# Patient Record
Sex: MALE | Race: WHITE | NOT HISPANIC OR LATINO | Employment: OTHER | URBAN - METROPOLITAN AREA
[De-identification: names, ages, dates, MRNs, and addresses within clinical notes are randomized per-mention and may not be internally consistent; named-entity substitution may affect disease eponyms.]

---

## 2017-01-01 ENCOUNTER — HOSPITAL ENCOUNTER (EMERGENCY)
Facility: CLINIC | Age: 62
Discharge: HOME | End: 2017-01-01
Attending: EMERGENCY MEDICINE | Admitting: EMERGENCY MEDICINE
Payer: COMMERCIAL

## 2017-01-01 ENCOUNTER — APPOINTMENT (OUTPATIENT)
Dept: GENERAL RADIOLOGY | Facility: CLINIC | Age: 62
End: 2017-01-01
Attending: EMERGENCY MEDICINE
Payer: COMMERCIAL

## 2017-01-01 VITALS
OXYGEN SATURATION: 98 % | WEIGHT: 126.1 LBS | TEMPERATURE: 98.2 F | RESPIRATION RATE: 16 BRPM | BODY MASS INDEX: 17.83 KG/M2 | DIASTOLIC BLOOD PRESSURE: 84 MMHG | SYSTOLIC BLOOD PRESSURE: 143 MMHG

## 2017-01-01 DIAGNOSIS — H10.31 ACUTE CONJUNCTIVITIS OF RIGHT EYE, UNSPECIFIED ACUTE CONJUNCTIVITIS TYPE: ICD-10-CM

## 2017-01-01 DIAGNOSIS — R04.2 HEMOPTYSIS: ICD-10-CM

## 2017-01-01 DIAGNOSIS — J44.1 COPD EXACERBATION (H): ICD-10-CM

## 2017-01-01 DIAGNOSIS — J20.9 ACUTE BRONCHITIS, UNSPECIFIED ORGANISM: ICD-10-CM

## 2017-01-01 DIAGNOSIS — J01.90 ACUTE NON-RECURRENT SINUSITIS, UNSPECIFIED LOCATION: ICD-10-CM

## 2017-01-01 LAB
FLUAV+FLUBV AG SPEC QL: NEGATIVE
FLUAV+FLUBV AG SPEC QL: NORMAL
SPECIMEN SOURCE: NORMAL

## 2017-01-01 PROCEDURE — 71020 XR CHEST 2 VW: CPT

## 2017-01-01 PROCEDURE — 99284 EMERGENCY DEPT VISIT MOD MDM: CPT | Mod: 25

## 2017-01-01 PROCEDURE — 87804 INFLUENZA ASSAY W/OPTIC: CPT | Performed by: EMERGENCY MEDICINE

## 2017-01-01 RX ORDER — ERYTHROMYCIN 5 MG/G
1 OINTMENT OPHTHALMIC 4 TIMES DAILY
Qty: 1 TUBE | Refills: 0 | Status: SHIPPED | OUTPATIENT
Start: 2017-01-01 | End: 2017-01-06

## 2017-01-01 RX ORDER — PREDNISONE 20 MG/1
TABLET ORAL
Qty: 10 TABLET | Refills: 0 | Status: SHIPPED | OUTPATIENT
Start: 2017-01-01 | End: 2017-01-06

## 2017-01-01 RX ORDER — AZITHROMYCIN 250 MG/1
TABLET, FILM COATED ORAL
Qty: 6 TABLET | Refills: 0 | Status: SHIPPED | OUTPATIENT
Start: 2017-01-01 | End: 2017-01-06

## 2017-01-01 ASSESSMENT — ENCOUNTER SYMPTOMS
ABDOMINAL PAIN: 0
COUGH: 1
CHILLS: 1
FEVER: 1
SORE THROAT: 0

## 2017-01-01 NOTE — ED PROVIDER NOTES
History     Chief Complaint:  Cough and Hemoptysis    HPI   Ángel Pham is a 61 year old male smoker with COPD who presents to the ED for evaluation of cough and hemoptysis. The patient reports he has had a cold for about a week, which started with a cough and congestion. He states his cold worsened quite a bit a couple days ago and he noticed his mucous turned green and yellow. He mentions he started in with fever and chills yesterday. Today, he comes in because he noticed a small streak of blood in his sputum and his symptoms are not improving. He has been taking Tylenol and doing his nebulizer treatments at home as typical and notes his shortness of breath is just slightly worse than typical. He denies chest pain, leg swelling, abdominal pain or sore throat. No known sick contacts or travel outside the country.    Allergies:  Chantix  Morphine  Wellbutrin     Medications:    Ventolin  Lisinopril  Incruse Ellipta  Advair Disk  Accuneb  Butisol sodium PO     Past Medical History:    HTN  Hyperlipidemia  COPD  Pneumothorax on left    Past Surgical History:    Hemorrhoidectomy  Thoracic surgery  Hernia repair  ENT surgery  Orthopedic surgery    Family History:    Breast cancer- mother  Brain cancer- father    Social History:  The patient was accompanied to the ED by his wife.  Smoking Status: Current every day smoker, 1 pack per day for 30 yrs.  Smokeless Tobacco: Never  Alcohol Use: 6 cans of beer weekly  Marital Status:  Single      Review of Systems   Constitutional: Positive for fever and chills.   HENT: Positive for congestion. Negative for sore throat.    Respiratory: Positive for cough.    Cardiovascular: Negative for chest pain and leg swelling.   Gastrointestinal: Negative for abdominal pain.   All other systems reviewed and are negative.    Physical Exam   First Vitals:  BP: 171/84 mmHg  Heart Rate: 70  Temp: 98.2  F (36.8  C)  Resp: 18  Weight: 57.2 kg (126 lb 1.7 oz)  SpO2: 100 %      Physical  Exam  General: Thin adult male sitting upright  Eyes: PERRL. Mild conjunctival injection on the right. No discharge.  ENT:No facial tenderness to palpation. Moist mucous membranes, oropharynx clear. No lymphadenopathy.  CV: Normal S1S2, no murmur, rub or gallop. Regular rate and rhythm  Resp: Mildly diminished throughout. Normal respiratory effort.  GI: Abdomen is soft, nontender and nondistended. No palpable masses. No rebound or guarding.  MSK: No edema. Nontender. Normal active range of motion. No calf asymmetry or tenderness.  Skin: Warm and dry. No rashes or lesions or ecchymoses on visible skin.  Neuro: Alert and oriented. Responds appropriately to all questions and commands. No focal findings appreciated. Normal muscle tone.  Psych: Normal mood and affect. Pleasant.    Emergency Department Course     Imaging:  Radiographic findings were communicated with the patient who voiced understanding of the findings.    Chest XR, PA & LAT  IMPRESSION: Emphysematous and fibrotic changes again noted. There are  no airspace opacities to suggest pneumonia. There is no pleural  effusion or pneumothorax. Heart size is normal with no evidence for  congestive failure.  Preliminary result, per Radiology.    Laboratory:  Influenza A/B Antigen: Negative    Emergency Department Course:  Nursing notes and vitals reviewed.  I performed an exam of the patient as documented above.     Patient reassessed. Feels comfortable. Denies any new concerns.     Findings and plan explained to the Patient. Patient discharged home with instructions regarding supportive care, medications, and reasons to return. The importance of close follow-up was reviewed. The patient was prescribed Prednisone, Azithromycin and Erythromycin.      Impression & Plan    Medical Decision Making:  Ángel Pham is a 61 year old male with a history of COPD and ongoing smoking who presents to the ED with concerns for a cough, nasal congestion and a headache. He has  also coughed a bit of blood-tinged mucous, but thought it may have been from his nose, which seems likely, as he had a bloody nose earlier that resolved. Here, he did not have any hemoptysis. He appeared stable. Given the slight amount of blood tinged mucous, I did not think there was indication for blood work. He had no evidence to suggest pneumonia, effusion, or other pulmonary abnormalities that were worrisome from this presentation. No clinical concern for significant PE.  He is not hypoxic. He may be having a mild COPD flare, secondary to his acute bronchitis, which is likely viral. He is has associated conjunctivitis, also suggesting viral etiology. I prescribed Erythromycin for this. Given concerns for COPD exacerbation, he'll be covered with Azithromycin. He was also sent home with a burst dose of steroids. There is no indication for antibiotics for acute sinusitis, given the short duration and lack of findings that would support bacterial cause. I feel comfortable discharging him home. He is comfortable with the plan as well. He should return immediately if symptoms worsen. He should follow up with his PCP within 3 days for reassessment.     Diagnosis:  1. (J20.9) Acute bronchitis, unspecified organism    2. (R04.2) Hemoptysis    3. (J01.90) Acute non-recurrent sinusitis, unspecified location    4. (H10.31) Acute conjunctivitis of right eye, unspecified acute conjunctivitis type    5. (J44.1) COPD exacerbation (H)    Disposition:  The patient was discharged home.    Discharge Medications:  Discharge Medication List as of 1/1/2017  9:14 AM      START taking these medications    Details   predniSONE (DELTASONE) 20 MG tablet Take two tablets (= 40mg) each day for 5 (five) days, Disp-10 tablet, R-0, Local Print      azithromycin (ZITHROMAX Z-CINDY) 250 MG tablet Two tablets on the first day, then one tablet daily for the next 4 days, Disp-6 tablet, R-0, Local Print      erythromycin (ROMYCIN) ophthalmic ointment  Place 1 Application into the right eye 4 times daily for 5 daysDisp-1 Tube, R-0Local Print             1/1/2017   [facility] EMERGENCY DEPARTMENT    I, Marlene Greco, am serving as a scribe at 0651 on January 1, 2017 to document services personally performed by  Dr. Ray, based on my observations and the provider's statements to me.      Solange Ray MD  01/02/17 [id number]

## 2017-01-01 NOTE — ED AVS SNAPSHOT
[facility] Emergency Department    [address]    [facility] [address]    Phone:  [phone]    Fax:  [phone]                                       Ángel Pham   MRN: [id number]    Department:  [facility] Emergency Department   Date of Visit:  1/1/2017           Patient Information     Date Of Birth          1955        Your diagnoses for this visit were:     Acute bronchitis, unspecified organism     Hemoptysis     Acute non-recurrent sinusitis, unspecified location     Acute conjunctivitis of right eye, unspecified acute conjunctivitis type     COPD exacerbation (H)        You were seen by Solange Ray MD.      Follow-up Information     Follow up with Chuck Morillo MD.    Specialty:  Family Practice    Why:  within 3 days for reassessment    Contact information:    [facility]  [address]  [facility] [address] [phone]          Follow up with [facility] Emergency Department.    Specialty:  EMERGENCY MEDICINE    Why:  As needed, If symptoms worsen    Contact information:    [address]  [facility] [phone] [phone]        Discharge Instructions       Discharge Instructions  Bronchitis, Pneumonia, Bronchospasm    You were seen today for a chest infection or inflammation. If your doctor decided this was due to a bacterial infection, you may need an antibiotic. Sometimes these are caused by a virus, and then an antibiotic will not help.     Return to the Emergency Department if:    Your breathing gets much worse.    You are very weak, or feel much more ill.    You develop new symptoms, such as chest pain.    You cough up blood.    You are vomiting enough that you can t keep fluids or your medicine down.    What can I do to help myself?    Fill any prescriptions the doctor gave you and take them right away--especially antibiotics. Be sure to finish the whole antibiotic  "prescription.    You may be given a prescription for an inhaler, which can help loosen tight air passages.  Use this as needed, but not more often than directed. Inhalers work much better when used with a spacer.     You may be given a prescription for a steroid to reduce inflammation. Used long-term, these can have many serious side effects, but for short courses these do not happen. You may notice restlessness or increased appetite.        You may use non-prescription cough or cold medicines. Cough medicines may help, but don t make the cough go away completely.     Avoid smoke, because this can make your symptoms worse. If you smoke, this may be a good time to quit! Consider using nicotine lozenges, gum, or patches to reduce cravings.     If you have a fever, Tylenol  (acetaminophen), Motrin  (ibuprofen), or Advil  (ibuprofen) may help bring fever down and may help you feel more comfortable. Be sure to read and follow the package directions, and ask your doctor if you have questions.    Be sure to get your flu shot each year.  The pneumonia shot can help prevent pneumonia.  Probiotics: If you have been given an antibiotic, you may want to also take a probiotic pill or eat yogurt with live cultures. Probiotics have \"good bacteria\" to help your intestines stay healthy. Studies have shown that probiotics help prevent diarrhea and other intestine problems (including C. diff infection) when you take antibiotics. You can buy these without a prescription in the pharmacy section of the store.     If your doctor has told you to follow-up at your clinic, be sure to call right away and go to your appointment.  If there is any problem with keeping your appointment, call your doctor or return to the Emergency Department.    If you were given a prescription for medicine here today, be sure to read all of the information (including the package insert) that comes with your prescription.  This will include important information " about the medicine, its side effects, and any warnings that you need to know about.  The pharmacist who fills the prescription can provide more information and answer questions you may have about the medicine.  If you have questions or concerns that the pharmacist cannot address, please call or return to the Emergency Department.       Remember that you can always come back to the Emergency Department if you are not able to see your regular doctor in the amount of time listed above, if you get any new symptoms, or if there is anything that worries you.    Discharge Instructions  Sinus Infection    You have acute sinusitis, or an infection of the sinuses. The sinuses are the hollow areas within the facial bones that are connected to the nasal opening. The most common cause of acute sinusitis is a virus infection associated with the common cold. Bacterial sinusitis occurs much less commonly, usually as a complication of viral sinusitis. Experts say that most sinusitis is caused by a virus within the first 7-10 days of illness. Antibiotics do nothing to help with virus infections, so most people do not need antibiotics for acute sinusitis.     Return to the Emergency Department if:    Your vision changes.    You are confused or have difficulty thinking clearly.    You have swelling around your eye.    You develop a severe headache or neck stiffness.    You have a fever over 101 degrees.    Your symptoms get worse and you are unable to see your primary doctor.    Follow-up with your doctor:     See your primary doctor in one week if not improving.    Treatment:    Pain relief -- Non-prescription pain medications, such as Tylenol  (acetaminophen) or Motrin  or Advil  (ibuprofen) are recommended for pain.  Do not use a medicine that you are allergic to, or if your doctor has told you not to use it.       Nasal irrigation -- Flushing the nose and sinuses with a saline solution several times per day can help to decrease  "pain caused by congestion.    Nasal decongestants -- Nasal decongestant sprays, including Afrin  (oxymetazoline) and Chuy-Synephrine  (phenylephrine) can be used to temporarily treat congestion. However, these sprays should not be used for more than two to three days due to the risk of rebound congestion (when the nose is congested constantly unless the medication is used repeatedly).    Nasal glucocorticoids -- These are prescription steroids delivered by a nasal spray that can help to reduce swelling inside the nose, usually within two to three days. These drugs have few side effects and dramatically relieve symptoms in most people.  If you use these in conjunction with Afrin  you will need to use at least 15 minutes prior to the nasal decongestant.      Do I need an antibiotic? -- Sometimes, but not always, antibiotics are used along with the above treatments.    Antibiotic Warning:     If you have been placed on antibiotics - watch for signs of allergic reaction.  These include rash, lip swelling, difficulty breathing, wheezing, and dizziness.  If you develop any of these symptoms, stop the antibiotic immediately and go to an Emergency Department or Urgent Care for evaluation.    Probiotics: If you have been given an antibiotic, you may want to also take a probiotic pill or eat yogurt with live cultures. Probiotics have \"good bacteria\" to help your intestines stay healthy. Studies have shown that probiotics help prevent diarrhea and other intestine problems (including C. diff infection) when you take antibiotics. You can buy these without a prescription in the pharmacy section of the store.   If you were given a prescription for medicine here today, be sure to read all of the information (including the package insert) that comes with your prescription.  This will include important information about the medicine, its side effects, and any warnings that you need to know about.  The pharmacist who fills the " "prescription can provide more information and answer questions you may have about the medicine.  If you have questions or concerns that the pharmacist cannot address, please call or return to the Emergency Department.   Discharge Instructions  Conjunctivitis  Conjunctivitis, or \"pinkeye\", is inflammation of the conjunctiva which is the thin membrane that lines the inner surface of the eyelids and the whites of the eyes.   There are four main types of conjunctivitis: viral, bacterial, allergic, and non-specific. Both bacterial and viral conjunctivitis spread easily from one person to another by contact with the eye or another person s hands, by an object the infected person has touched, such as a door handle, or by sharing an object that has touched their eye such as a towel or pillow case. Because of this, children with bacterial conjunctivitis can t go back to school or  until they have been on antibiotic drops for 24 hours.  VIRAL CONJUNCTIVITIS:  This is typically caused by the virus that also causes the common cold and is often seen as part of a general cold.  This type of conjunctivitis is not treated with antibiotics, and usually lasts 3 - 5 days.  An over the counter antihistamine/decongestant eye drop may help to relieve the itching and irritation of viral conjunctivitis.  BACTERIAL CONJUNCTIVITIS:  This is treated with an antibiotic ointment or eye drop.  In both bacterial and viral conjunctivitis, do not wear contact lenses until your eye is no longer red.   Your contact case should be thrown away and the contacts disinfected overnight, or replaced if disposable.  NON SPECIFIC CONJUNCTIVITIS: Sometimes a red eye is caused by other things such as dry eye, chemical exposure, or foreign body in the eye such as dust or eyelash.   All of these problems generally improve on their own within 24 hours.  ALLERGIC CONJUNCTIVITIS: These are eye symptoms caused by allergies. This type of conjunctivitis will be " treated with allergy medications.    Return to the Emergency Department if:    If you have blurry vision.    If you have increasing eye pain or drainage.    If you have redness or swelling in the skin around the eye.    If you have a fever.    Follow-up with your doctor:      Your eye should improve within 2 days, if it does not, return to the Emergency Department or see your regular doctor for a second eye exam.  If you were given a prescription for medicine here today, be sure to read all of the information (including the package insert) that comes with your prescription.  This will include important information about the medicine, its side effects, and any warnings that you need to know about.  The pharmacist who fills the prescription can provide more information and answer questions you may have about the medicine.  If you have questions or concerns that the pharmacist cannot address, please call or return to the Emergency Department.     COPD Flare    You have had a flare-up of your COPD.  COPD, or chronic obstructive pulmonary disease, is a common lung disease. It causes your airways to become irritated and narrower. This makes it harder for you to breathe. Emphysema and chronic bronchitis are both types of COPD. This is a chronic condition, which means you always have it. Sometimes it gets worse. When this happens, it is called a flare-up.  Symptoms of COPD  People with COPD may have symptoms most of the time. In a flare-up, your symptoms get worse. These symptoms may mean you are having a flare-up:    Shortness of breath, shallow or rapid breathing, or wheezing that gets worse    Lung infection    Cough that gets worse    More mucus, thicker mucus or mucus of a different color    Tiredness, decreased energy, or trouble doing your usual activities    Fever    Chest tightness    Your symptoms don t get better even when you use your usual medicines, inhalers, and nebulizer    Trouble talking    You feel  confused  Causes of flare-ups  Unfortunately, a flare-up can happen even though you did everything right, and you followed your doctor s instructions. Some causes of flare-ups are:    Smoking or secondhand smoke    Colds, the flu, or respiratory infections    Air pollution    Sudden change in the weather    Dust, irritating chemicals, or strong fumes    Not taking your medicines as prescribed  Home care  Here are some things you can do at home to treat a flare-up:    Try not to panic. This makes it harder to breathe, and keeps you from doing the right things.    Don t smoke or be around others who are smoking.    Try to drink more fluids than usual during a flare-up, unless your doctor has told you not to because of heart and kidney problems. More fluids can help loosen the mucus.    Use your inhalers and nebulizer, if you have one, as you have been told to.    If you were given antibiotics, take them until they are used up or your doctor tells you to stop. It s important to finish the antibiotics, even though you feel better. This will make sure the infection has cleared.    If you were given prednisone or another steroid, finish it even if you feel better.  Preventing a flare-up  Even though flare-ups happen, the best way to treat one is to prevent it before it starts. Here are some pointers:    Don t smoke or be around others who are smoking.    Take your medicines as you have been told.    Talk with your doctor about getting a flu shot every year. Also find out if you need a pneumonia shot.    If there is a weather advisory warning to stay indoors, try to stay inside when possible.    Try to eat healthy and get plenty of sleep.    Try to avoid things that usually set you off, like dust, chemical fumes, hairsprays, or strong perfumes.  Follow-up care  Follow up with your healthcare provider.  If a culture was done, you will be told if your treatment needs to be changed. You can call as directed for the  results.  If X-rays were done, and a radiologist had not seen them while you were there, they will be reviewed. You will be told if there is a change in the reading, especially if it affects your treatment.  Call 911  Call 911 if any of these occur:    You have trouble breathing    You feel confused or it s difficult to wake you up    You faint or lose consciousness    You have a rapid heart rate    You have new pain in your chest, arm, shoulder, neck or upper back  When to seek medical advice  Call your healthcare provider right away if any of these occur:    Wheezing or shortness of breath gets worse    You need to use your inhalers more often than usual without relief    Fever of 100.4 F (38 C) or higher, or as directed    Coughing up lots of dark-colored or bloody sputum (mucus)    Chest pain with each breath    You do not start to get better within 24 hours    Swelling or your ankles gets worse    Dizziness or weakness       2073-7515 [employer]. [address] [place] [address]. All rights reserved. This information is not intended as a substitute for professional medical care. Always follow your healthcare professional's instructions.          Remember that you can always come back to the Emergency Department if you are not able to see your regular doctor in the amount of time listed above, if you get any new symptoms, or if there is anything that worries you.        24 Hour Appointment Hotline       To make an appointment at any [facility], call [phone] ([phone]). If you don't have a family doctor or clinic, we will help you find one. [facility] are conveniently located to serve the needs of you and your family.             Review of your medicines      START taking        Dose / Directions Last dose taken    azithromycin 250 MG tablet   Commonly known as:  ZITHROMAX Z-CINDY   Quantity:  6 tablet        Two tablets on the first day, then one tablet daily for the  next 4 days   Refills:  0        erythromycin ophthalmic ointment   Commonly known as:  ROMYCIN   Dose:  1 Application   Quantity:  1 Tube        Place 1 Application into the right eye 4 times daily for 5 days   Refills:  0        predniSONE 20 MG tablet   Commonly known as:  DELTASONE   Quantity:  10 tablet        Take two tablets (= 40mg) each day for 5 (five) days   Refills:  0          Our records show that you are taking the medicines listed below. If these are incorrect, please call your family doctor or clinic.        Dose / Directions Last dose taken    ADVAIR DISKUS 250-50 MCG/DOSE diskus inhaler   Dose:  1 puff   Generic drug:  fluticasone-salmeterol        Inhale 1 puff into the lungs every 12 hours   Refills:  0        * albuterol 1.25 MG/3ML nebulizer solution   Commonly known as:  ACCUNEB   Dose:  1 vial        Take 1 vial by nebulization every 6 hours as needed for shortness of breath / dyspnea or wheezing   Refills:  0        * albuterol 108 (90 BASE) MCG/ACT Inhaler   Dose:  2 puff   Generic drug:  albuterol        Inhale 2 puffs into the lungs as needed.   Refills:  0        BUTISOL SODIUM PO        Take  by mouth at onset of headache.   Refills:  0        lisinopril 10 MG tablet   Commonly known as:  PRINIVIL/ZESTRIL   Quantity:  90 tablet        Take 1 tablet by mouth  daily   Refills:  0        umeclidinium-vilanterol 62.5-25 MCG/INH oral inhaler   Commonly known as:  ANORO ELLIPTA   Dose:  1 puff        Inhale 1 puff into the lungs daily   Refills:  0        * Notice:  This list has 2 medication(s) that are the same as other medications prescribed for you. Read the directions carefully, and ask your doctor or other care provider to review them with you.            Prescriptions were sent or printed at these locations (3 Prescriptions)                   Other Prescriptions                Printed at Department/Unit printer (3 of 3)         predniSONE (DELTASONE) 20 MG tablet                azithromycin (ZITHROMAX Z-CINDY) 250 MG tablet               erythromycin (ROMYCIN) ophthalmic ointment                Procedures and tests performed during your visit     Chest XR,  PA & LAT    Influenza A/B antigen      Orders Needing Specimen Collection     None      Pending Results     No orders found from 12/31/2016 to 1/2/2017.       Test Results from your hospital stay           1/1/2017  8:04 AM - Interface, Radiant Ib      Narrative     CHEST TWO VIEWS 1/1/2017 7:30 AM     COMPARISON: Chest CT 11/10/2011.    HISTORY: Cough.        Impression     IMPRESSION: Emphysematous and fibrotic changes again noted. There are  no airspace opacities to suggest pneumonia. There is no pleural  effusion or pneumothorax. Heart size is normal with no evidence for  congestive failure.    AUTUMN SQUIRES MD         1/1/2017  8:29 AM - Interface, Flexilab Results      Component Results     Component Value Ref Range & Units Status    Influenza A/B Agn Specimen Nasopharyngeal  Final    Influenza A Negative NEG Final    Influenza B  NEG Final    Negative   Test results must be correlated with clinical data. If necessary, results   should be confirmed by a molecular assay or viral culture.                  Clinical Quality Measure: Blood Pressure Screening     Your blood pressure was checked while you were in the emergency department today. The last reading we obtained was  BP: 143/84 mmHg . Please read the guidelines below about what these numbers mean and what you should do about them.  If your systolic blood pressure (the top number) is less than 120 and your diastolic blood pressure (the bottom number) is less than 80, then your blood pressure is normal. There is nothing more that you need to do about it.  If your systolic blood pressure (the top number) is 120-139 or your diastolic blood pressure (the bottom number) is 80-89, your blood pressure may be higher than it should be. You should have your blood pressure rechecked within  "a year by a primary care provider.  If your systolic blood pressure (the top number) is 140 or greater or your diastolic blood pressure (the bottom number) is 90 or greater, you may have high blood pressure. High blood pressure is treatable, but if left untreated over time it can put you at risk for heart attack, stroke, or kidney failure. You should have your blood pressure rechecked by a primary care provider within the next 4 weeks.  If your provider in the emergency department today gave you specific instructions to follow-up with your doctor or provider even sooner than that, you should follow that instruction and not wait for up to 4 weeks for your follow-up visit.        Thank you for choosing [facility]       Thank you for choosing [facility] for your care. Our goal is always to provide you with excellent care. Hearing back from our patients is one way we can continue to improve our services. Please take a few minutes to complete the written survey that you may receive in the mail after you visit with us. Thank you!        [employer] Information     [employer] lets you send messages to your doctor, view your test results, renew your prescriptions, schedule appointments and more. To sign up, go to [email or web address] . Click on \"Log in\" on the left side of the screen, which will take you to the Welcome page. Then click on \"Sign up Now\" on the right side of the page.     You will be asked to enter the access code listed below, as well as some personal information. Please follow the directions to create your username and password.     Your access code is: [email or web address]  Expires: 2017  9:07 AM     Your access code will  in 90 days. If you need help or a new code, please call your [facility] clinic or [phone].        After Visit Summary       This is your record. Keep this with you and show to your community pharmacist(s) and doctor(s) at your next visit.                  "

## 2017-01-01 NOTE — ED AVS SNAPSHOT
[facility] Emergency Department    [address]    [facility] [address]    Phone:  [phone]    Fax:  [phone]                                       Ángel Pham   MRN: [id number]    Department:  [facility] Emergency Department   Date of Visit:  1/1/2017           After Visit Summary Signature Page     I have received my discharge instructions, and my questions have been answered. I have discussed any challenges I see with this plan with the nurse or doctor.    ..........................................................................................................................................  Patient/Patient Representative Signature      ..........................................................................................................................................  Patient Representative Print Name and Relationship to Patient    ..................................................               ................................................  Date                                            Time    ..........................................................................................................................................  Reviewed by Signature/Title    ...................................................              ..............................................  Date                                                            Time

## 2017-01-01 NOTE — ED NOTES
Alert and oriented x 3 airway,breathing and circulation intact, sinus headache for 3 days, coughing up blood tingled sputum this am

## 2017-01-01 NOTE — DISCHARGE INSTRUCTIONS
Discharge Instructions  Bronchitis, Pneumonia, Bronchospasm    You were seen today for a chest infection or inflammation. If your doctor decided this was due to a bacterial infection, you may need an antibiotic. Sometimes these are caused by a virus, and then an antibiotic will not help.     Return to the Emergency Department if:    Your breathing gets much worse.    You are very weak, or feel much more ill.    You develop new symptoms, such as chest pain.    You cough up blood.    You are vomiting enough that you can t keep fluids or your medicine down.    What can I do to help myself?    Fill any prescriptions the doctor gave you and take them right away--especially antibiotics. Be sure to finish the whole antibiotic prescription.    You may be given a prescription for an inhaler, which can help loosen tight air passages.  Use this as needed, but not more often than directed. Inhalers work much better when used with a spacer.     You may be given a prescription for a steroid to reduce inflammation. Used long-term, these can have many serious side effects, but for short courses these do not happen. You may notice restlessness or increased appetite.        You may use non-prescription cough or cold medicines. Cough medicines may help, but don t make the cough go away completely.     Avoid smoke, because this can make your symptoms worse. If you smoke, this may be a good time to quit! Consider using nicotine lozenges, gum, or patches to reduce cravings.     If you have a fever, Tylenol  (acetaminophen), Motrin  (ibuprofen), or Advil  (ibuprofen) may help bring fever down and may help you feel more comfortable. Be sure to read and follow the package directions, and ask your doctor if you have questions.    Be sure to get your flu shot each year.  The pneumonia shot can help prevent pneumonia.  Probiotics: If you have been given an antibiotic, you may want to also take a probiotic pill or eat yogurt with live cultures.  "Probiotics have \"good bacteria\" to help your intestines stay healthy. Studies have shown that probiotics help prevent diarrhea and other intestine problems (including C. diff infection) when you take antibiotics. You can buy these without a prescription in the pharmacy section of the store.     If your doctor has told you to follow-up at your clinic, be sure to call right away and go to your appointment.  If there is any problem with keeping your appointment, call your doctor or return to the Emergency Department.    If you were given a prescription for medicine here today, be sure to read all of the information (including the package insert) that comes with your prescription.  This will include important information about the medicine, its side effects, and any warnings that you need to know about.  The pharmacist who fills the prescription can provide more information and answer questions you may have about the medicine.  If you have questions or concerns that the pharmacist cannot address, please call or return to the Emergency Department.       Remember that you can always come back to the Emergency Department if you are not able to see your regular doctor in the amount of time listed above, if you get any new symptoms, or if there is anything that worries you.    Discharge Instructions  Sinus Infection    You have acute sinusitis, or an infection of the sinuses. The sinuses are the hollow areas within the facial bones that are connected to the nasal opening. The most common cause of acute sinusitis is a virus infection associated with the common cold. Bacterial sinusitis occurs much less commonly, usually as a complication of viral sinusitis. Experts say that most sinusitis is caused by a virus within the first 7-10 days of illness. Antibiotics do nothing to help with virus infections, so most people do not need antibiotics for acute sinusitis.     Return to the Emergency Department if:    Your vision " changes.    You are confused or have difficulty thinking clearly.    You have swelling around your eye.    You develop a severe headache or neck stiffness.    You have a fever over 101 degrees.    Your symptoms get worse and you are unable to see your primary doctor.    Follow-up with your doctor:     See your primary doctor in one week if not improving.    Treatment:    Pain relief -- Non-prescription pain medications, such as Tylenol  (acetaminophen) or Motrin  or Advil  (ibuprofen) are recommended for pain.  Do not use a medicine that you are allergic to, or if your doctor has told you not to use it.       Nasal irrigation -- Flushing the nose and sinuses with a saline solution several times per day can help to decrease pain caused by congestion.    Nasal decongestants -- Nasal decongestant sprays, including Afrin  (oxymetazoline) and Chuy-Synephrine  (phenylephrine) can be used to temporarily treat congestion. However, these sprays should not be used for more than two to three days due to the risk of rebound congestion (when the nose is congested constantly unless the medication is used repeatedly).    Nasal glucocorticoids -- These are prescription steroids delivered by a nasal spray that can help to reduce swelling inside the nose, usually within two to three days. These drugs have few side effects and dramatically relieve symptoms in most people.  If you use these in conjunction with Afrin  you will need to use at least 15 minutes prior to the nasal decongestant.      Do I need an antibiotic? -- Sometimes, but not always, antibiotics are used along with the above treatments.    Antibiotic Warning:     If you have been placed on antibiotics - watch for signs of allergic reaction.  These include rash, lip swelling, difficulty breathing, wheezing, and dizziness.  If you develop any of these symptoms, stop the antibiotic immediately and go to an Emergency Department or Urgent Care for evaluation.    Probiotics: If  "you have been given an antibiotic, you may want to also take a probiotic pill or eat yogurt with live cultures. Probiotics have \"good bacteria\" to help your intestines stay healthy. Studies have shown that probiotics help prevent diarrhea and other intestine problems (including C. diff infection) when you take antibiotics. You can buy these without a prescription in the pharmacy section of the store.   If you were given a prescription for medicine here today, be sure to read all of the information (including the package insert) that comes with your prescription.  This will include important information about the medicine, its side effects, and any warnings that you need to know about.  The pharmacist who fills the prescription can provide more information and answer questions you may have about the medicine.  If you have questions or concerns that the pharmacist cannot address, please call or return to the Emergency Department.   Discharge Instructions  Conjunctivitis  Conjunctivitis, or \"pinkeye\", is inflammation of the conjunctiva which is the thin membrane that lines the inner surface of the eyelids and the whites of the eyes.   There are four main types of conjunctivitis: viral, bacterial, allergic, and non-specific. Both bacterial and viral conjunctivitis spread easily from one person to another by contact with the eye or another person s hands, by an object the infected person has touched, such as a door handle, or by sharing an object that has touched their eye such as a towel or pillow case. Because of this, children with bacterial conjunctivitis can t go back to school or  until they have been on antibiotic drops for 24 hours.  VIRAL CONJUNCTIVITIS:  This is typically caused by the virus that also causes the common cold and is often seen as part of a general cold.  This type of conjunctivitis is not treated with antibiotics, and usually lasts 3 - 5 days.  An over the counter " antihistamine/decongestant eye drop may help to relieve the itching and irritation of viral conjunctivitis.  BACTERIAL CONJUNCTIVITIS:  This is treated with an antibiotic ointment or eye drop.  In both bacterial and viral conjunctivitis, do not wear contact lenses until your eye is no longer red.   Your contact case should be thrown away and the contacts disinfected overnight, or replaced if disposable.  NON SPECIFIC CONJUNCTIVITIS: Sometimes a red eye is caused by other things such as dry eye, chemical exposure, or foreign body in the eye such as dust or eyelash.   All of these problems generally improve on their own within 24 hours.  ALLERGIC CONJUNCTIVITIS: These are eye symptoms caused by allergies. This type of conjunctivitis will be treated with allergy medications.    Return to the Emergency Department if:    If you have blurry vision.    If you have increasing eye pain or drainage.    If you have redness or swelling in the skin around the eye.    If you have a fever.    Follow-up with your doctor:      Your eye should improve within 2 days, if it does not, return to the Emergency Department or see your regular doctor for a second eye exam.  If you were given a prescription for medicine here today, be sure to read all of the information (including the package insert) that comes with your prescription.  This will include important information about the medicine, its side effects, and any warnings that you need to know about.  The pharmacist who fills the prescription can provide more information and answer questions you may have about the medicine.  If you have questions or concerns that the pharmacist cannot address, please call or return to the Emergency Department.     COPD Flare    You have had a flare-up of your COPD.  COPD, or chronic obstructive pulmonary disease, is a common lung disease. It causes your airways to become irritated and narrower. This makes it harder for you to breathe. Emphysema and  chronic bronchitis are both types of COPD. This is a chronic condition, which means you always have it. Sometimes it gets worse. When this happens, it is called a flare-up.  Symptoms of COPD  People with COPD may have symptoms most of the time. In a flare-up, your symptoms get worse. These symptoms may mean you are having a flare-up:    Shortness of breath, shallow or rapid breathing, or wheezing that gets worse    Lung infection    Cough that gets worse    More mucus, thicker mucus or mucus of a different color    Tiredness, decreased energy, or trouble doing your usual activities    Fever    Chest tightness    Your symptoms don t get better even when you use your usual medicines, inhalers, and nebulizer    Trouble talking    You feel confused  Causes of flare-ups  Unfortunately, a flare-up can happen even though you did everything right, and you followed your doctor s instructions. Some causes of flare-ups are:    Smoking or secondhand smoke    Colds, the flu, or respiratory infections    Air pollution    Sudden change in the weather    Dust, irritating chemicals, or strong fumes    Not taking your medicines as prescribed  Home care  Here are some things you can do at home to treat a flare-up:    Try not to panic. This makes it harder to breathe, and keeps you from doing the right things.    Don t smoke or be around others who are smoking.    Try to drink more fluids than usual during a flare-up, unless your doctor has told you not to because of heart and kidney problems. More fluids can help loosen the mucus.    Use your inhalers and nebulizer, if you have one, as you have been told to.    If you were given antibiotics, take them until they are used up or your doctor tells you to stop. It s important to finish the antibiotics, even though you feel better. This will make sure the infection has cleared.    If you were given prednisone or another steroid, finish it even if you feel better.  Preventing a  flare-up  Even though flare-ups happen, the best way to treat one is to prevent it before it starts. Here are some pointers:    Don t smoke or be around others who are smoking.    Take your medicines as you have been told.    Talk with your doctor about getting a flu shot every year. Also find out if you need a pneumonia shot.    If there is a weather advisory warning to stay indoors, try to stay inside when possible.    Try to eat healthy and get plenty of sleep.    Try to avoid things that usually set you off, like dust, chemical fumes, hairsprays, or strong perfumes.  Follow-up care  Follow up with your healthcare provider.  If a culture was done, you will be told if your treatment needs to be changed. You can call as directed for the results.  If X-rays were done, and a radiologist had not seen them while you were there, they will be reviewed. You will be told if there is a change in the reading, especially if it affects your treatment.  Call 911  Call 911 if any of these occur:    You have trouble breathing    You feel confused or it s difficult to wake you up    You faint or lose consciousness    You have a rapid heart rate    You have new pain in your chest, arm, shoulder, neck or upper back  When to seek medical advice  Call your healthcare provider right away if any of these occur:    Wheezing or shortness of breath gets worse    You need to use your inhalers more often than usual without relief    Fever of 100.4 F (38 C) or higher, or as directed    Coughing up lots of dark-colored or bloody sputum (mucus)    Chest pain with each breath    You do not start to get better within 24 hours    Swelling or your ankles gets worse    Dizziness or weakness       7247-1183 [employer]. [address] [place] [address]24. All rights reserved. This information is not intended as a substitute for professional medical care. Always follow your healthcare professional's instructions.          Remember  that you can always come back to the Emergency Department if you are not able to see your regular doctor in the amount of time listed above, if you get any new symptoms, or if there is anything that worries you.

## 2017-01-06 ENCOUNTER — OFFICE VISIT (OUTPATIENT)
Dept: FAMILY MEDICINE | Facility: CLINIC | Age: 62
End: 2017-01-06

## 2017-01-06 VITALS
HEART RATE: 75 BPM | BODY MASS INDEX: 17.82 KG/M2 | TEMPERATURE: 98.9 F | DIASTOLIC BLOOD PRESSURE: 80 MMHG | WEIGHT: 126 LBS | SYSTOLIC BLOOD PRESSURE: 132 MMHG | OXYGEN SATURATION: 98 %

## 2017-01-06 DIAGNOSIS — J01.90 ACUTE SINUSITIS WITH SYMPTOMS > 10 DAYS: Primary | ICD-10-CM

## 2017-01-06 PROCEDURE — 99213 OFFICE O/P EST LOW 20 MIN: CPT | Performed by: FAMILY MEDICINE

## 2017-01-06 NOTE — PROGRESS NOTES
Here to recheck on his COPD exacerbation. He was given prednisone and Zpak at the [facility] ED 5 days ago. He is still not feeling well. No fever, but SOB, stuffy nose. Productive cough.  /80 mmHg  Pulse 75  Temp(Src) 98.9  F (37.2  C)  Wt 57.153 kg (126 lb)  SpO2 98%   NAD except fatigue. TM's OK. Turbinates red and swollen. Pharynx injected. No nodes. Lungs are clear with decreased flow, and cor RRR.  (J01.90) Acute sinusitis with symptoms > 10 days  (primary encounter diagnosis)  Comment:   Plan: amoxicillin-clavulanate (AUGMENTIN) 875-125 MG         per tablet

## 2017-01-13 ENCOUNTER — TELEPHONE (OUTPATIENT)
Dept: FAMILY MEDICINE | Facility: CLINIC | Age: 62
End: 2017-01-13

## 2017-01-13 DIAGNOSIS — J44.9 COPD (CHRONIC OBSTRUCTIVE PULMONARY DISEASE) (H): Primary | ICD-10-CM

## 2017-01-13 DIAGNOSIS — J06.9 URI (UPPER RESPIRATORY INFECTION): ICD-10-CM

## 2017-01-13 RX ORDER — METHYLPREDNISOLONE 4 MG
TABLET, DOSE PACK ORAL
Qty: 21 TABLET | Refills: 0 | Status: SHIPPED | OUTPATIENT
Start: 2017-01-13 | End: 2017-01-20

## 2017-01-13 NOTE — TELEPHONE ENCOUNTER
Patient called and states he was seen last Friday and given augmentin 875 mg for sinus infection.  States he does not feel much better.  He also had RENE persaud from hospital on 1/1/17.  He does have COPD.  Per Dr. Saji Rush--- no further antibiotic is needed at this time.  To finish out the augmentin 875 mg-- has 3 days left.  Per Dr. Saji Rush-- will treat with medrol dose cori.  Patient informed and rx to pharmacy.

## 2017-01-20 ENCOUNTER — OFFICE VISIT (OUTPATIENT)
Dept: FAMILY MEDICINE | Facility: CLINIC | Age: 62
End: 2017-01-20

## 2017-01-20 VITALS
WEIGHT: 121 LBS | DIASTOLIC BLOOD PRESSURE: 70 MMHG | TEMPERATURE: 98.4 F | BODY MASS INDEX: 17.11 KG/M2 | HEART RATE: 66 BPM | OXYGEN SATURATION: 98 % | SYSTOLIC BLOOD PRESSURE: 120 MMHG

## 2017-01-20 DIAGNOSIS — I10 BENIGN ESSENTIAL HYPERTENSION: Primary | ICD-10-CM

## 2017-01-20 DIAGNOSIS — J44.9 CHRONIC OBSTRUCTIVE PULMONARY DISEASE, UNSPECIFIED COPD TYPE (H): ICD-10-CM

## 2017-01-20 PROCEDURE — 99213 OFFICE O/P EST LOW 20 MIN: CPT | Performed by: FAMILY MEDICINE

## 2017-01-20 RX ORDER — LOSARTAN POTASSIUM 50 MG/1
50 TABLET ORAL DAILY
Qty: 30 TABLET | Refills: 0 | Status: SHIPPED | OUTPATIENT
Start: 2017-01-20 | End: 2017-01-20

## 2017-01-20 RX ORDER — LOSARTAN POTASSIUM 50 MG/1
50 TABLET ORAL DAILY
Qty: 90 TABLET | Refills: 3 | Status: SHIPPED | OUTPATIENT
Start: 2017-01-20 | End: 2017-11-06

## 2017-01-20 RX ORDER — LOSARTAN POTASSIUM 50 MG/1
50 TABLET ORAL DAILY
Qty: 30 TABLET | Refills: 0 | Status: SHIPPED | OUTPATIENT
Start: 2017-01-20 | End: 2019-04-11

## 2017-01-20 RX ORDER — PREDNISONE 20 MG/1
40 TABLET ORAL DAILY
Qty: 14 TABLET | Refills: 0 | Status: SHIPPED | OUTPATIENT
Start: 2017-01-20 | End: 2017-11-06

## 2017-01-20 NOTE — PROGRESS NOTES
3 weeks of nasal congestion and productive cough. He has failed Zpak and Augmentin with Medrol dosepak. No fever or chills. Breathing OK, unless the nose is congested. Cold air helps open him up. He is chronic smoker and has been exposed to silica in the past.  On lisinopril for HTN.  OBJECTIVE: /70 mmHg  Pulse 66  Temp(Src) 98.4  F (36.9  C)  Wt 54.885 kg (121 lb)  SpO2 98% NAD. Nasal turbinates pale and swollen, occluding airway. Lungs are clear with decreased flow.   (I10) Benign essential hypertension  (primary encounter diagnosis)  Comment: on lisinopril, may effect breathing  Plan: change to losartan 50 mg daily    (J44.9) Chronic obstructive pulmonary disease, unspecified COPD type (H)  Comment: nasal congestion, and refuses nasal sprays  Plan: predniSONE (DELTASONE) 20 MG tablet        For 14 days    '

## 2017-04-04 ENCOUNTER — TRANSFERRED RECORDS (OUTPATIENT)
Dept: FAMILY MEDICINE | Facility: CLINIC | Age: 62
End: 2017-04-04

## 2017-05-11 ENCOUNTER — TRANSFERRED RECORDS (OUTPATIENT)
Dept: FAMILY MEDICINE | Facility: CLINIC | Age: 62
End: 2017-05-11

## 2017-09-28 DIAGNOSIS — Z23 NEED FOR PROPHYLACTIC VACCINATION AND INOCULATION AGAINST INFLUENZA: Primary | ICD-10-CM

## 2017-09-28 PROCEDURE — 90471 IMMUNIZATION ADMIN: CPT | Performed by: FAMILY MEDICINE

## 2017-09-28 PROCEDURE — 90686 IIV4 VACC NO PRSV 0.5 ML IM: CPT | Performed by: FAMILY MEDICINE

## 2017-09-28 NOTE — PROGRESS NOTES
Injectable Influenza Immunization Documentation    1.  Is the person to be vaccinated sick today?   No    2. Does the person to be vaccinated have an allergy to a component   of the vaccine?   No    3. Has the person to be vaccinated ever had a serious reaction   to influenza vaccine in the past?   No    4. Has the person to be vaccinated ever had Guillain-Barré syndrome?   No    Form completed by Parminder Saunders

## 2017-11-06 ENCOUNTER — OFFICE VISIT (OUTPATIENT)
Dept: FAMILY MEDICINE | Facility: CLINIC | Age: 62
End: 2017-11-06

## 2017-11-06 VITALS
DIASTOLIC BLOOD PRESSURE: 72 MMHG | BODY MASS INDEX: 17.12 KG/M2 | TEMPERATURE: 98.5 F | HEART RATE: 64 BPM | WEIGHT: 121 LBS | SYSTOLIC BLOOD PRESSURE: 122 MMHG | RESPIRATION RATE: 16 BRPM | OXYGEN SATURATION: 95 %

## 2017-11-06 DIAGNOSIS — E78.5 HYPERLIPIDEMIA WITH TARGET LDL LESS THAN 100: ICD-10-CM

## 2017-11-06 DIAGNOSIS — J69.0 ASPIRATION PNEUMONITIS (H): Primary | ICD-10-CM

## 2017-11-06 DIAGNOSIS — J44.9 CHRONIC OBSTRUCTIVE PULMONARY DISEASE, UNSPECIFIED COPD TYPE (H): ICD-10-CM

## 2017-11-06 DIAGNOSIS — F17.200 TOBACCO USE DISORDER: ICD-10-CM

## 2017-11-06 DIAGNOSIS — I10 HTN, GOAL BELOW 140/90: ICD-10-CM

## 2017-11-06 DIAGNOSIS — H61.22 IMPACTED CERUMEN OF LEFT EAR: ICD-10-CM

## 2017-11-06 PROCEDURE — 69209 REMOVE IMPACTED EAR WAX UNI: CPT | Mod: LT | Performed by: FAMILY MEDICINE

## 2017-11-06 PROCEDURE — 99213 OFFICE O/P EST LOW 20 MIN: CPT | Mod: 25 | Performed by: FAMILY MEDICINE

## 2017-11-06 RX ORDER — AZITHROMYCIN 250 MG/1
TABLET, FILM COATED ORAL
Qty: 6 TABLET | Refills: 0 | Status: SHIPPED | OUTPATIENT
Start: 2017-11-06 | End: 2019-04-11

## 2017-11-06 NOTE — PROGRESS NOTES
Chest two views on 1/1/2017:  COMPARISON: Chest CT 11/10/2011  HISTORY: Cough  IMPRESSION: Emphysematous and fibrotic changes again noted. There are  no airspace opacities to suggest pneumonia. There is no pleural  effusion or pneumothorax. Heart size is normal with no evidence for  congestive failure.  AUTUMN SQUIRES MD    HCM:   Hep C  Living Will- declined     Cc: cough; current everyday smoker, HO of COPD and pneumothorax on left.   Sees [place] [facility] specialist for COPD.   SUBJECTIVE:   Ángel Pham is a 62 year old male who presents to clinic today for the following health issues:  White male smoker  FYI: Patient states that he had a piece of steak in esophagus x yesterday.Get the steak out. Wasn't wearing teeth. Patient induced vomiting to remove piece of steak. Noticed mucus in vomit. Feels that the piece of steak has been removed. Currently has no issues with fluid intake, has not eaten since episode. No chest pain, no SOB, no difficulty with breathing.        RESPIRATORY SYMPTOMS      Duration: productive cough x  1.5 weeks, no recent fevers     Description  nasal congestion, rhinorrhea, facial pain/pressure, productive cough white, wheezing and headache, no fevers, no chills, no N/V/D    Severity: mild     Accompanying signs and symptoms: None    History (predisposing factors):  COPD, tobacco abuse and pneumothorax on left.     Precipitating or alleviating factors: None    Therapies tried and outcome:  none    <1ppd smoking has patches at home. Discussed smoking cessation.  ETOH 1 case of beer per week  Street drug/MJ no  Caffeine 1 cup coffee per day    Travel no  Pets dog  Exposure no    BP Readings from Last 3 Encounters:   11/06/17 122/72   01/20/17 120/70   01/06/17 132/80   On cozaar    LDL Cholesterol Calculated   Date Value Ref Range Status   07/11/2014 81 0 - 99 mg/dL Final   ]        Problem list and histories reviewed & adjusted, as indicated.  Additional history: as documented    Patient  Active Problem List   Diagnosis     Allergy history unknown     COPD (chronic obstructive pulmonary disease) (H)     Pneumothorax on left     Health Care Home     HTN, goal below 140/90     Hyperlipidemia with target LDL less than 100     Past Surgical History:   Procedure Laterality Date     ENT SURGERY       HC HEMORROIDECTOMY, EXTERNAL, SINGLE COLUMN/GROUP      left     HERNIA REPAIR       ORTHOPEDIC SURGERY       THORACIC SURGERY         Social History   Substance Use Topics     Smoking status: Current Every Day Smoker     Packs/day: 1.00     Years: 30.00     Types: Cigarettes     Smokeless tobacco: Never Used     Alcohol use 3.0 oz/week     6 Cans of beer per week     Family History   Problem Relation Age of Onset     Breast Cancer Mother      CANCER Father 55     brain ca         Current Outpatient Prescriptions   Medication Sig Dispense Refill     azithromycin (ZITHROMAX) 250 MG tablet Two tablets first day, then one tablet daily for four days. 6 tablet 0     losartan (COZAAR) 50 MG tablet Take 1 tablet (50 mg) by mouth daily 30 tablet 0     albuterol (ACCUNEB) 1.25 MG/3ML nebulizer solution Take 1 vial by nebulization every 6 hours as needed for shortness of breath / dyspnea or wheezing       fluticasone-salmeterol (ADVAIR DISKUS) 250-50 MCG/DOSE diskus inhaler Inhale 1 puff into the lungs every 12 hours       albuterol (PROAIR HFA) 108 (90 BASE) MCG/ACT inhaler Inhale 2 puffs into the lungs as needed.       Butabarbital Sodium (BUTISOL SODIUM PO) Take  by mouth at onset of headache.       [DISCONTINUED] losartan (COZAAR) 50 MG tablet Take 1 tablet (50 mg) by mouth daily 90 tablet 3     Allergies   Allergen Reactions     Chantix [Varenicline] Other (See Comments)     Patient reports has tried this in past and had lightheadedness and nose bleeds.     Morphine Hcl Itching     On contact     Wellbutrin [Bupropion Hydrobromide]      syncope     Recent Labs   Lab Test  11/25/15   1033  04/09/15   0645  07/11/14    1416  07/08/14   1046  06/28/13   1425   LDL   --    --   81   --   71   HDL   --    --   103   --   96   TRIG   --    --   51   --   51   ALT  21  29   --   19   --    CR  0.57*  0.61*   --   0.74*   --    GFRESTIMATED   --   >90  Non  GFR Calc     --    --    --    GFRESTBLACK   --   >90   GFR Calc     --    --    --    POTASSIUM  4.9  4.1   --   4.5   --       BP Readings from Last 3 Encounters:   11/06/17 122/72   01/20/17 120/70   01/06/17 132/80    Wt Readings from Last 3 Encounters:   11/06/17 54.9 kg (121 lb)   01/20/17 54.9 kg (121 lb)   01/06/17 57.2 kg (126 lb)                  Labs reviewed in EPIC          Reviewed and updated as needed this visit by clinical staff     Reviewed and updated as needed this visit by Provider         ROS:  Constitutional, HEENT, cardiovascular, pulmonary, GI, , musculoskeletal, neuro, skin, endocrine and psych systems are negative, except as otherwise noted.      OBJECTIVE:   /72  Pulse 64  Temp 98.5  F (36.9  C) (Oral)  Resp 16  Wt 54.9 kg (121 lb)  SpO2 95%  BMI 17.12 kg/m2  Body mass index is 17.12 kg/(m^2).  GENERAL: healthy, alert and no distress  EYES: Eyes grossly normal to inspection, PERRL and conjunctivae and sclerae normal  HENT: ear canals and TM's normal right and cerumen on the left-lavaged and clear on recheck, nose and mouth without ulcers or lesions  NECK: no adenopathy, no asymmetry, masses, or scars and thyroid normal to palpation  RESP: lungs clear to auscultation - no rales, rhonchi or wheezes Deep bronchial cough intermittent. Smelling of cigarettes.  CV: regular rate and rhythm, normal S1 S2, no S3 or S4, no murmur, click or rub, no peripheral edema and peripheral pulses strong  ABDOMEN: soft, nontender, no hepatosplenomegaly, no masses and bowel sounds normal  MS: no gross musculoskeletal defects noted, no edema  SKIN: no suspicious lesions or rashes  NEURO: Normal strength and tone, mentation intact and  "speech normal  PSYCH: mentation appears normal, affect normal/bright  LYMPH: no cervical, supraclavicular, axillary, or inguinal adenopathy    Diagnostic Test Results:  Results for orders placed or performed during the hospital encounter of 01/01/17   Chest XR,  PA & LAT    Narrative    CHEST TWO VIEWS 1/1/2017 7:30 AM     COMPARISON: Chest CT 11/10/2011.    HISTORY: Cough.      Impression    IMPRESSION: Emphysematous and fibrotic changes again noted. There are  no airspace opacities to suggest pneumonia. There is no pleural  effusion or pneumothorax. Heart size is normal with no evidence for  congestive failure.    AUTUMN SQUIRES MD   Influenza A/B antigen   Result Value Ref Range    Influenza A/B Agn Specimen Nasopharyngeal     Influenza A Negative NEG    Influenza B  NEG     Negative   Test results must be correlated with clinical data. If necessary, results   should be confirmed by a molecular assay or viral culture.         ASSESSMENT/PLAN:     ASSESSMENT / PLAN:  (J69.0) Aspiration pneumonitis (H)  (primary encounter diagnosis)  Comment: choked on some steak that he was tasting without his \"teeth\" in. Got the meat out ok, then had increase phlegm. Smoker with COPD history.  Plan: azithromycin (ZITHROMAX) 250 MG tablet        Consider imaging if not getting better    (F17.200) Tobacco use disorder  Comment:   Plan: Discussed smoking cessation. He has some low grade patches at home    (J44.9) Chronic obstructive pulmonary disease, unspecified COPD type (H)  Comment:   Plan: azithromycin (ZITHROMAX) 250 MG tablet        As above    (H61.22) Impacted cerumen of left ear  Comment: clear on inspection after lavage. No currette  Plan: Removal Impacted Cerumen Irrigation Unilateral         (Ear Wash)            (I10) HTN, goal below 140/90  Comment:   BP Readings from Last 3 Encounters:   11/06/17 122/72   01/20/17 120/70   01/06/17 132/80     Last Basic Metabolic Panel:  Lab Results   Component Value Date     " 11/25/2015      Lab Results   Component Value Date    POTASSIUM 4.9 11/25/2015     Lab Results   Component Value Date    CHLORIDE 92 11/25/2015     Lab Results   Component Value Date    MOSHE 9.4 11/25/2015     Lab Results   Component Value Date    CO2 26 04/09/2015     Lab Results   Component Value Date    BUN 8 11/25/2015    BUN 14 11/25/2015     Lab Results   Component Value Date    CR 0.57 11/25/2015     Lab Results   Component Value Date    GLC 92 11/25/2015     Update lab at future appointment lab only   Plan: Continue cares    (E78.5) Hyperlipidemia with target LDL less than 100  Comment:   LDL Cholesterol Calculated   Date Value Ref Range Status   07/11/2014 81 0 - 99 mg/dL Final   ]    Plan: Consider recheck fasting lab at a future appointment lab only  Continue cares        Patient Instructions   Left ear wax wash    Continue smoking cessation work    Zithromax for respiratory symptoms    Don't eat steak without your teeth in    Living will forms given    You declined Hep C screen today    Consider lipid and BMP at future lab appointment.      Naa Rodriges MD  [facility]

## 2017-11-06 NOTE — PATIENT INSTRUCTIONS
Left ear wax wash    Continue smoking cessation work    Zithromax for respiratory symptoms    Don't eat steak without your teeth in    Living will forms given    You declined Hep C screen today    Consider lipid and BMP at future lab appointment.

## 2017-11-06 NOTE — LETTER
[facility]  [address] [place] [address]  [phone]  November 6, 2017    Dear Employer:    This patient was seen in clinic today. Please excuse today due to illness.    Sincerely,    Naa Rodriges MD, MEd

## 2017-11-06 NOTE — MR AVS SNAPSHOT
"              After Visit Summary   11/6/2017    Ángel Pham    MRN: [id number]           Patient Information     Date Of Birth          1955        Visit Information        Provider Department      11/6/2017 11:30 AM Naa Rodriges MD [facility]        Today's Diagnoses     Aspiration pneumonitis (H)    -  1    Tobacco use disorder        Chronic obstructive pulmonary disease, unspecified COPD type (H)        Impacted cerumen of left ear          Care Instructions    Left ear wax wash    Continue smoking cessation work    Zithromax for respiratory symptoms    Don't eat steak without your teeth in    Living will forms given    You declined Hep C screen today          Follow-ups after your visit        Who to contact     If you have questions or need follow up information about today's clinic visit or your schedule please contact [facility] directly at [phone].  Normal or non-critical lab and imaging results will be communicated to you by [employer], letter or phone within 4 business days after the clinic has received the results. If you do not hear from us within 7 days, please contact the clinic through [employer] or phone. If you have a critical or abnormal lab result, we will notify you by phone as soon as possible.  Submit refill requests through [employer] or call your pharmacy and they will forward the refill request to us. Please allow 3 business days for your refill to be completed.          Additional Information About Your Visit        [employer] Information     [employer] lets you send messages to your doctor, view your test results, renew your prescriptions, schedule appointments and more. To sign up, go to [email or web address] . Click on \"Log in\" on the left side of the screen, which will take you to the Welcome page. Then click on \"Sign up Now\" on the right side of the page.     You will be asked to enter the access code listed below, as well as some personal " information. Please follow the directions to create your username and password.     Your access code is: [email or web address]  Expires: 2018 12:01 PM     Your access code will  in 90 days. If you need help or a new code, please call your [place] clinic or [phone].        Care EveryWhere ID     This is your Care EveryWhere ID. This could be used by other organizations to access your [place] medical records  [id number]        Your Vitals Were     Pulse Temperature Respirations Pulse Oximetry BMI (Body Mass Index)       64 98.5  F (36.9  C) (Oral) 16 95% 17.12 kg/m2        Blood Pressure from Last 3 Encounters:   17 122/72   17 120/70   17 132/80    Weight from Last 3 Encounters:   17 54.9 kg (121 lb)   17 54.9 kg (121 lb)   17 57.2 kg (126 lb)              We Performed the Following     Removal Impacted Cerumen Irrigation Unilateral (Ear Wash)          Today's Medication Changes          These changes are accurate as of: 17 12:04 PM.  If you have any questions, ask your nurse or doctor.               Start taking these medicines.        Dose/Directions    azithromycin 250 MG tablet   Commonly known as:  ZITHROMAX   Used for:  Aspiration pneumonitis (H), Tobacco use disorder, Chronic obstructive pulmonary disease, unspecified COPD type (H)   Started by:  Naa Rodriges MD        Two tablets first day, then one tablet daily for four days.   Quantity:  6 tablet   Refills:  0            Where to get your medicines      These medications were sent to [facility]/pharmacy #[id number] [place] [id number] [address]     Phone:  [phone]     azithromycin 250 MG tablet                Primary Care Provider Office Phone # Fax #    Chuck Morillo [phone] [address]  [facility] [address]        Equal Access to Services     LORE NUNN AH: melania Martínez qaybta  evelyn de jesus cuonggabby ion [place] ah. [facility] [phone].    ATENCIÓN: Si nancy pinto, tiene a [place] disposición servicios gratuitos de asistencia lingüística. Renay al [phone].    We comply with applicable federal civil rights laws and [place] laws. We do not discriminate on the basis of race, color, national origin, age, disability, sex, sexual orientation, or gender identity.            Thank you!     Thank you for choosing [facility]  for your care. Our goal is always to provide you with excellent care. Hearing back from our patients is one way we can continue to improve our services. Please take a few minutes to complete the written survey that you may receive in the mail after your visit with us. Thank you!             Your Updated Medication List - Protect others around you: Learn how to safely use, store and throw away your medicines at www.disposemymeds.org.          This list is accurate as of: 11/6/17 12:04 PM.  Always use your most recent med list.                   Brand Name Dispense Instructions for use Diagnosis    ADVAIR DISKUS 250-50 MCG/DOSE diskus inhaler   Generic drug:  fluticasone-salmeterol      Inhale 1 puff into the lungs every 12 hours        azithromycin 250 MG tablet    ZITHROMAX    6 tablet    Two tablets first day, then one tablet daily for four days.    Aspiration pneumonitis (H), Tobacco use disorder, Chronic obstructive pulmonary disease, unspecified COPD type (H)       BUTISOL SODIUM PO      Take  by mouth at onset of headache.        losartan 50 MG tablet    COZAAR    30 tablet    Take 1 tablet (50 mg) by mouth daily    Benign essential hypertension       * albuterol 1.25 MG/3ML nebulizer solution    ACCUNEB     Take 1 vial by nebulization every 6 hours as needed for shortness of breath / dyspnea or wheezing    Health Care Home, Lipid screening, HTN (hypertension), COPD (chronic obstructive pulmonary disease) (H), Quit smoking,  Screening, Screen for colon cancer       * PROAIR  (90 BASE) MCG/ACT Inhaler   Generic drug:  albuterol      Inhale 2 puffs into the lungs as needed.        * Notice:  This list has 2 medication(s) that are the same as other medications prescribed for you. Read the directions carefully, and ask your doctor or other care provider to review them with you.

## 2018-01-24 DIAGNOSIS — I10 BENIGN ESSENTIAL HYPERTENSION: ICD-10-CM

## 2018-01-25 RX ORDER — LOSARTAN POTASSIUM 50 MG/1
50 TABLET ORAL DAILY
Qty: 90 TABLET | Refills: 2 | Status: CANCELLED | OUTPATIENT
Start: 2018-01-25

## 2018-01-25 NOTE — TELEPHONE ENCOUNTER
"Called patient to inform him that [facility] pharmacy notified us that his Cozaar needs refilled.  Dr Rodriges wants patient to get blood work first.  Patient states he has prescription and does not need refill at this time.  He says he should have 3 months left.  He says \"You and the pharmacy are all screwed up\".   Informed him he should still come in for the lab work and he says he will do it within the next 3 months before rx is up.  He also wants a Hep C drawn.  Will get order from  and add to labs.   "

## 2018-02-15 ENCOUNTER — CARE COORDINATION (OUTPATIENT)
Dept: CARE COORDINATION | Facility: CLINIC | Age: 63
End: 2018-02-15

## 2018-02-15 NOTE — PROGRESS NOTES
Clinic Care Coordination Contact  OUTREACH    Referral Information:  Referral Source: Pro-Active Outreach  Reason for Contact: Initial  Care Conference: No     Universal Utilization:   ED Visits in last year: 1  Hospital visits in last year: 0  Last PCP appointment: 11/06/17  Missed Appointments: 0  Concerns: Proper Utilization  Multiple Providers or Specialists: Yes (Pulmonology, Neurology)    Clinical Concerns:  Current Medical Concerns: Pt is a smoker with COPD. Proactive outreach to patient who reports that he continues to have cold like symptoms- this seems to be a persistent baseline for pt- which includes coughing and congestion. Pt denies the need to be seen in clinic for this. Pt denies any other needs. Explained care coordination services, pt is not feeling as though he needs any additional assistance or resources.  Current Behavioral Concerns: Tobacco Use - pt has multiple health conditions relating to tobacco use- does not seem interested in smoking cessation counseling at this time.  Education Provided to patient: Care Coordination   Clinical Pathway Name: None    Functional Status:  Mobility Status: Independent  Equipment Currently Used at Home: none  Transportation: Pt drives     Psychosocial:  Current living arrangement:: I live alone  Financial/Insurance: [employer], Not discussed     Resources and Interventions:  Current Resources: No formal supports noted  Advanced Care Plans/Directives on file:: No  Patient/Caregiver understanding: Pt reports understanding and denies any additional questions or concerns at this times. PIERRE KIRKLAND engaged in AIDET communication during encounter.  Frequency of Care Coordination: No further outreaches will be made at this time unless a new referral is made or a change in the pt's status occurs. Patient was provided with this writer's contact information and encouraged to call with any questions or concerns.      Plan: PIERRE CC will be available if needed.    Jill Quesada  \[facility] [place] [facility]\""  Clinic Care Coordinator  [phone]  [email or web address]

## 2018-04-12 NOTE — PROGRESS NOTES
HCM:    Spirometry  Hepatitis C screening  Living will  COPD AP    SUBJECTIVE:   CC: Ángel Pham is an 62 year old male who presents for preventative health visit.   White male glasses slender smoker non fasting  Wife cancer  House one story  Driving no issues  Sister helps.   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 All grown, 9 grandkids 1.5 to 23 years  Travel no  Exposure no    Sleep 8 hours, nocturia x1  Appetite ok  Exercise yardwork    Smoking <1ppd. Has Quit plan.  ETOH 5 beers per day. (recommendation <3 per day for men)  Street drugs/MJ no  Caffeine 1.5 cup per day coffee, 16 oz soda 3-4 days.    Depression no  Anxiety no  Panic no  SI/SP no  Hallucinations no  Paranoid no  Manic no  OCD no  PTSD no  Gambling no  Guns no      Healthy Habits:    Do you get at least three servings of calcium containing foods daily (dairy, green leafy vegetables, etc.)? yes    Amount of exercise or daily activities, outside of work: 2 day(s) per week    Problems taking medications regularly No    Medication side effects: No    Have you had an eye exam in the past two years? yes    Do you see a dentist twice per year? no    Do you have sleep apnea, excessive snoring or daytime drowsiness?no           Today's PHQ-2 Score:   PHQ-2 ( 1999 Pfizer) 4/20/2018   Q1: Little interest or pleasure in doing things 1   Q2: Feeling down, depressed or hopeless 1   PHQ-2 Score 2       Abuse: Current or Past(Physical, Sexual or Emotional)- No  Do you feel safe in your environment - Yes    Social History   Substance Use Topics     Smoking status: Current Every Day Smoker     Packs/day: 1.00     Years: 30.00     Types: Cigarettes     Smokeless tobacco: Never Used     Alcohol use 3.0 oz/week     6 Cans of beer per week      If you drink alcohol do you typically have >3 drinks per day or >7 drinks per week? No                      Last PSA: No results found for: PSA FHx negative    COPD seeing lung doctor  coming up  Migraine consult in May.     Colonoscopy was hard to do x3, so had imaging. CT 2015 negative.    IMPRESSION:  1. No acute abnormality is seen.  2. Diffuse vascular calcifications.  3. Incidental note made of minimal herniation of the distal descending  colon into the internal portion of the left inguinal canal. No  associated bowel obstruction or inflammation.     MAGNUS RIGGS MD  Reviewed orders with patient. Reviewed health maintenance and updated orders accordingly - Yes  Labs reviewed in EPIC  BP Readings from Last 3 Encounters:   04/20/18 156/80   11/06/17 122/72   01/20/17 120/70    Wt Readings from Last 3 Encounters:   04/20/18 56.2 kg (124 lb)   11/06/17 54.9 kg (121 lb)   01/20/17 54.9 kg (121 lb)                  Patient Active Problem List   Diagnosis     Allergy history unknown     COPD (chronic obstructive pulmonary disease) (H)     Pneumothorax on left     Health Care Home     HTN, goal below 140/90     Hyperlipidemia with target LDL less than 100     Past Surgical History:   Procedure Laterality Date     ENT SURGERY       HC HEMORROIDECTOMY, EXTERNAL, SINGLE COLUMN/GROUP      left     HERNIA REPAIR       ORTHOPEDIC SURGERY       THORACIC SURGERY         Social History   Substance Use Topics     Smoking status: Current Every Day Smoker     Packs/day: 1.00     Years: 30.00     Types: Cigarettes     Smokeless tobacco: Never Used     Alcohol use 3.0 oz/week     6 Cans of beer per week     Family History   Problem Relation Age of Onset     Breast Cancer Mother      CANCER Father 55     brain ca         Current Outpatient Prescriptions   Medication Sig Dispense Refill     albuterol (2.5 MG/3ML) 0.083% neb solution INHALE 3 MILLILITER BY NEBULIZATION ROUTE 4 TIMES EVERY DAY  11     albuterol (ACCUNEB) 1.25 MG/3ML nebulizer solution Take 1 vial by nebulization every 6 hours as needed for shortness of breath / dyspnea or wheezing       albuterol (PROAIR HFA) 108 (90 BASE) MCG/ACT inhaler Inhale 2  puffs into the lungs as needed.       Butabarbital Sodium (BUTISOL SODIUM PO) Take  by mouth at onset of headache.       butalbital-acetaminophen-caffeine (FIORICET/ESGIC) -40 MG per tablet TAKE 1 TABLET BY MOUTH EVERY 4-6 HOURS AS NEEDED  0     fluticasone-salmeterol (ADVAIR DISKUS) 250-50 MCG/DOSE diskus inhaler Inhale 1 puff into the lungs every 12 hours       losartan (COZAAR) 100 MG tablet Take 1 tablet (100 mg) by mouth daily 90 tablet 3     losartan (COZAAR) 50 MG tablet Take 1 tablet (50 mg) by mouth daily 30 tablet 0     azithromycin (ZITHROMAX) 250 MG tablet Two tablets first day, then one tablet daily for four days. (Patient not taking: Reported on 4/20/2018) 6 tablet 0     Allergies   Allergen Reactions     Chantix [Varenicline] Other (See Comments)     Patient reports has tried this in past and had lightheadedness and nose bleeds.     Morphine Hcl Itching     On contact     Wellbutrin [Bupropion Hydrobromide]      syncope     Recent Labs   Lab Test  04/20/18   0949  11/25/15   1033  04/09/15   0645  07/11/14   1416  07/08/14   1046  06/28/13   1425   LDL  62   --    --   81   --   71   HDL  110   --    --   103   --   96   TRIG  48   --    --   51   --   51   ALT   --   21  29   --   19   --    CR  0.66*  0.57*  0.61*   --   0.74*   --    GFRESTIMATED   --    --   >90  Non  GFR Calc     --    --    --    GFRESTBLACK   --    --   >90   GFR Calc     --    --    --    POTASSIUM  4.7  4.9  4.1   --   4.5   --         Reviewed and updated as needed this visit by clinical staff         Reviewed and updated as needed this visit by Provider        Past Medical History:   Diagnosis Date     Allergy history unknown      COPD (chronic obstructive pulmonary disease) (H)      HTN (hypertension)      HTN, goal below 140/90      Hyperlipidaemia LDL goal < 100      Pneumothorax on left       Past Surgical History:   Procedure Laterality Date     ENT SURGERY       HC  "HEMORROIDECTOMY, EXTERNAL, SINGLE COLUMN/GROUP      left     HERNIA REPAIR       ORTHOPEDIC SURGERY       THORACIC SURGERY          ROS: 10 point ROS neg other than the symptoms noted above in the HPI.  Itch/dry skin    Last migraine 6 months ago ER. Has f/u with neurology  Non fasting  OBJECTIVE:   /80  Pulse 67  Resp 16  Ht 1.778 m (5' 10\")  Wt 56.2 kg (124 lb)  SpO2 96%  BMI 17.79 kg/m2  EXAM:  GENERAL: healthy, alert and no distress  EYES: Eyes grossly normal to inspection, PERRL and conjunctivae and sclerae normal  HENT: ear canals and TM's normal, nose and mouth without ulcers or lesions  NECK: no adenopathy, no asymmetry, masses, or scars and thyroid normal to palpation  RESP: lungs clear to auscultation - no rales, rhonchi or wheezes  CV: regular rate and rhythm, normal S1 S2, no S3 or S4, no murmur, click or rub, no peripheral edema and peripheral pulses strong  ABDOMEN: soft, nontender, no hepatosplenomegaly, no masses and bowel sounds normal   (male): declined  RECTAL: declined  MS: no gross musculoskeletal defects noted, no edema  SKIN: no suspicious lesions or rashes  NEURO: Normal strength and tone, mentation intact and speech normal  PSYCH: mentation appears normal, affect normal/bright  LYMPH: no cervical, supraclavicular, axillary, or inguinal adenopathy  Left ear wax declined wash/ent referral  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 Routine general medical examination at a health care facility Z00.00    2. Hyperlipidemia with target LDL less than 100 E78.5 Lipid Panel ([facility])   3. HTN, goal below 140/90 I10 Basic Metabolic Panel (8) ([facility])     losartan (COZAAR) 100 MG tablet   4. Screening for prostate cancer Z12.5 PSA Serum ([facility]) " "  5. Pulmonary emphysema, unspecified emphysema type (H) J43.9    6. Intractable chronic migraine without aura and without status migrainosus G43.719    7. Need for pneumococcal vaccine Z23 PNEUMOCOCCAL CONJ VACCINE 13 VALENT IM     ADMIN 1st VACCINE     VENOUS COLLECTION   8. Benign essential hypertension I10 DISCONTINUED: losartan (COZAAR) 50 MG tablet       COUNSELING:  Reviewed preventive health counseling, as reflected in patient instructions       Regular exercise       Healthy diet/nutrition       Vision screening       Immunizations    Discussed vaccines            Colon cancer screening       Prostate cancer screening       Advance Care Planning    BP Readings from Last 3 Encounters:   04/20/18 156/80   11/06/17 122/72   01/20/17 120/70        reports that he has been smoking Cigarettes.  He has a 30.00 pack-year smoking history. He has never used smokeless tobacco.  Tobacco Cessation Action Plan: Information offered: Patient not interested at this time  Consider low dost CT screen for lung cancer  Estimated body mass index is 17.79 kg/(m^2) as calculated from the following:    Height as of this encounter: 1.778 m (5' 10\").    Weight as of this encounter: 56.2 kg (124 lb).   Underweight. Discussed nutrition.    Counseling Resources:  ATP IV Guidelines  Pooled Cohorts Equation Calculator  FRAX Risk Assessment  ICSI Preventive Guidelines  Dietary Guidelines for Americans, 2010  [employer] MyPlate  ASA Prophylaxis  Lung CA Screening    ASSESSMENT / PLAN:  (Z00.00) Routine general medical examination at a health care facility  (primary encounter diagnosis)  Comment:   Plan: f/u one year    (E78.5) Hyperlipidemia with target LDL less than 100  Comment:   LDL Cholesterol Calculated   Date Value Ref Range Status   04/20/2018 62 0 - 99 mg/dL Final   ]    Plan: recheck one year    (I10) HTN, goal below 140/90  Comment:   BP Readings from Last 3 Encounters:   04/20/18 156/80   11/06/17 122/72   01/20/17 120/70 "       Plan: losartan (COZAAR) 100 MG tablet        Recheck BP    (Z12.5) Screening for prostate cancer  Comment:   Plan: PSA Serum ([facility])            (J43.9) Pulmonary emphysema, unspecified emphysema type (H)  Comment:   Plan: f/u with pulmonary as planned.Advised smoking cessation    (G43.969) Intractable chronic migraine without aura and without status migrainosus  Comment:   Plan: Continue cares    (Z23) Need for pneumococcal vaccine  Comment:   Plan: PNEUMOCOCCAL CONJ VACCINE 13 VALENT IM, ADMIN         1st VACCINE, VENOUS COLLECTION            (I10) Benign essential hypertension  Comment:   BP Readings from Last 3 Encounters:   04/20/18 156/80   11/06/17 122/72   01/20/17 120/70       Plan: DISCONTINUED: losartan (COZAAR) 50 MG tablet              Preventive Health Recommendations  Male Ages 50 - 64    Yearly exam:             See your health care provider every year in order to  o   Review health changes.   o   Discuss preventive care.    o   Review your medicines if your doctor has prescribed any.     Have a cholesterol test every 5 years, or more frequently if you are at risk for high cholesterol/heart disease.     Have a diabetes test (fasting glucose) every three years. If you are at risk for diabetes, you should have this test more often.     Have a colonoscopy at age 50, or have a yearly FIT test (stool test). These exams will check for colon cancer.      Talk with your health care provider about whether or not a prostate cancer screening test (PSA) is right for you.    You should be tested each year for STDs (sexually transmitted diseases), if you re at risk.     Shots: Get a flu shot each year. Get a tetanus shot every 10 years.     Nutrition:    Eat at least 5 servings of fruits and vegetables daily.     Eat whole-grain bread, whole-wheat pasta and brown rice instead of white grains and rice.     Talk to your provider about Calcium and Vitamin D.     Lifestyle    Exercise for at least 150 minutes a  week (30 minutes a day, 5 days a week). This will help you control your weight and prevent disease.     Limit alcohol to one drink per day.     No smoking.     Wear sunscreen to prevent skin cancer.     See your dentist every six months for an exam and cleaning.     See your eye doctor every 1 to 2 years.  Losartan dose increase to 100 mg daily, then recheck in clinic [place] only in 1-2 weeks  Labs  BP today  Pneumonia 13 vaccine    F/u 6 months      Naa Rodriges MD  [facility]

## 2018-04-12 NOTE — PATIENT INSTRUCTIONS
Preventive Health Recommendations  Male Ages 50   64    Yearly exam:             See your health care provider every year in order to  o   Review health changes.   o   Discuss preventive care.    o   Review your medicines if your doctor has prescribed any.     Have a cholesterol test every 5 years, or more frequently if you are at risk for high cholesterol/heart disease.     Have a diabetes test (fasting glucose) every three years. If you are at risk for diabetes, you should have this test more often.     Have a colonoscopy at age 50, or have a yearly FIT test (stool test). These exams will check for colon cancer.      Talk with your health care provider about whether or not a prostate cancer screening test (PSA) is right for you.    You should be tested each year for STDs (sexually transmitted diseases), if you re at risk.     Shots: Get a flu shot each year. Get a tetanus shot every 10 years.     Nutrition:    Eat at least 5 servings of fruits and vegetables daily.     Eat whole-grain bread, whole-wheat pasta and brown rice instead of white grains and rice.     Talk to your provider about Calcium and Vitamin D.     Lifestyle    Exercise for at least 150 minutes a week (30 minutes a day, 5 days a week). This will help you control your weight and prevent disease.     Limit alcohol to one drink per day.     No smoking.     Wear sunscreen to prevent skin cancer.     See your dentist every six months for an exam and cleaning.     See your eye doctor every 1 to 2 years.  Losartan dose increase to 100 mg daily, then recheck in clinic [place] only in 1-2 weeks  Labs  BP today  Pneumonia 13 vaccine    F/u 6 months

## 2018-04-20 ENCOUNTER — OFFICE VISIT (OUTPATIENT)
Dept: FAMILY MEDICINE | Facility: CLINIC | Age: 63
End: 2018-04-20

## 2018-04-20 VITALS
DIASTOLIC BLOOD PRESSURE: 80 MMHG | SYSTOLIC BLOOD PRESSURE: 156 MMHG | HEIGHT: 70 IN | WEIGHT: 124 LBS | BODY MASS INDEX: 17.75 KG/M2 | HEART RATE: 67 BPM | RESPIRATION RATE: 16 BRPM | OXYGEN SATURATION: 96 %

## 2018-04-20 DIAGNOSIS — I10 HTN, GOAL BELOW 140/90: ICD-10-CM

## 2018-04-20 DIAGNOSIS — Z23 NEED FOR PNEUMOCOCCAL VACCINE: ICD-10-CM

## 2018-04-20 DIAGNOSIS — I10 BENIGN ESSENTIAL HYPERTENSION: ICD-10-CM

## 2018-04-20 DIAGNOSIS — Z12.5 SCREENING FOR PROSTATE CANCER: ICD-10-CM

## 2018-04-20 DIAGNOSIS — J43.9 PULMONARY EMPHYSEMA, UNSPECIFIED EMPHYSEMA TYPE (H): ICD-10-CM

## 2018-04-20 DIAGNOSIS — G43.719 INTRACTABLE CHRONIC MIGRAINE WITHOUT AURA AND WITHOUT STATUS MIGRAINOSUS: ICD-10-CM

## 2018-04-20 DIAGNOSIS — Z00.00 ROUTINE GENERAL MEDICAL EXAMINATION AT A HEALTH CARE FACILITY: Primary | ICD-10-CM

## 2018-04-20 DIAGNOSIS — E78.5 HYPERLIPIDEMIA WITH TARGET LDL LESS THAN 100: ICD-10-CM

## 2018-04-20 PROCEDURE — 90471 IMMUNIZATION ADMIN: CPT | Performed by: FAMILY MEDICINE

## 2018-04-20 PROCEDURE — 99396 PREV VISIT EST AGE 40-64: CPT | Mod: 25 | Performed by: FAMILY MEDICINE

## 2018-04-20 PROCEDURE — 90670 PCV13 VACCINE IM: CPT | Performed by: FAMILY MEDICINE

## 2018-04-20 PROCEDURE — 36415 COLL VENOUS BLD VENIPUNCTURE: CPT | Performed by: FAMILY MEDICINE

## 2018-04-20 RX ORDER — LOSARTAN POTASSIUM 50 MG/1
TABLET ORAL
Qty: 90 TABLET | Refills: 3 | Status: SHIPPED | OUTPATIENT
Start: 2018-04-20 | End: 2018-04-20 | Stop reason: DRUGHIGH

## 2018-04-20 RX ORDER — BUTALBITAL, ACETAMINOPHEN AND CAFFEINE 50; 325; 40 MG/1; MG/1; MG/1
TABLET ORAL
Refills: 0 | COMMUNITY
Start: 2018-04-06

## 2018-04-20 RX ORDER — ALBUTEROL SULFATE 0.83 MG/ML
2.5 SOLUTION RESPIRATORY (INHALATION)
Refills: 11 | COMMUNITY
Start: 2018-03-09 | End: 2024-03-29 | Stop reason: ALTCHOICE

## 2018-04-20 RX ORDER — LOSARTAN POTASSIUM 100 MG/1
100 TABLET ORAL DAILY
Qty: 90 TABLET | Refills: 3 | Status: SHIPPED | OUTPATIENT
Start: 2018-04-20 | End: 2018-12-28

## 2018-04-20 NOTE — LETTER
My COPD Action Plan     Name: Ángel Pham    YOB: 1955   Date: 4/20/2018    My doctor: Naa Rodriges MD   My clinic: [facility] [address] [place] [address] [phone]  My Controller Medicine: Albuterol (Proair/Ventolin/Proventolin)   Dose: 90 base     My Rescue Medicine: Albuterol nebulizer solution    Dose: 1.25mg     My Flare Up Medicine: none   Dose: none     My COPD Severity: Moderate = FeV1 < 79% -50%      Use of Oxygen: Oxygen Not Prescribed      Make sure you've had your pneumonia   vaccines.          GREEN ZONE       Doing well today      Usual level of activity and exercise    Usual amount of cough and mucus    No shortness of breath    Usual level of health (thinking clearly, sleeping well, feel like eating) Actions:      Take daily medicines    Use oxygen as prescribed    Follow regular exercise and diet plan    Avoid cigarette smoke and other irritants that harm the lungs           YELLOW ZONE          Having a bad day or flare up      Short of breath more than usual    A lot more sputum (mucus) than usual    Sputum looks yellow, green, tan, brown or bloody    More coughing or wheezing    Fever or chills    Less energy; trouble completing activities    Trouble thinking or focusing    Using quick relief inhaler or nebulizer more often    Poor sleep; symptoms wake me up    Do not feel like eating Actions:      Get plenty of rest    Take daily medicines    Use quick relief inhaler every 6 hours    If you use oxygen, call you doctor to see if you should adjust your oxygen    Do breathing exercises or other things to help you relax    Let a loved one, friend or neighbor know you are feeling worse    Call your care team if you have 2 or more symptoms.  Start taking steroids or antibiotics if directed by your care team           RED ZONE       Need medical care now      Severe shortness of breath (feel you can't breathe)    Fever, chills    Not  enough breath to do any activity    Trouble coughing up mucus, walking or talking    Blood in mucus    Frequent coughing   Rescue medicines are not working    Not able to sleep because of breathing    Feel confused or drowsy    Chest pain    Actions:      Call your health care team.  If you cannot reach your care team, call 911 or go to the emergency room.        Annual Reminders:  Meet with Care Team, Flu Shot every Fall  Pharmacy:    [facility]ACY #[id number] - [place], [place] - [address]  [facility] - [facility] [address] [place] [address]

## 2018-04-20 NOTE — LETTER
[facility] [address] [place]  [address]  Phone: [phone]    April 24, 2018      Ángel Pham  [address] [facility] [address]              Dear Ángel,    The results from your recent visit showed that your   Lipids- cholesterol  results were good.  Basic Metabolic panel showed  some kidney lab changes. Creatine was slightly better, Bun/Cr slightly worse. GFR ratio were fine.    You may continue your current plan of care.    Sincerely,     Naa Rodriges M.D.    Results for orders placed or performed in visit on 04/20/18   Lipid Panel ([facility])   Result Value Ref Range    Cholesterol 182 100 - 199 mg/dL    Triglycerides 48 0 - 149 mg/dL    HDL Cholesterol 110 >39 mg/dL    VLDL Cholesterol Damian 10 5 - 40 mg/dL    LDL Cholesterol Calculated 62 0 - 99 mg/dL    LDL/HDL Ratio 0.6 0.0 - 3.6 ratio    Narrative    Performed at:  01 - [facility] [address], [place], [place]  [id number]  : Daniel Cheng MD, Phone:  [phone]   Basic Metabolic Panel (8) ([facility])   Result Value Ref Range    Glucose 81 65 - 99 mg/dL    Urea Nitrogen 6 (L) 8 - 27 mg/dL    Creatinine 0.66 (L) 0.76 - 1.27 mg/dL    eGFR If NonAfricn Am 104 >59 mL/min/1.73    eGFR If Africn Am 121 >59 mL/min/1.73    BUN/Creatinine Ratio 9 (L) 10 - 24    Sodium 138 134 - 144 mmol/L    Potassium 4.7 3.5 - 5.2 mmol/L    Chloride 98 96 - 106 mmol/L    Total CO2 24 18 - 28 mmol/L    Calcium 9.0 8.6 - 10.2 mg/dL    Narrative    Performed at:  01 - [facility]  [place], [place]  [id number]  : Daniel Cheng MD, Phone:  [phone]

## 2018-04-20 NOTE — LETTER
My COPD Action Plan     Name: Ángel Pham    YOB: 1955   Date: 4/20/2018    My doctor: Naa Rodriges MD   My clinic: [facility] [address] [place] [address] [phone]  My Controller Medicine: { :806240}   Dose: ***     My Rescue Medicine: { :553421}   Dose: ***     My Flare Up Medicine: { :294947}   Dose: ***     My COPD Severity: { :851908}      Use of Oxygen: { :152630}     Make sure you've had your pneumonia   vaccines.          GREEN ZONE       Doing well today      Usual level of activity and exercise    Usual amount of cough and mucus    No shortness of breath    Usual level of health (thinking clearly, sleeping well, feel like eating) Actions:      Take daily medicines    Use oxygen as prescribed    Follow regular exercise and diet plan    Avoid cigarette smoke and other irritants that harm the lungs           YELLOW ZONE          Having a bad day or flare up      Short of breath more than usual    A lot more sputum (mucus) than usual    Sputum looks yellow, green, tan, brown or bloody    More coughing or wheezing    Fever or chills    Less energy; trouble completing activities    Trouble thinking or focusing    Using quick relief inhaler or nebulizer more often    Poor sleep; symptoms wake me up    Do not feel like eating Actions:      Get plenty of rest    Take daily medicines    Use quick relief inhaler every *** hours    If you use oxygen, call you doctor to see if you should adjust your oxygen    Do breathing exercises or other things to help you relax    Let a loved one, friend or neighbor know you are feeling worse    Call your care team if you have 2 or more symptoms.  Start taking steroids or antibiotics if directed by your care team           RED ZONE       Need medical care now      Severe shortness of breath (feel you can't breathe)    Fever, chills    Not enough breath to do any activity    Trouble coughing up mucus, walking or  talking    Blood in mucus    Frequent coughing   Rescue medicines are not working    Not able to sleep because of breathing    Feel confused or drowsy    Chest pain    Actions:      Call your health care team.  If you cannot reach your care team, call 911 or go to the emergency room.        Annual Reminders:  Meet with Care Team, Flu Shot every Fall  Pharmacy:    [facility]/PH[facility]ACY #[id number] - [place], [place] - [address] [facility]  [facility] - [facility] [address] [place] [address]

## 2018-04-20 NOTE — MR AVS SNAPSHOT
After Visit Summary   4/20/2018    Ángel Pham    MRN: [id number]           Patient Information     Date Of Birth          1955        Visit Information        Provider Department      4/20/2018 8:15 AM Naa Rodriges MD [facility]        Today's Diagnoses     Routine general medical examination at a health care facility    -  1    Hyperlipidemia with target LDL less than 100        HTN, goal below 140/90        Screening for prostate cancer        Pulmonary emphysema, unspecified emphysema type (H)        Intractable chronic migraine without aura and without status migrainosus        Need for pneumococcal vaccine        Benign essential hypertension          Care Instructions      Preventive Health Recommendations  Male Ages 50 - 64    Yearly exam:             See your health care provider every year in order to  o   Review health changes.   o   Discuss preventive care.    o   Review your medicines if your doctor has prescribed any.     Have a cholesterol test every 5 years, or more frequently if you are at risk for high cholesterol/heart disease.     Have a diabetes test (fasting glucose) every three years. If you are at risk for diabetes, you should have this test more often.     Have a colonoscopy at age 50, or have a yearly FIT test (stool test). These exams will check for colon cancer.      Talk with your health care provider about whether or not a prostate cancer screening test (PSA) is right for you.    You should be tested each year for STDs (sexually transmitted diseases), if you re at risk.     Shots: Get a flu shot each year. Get a tetanus shot every 10 years.     Nutrition:    Eat at least 5 servings of fruits and vegetables daily.     Eat whole-grain bread, whole-wheat pasta and brown rice instead of white grains and rice.     Talk to your provider about Calcium and Vitamin D.     Lifestyle    Exercise for at least 150 minutes a week (30 minutes a day, 5  "days a week). This will help you control your weight and prevent disease.     Limit alcohol to one drink per day.     No smoking.     Wear sunscreen to prevent skin cancer.     See your dentist every six months for an exam and cleaning.     See your eye doctor every 1 to 2 years.  Losartan dose increase to 100 mg daily, then recheck in clinic MA only in 1-2 weeks  Labs  BP today  Pneumonia 13 vaccine    F/u 6 months          Follow-ups after your visit        Future tests that were ordered for you today     Open Future Orders        Priority Expected Expires Ordered    PSA Serum ([facility]) Routine 4/27/2018 7/19/2018 4/20/2018            Who to contact     If you have questions or need follow up information about today's clinic visit or your schedule please contact [facility] directly at [phone].  Normal or non-critical lab and imaging results will be communicated to you by [employer], letter or phone within 4 business days after the clinic has received the results. If you do not hear from us within 7 days, please contact the clinic through [employer] or phone. If you have a critical or abnormal lab result, we will notify you by phone as soon as possible.  Submit refill requests through [employer] or call your pharmacy and they will forward the refill request to us. Please allow 3 business days for your refill to be completed.          Additional Information About Your Visit        [employer] Information     [employer] lets you send messages to your doctor, view your test results, renew your prescriptions, schedule appointments and more. To sign up, go to [email or web address] . Click on \"Log in\" on the left side of the screen, which will take you to the Welcome page. Then click on \"Sign up Now\" on the right side of the page.     You will be asked to enter the access code listed below, as well as some personal information. Please follow the directions to create your username and password.     Your access code " "is: [email or web address]  Expires: 2018  8:49 AM     Your access code will  in 90 days. If you need help or a new code, please call your [place] clinic or [phone].        Care EveryWhere ID     This is your Care EveryWhere ID. This could be used by other organizations to access your [place] medical records  [id number]        Your Vitals Were     Pulse Respirations Height Pulse Oximetry BMI (Body Mass Index)       67 16 1.778 m (5' 10\") 96% 17.79 kg/m2        Blood Pressure from Last 3 Encounters:   18 156/80   17 122/72   17 120/70    Weight from Last 3 Encounters:   18 56.2 kg (124 lb)   17 54.9 kg (121 lb)   17 54.9 kg (121 lb)              We Performed the Following     ADMIN 1st VACCINE     Basic Metabolic Panel (8) ([facility])     Lipid Panel ([facility])     PNEUMOCOCCAL CONJ VACCINE 13 VALENT IM     VENOUS COLLECTION          Today's Medication Changes          These changes are accurate as of 18  9:03 AM.  If you have any questions, ask your nurse or doctor.               These medicines have changed or have updated prescriptions.        Dose/Directions    * losartan 50 MG tablet   Commonly known as:  COZAAR   This may have changed:  Another medication with the same name was changed. Make sure you understand how and when to take each.   Used for:  Benign essential hypertension   Changed by:  Naa Rodriges MD        Dose:  50 mg   Take 1 tablet (50 mg) by mouth daily   Quantity:  30 tablet   Refills:  0       * losartan 100 MG tablet   Commonly known as:  COZAAR   This may have changed:  See the new instructions.   Used for:  HTN, goal below 140/90   Changed by:  Naa Rodriges MD        Dose:  100 mg   Take 1 tablet (100 mg) by mouth daily   Quantity:  90 tablet   Refills:  3       * Notice:  This list has 2 medication(s) that are the same as other medications prescribed for you. Read the directions carefully, and ask your doctor or other care " provider to review them with you.         Where to get your medicines      These medications were sent to [facility] - [address]  [address], [facility] [address]     Phone:  [phone]     losartan 100 MG tablet                Primary Care Provider Office Phone # Fax #    Naa Rodriges [phone] [address]  [facility] [address]        Equal Access to Services     [facility]ENN[facility] : Hadii aad ku hadasho Soomaali, waaxda luqadaha, qaybta kaalmada adeegyada, waxay idiin hayaan adeeg kharash la'guzman ah. So [facility] [phone].    ATENCIÓN: Si habla español, tiene a [place] disposición servicios gratuitos de asistencia lingüística. [facility] [phone].    We comply with applicable federal civil rights laws and [place] laws. We do not discriminate on the basis of race, color, national origin, age, disability, sex, sexual orientation, or gender identity.            Thank you!     Thank you for choosing [facility]  for your care. Our goal is always to provide you with excellent care. Hearing back from our patients is one way we can continue to improve our services. Please take a few minutes to complete the written survey that you may receive in the mail after your visit with us. Thank you!             Your Updated Medication List - Protect others around you: Learn how to safely use, store and throw away your medicines at www.disposemymeds.org.          This list is accurate as of 4/20/18  9:03 AM.  Always use your most recent med list.                   Brand Name Dispense Instructions for use Diagnosis    ADVAIR DISKUS 250-50 MCG/DOSE diskus inhaler   Generic drug:  fluticasone-salmeterol      Inhale 1 puff into the lungs every 12 hours        azithromycin 250 MG tablet    ZITHROMAX    6 tablet    Two tablets first day, then one tablet daily for four days.    Aspiration pneumonitis (H), Tobacco use disorder, Chronic  obstructive pulmonary disease, unspecified COPD type (H)       butalbital-acetaminophen-caffeine -40 MG per tablet    FIORICET/ESGIC     TAKE 1 TABLET BY MOUTH EVERY 4-6 HOURS AS NEEDED        BUTISOL SODIUM PO      Take  by mouth at onset of headache.        * losartan 50 MG tablet    COZAAR    30 tablet    Take 1 tablet (50 mg) by mouth daily    Benign essential hypertension       * losartan 100 MG tablet    COZAAR    90 tablet    Take 1 tablet (100 mg) by mouth daily    HTN, goal below 140/90       * albuterol 1.25 MG/3ML nebulizer solution    ACCUNEB     Take 1 vial by nebulization every 6 hours as needed for shortness of breath / dyspnea or wheezing    Health Care Home, Lipid screening, HTN (hypertension), COPD (chronic obstructive pulmonary disease) (H), Quit smoking, Screening, Screen for colon cancer       * albuterol (2.5 MG/3ML) 0.083% neb solution      INHALE 3 MILLILITER BY NEBULIZATION ROUTE 4 TIMES EVERY DAY        * PROAIR  (90 Base) MCG/ACT Inhaler   Generic drug:  albuterol      Inhale 2 puffs into the lungs as needed.        * Notice:  This list has 5 medication(s) that are the same as other medications prescribed for you. Read the directions carefully, and ask your doctor or other care provider to review them with you.

## 2018-04-21 LAB
BUN SERPL-MCNC: 6 MG/DL (ref 8–27)
BUN/CREATININE RATIO: 9 (ref 10–24)
CALCIUM SERPL-MCNC: 9 MG/DL (ref 8.6–10.2)
CHLORIDE SERPLBLD-SCNC: 98 MMOL/L (ref 96–106)
CHOLEST SERPL-MCNC: 182 MG/DL (ref 100–199)
CREAT SERPL-MCNC: 0.66 MG/DL (ref 0.76–1.27)
EGFR IF AFRICN AM: 121 ML/MIN/1.73
EGFR IF NONAFRICN AM: 104 ML/MIN/1.73
GLUCOSE SERPL-MCNC: 81 MG/DL (ref 65–99)
HDLC SERPL-MCNC: 110 MG/DL
LDL/HDL RATIO: 0.6 RATIO (ref 0–3.6)
LDLC SERPL CALC-MCNC: 62 MG/DL (ref 0–99)
POTASSIUM SERPL-SCNC: 4.7 MMOL/L (ref 3.5–5.2)
SODIUM SERPL-SCNC: 138 MMOL/L (ref 134–144)
TOTAL CO2: 24 MMOL/L (ref 18–28)
TRIGL SERPL-MCNC: 48 MG/DL (ref 0–149)
VLDLC SERPL CALC-MCNC: 10 MG/DL (ref 5–40)

## 2018-04-26 ENCOUNTER — TRANSFERRED RECORDS (OUTPATIENT)
Dept: FAMILY MEDICINE | Facility: CLINIC | Age: 63
End: 2018-04-26

## 2018-05-24 ENCOUNTER — TRANSFERRED RECORDS (OUTPATIENT)
Dept: FAMILY MEDICINE | Facility: CLINIC | Age: 63
End: 2018-05-24

## 2018-07-10 NOTE — PROGRESS NOTES
5/22/18 Emily faxed office notes to [place]  @ [phone]    Say Butler,   [facility]  [phone]

## 2018-09-28 DIAGNOSIS — Z23 NEED FOR PROPHYLACTIC VACCINATION AND INOCULATION AGAINST INFLUENZA: Primary | ICD-10-CM

## 2018-09-28 PROCEDURE — 90471 IMMUNIZATION ADMIN: CPT | Performed by: FAMILY MEDICINE

## 2018-09-28 PROCEDURE — 90686 IIV4 VACC NO PRSV 0.5 ML IM: CPT | Performed by: FAMILY MEDICINE

## 2018-09-28 NOTE — PROGRESS NOTES
Injectable Influenza Immunization Documentation    1.  Is the person to be vaccinated sick today?   No    2. Does the person to be vaccinated have an allergy to a component   of the vaccine?   No  Egg Allergy Algorithm Link    3. Has the person to be vaccinated ever had a serious reaction   to influenza vaccine in the past?   No    4. Has the person to be vaccinated ever had Guillain-Barré syndrome?   No    Form completed by Yola Saeed MA September 28, 2018 9:19 AM

## 2018-12-28 DIAGNOSIS — I10 HTN, GOAL BELOW 140/90: ICD-10-CM

## 2018-12-28 RX ORDER — LOSARTAN POTASSIUM 100 MG/1
TABLET ORAL
Qty: 90 TABLET | Refills: 1 | Status: SHIPPED | OUTPATIENT
Start: 2018-12-28 | End: 2019-04-11

## 2019-04-11 ENCOUNTER — OFFICE VISIT (OUTPATIENT)
Dept: FAMILY MEDICINE | Facility: CLINIC | Age: 64
End: 2019-04-11

## 2019-04-11 VITALS
BODY MASS INDEX: 17.79 KG/M2 | DIASTOLIC BLOOD PRESSURE: 80 MMHG | WEIGHT: 124 LBS | SYSTOLIC BLOOD PRESSURE: 136 MMHG | HEART RATE: 71 BPM | RESPIRATION RATE: 16 BRPM | OXYGEN SATURATION: 98 % | TEMPERATURE: 99.1 F

## 2019-04-11 DIAGNOSIS — I10 HTN, GOAL BELOW 140/90: ICD-10-CM

## 2019-04-11 DIAGNOSIS — J02.9 ACUTE SORE THROAT: Primary | ICD-10-CM

## 2019-04-11 DIAGNOSIS — J43.9 PULMONARY EMPHYSEMA, UNSPECIFIED EMPHYSEMA TYPE (H): ICD-10-CM

## 2019-04-11 PROCEDURE — 99213 OFFICE O/P EST LOW 20 MIN: CPT | Performed by: FAMILY MEDICINE

## 2019-04-11 PROCEDURE — 36415 COLL VENOUS BLD VENIPUNCTURE: CPT | Performed by: FAMILY MEDICINE

## 2019-04-11 RX ORDER — LOSARTAN POTASSIUM 100 MG/1
100 TABLET ORAL DAILY
Qty: 90 TABLET | Refills: 3 | Status: SHIPPED | OUTPATIENT
Start: 2019-04-11 | End: 2019-04-11

## 2019-04-11 RX ORDER — AMOXICILLIN 500 MG/1
500 CAPSULE ORAL 3 TIMES DAILY
Qty: 21 CAPSULE | Refills: 0 | Status: SHIPPED | OUTPATIENT
Start: 2019-04-11 | End: 2019-04-23

## 2019-04-11 RX ORDER — LOSARTAN POTASSIUM 100 MG/1
100 TABLET ORAL DAILY
Qty: 90 TABLET | Refills: 3 | Status: SHIPPED | OUTPATIENT
Start: 2019-04-11 | End: 2020-05-12

## 2019-04-11 NOTE — LETTER
[facility] [address] [place]  [address]  Phone: [phone]    April 12, 2019      Ángel Pham  [address] [facility] [address]              Dear Ángel,    The results from your recent visit showed sodium that is slightly low and potassium that is slightly high.   I would not make any changes but repeat this in 3 months in the office.      Sincerely,         Cesar Dye M.D.    Results for orders placed or performed in visit on 04/11/19   Basic Metabolic Panel (8) ([facility])   Result Value Ref Range    Glucose 89 65 - 99 mg/dL    Urea Nitrogen 7 (L) 8 - 27 mg/dL    Creatinine 0.69 (L) 0.76 - 1.27 mg/dL    eGFR If NonAfricn Am 102 >59 mL/min/1.73    eGFR If Africn Am 118 >59 mL/min/1.73    BUN/Creatinine Ratio 10 10 - 24    Sodium 133 (L) 134 - 144 mmol/L    Potassium 5.3 (H) 3.5 - 5.2 mmol/L    Chloride 96 96 - 106 mmol/L    Total CO2 25 20 - 29 mmol/L    Calcium 9.2 8.6 - 10.2 mg/dL    Narrative    Performed at:  01 - [facility]  [address] [place] [address]  [id number]  : Joe Hanson MD, Phone:  [phone]

## 2019-04-11 NOTE — PROGRESS NOTES
SUBJECTIVE:   Ángel Pham is a 63 year old male presenting with a chief complaint of stuffy nose and sore throat.  Onset of symptoms was 2 day(s) ago.  Course of illness is same.    Severity mild  Current and Associated symptoms: none  Treatment measures tried include throat spray.  Predisposing factors include None.  Pt states his wife has stage 4 pancreatic cancer and he can't be ill around her.    Smoker, copd   Sees lung   No recent lung ct    Hx of htn and takes arb    Past Medical History:   Diagnosis Date     Allergy history unknown      COPD (chronic obstructive pulmonary disease) (H)      HTN, goal below 140/90      Hyperlipidaemia LDL goal < 100      Pneumothorax on left      Current Outpatient Medications   Medication Sig Dispense Refill     albuterol (2.5 MG/3ML) 0.083% neb solution INHALE 3 MILLILITER BY NEBULIZATION ROUTE 4 TIMES EVERY DAY  11     albuterol (ACCUNEB) 1.25 MG/3ML nebulizer solution Take 1 vial by nebulization every 6 hours as needed for shortness of breath / dyspnea or wheezing       albuterol (PROAIR HFA) 108 (90 BASE) MCG/ACT inhaler Inhale 2 puffs into the lungs as needed.       azithromycin (ZITHROMAX) 250 MG tablet Two tablets first day, then one tablet daily for four days. (Patient not taking: Reported on 4/20/2018) 6 tablet 0     Butabarbital Sodium (BUTISOL SODIUM PO) Take  by mouth at onset of headache.       butalbital-acetaminophen-caffeine (FIORICET/ESGIC) -40 MG per tablet TAKE 1 TABLET BY MOUTH EVERY 4-6 HOURS AS NEEDED  0     fluticasone-salmeterol (ADVAIR DISKUS) 250-50 MCG/DOSE diskus inhaler Inhale 1 puff into the lungs every 12 hours       losartan (COZAAR) 100 MG tablet TAKE 1 TABLET BY MOUTH  DAILY 90 tablet 1     losartan (COZAAR) 50 MG tablet Take 1 tablet (50 mg) by mouth daily 30 tablet 0     Social History     Tobacco Use     Smoking status: Current Every Day Smoker     Packs/day: 1.00     Years: 30.00     Pack years: 30.00     Types: Cigarettes      Smokeless tobacco: Never Used   Substance Use Topics     Alcohol use: Yes     Alcohol/week: 3.0 oz     Types: 6 Cans of beer per week     ROS:  CONSTITUTIONAL:NEGATIVE for fever, chills, change in weight  INTEGUMENTARY/SKIN: NEGATIVE for worrisome rashes, moles or lesions    OBJECTIVE:  /80   Pulse 71   Temp 99.1  F (37.3  C) (Temporal)   Resp 16   Wt 56.2 kg (124 lb)   SpO2 98%   BMI 17.79 kg/m    GENERAL APPEARANCE: mild distress and slim  EYES: EOMI,  PERRL, conjunctiva clear  HENT: ear canals and TM's normal.  Nose without ulcers, erythema or lesions   Throat is bright red and angry appearing  NECK: supple, nontender, no lymphadenopathy  RESP: lungs with coarse bs  CV: regular rates and rhythm, normal S1 S2, no murmur noted  NEURO: Normal strength and tone, sensory exam grossly normal,  normal speech and mentation  SKIN: no suspicious lesions or rashes    ASSESSMENT:  (J02.9) Acute sore throat  (primary encounter diagnosis)  Comment: will cover for infection  Plan: amoxicillin (AMOXIL) 500 MG capsule            (I10) HTN, goal below 140/90  Comment: bp ok today  Plan: Basic Metabolic Panel (8) ([facility]), losartan         (COZAAR) 100 MG tablet,         Renewed med    (J43.9) Pulmonary emphysema, unspecified emphysema type (H)  Comment: recommend lung ca screening   Pt to discuss with pulmonary

## 2019-04-12 LAB
BUN SERPL-MCNC: 7 MG/DL (ref 8–27)
BUN/CREATININE RATIO: 10 (ref 10–24)
CALCIUM SERPL-MCNC: 9.2 MG/DL (ref 8.6–10.2)
CHLORIDE SERPLBLD-SCNC: 96 MMOL/L (ref 96–106)
CREAT SERPL-MCNC: 0.69 MG/DL (ref 0.76–1.27)
EGFR IF AFRICN AM: 118 ML/MIN/1.73
EGFR IF NONAFRICN AM: 102 ML/MIN/1.73
GLUCOSE SERPL-MCNC: 89 MG/DL (ref 65–99)
POTASSIUM SERPL-SCNC: 5.3 MMOL/L (ref 3.5–5.2)
SODIUM SERPL-SCNC: 133 MMOL/L (ref 134–144)
TOTAL CO2: 25 MMOL/L (ref 20–29)

## 2019-04-16 ENCOUNTER — TELEPHONE (OUTPATIENT)
Dept: FAMILY MEDICINE | Facility: CLINIC | Age: 64
End: 2019-04-16

## 2019-04-16 DIAGNOSIS — J02.9 ACUTE SORE THROAT: Primary | ICD-10-CM

## 2019-04-16 RX ORDER — AZITHROMYCIN 250 MG/1
TABLET, FILM COATED ORAL
Qty: 6 TABLET | Refills: 0 | Status: SHIPPED | OUTPATIENT
Start: 2019-04-16 | End: 2019-04-23

## 2019-04-16 NOTE — TELEPHONE ENCOUNTER
Patient calls complains of sore throat not any better since started amoxicillin 4/11/19 after visit with  for 2 days of sore throat and stuffy nose. amox was prescribed due to his wife ill with cancer and patient very concerned about her getting his illness.   Discussed today difference between viral and bacterial. Patient adamantly wants more antibiotics.   Discussed with  and ok for zpak. RTC if symptoms fail to resolve. Patient informed and rx sent to pharmacy.  Josehpine Sarkar RN

## 2019-04-23 ENCOUNTER — OFFICE VISIT (OUTPATIENT)
Dept: FAMILY MEDICINE | Facility: CLINIC | Age: 64
End: 2019-04-23

## 2019-04-23 VITALS
TEMPERATURE: 98.9 F | BODY MASS INDEX: 17.36 KG/M2 | SYSTOLIC BLOOD PRESSURE: 128 MMHG | WEIGHT: 121 LBS | DIASTOLIC BLOOD PRESSURE: 80 MMHG | HEART RATE: 73 BPM | OXYGEN SATURATION: 97 % | RESPIRATION RATE: 16 BRPM

## 2019-04-23 DIAGNOSIS — J02.9 ACUTE SORE THROAT: Primary | ICD-10-CM

## 2019-04-23 PROCEDURE — 99213 OFFICE O/P EST LOW 20 MIN: CPT | Performed by: FAMILY MEDICINE

## 2019-04-23 RX ORDER — PREDNISONE 20 MG/1
TABLET ORAL
Qty: 20 TABLET | Refills: 0 | Status: SHIPPED | OUTPATIENT
Start: 2019-04-23 | End: 2019-05-25

## 2019-04-23 RX ORDER — CEFUROXIME AXETIL 500 MG/1
500 TABLET ORAL 2 TIMES DAILY
Qty: 14 TABLET | Refills: 0 | Status: SHIPPED | OUTPATIENT
Start: 2019-04-23 | End: 2019-05-25

## 2019-04-23 NOTE — PROGRESS NOTES
SUBJECTIVE:   Ángel Pham is a 63 year old male presenting with a chief complaint of runny nose, stuffy nose, post nasal drip, sore throat and hoarse voice.  Onset of symptoms was 2 week(s) ago.   Course of illness is worsening.    Severity moderate   Current and Associated symptoms: None  Treatment measures tried include Tylenol/Ibuprofen and antibiotic.  Predisposing factors include-COPD.    Past Medical History:   Diagnosis Date     Allergy history unknown      COPD (chronic obstructive pulmonary disease) (H)      HTN, goal below 140/90      Hyperlipidaemia LDL goal < 100      Pneumothorax on left      Current Outpatient Medications   Medication Sig Dispense Refill     albuterol (2.5 MG/3ML) 0.083% neb solution INHALE 3 MILLILITER BY NEBULIZATION ROUTE 4 TIMES EVERY DAY  11     albuterol (PROAIR HFA) 108 (90 BASE) MCG/ACT inhaler Inhale 2 puffs into the lungs as needed.       amoxicillin (AMOXIL) 500 MG capsule Take 1 capsule (500 mg) by mouth 3 times daily 21 capsule 0     Butabarbital Sodium (BUTISOL SODIUM PO) Take  by mouth at onset of headache.       butalbital-acetaminophen-caffeine (FIORICET/ESGIC) -40 MG per tablet TAKE 1 TABLET BY MOUTH EVERY 4-6 HOURS AS NEEDED  0     fluticasone-salmeterol (ADVAIR DISKUS) 250-50 MCG/DOSE diskus inhaler Inhale 1 puff into the lungs every 12 hours       losartan (COZAAR) 100 MG tablet Take 1 tablet (100 mg) by mouth daily 90 tablet 3     umeclidinium (INCRUSE ELLIPTA) 62.5 MCG/INH inhaler Inhale 1 puff into the lungs daily       Social History     Tobacco Use     Smoking status: Current Every Day Smoker     Packs/day: 1.00     Years: 30.00     Pack years: 30.00     Types: Cigarettes     Smokeless tobacco: Never Used   Substance Use Topics     Alcohol use: Yes     Alcohol/week: 3.0 oz     Types: 6 Cans of beer per week     ROS:  CONSTITUTIONAL:NEGATIVE for fever, chills, change in weight  INTEGUMENTARY/SKIN: NEGATIVE for worrisome rashes, moles or  lesions    OBJECTIVE:  /80   Pulse 73   Temp 98.9  F (37.2  C) (Temporal)   Resp 16   Wt 54.9 kg (121 lb)   SpO2 97%   BMI 17.36 kg/m    GENERAL APPEARANCE: healthy, alert and no distress  EYES: EOMI,  PERRL, conjunctiva clear  HENT: TM's normal bilaterally and erythematous throat, L > R  NECK: supple, nontender, no lymphadenopathy  RESP: lungs clear to auscultation - no rales, rhonchi or wheezes  CV: regular rates and rhythm, normal S1 S2, no murmur noted  NEURO: Normal strength and tone, sensory exam grossly normal,  normal speech and mentation  SKIN: no suspicious lesions or rashes    ASSESSMENT:  1. Acute sore throat  Symptomatic cares were discussed in detail.   Pt instructed to come back to the clinic for worsening sx    - predniSONE (DELTASONE) 20 MG tablet; Take 60 mg by mouth daily for 3 days, THEN 40 mg daily for 3 days, THEN 20 mg daily for 3 days, THEN 10 mg daily for 3 days.  Dispense: 20 tablet; Refill: 0  - cefuroxime (CEFTIN) 500 MG tablet; Take 1 tablet (500 mg) by mouth 2 times daily  Dispense: 14 tablet; Refill: 0

## 2019-05-02 ENCOUNTER — OFFICE VISIT (OUTPATIENT)
Dept: FAMILY MEDICINE | Facility: CLINIC | Age: 64
End: 2019-05-02

## 2019-05-02 VITALS
HEART RATE: 58 BPM | RESPIRATION RATE: 16 BRPM | WEIGHT: 120 LBS | OXYGEN SATURATION: 98 % | BODY MASS INDEX: 17.22 KG/M2 | DIASTOLIC BLOOD PRESSURE: 72 MMHG | SYSTOLIC BLOOD PRESSURE: 138 MMHG

## 2019-05-02 DIAGNOSIS — R07.0 THROAT PAIN: Primary | ICD-10-CM

## 2019-05-02 PROCEDURE — 99213 OFFICE O/P EST LOW 20 MIN: CPT | Performed by: FAMILY MEDICINE

## 2019-05-02 RX ORDER — NYSTATIN 100000/ML
500000 SUSPENSION, ORAL (FINAL DOSE FORM) ORAL 4 TIMES DAILY
Qty: 100 ML | Refills: 0 | Status: SHIPPED | OUTPATIENT
Start: 2019-05-02 | End: 2019-05-02

## 2019-05-02 RX ORDER — NYSTATIN 100000/ML
500000 SUSPENSION, ORAL (FINAL DOSE FORM) ORAL 4 TIMES DAILY
Qty: 100 ML | Refills: 0 | Status: SHIPPED | OUTPATIENT
Start: 2019-05-02 | End: 2019-05-25

## 2019-05-02 NOTE — PROGRESS NOTES
"SUBJECTIVE:   Ángel Pham is a 63 year old male presenting with a chief complaint of sore throat.  Onset of symptoms was 3 week(s) ago.  Course of illness is same.    Severity moderate  Current and Associated symptoms:Pt states \"Left nostril feels like it's 'too open' and the right side stays closed\"  Treatment measures tried include Nebulizer (name: Albuterol).Pt states his hose was old in his nebulizer and he thinks this may be contributing to the persistent sore throat. He changed the tubing this morning and 'feels a difference'.  Predisposing factors include smoking, doesn't rinse his mouth after advair    Past Medical History:   Diagnosis Date     Allergy history unknown      COPD (chronic obstructive pulmonary disease) (H)      HTN, goal below 140/90      Hyperlipidaemia LDL goal < 100      Pneumothorax on left      Current Outpatient Medications   Medication Sig Dispense Refill     albuterol (2.5 MG/3ML) 0.083% neb solution INHALE 3 MILLILITER BY NEBULIZATION ROUTE 4 TIMES EVERY DAY  11     albuterol (PROAIR HFA) 108 (90 BASE) MCG/ACT inhaler Inhale 2 puffs into the lungs as needed.       Butabarbital Sodium (BUTISOL SODIUM PO) Take  by mouth at onset of headache.       butalbital-acetaminophen-caffeine (FIORICET/ESGIC) -40 MG per tablet TAKE 1 TABLET BY MOUTH EVERY 4-6 HOURS AS NEEDED  0     cefuroxime (CEFTIN) 500 MG tablet Take 1 tablet (500 mg) by mouth 2 times daily 14 tablet 0     fluticasone-salmeterol (ADVAIR DISKUS) 250-50 MCG/DOSE diskus inhaler Inhale 1 puff into the lungs every 12 hours       losartan (COZAAR) 100 MG tablet Take 1 tablet (100 mg) by mouth daily 90 tablet 3     predniSONE (DELTASONE) 20 MG tablet Take 60 mg by mouth daily for 3 days, THEN 40 mg daily for 3 days, THEN 20 mg daily for 3 days, THEN 10 mg daily for 3 days. 20 tablet 0     umeclidinium (INCRUSE ELLIPTA) 62.5 MCG/INH inhaler Inhale 1 puff into the lungs daily       Social History     Tobacco Use     Smoking " status: Current Every Day Smoker     Packs/day: 1.00     Years: 30.00     Pack years: 30.00     Types: Cigarettes     Smokeless tobacco: Never Used   Substance Use Topics     Alcohol use: Yes     Alcohol/week: 3.0 oz     Types: 6 Cans of beer per week     ROS:  CONSTITUTIONAL:NEGATIVE for fever, chills, change in weight  INTEGUMENTARY/SKIN: NEGATIVE for worrisome rashes, moles or lesions    OBJECTIVE:  /72   Pulse 58   Resp 16   Wt 54.4 kg (120 lb)   SpO2 98%   BMI 17.22 kg/m    GENERAL APPEARANCE: underweight  EYES: EOMI,  PERRL, conjunctiva clear  HENT: ear canals and TM's normal.  Nose and mouth without ulcers, erythema or lesions  NECK: supple, nontender, no lymphadenopathy  RESP: lungs clear to auscultation - no rales, rhonchi or wheezes  CV: regular rates and rhythm, normal S1 S2, no murmur noted  NEURO: Normal strength and tone, sensory exam grossly normal,  normal speech and mentation  SKIN: no suspicious lesions or rashes    ASSESSMENT:  1. Throat pain  Trial of treatment for thrush.   To ENT if persists.   Needs ot see pulm to discuss lung ca screening CT etc  - nystatin (MYCOSTATIN) 254334 UNIT/ML suspension; Take 5 mLs (500,000 Units) by mouth 4 times daily for 5 days  Dispense: 100 mL; Refill: 0  - Referral to [facility]

## 2019-05-02 NOTE — PROGRESS NOTES
Nystatin suspension sent to [facility] unable to fill. Patient aware of new pharmacy. Verónica Andrews

## 2019-05-03 NOTE — PROGRESS NOTES
Prescription changed from nystatin solution to clotrimazole ranjit as supply of nystatin is unavailable. Per Dr Dye prescription sent to pharmacy and patient notified. Verónica Andrews

## 2019-05-25 ENCOUNTER — OFFICE VISIT (OUTPATIENT)
Dept: URGENT CARE | Facility: URGENT CARE | Age: 64
End: 2019-05-25
Payer: COMMERCIAL

## 2019-05-25 ENCOUNTER — ANCILLARY PROCEDURE (OUTPATIENT)
Dept: GENERAL RADIOLOGY | Facility: CLINIC | Age: 64
End: 2019-05-25
Attending: FAMILY MEDICINE
Payer: COMMERCIAL

## 2019-05-25 VITALS
RESPIRATION RATE: 18 BRPM | BODY MASS INDEX: 16.57 KG/M2 | HEART RATE: 92 BPM | OXYGEN SATURATION: 96 % | WEIGHT: 115.5 LBS | TEMPERATURE: 97.5 F | DIASTOLIC BLOOD PRESSURE: 80 MMHG | SYSTOLIC BLOOD PRESSURE: 131 MMHG

## 2019-05-25 DIAGNOSIS — Z72.0 TOBACCO ABUSE: ICD-10-CM

## 2019-05-25 DIAGNOSIS — J01.90 ACUTE SINUSITIS WITH SYMPTOMS > 10 DAYS: ICD-10-CM

## 2019-05-25 DIAGNOSIS — J44.1 COPD EXACERBATION (H): Primary | ICD-10-CM

## 2019-05-25 PROCEDURE — 71046 X-RAY EXAM CHEST 2 VIEWS: CPT

## 2019-05-25 PROCEDURE — 99214 OFFICE O/P EST MOD 30 MIN: CPT | Performed by: FAMILY MEDICINE

## 2019-05-25 RX ORDER — DOXYCYCLINE 100 MG/1
100 TABLET ORAL 2 TIMES DAILY
Qty: 20 TABLET | Refills: 0 | Status: SHIPPED | OUTPATIENT
Start: 2019-05-25 | End: 2019-06-06

## 2019-05-25 RX ORDER — METHYLPREDNISOLONE 4 MG
TABLET, DOSE PACK ORAL
Qty: 21 TABLET | Refills: 0 | Status: SHIPPED | OUTPATIENT
Start: 2019-05-25 | End: 2019-07-25

## 2019-05-25 NOTE — PROGRESS NOTES
SUBJECTIVE: Ángel Pham is a 63 year old male presenting with a chief complaint of nasal congestion, cough  and facial pain/pressure.  Onset of symptoms was 1 month(s) ago.  Course of illness is same.    Severity moderate  Current and Associated symptoms: runny nose, stuffy nose and cough - productive  Treatment measures tried include Inhaler (name: see med list).  Predisposing factors include tobacco use and HX of COPD.    Past Medical History:   Diagnosis Date     Allergy history unknown      COPD (chronic obstructive pulmonary disease) (H)      HTN, goal below 140/90      Hyperlipidaemia LDL goal < 100      Pneumothorax on left      Allergies   Allergen Reactions     Chantix [Varenicline] Other (See Comments)     Patient reports has tried this in past and had lightheadedness and nose bleeds.     Morphine Hcl Itching     On contact     Wellbutrin [Bupropion Hydrobromide]      syncope     Social History     Tobacco Use     Smoking status: Current Every Day Smoker     Packs/day: 1.00     Years: 30.00     Pack years: 30.00     Types: Cigarettes     Smokeless tobacco: Never Used   Substance Use Topics     Alcohol use: Yes     Alcohol/week: 3.0 oz     Types: 6 Cans of beer per week       ROS:  SKIN: no rash  GI: no vomiting    OBJECTIVE:  /80   Pulse 92   Temp 97.5  F (36.4  C) (Oral)   Resp 18   Wt 52.4 kg (115 lb 8 oz)   SpO2 96%   BMI 16.57 kg/m     GENERAL APPEARANCE: healthy, alert and no distress  EYES: EOMI,  PERRL, conjunctiva clear  HENT: ear canals and TM's normal.  Nose and mouth without ulcers, erythema or lesions, maxillary sinus pain  NECK: supple, nontender, no lymphadenopathy  RESP: lungs clear to auscultation - no rales, rhonchi or wheezes  SKIN: no suspicious lesions or rashes    Xray without acute findings, read by Nicanor Guevara D.O.      ICD-10-CM    1. COPD exacerbation (H) J44.1 XR Chest 2 Views     doxycycline monohydrate (ADOXA) 100 MG tablet     methylPREDNISolone (MEDROL  DOSEPAK) 4 MG tablet therapy pack   2. Acute sinusitis with symptoms > 10 days J01.90 doxycycline monohydrate (ADOXA) 100 MG tablet   3. Tobacco abuse Z72.0 XR Chest 2 Views     Fluids/Rest, f/u if worse/not any better

## 2019-05-30 ENCOUNTER — TRANSFERRED RECORDS (OUTPATIENT)
Dept: FAMILY MEDICINE | Facility: CLINIC | Age: 64
End: 2019-05-30

## 2019-06-06 ENCOUNTER — OFFICE VISIT (OUTPATIENT)
Dept: FAMILY MEDICINE | Facility: CLINIC | Age: 64
End: 2019-06-06

## 2019-06-06 VITALS
HEART RATE: 79 BPM | BODY MASS INDEX: 16.5 KG/M2 | SYSTOLIC BLOOD PRESSURE: 132 MMHG | DIASTOLIC BLOOD PRESSURE: 72 MMHG | OXYGEN SATURATION: 97 % | TEMPERATURE: 99.1 F | RESPIRATION RATE: 16 BRPM | WEIGHT: 115 LBS

## 2019-06-06 DIAGNOSIS — J31.2 CHRONIC SORE THROAT: ICD-10-CM

## 2019-06-06 PROCEDURE — 99213 OFFICE O/P EST LOW 20 MIN: CPT | Performed by: FAMILY MEDICINE

## 2019-06-06 RX ORDER — PREDNISONE 20 MG/1
TABLET ORAL
Qty: 20 TABLET | Refills: 0 | Status: SHIPPED | OUTPATIENT
Start: 2019-06-06 | End: 2019-07-25

## 2019-06-06 RX ORDER — DOXYCYCLINE 100 MG/1
100 TABLET ORAL 2 TIMES DAILY
Qty: 28 TABLET | Refills: 0 | Status: SHIPPED | OUTPATIENT
Start: 2019-06-06 | End: 2019-07-25

## 2019-06-06 NOTE — PROGRESS NOTES
SUBJECTIVE:   Ángel Pham is a 63 year old male presenting with a chief complaint of sore throat.   Onset of symptoms was 1 month(s) ago.  Course of illness is same.    Severity moderate  Current and Associated symptoms: none  Treatment measures tried include Z-pack and doxycycline, medrol dose pack.  Predisposing factors include tobacco use and HX of COPD.   These meds helped short term but sx returned.   Long term smoker    Past Medical History:   Diagnosis Date     Allergy history unknown      COPD (chronic obstructive pulmonary disease) (H)      HTN, goal below 140/90      Hyperlipidaemia LDL goal < 100      Pneumothorax on left      Current Outpatient Medications   Medication Sig Dispense Refill     albuterol (2.5 MG/3ML) 0.083% neb solution INHALE 3 MILLILITER BY NEBULIZATION ROUTE 4 TIMES EVERY DAY  11     albuterol (PROAIR HFA) 108 (90 BASE) MCG/ACT inhaler Inhale 2 puffs into the lungs as needed.       butalbital-acetaminophen-caffeine (FIORICET/ESGIC) -40 MG per tablet TAKE 1 TABLET BY MOUTH EVERY 4-6 HOURS AS NEEDED  0     fluticasone-salmeterol (ADVAIR DISKUS) 250-50 MCG/DOSE diskus inhaler Inhale 1 puff into the lungs every 12 hours       losartan (COZAAR) 100 MG tablet Take 1 tablet (100 mg) by mouth daily 90 tablet 3     umeclidinium (INCRUSE ELLIPTA) 62.5 MCG/INH inhaler Inhale 1 puff into the lungs daily       Social History     Tobacco Use     Smoking status: Current Every Day Smoker     Packs/day: 1.00     Years: 30.00     Pack years: 30.00     Types: Cigarettes     Smokeless tobacco: Never Used   Substance Use Topics     Alcohol use: Yes     Alcohol/week: 3.0 oz     Types: 6 Cans of beer per week       ROS:  CONSTITUTIONAL:NEGATIVE for fever, chills, change in weight  INTEGUMENTARY/SKIN: NEGATIVE for worrisome rashes, moles or lesions    OBJECTIVE:  /72   Pulse 79   Temp 99.1  F (37.3  C) (Temporal)   Resp 16   Wt 52.2 kg (115 lb)   SpO2 97%   BMI 16.50 kg/m    GENERAL  APPEARANCE: thin, smells of smoke  EYES: EOMI,  PERRL, conjunctiva clear  HENT: ear canals and TM's normal.  Nose and mouth without ulcers, erythema or lesions  NECK: supple, nontender, no lymphadenopathy  RESP: lungs clear to auscultation - no rales, rhonchi or wheezes  CV: regular rates and rhythm, normal S1 S2, no murmur noted  NEURO: Normal strength and tone, sensory exam grossly normal,  normal speech and mentation  SKIN: no suspicious lesions or rashes    ASSESSMENT:  1. Chronic sore throat  Symptomatic cares were discussed in detail.    Pt instructed to come back to the clinic for worsening sx    - doxycycline monohydrate (ADOXA) 100 MG tablet; Take 1 tablet (100 mg) by mouth 2 times daily  Dispense: 28 tablet; Refill: 0  - OTOLARYNGOLOGY REFERRAL

## 2019-06-19 ENCOUNTER — TELEPHONE (OUTPATIENT)
Dept: FAMILY MEDICINE | Facility: CLINIC | Age: 64
End: 2019-06-19

## 2019-06-19 NOTE — TELEPHONE ENCOUNTER
Patient calls reporting sore throat persisting now for more than a month. At last visit  mentioned may need ENT eval. Patient would like to proceed with this.    did do referral to ENT at last visit - gave [facility] phone number to [facility] [phone]. He will call to schedule.   Josephine Sarkar RN

## 2019-07-25 ENCOUNTER — OFFICE VISIT (OUTPATIENT)
Dept: FAMILY MEDICINE | Facility: CLINIC | Age: 64
End: 2019-07-25

## 2019-07-25 VITALS
RESPIRATION RATE: 16 BRPM | HEART RATE: 85 BPM | WEIGHT: 111 LBS | SYSTOLIC BLOOD PRESSURE: 166 MMHG | DIASTOLIC BLOOD PRESSURE: 90 MMHG | OXYGEN SATURATION: 97 % | BODY MASS INDEX: 15.93 KG/M2

## 2019-07-25 DIAGNOSIS — C14.0 THROAT CANCER (H): ICD-10-CM

## 2019-07-25 DIAGNOSIS — G43.711 INTRACTABLE CHRONIC MIGRAINE WITHOUT AURA AND WITH STATUS MIGRAINOSUS: ICD-10-CM

## 2019-07-25 DIAGNOSIS — J43.9 PULMONARY EMPHYSEMA, UNSPECIFIED EMPHYSEMA TYPE (H): ICD-10-CM

## 2019-07-25 DIAGNOSIS — Z72.0 TOBACCO ABUSE: ICD-10-CM

## 2019-07-25 DIAGNOSIS — Z01.818 PREOP GENERAL PHYSICAL EXAM: Primary | ICD-10-CM

## 2019-07-25 LAB
% GRANULOCYTES: 70.6 % (ref 42.2–75.2)
HCT VFR BLD AUTO: 41.1 % (ref 39–51)
HEMOGLOBIN: 14 G/DL (ref 13.4–17.5)
LYMPHOCYTES NFR BLD AUTO: 21.8 % (ref 20.5–51.1)
MCH RBC QN AUTO: 34.2 PG (ref 27–31)
MCHC RBC AUTO-ENTMCNC: 34.1 G/DL (ref 33–37)
MCV RBC AUTO: 100.1 FL (ref 80–100)
MONOCYTES NFR BLD AUTO: 7.6 % (ref 1.7–9.3)
PLATELET # BLD AUTO: 237 K/UL (ref 140–450)
RBC # BLD AUTO: 4.11 X10/CMM (ref 4.2–5.9)
WBC # BLD AUTO: 4.9 X10/CMM (ref 3.8–11)

## 2019-07-25 PROCEDURE — 36415 COLL VENOUS BLD VENIPUNCTURE: CPT | Performed by: FAMILY MEDICINE

## 2019-07-25 PROCEDURE — 93000 ELECTROCARDIOGRAM COMPLETE: CPT | Performed by: FAMILY MEDICINE

## 2019-07-25 PROCEDURE — 99406 BEHAV CHNG SMOKING 3-10 MIN: CPT | Performed by: FAMILY MEDICINE

## 2019-07-25 PROCEDURE — 85025 COMPLETE CBC W/AUTO DIFF WBC: CPT | Performed by: FAMILY MEDICINE

## 2019-07-25 PROCEDURE — 99215 OFFICE O/P EST HI 40 MIN: CPT | Performed by: FAMILY MEDICINE

## 2019-07-25 SDOH — HEALTH STABILITY: MENTAL HEALTH: HOW OFTEN DO YOU HAVE A DRINK CONTAINING ALCOHOL?: 4 OR MORE TIMES A WEEK

## 2019-07-25 SDOH — HEALTH STABILITY: MENTAL HEALTH: HOW MANY STANDARD DRINKS CONTAINING ALCOHOL DO YOU HAVE ON A TYPICAL DAY?: 1 OR 2

## 2019-07-25 NOTE — PROGRESS NOTES
[facility]  [address] [place] [id number] [phone]  Dept: [phone]    PRE-OP EVALUATION:  Today's date: 2019    Ángel Pham (: 1955) presents for pre-operative evaluation assessment as requested by Dr. Mehta.  He requires evaluation and anesthesia risk assessment prior to undergoing surgery/procedure for treatment of Throat Bx .    Proposed Surgery/ Procedure: Throat bx  Date of Surgery/ Procedure: 19  Time of Surgery/ Procedure: 7:30am  Hospital/Surgical Facility: [facility]  Primary Physician: Naa Rodriges  Type of Anesthesia Anticipated: to be determined    Patient has a Health Care Directive or Living Will:  NO    1. NO - Do you have a history of heart attack, stroke, stent, bypass or surgery on an artery in the head, neck, heart or legs?  2. NO - Do you ever have any pain or discomfort in your chest?  3. NO - Do you have a history of  Heart Failure?  4. YES - Are you troubled by shortness of breath when: walking on the level, up a slight hill or at night? If he lifts heavy items as he does at work.  5. NO - Do you currently have a cold, bronchitis or other respiratory infection?  6. NO - Do you have a cough, shortness of breath or wheezing?  7. NO - Do you sometimes get pains in the calves of your legs when you walk?  8. NO - Do you or anyone in your family have previous history of blood clots?  9. NO - Do you or does anyone in your family have a serious bleeding problem such as prolonged bleeding following surgeries or cuts?  10. NO - Have you ever had problems with anemia or been told to take iron pills?  11. NO - Have you had any abnormal blood loss such as black, tarry or bloody stools, or abnormal vaginal bleeding?  12. NO - Have you ever had a blood transfusion?  13. NO - Have you or any of your relatives ever had problems with anesthesia?  14. NO - Do you have sleep apnea, excessive snoring or daytime drowsiness?  15. NO - Do you  have any prosthetic heart valves?  16. NO - Do you have prosthetic joints?  17. NO - Is there any chance that you may be pregnant?      HPI:     HPI related to upcoming procedure: Chronic sore throat, first evaluated in June, seen today by ENT with endoscopy showing esophageal abnormality. He has had a 13 pound weight loss since April of this year. Eating is difficult due to his throat.      COPD - Patient has a longstanding history of moderate-severe COPD . Patient has been doing well overall noting WEIGHT LOSS and continues on medication regimen consisting of Advair, Incruse, and albuterol without adverse reactions or side effects.    HYPERTENSION - Patient has longstanding history of HTN , currently denies any symptoms referable to elevated blood pressure. Specifically denies chest pain, palpitations, dyspnea, orthopnea, PND or peripheral edema. Blood pressure readings have been in normal range. Current medication regimen is as listed below. Patient denies any side effects of medication.       MEDICAL HISTORY:     Patient Active Problem List    Diagnosis Date Noted     HTN, goal below 140/90      Priority: Medium     Hyperlipidemia with target LDL less than 100      Priority: Medium     Diagnosis updated by automated process. Provider to review and confirm.       [facility] 07/08/2014     Priority: Medium     State Tier 1       Allergy history unknown      Priority: Medium     COPD (chronic obstructive pulmonary disease) (H)      Priority: Medium     History of pneumothorax      Priority: Medium      Past Medical History:   Diagnosis Date     Allergy history unknown      COPD (chronic obstructive pulmonary disease) (H)      HTN, goal below 140/90      Hyperlipidaemia LDL goal < 100      Pneumothorax on left      Past Surgical History:   Procedure Laterality Date     ENT SURGERY      sinus     HC HEMORROIDECTOMY, EXTERNAL, SINGLE COLUMN/GROUP      left     HERNIA REPAIR      umbilical and groin     THORACIC  SURGERY Right 2005    pneumothorax     Current Outpatient Medications   Medication Sig Dispense Refill     albuterol (2.5 MG/3ML) 0.083% neb solution INHALE 3 MILLILITER BY NEBULIZATION ROUTE 4 TIMES EVERY DAY  11     albuterol (PROAIR HFA) 108 (90 BASE) MCG/ACT inhaler Inhale 2 puffs into the lungs as needed.       butalbital-acetaminophen-caffeine (FIORICET/ESGIC) -40 MG per tablet TAKE 1 TABLET BY MOUTH EVERY 4-6 HOURS AS NEEDED  0     fluticasone-salmeterol (ADVAIR DISKUS) 250-50 MCG/DOSE diskus inhaler Inhale 1 puff into the lungs every 12 hours       losartan (COZAAR) 100 MG tablet Take 1 tablet (100 mg) by mouth daily 90 tablet 3     umeclidinium (INCRUSE ELLIPTA) 62.5 MCG/INH inhaler Inhale 1 puff into the lungs daily       OTC products: None, except as noted above    Allergies   Allergen Reactions     Chantix [Varenicline] Other (See Comments)     Patient reports has tried this in past and had lightheadedness and nose bleeds.     Morphine Hcl Itching     On contact     Wellbutrin [Bupropion Hydrobromide]      syncope      Latex Allergy: NO    Social History     Tobacco Use     Smoking status: Current Every Day Smoker     Packs/day: 1.00     Years: 43.00     Pack years: 43.00     Types: Cigarettes     Smokeless tobacco: Never Used   Substance Use Topics     Alcohol use: Yes     Alcohol/week: 9.0 oz     Types: 15 Cans of beer per week     Frequency: 4 or more times a week     Drinks per session: 1 or 2     History   Drug Use Unknown       REVIEW OF SYSTEMS:   Left ear hurts after having cerumen removed 2 weeks ago.   Constitutional, neuro, ENT, endocrine, pulmonary, cardiac, gastrointestinal, genitourinary, musculoskeletal, integument and psychiatric systems are negative, except as otherwise noted.    EXAM:   /90   Pulse 85   Resp 16   Wt 50.3 kg (111 lb)   SpO2 97%   BMI 15.93 kg/m      GENERAL APPEARANCE: alert and cachectic     EYES: EOMI,  PERRL     HENT: ear canals and TM's normal and  absent upper teeth     NECK: no adenopathy, no asymmetry, masses, or scars and thyroid normal to palpation     RESP: lungs clear to auscultation - no rales, rhonchi or wheezes     CV: regular rates and rhythm and grade 2/6 murmur heard best over the LUSB     ABDOMEN:  soft, nontender, no HSM or masses and bowel sounds normal     MS: extremities normal- no gross deformities noted, no evidence of inflammation in joints, FROM in all extremities.     SKIN: no suspicious lesions or rashes     NEURO: Normal strength and tone, sensory exam grossly normal, mentation intact and speech normal     PSYCH: mentation appears normal. and affect normal/bright     LYMPHATICS: No cervical adenopathy    DIAGNOSTICS:     EKG: appears normal, NSR, normal axis, normal intervals, no acute ST/T changes c/w ischemia, no LVH by voltage criteria, unchanged from previous tracings  Labs Resulted Today:   Results for orders placed or performed in visit on 07/25/19   CBC with Diff/Plt ([facility])   Result Value Ref Range    WBC x10/cmm 4.9 3.8 - 11.0 x10/cmm    % Lymphocytes 21.8 20.5 - 51.1 %    % Monocytes 7.6 1.7 - 9.3 %    % Granulocytes 70.6 42.2 - 75.2 %    RBC x10/cmm 4.11 (A) 4.2 - 5.9 x10/cmm    Hemoglobin 14.0 13.4 - 17.5 g/dl    Hematocrit 41.1 39 - 51 %    .1 (A) 80 - 100 fL    MCH 34.2 (A) 27.0 - 31.0 pg    MCHC 34.1 33.0 - 37.0 g/dL    Platelet Count 237 140 - 450 K/uL       Recent Labs   Lab Test 04/11/19  0945 04/20/18  0949  04/09/15  0645  02/26/14  0927   HGB  --   --   --  14.7  --  15.3   PLT  --   --   --  234  --  201   * 138   < > 129*   < >  --    POTASSIUM 5.3* 4.7   < > 4.1   < >  --    CR 0.69* 0.66*   < > 0.61*   < >  --     < > = values in this interval not displayed.    Study Result     XR CHEST 2 VW 5/25/2019 10:00 AM     COMPARISON: 1/1/2017     HISTORY: COPD exacerbation, tobacco abuse.                                                                      IMPRESSION: Apically predominant emphysema again  seen. Wedge resection  changes in the RIGHT apex and scarring in the LEFT apex again seen. No  pleural effusion or pneumothorax. No new focal airspace disease.     BERNARDA PELAEZ MD         IMPRESSION:   Reason for surgery/procedure: esophageal mass  Diagnosis/reason for consult: preoperative clerance    The proposed surgical procedure is considered INTERMEDIATE risk.    REVISED CARDIAC RISK INDEX  The patient has the following serious cardiovascular risks for perioperative complications such as (MI, PE, VFib and 3  AV Block):  No serious cardiac risks  INTERPRETATION: 0 risks: Class I (very low risk - 0.4% complication rate)    The patient has the following additional risks for perioperative complications:  No identified additional risks      ICD-10-CM    1. Preop general physical exam Z01.818    2. Throat cancer (H) C14.0    3. Pulmonary emphysema, unspecified emphysema type (H) J43.9    4. Intractable chronic migraine without aura and with status migrainosus G43.711        RECOMMENDATIONS:     Strongly recommend that he quit smoking prior to and following the surgery. He is offered help with this. He declines nicotine replacement, but accepts referral to Quit Plan.    Also discussed the benefits of stopping alcohol. He denies any sort of withdrawal symptoms when he goes without alcohol.     APPROVAL GIVEN to proceed with proposed procedure, without further diagnostic evaluation       Signed Electronically by: Pamela Jones MD    Copy of this evaluation report is provided to requesting physician.    [facility] Preop Guidelines    Revised Cardiac Risk Index

## 2019-07-26 ENCOUNTER — ANESTHESIA EVENT (OUTPATIENT)
Dept: SURGERY | Facility: CLINIC | Age: 64
End: 2019-07-26
Payer: COMMERCIAL

## 2019-07-26 ENCOUNTER — HOSPITAL ENCOUNTER (OUTPATIENT)
Facility: CLINIC | Age: 64
Discharge: HOME | End: 2019-07-26
Attending: OTOLARYNGOLOGY | Admitting: OTOLARYNGOLOGY
Payer: COMMERCIAL

## 2019-07-26 ENCOUNTER — ANESTHESIA (OUTPATIENT)
Dept: SURGERY | Facility: CLINIC | Age: 64
End: 2019-07-26
Payer: COMMERCIAL

## 2019-07-26 VITALS
SYSTOLIC BLOOD PRESSURE: 136 MMHG | TEMPERATURE: 97.3 F | HEIGHT: 66 IN | WEIGHT: 109.56 LBS | HEART RATE: 84 BPM | BODY MASS INDEX: 17.61 KG/M2 | DIASTOLIC BLOOD PRESSURE: 78 MMHG | OXYGEN SATURATION: 96 % | RESPIRATION RATE: 18 BRPM

## 2019-07-26 DIAGNOSIS — G89.18 POST-OP PAIN: Primary | ICD-10-CM

## 2019-07-26 LAB
CREAT SERPL-MCNC: 0.52 MG/DL (ref 0.66–1.25)
GFR SERPL CREATININE-BSD FRML MDRD: >90 ML/MIN/{1.73_M2}

## 2019-07-26 PROCEDURE — 37000008 ZZH ANESTHESIA TECHNICAL FEE, 1ST 30 MIN: Performed by: OTOLARYNGOLOGY

## 2019-07-26 PROCEDURE — 88341 IMHCHEM/IMCYTCHM EA ADD ANTB: CPT | Mod: 26 | Performed by: OTOLARYNGOLOGY

## 2019-07-26 PROCEDURE — 36000058 ZZH SURGERY LEVEL 3 EA 15 ADDTL MIN: Performed by: OTOLARYNGOLOGY

## 2019-07-26 PROCEDURE — 36415 COLL VENOUS BLD VENIPUNCTURE: CPT | Performed by: ANESTHESIOLOGY

## 2019-07-26 PROCEDURE — 25000566 ZZH SEVOFLURANE, EA 15 MIN: Performed by: OTOLARYNGOLOGY

## 2019-07-26 PROCEDURE — 88305 TISSUE EXAM BY PATHOLOGIST: CPT | Performed by: OTOLARYNGOLOGY

## 2019-07-26 PROCEDURE — 25000128 H RX IP 250 OP 636: Performed by: OTOLARYNGOLOGY

## 2019-07-26 PROCEDURE — 25000125 ZZHC RX 250: Performed by: OTOLARYNGOLOGY

## 2019-07-26 PROCEDURE — 71000027 ZZH RECOVERY PHASE 2 EACH 15 MINS: Performed by: OTOLARYNGOLOGY

## 2019-07-26 PROCEDURE — 88331 PATH CONSLTJ SURG 1 BLK 1SPC: CPT | Performed by: OTOLARYNGOLOGY

## 2019-07-26 PROCEDURE — 71000012 ZZH RECOVERY PHASE 1 LEVEL 1 FIRST HR: Performed by: OTOLARYNGOLOGY

## 2019-07-26 PROCEDURE — 37000009 ZZH ANESTHESIA TECHNICAL FEE, EACH ADDTL 15 MIN: Performed by: OTOLARYNGOLOGY

## 2019-07-26 PROCEDURE — 25800030 ZZH RX IP 258 OP 636: Performed by: NURSE ANESTHETIST, CERTIFIED REGISTERED

## 2019-07-26 PROCEDURE — 40000170 ZZH STATISTIC PRE-PROCEDURE ASSESSMENT II: Performed by: OTOLARYNGOLOGY

## 2019-07-26 PROCEDURE — 82565 ASSAY OF CREATININE: CPT | Performed by: ANESTHESIOLOGY

## 2019-07-26 PROCEDURE — 88305 TISSUE EXAM BY PATHOLOGIST: CPT | Mod: 26 | Performed by: OTOLARYNGOLOGY

## 2019-07-26 PROCEDURE — 88341 IMHCHEM/IMCYTCHM EA ADD ANTB: CPT | Performed by: OTOLARYNGOLOGY

## 2019-07-26 PROCEDURE — 88342 IMHCHEM/IMCYTCHM 1ST ANTB: CPT | Mod: 26 | Performed by: OTOLARYNGOLOGY

## 2019-07-26 PROCEDURE — 25000128 H RX IP 250 OP 636: Performed by: NURSE ANESTHETIST, CERTIFIED REGISTERED

## 2019-07-26 PROCEDURE — 88342 IMHCHEM/IMCYTCHM 1ST ANTB: CPT | Performed by: OTOLARYNGOLOGY

## 2019-07-26 PROCEDURE — 36000056 ZZH SURGERY LEVEL 3 1ST 30 MIN: Performed by: OTOLARYNGOLOGY

## 2019-07-26 PROCEDURE — 25000125 ZZHC RX 250: Performed by: NURSE ANESTHETIST, CERTIFIED REGISTERED

## 2019-07-26 PROCEDURE — 25000132 ZZH RX MED GY IP 250 OP 250 PS 637: Performed by: OTOLARYNGOLOGY

## 2019-07-26 PROCEDURE — 88331 PATH CONSLTJ SURG 1 BLK 1SPC: CPT | Mod: 26 | Performed by: OTOLARYNGOLOGY

## 2019-07-26 PROCEDURE — 25000128 H RX IP 250 OP 636: Performed by: ANESTHESIOLOGY

## 2019-07-26 RX ORDER — NALOXONE HYDROCHLORIDE 0.4 MG/ML
.1-.4 INJECTION, SOLUTION INTRAMUSCULAR; INTRAVENOUS; SUBCUTANEOUS
Status: DISCONTINUED | OUTPATIENT
Start: 2019-07-26 | End: 2019-07-26 | Stop reason: HOSPADM

## 2019-07-26 RX ORDER — GLYCOPYRROLATE 0.2 MG/ML
0.1 INJECTION, SOLUTION INTRAMUSCULAR; INTRAVENOUS ONCE
Status: DISCONTINUED | OUTPATIENT
Start: 2019-07-26 | End: 2019-07-26 | Stop reason: HOSPADM

## 2019-07-26 RX ORDER — AMPICILLIN AND SULBACTAM 1; .5 G/1; G/1
1.5 INJECTION, POWDER, FOR SOLUTION INTRAMUSCULAR; INTRAVENOUS
Status: COMPLETED | OUTPATIENT
Start: 2019-07-26 | End: 2019-07-26

## 2019-07-26 RX ORDER — ONDANSETRON 2 MG/ML
4 INJECTION INTRAMUSCULAR; INTRAVENOUS EVERY 30 MIN PRN
Status: DISCONTINUED | OUTPATIENT
Start: 2019-07-26 | End: 2019-07-26 | Stop reason: HOSPADM

## 2019-07-26 RX ORDER — FENTANYL CITRATE 50 UG/ML
25-50 INJECTION, SOLUTION INTRAMUSCULAR; INTRAVENOUS
Status: DISCONTINUED | OUTPATIENT
Start: 2019-07-26 | End: 2019-07-26 | Stop reason: HOSPADM

## 2019-07-26 RX ORDER — FENTANYL CITRATE 50 UG/ML
INJECTION, SOLUTION INTRAMUSCULAR; INTRAVENOUS PRN
Status: DISCONTINUED | OUTPATIENT
Start: 2019-07-26 | End: 2019-07-26

## 2019-07-26 RX ORDER — GLYCOPYRROLATE 0.2 MG/ML
INJECTION, SOLUTION INTRAMUSCULAR; INTRAVENOUS PRN
Status: DISCONTINUED | OUTPATIENT
Start: 2019-07-26 | End: 2019-07-26

## 2019-07-26 RX ORDER — LIDOCAINE HYDROCHLORIDE 20 MG/ML
INJECTION, SOLUTION INFILTRATION; PERINEURAL PRN
Status: DISCONTINUED | OUTPATIENT
Start: 2019-07-26 | End: 2019-07-26

## 2019-07-26 RX ORDER — ONDANSETRON 2 MG/ML
INJECTION INTRAMUSCULAR; INTRAVENOUS PRN
Status: DISCONTINUED | OUTPATIENT
Start: 2019-07-26 | End: 2019-07-26

## 2019-07-26 RX ORDER — AMPICILLIN AND SULBACTAM 1; .5 G/1; G/1
1.5 INJECTION, POWDER, FOR SOLUTION INTRAMUSCULAR; INTRAVENOUS SEE ADMIN INSTRUCTIONS
Status: DISCONTINUED | OUTPATIENT
Start: 2019-07-26 | End: 2019-07-26 | Stop reason: HOSPADM

## 2019-07-26 RX ORDER — OXYMETAZOLINE HYDROCHLORIDE 0.05 G/100ML
SPRAY NASAL PRN
Status: DISCONTINUED | OUTPATIENT
Start: 2019-07-26 | End: 2019-07-26 | Stop reason: HOSPADM

## 2019-07-26 RX ORDER — MEPERIDINE HYDROCHLORIDE 25 MG/ML
12.5 INJECTION INTRAMUSCULAR; INTRAVENOUS; SUBCUTANEOUS
Status: DISCONTINUED | OUTPATIENT
Start: 2019-07-26 | End: 2019-07-26 | Stop reason: HOSPADM

## 2019-07-26 RX ORDER — SODIUM CHLORIDE, SODIUM LACTATE, POTASSIUM CHLORIDE, CALCIUM CHLORIDE 600; 310; 30; 20 MG/100ML; MG/100ML; MG/100ML; MG/100ML
INJECTION, SOLUTION INTRAVENOUS CONTINUOUS PRN
Status: DISCONTINUED | OUTPATIENT
Start: 2019-07-26 | End: 2019-07-26

## 2019-07-26 RX ORDER — DEXAMETHASONE SODIUM PHOSPHATE 10 MG/ML
12 INJECTION, SOLUTION INTRAMUSCULAR; INTRAVENOUS ONCE
Status: DISCONTINUED | OUTPATIENT
Start: 2019-07-26 | End: 2019-07-26 | Stop reason: HOSPADM

## 2019-07-26 RX ORDER — DEXAMETHASONE SODIUM PHOSPHATE 4 MG/ML
INJECTION, SOLUTION INTRA-ARTICULAR; INTRALESIONAL; INTRAMUSCULAR; INTRAVENOUS; SOFT TISSUE PRN
Status: DISCONTINUED | OUTPATIENT
Start: 2019-07-26 | End: 2019-07-26

## 2019-07-26 RX ORDER — HYDROMORPHONE HYDROCHLORIDE 1 MG/ML
.3-.5 INJECTION, SOLUTION INTRAMUSCULAR; INTRAVENOUS; SUBCUTANEOUS EVERY 10 MIN PRN
Status: DISCONTINUED | OUTPATIENT
Start: 2019-07-26 | End: 2019-07-26 | Stop reason: HOSPADM

## 2019-07-26 RX ORDER — NEOSTIGMINE METHYLSULFATE 1 MG/ML
VIAL (ML) INJECTION PRN
Status: DISCONTINUED | OUTPATIENT
Start: 2019-07-26 | End: 2019-07-26

## 2019-07-26 RX ORDER — HYDROCODONE BITARTRATE AND ACETAMINOPHEN 5; 325 MG/1; MG/1
1-2 TABLET ORAL EVERY 4 HOURS PRN
Qty: 25 TABLET | Refills: 0 | Status: SHIPPED | OUTPATIENT
Start: 2019-07-26 | End: 2020-04-22 | Stop reason: ALTCHOICE

## 2019-07-26 RX ORDER — SODIUM CHLORIDE, SODIUM LACTATE, POTASSIUM CHLORIDE, CALCIUM CHLORIDE 600; 310; 30; 20 MG/100ML; MG/100ML; MG/100ML; MG/100ML
INJECTION, SOLUTION INTRAVENOUS CONTINUOUS
Status: DISCONTINUED | OUTPATIENT
Start: 2019-07-26 | End: 2019-07-26 | Stop reason: HOSPADM

## 2019-07-26 RX ORDER — HYDROCODONE BITARTRATE AND ACETAMINOPHEN 5; 325 MG/1; MG/1
1 TABLET ORAL
Status: COMPLETED | OUTPATIENT
Start: 2019-07-26 | End: 2019-07-26

## 2019-07-26 RX ORDER — ONDANSETRON 4 MG/1
4 TABLET, ORALLY DISINTEGRATING ORAL EVERY 30 MIN PRN
Status: DISCONTINUED | OUTPATIENT
Start: 2019-07-26 | End: 2019-07-26 | Stop reason: HOSPADM

## 2019-07-26 RX ORDER — PROPOFOL 10 MG/ML
INJECTION, EMULSION INTRAVENOUS PRN
Status: DISCONTINUED | OUTPATIENT
Start: 2019-07-26 | End: 2019-07-26

## 2019-07-26 RX ORDER — MAGNESIUM HYDROXIDE 1200 MG/15ML
LIQUID ORAL PRN
Status: DISCONTINUED | OUTPATIENT
Start: 2019-07-26 | End: 2019-07-26 | Stop reason: HOSPADM

## 2019-07-26 RX ADMIN — HYDROMORPHONE HYDROCHLORIDE 0.3 MG: 1 INJECTION, SOLUTION INTRAMUSCULAR; INTRAVENOUS; SUBCUTANEOUS at 09:28

## 2019-07-26 RX ADMIN — PHENYLEPHRINE HYDROCHLORIDE 200 MCG: 10 INJECTION INTRAVENOUS at 07:56

## 2019-07-26 RX ADMIN — ROCURONIUM BROMIDE 30 MG: 10 INJECTION INTRAVENOUS at 07:47

## 2019-07-26 RX ADMIN — PHENYLEPHRINE HYDROCHLORIDE 200 MCG: 10 INJECTION INTRAVENOUS at 08:25

## 2019-07-26 RX ADMIN — LIDOCAINE HYDROCHLORIDE 40 MG: 20 INJECTION, SOLUTION INFILTRATION; PERINEURAL at 07:47

## 2019-07-26 RX ADMIN — HYDROCODONE BITARTRATE AND ACETAMINOPHEN 1 TABLET: 5; 325 TABLET ORAL at 09:20

## 2019-07-26 RX ADMIN — NEOSTIGMINE METHYLSULFATE 2.5 MG: 1 INJECTION, SOLUTION INTRAVENOUS at 08:32

## 2019-07-26 RX ADMIN — AMPICILLIN SODIUM AND SULBACTAM SODIUM 1.5 G: 1; .5 INJECTION, POWDER, FOR SOLUTION INTRAMUSCULAR; INTRAVENOUS at 07:48

## 2019-07-26 RX ADMIN — DEXAMETHASONE SODIUM PHOSPHATE 12 MG: 4 INJECTION, SOLUTION INTRA-ARTICULAR; INTRALESIONAL; INTRAMUSCULAR; INTRAVENOUS; SOFT TISSUE at 07:48

## 2019-07-26 RX ADMIN — PROPOFOL 20 MG: 10 INJECTION, EMULSION INTRAVENOUS at 08:00

## 2019-07-26 RX ADMIN — PHENYLEPHRINE HYDROCHLORIDE 200 MCG: 10 INJECTION INTRAVENOUS at 08:02

## 2019-07-26 RX ADMIN — FENTANYL CITRATE 50 MCG: 50 INJECTION, SOLUTION INTRAMUSCULAR; INTRAVENOUS at 09:06

## 2019-07-26 RX ADMIN — PHENYLEPHRINE HYDROCHLORIDE 100 MCG: 10 INJECTION INTRAVENOUS at 08:09

## 2019-07-26 RX ADMIN — PHENYLEPHRINE HYDROCHLORIDE 100 MCG: 10 INJECTION INTRAVENOUS at 08:22

## 2019-07-26 RX ADMIN — ONDANSETRON 4 MG: 2 INJECTION INTRAMUSCULAR; INTRAVENOUS at 08:23

## 2019-07-26 RX ADMIN — GLYCOPYRROLATE 0.2 MG: 0.2 INJECTION, SOLUTION INTRAMUSCULAR; INTRAVENOUS at 07:49

## 2019-07-26 RX ADMIN — PHENYLEPHRINE HYDROCHLORIDE 100 MCG: 10 INJECTION INTRAVENOUS at 08:07

## 2019-07-26 RX ADMIN — HYDROMORPHONE HYDROCHLORIDE 0.5 MG: 1 INJECTION, SOLUTION INTRAMUSCULAR; INTRAVENOUS; SUBCUTANEOUS at 09:13

## 2019-07-26 RX ADMIN — REMIFENTANIL HYDROCHLORIDE 0.3 MCG/KG/MIN: 1 INJECTION, POWDER, LYOPHILIZED, FOR SOLUTION INTRAVENOUS at 07:46

## 2019-07-26 RX ADMIN — MIDAZOLAM 2 MG: 1 INJECTION INTRAMUSCULAR; INTRAVENOUS at 07:44

## 2019-07-26 RX ADMIN — SODIUM CHLORIDE, POTASSIUM CHLORIDE, SODIUM LACTATE AND CALCIUM CHLORIDE: 600; 310; 30; 20 INJECTION, SOLUTION INTRAVENOUS at 07:44

## 2019-07-26 RX ADMIN — FENTANYL CITRATE 50 MCG: 50 INJECTION, SOLUTION INTRAMUSCULAR; INTRAVENOUS at 07:47

## 2019-07-26 RX ADMIN — FENTANYL CITRATE 50 MCG: 50 INJECTION, SOLUTION INTRAMUSCULAR; INTRAVENOUS at 09:01

## 2019-07-26 RX ADMIN — ROCURONIUM BROMIDE 20 MG: 10 INJECTION INTRAVENOUS at 08:00

## 2019-07-26 RX ADMIN — PHENYLEPHRINE HYDROCHLORIDE 100 MCG: 10 INJECTION INTRAVENOUS at 08:15

## 2019-07-26 RX ADMIN — PHENYLEPHRINE HYDROCHLORIDE 200 MCG: 10 INJECTION INTRAVENOUS at 08:13

## 2019-07-26 RX ADMIN — PHENYLEPHRINE HYDROCHLORIDE 100 MCG: 10 INJECTION INTRAVENOUS at 07:17

## 2019-07-26 RX ADMIN — GLYCOPYRROLATE 0.4 MG: 0.2 INJECTION, SOLUTION INTRAMUSCULAR; INTRAVENOUS at 08:32

## 2019-07-26 RX ADMIN — PHENYLEPHRINE HYDROCHLORIDE 200 MCG: 10 INJECTION INTRAVENOUS at 08:32

## 2019-07-26 RX ADMIN — PHENYLEPHRINE HYDROCHLORIDE 200 MCG: 10 INJECTION INTRAVENOUS at 07:52

## 2019-07-26 RX ADMIN — PROPOFOL 130 MG: 10 INJECTION, EMULSION INTRAVENOUS at 07:47

## 2019-07-26 ASSESSMENT — MIFFLIN-ST. JEOR: SCORE: 1234.72

## 2019-07-26 ASSESSMENT — COPD QUESTIONNAIRES: COPD: 1

## 2019-07-26 ASSESSMENT — LIFESTYLE VARIABLES: TOBACCO_USE: 1

## 2019-07-26 ASSESSMENT — ENCOUNTER SYMPTOMS: SEIZURES: 0

## 2019-07-26 NOTE — ANESTHESIA PREPROCEDURE EVALUATION
Anesthesia Pre-Procedure Evaluation    Patient: Ángel Pham   MRN: [id number] : 1955          Preoperative Diagnosis: LEFT LARYNGEAL MASS    Procedure(s):  MICRODIRECT LARYNGOSCOPY WITH BIOPSY OF LEFT LARYNGEAL MASS    Past Medical History:   Diagnosis Date     Allergy history unknown      COPD (chronic obstructive pulmonary disease) (H)      HTN, goal below 140/90      Hyperlipidaemia LDL goal < 100      Pneumothorax on left      Past Surgical History:   Procedure Laterality Date     ENT SURGERY      sinus     HC HEMORROIDECTOMY, EXTERNAL, SINGLE COLUMN/GROUP      left     HERNIA REPAIR      umbilical and groin     THORACIC SURGERY Right 2005    pneumothorax       Anesthesia Evaluation     . Pt has had prior anesthetic.     No history of anesthetic complications          ROS/MED HX    ENT/Pulmonary: Comment: Laryngeal mass    (+)tobacco use, COPD, , . .   (-) sleep apnea   Neurologic:      (-) seizures and CVA   Cardiovascular:     (+) Dyslipidemia, hypertension----. : . . . :. .       METS/Exercise Tolerance:  >4 METS   Hematologic:         Musculoskeletal:         GI/Hepatic:        (-) GERD and liver disease   Renal/Genitourinary:      (-) renal disease   Endo:      (-) Type II DM and thyroid disease   Psychiatric:         Infectious Disease:         Malignancy:         Other:                          Physical Exam  Normal systems: cardiovascular and pulmonary    Airway   Mallampati: II  TM distance: >3 FB  Neck ROM: full    Dental   (+) chipped    Cardiovascular       Pulmonary             Lab Results   Component Value Date    WBC 4.9 2019    HGB 14.0 2019    HCT 41.1 2019     2019     (L) 2019    POTASSIUM 5.3 (H) 2019    CHLORIDE 96 2019    CO2 26 2015    BUN 7 (L) 2019    BUN 10 2019    CR 0.52 (L) 2019    GLC 89 2019    MOSHE 9.2 2019    ALBUMIN 4.4 2015    PROTTOTAL 6.6 2015    ALT 21  "11/25/2015    AST 28 11/25/2015    ALKPHOS 82 11/25/2015    BILITOTAL <0.2 11/25/2015    LIPASE 157 04/09/2015       Preop Vitals  BP Readings from Last 3 Encounters:   07/26/19 149/84   07/25/19 166/90   06/06/19 132/72    Pulse Readings from Last 3 Encounters:   07/25/19 85   06/06/19 79   05/25/19 92      Resp Readings from Last 3 Encounters:   07/26/19 16   07/25/19 16   06/06/19 16    SpO2 Readings from Last 3 Encounters:   07/26/19 97%   07/25/19 97%   06/06/19 97%      Temp Readings from Last 1 Encounters:   07/26/19 37.1  C (98.7  F) (Oral)    Ht Readings from Last 1 Encounters:   07/26/19 1.676 m (5' 6\")      Wt Readings from Last 1 Encounters:   07/26/19 49.7 kg (109 lb 9 oz)    Estimated body mass index is 17.68 kg/m  as calculated from the following:    Height as of this encounter: 1.676 m (5' 6\").    Weight as of this encounter: 49.7 kg (109 lb 9 oz).       Anesthesia Plan      History & Physical Review  History and physical reviewed and following examination; no interval change.    ASA Status:  2 .    NPO Status:  > 8 hours    Plan for General and ETT with Intravenous induction. Maintenance will be Balanced.    PONV prophylaxis:  Dexamethasone or Solumedrol and Ondansetron (or other 5HT-3)  Additional equipment: Videolaryngoscope      Postoperative Care  Postoperative pain management:  Multi-modal analgesia.      Consents  Anesthetic plan, risks, benefits and alternatives discussed with:  Patient..                 Gentry Beebe MD  "

## 2019-07-26 NOTE — ANESTHESIA CARE TRANSFER NOTE
Patient: Ángel MCCAIN [facility]    Procedure(s):  MICRODIRECT LARYNGOSCOPY WITH BIOPSY OF LEFT LARYNGEAL MASS    Diagnosis: LEFT LARYNGEAL MASS  Diagnosis Additional Information: No value filed.    Anesthesia Type:   General, ETT     Note:  Airway :Face Mask  Patient transferred to:PACU  Comments: VSS. Airway and IV patent. Patient comfortable. Report to RN. Stable care transfer.Handoff Report: Identifed the Patient, Identified the Reponsible Provider, Reviewed the pertinent medical history, Discussed the surgical course, Reviewed Intra-OP anesthesia mangement and issues during anesthesia, Set expectations for post-procedure period and Allowed opportunity for questions and acknowledgement of understanding      Vitals: (Last set prior to Anesthesia Care Transfer)    CRNA VITALS  7/26/2019 0810 - 7/26/2019 0844      7/26/2019             NIBP:  121/99  (Abnormal)     Pulse:  85    NIBP Mean:  108    SpO2:  100 %    Resp Rate (observed):  2  (Abnormal)     Resp Rate (set):  10                Electronically Signed By: ALY Pringle CRNA  July 26, 2019  8:44 AM

## 2019-07-26 NOTE — DISCHARGE INSTRUCTIONS
Same Day Surgery Discharge Instructions for  Sedation and General Anesthesia       It's not unusual to feel dizzy, light-headed or faint for up to 24 hours after surgery or while taking pain medication.  If you have these symptoms: sit for a few minutes before standing and have someone assist you when you get up to walk or use the bathroom.      You should rest and relax for the next 24 hours. We recommend you make arrangements to have an adult stay with you for at least 24 hours after your discharge.  Avoid hazardous and strenuous activity.      DO NOT DRIVE any vehicle or operate mechanical equipment for 24 hours following the end of your surgery.  Even though you may feel normal, your reactions may be affected by the medication you have received.      Do not drink alcoholic beverages for 24 hours following surgery.       Slowly progress to your regular diet as you feel able. It's not unusual to feel nauseated and/or vomit after receiving anesthesia.  If you develop these symptoms, drink clear liquids (apple juice, ginger ale, broth, 7-up, etc. ) until you feel better.  If your nausea and vomiting persists for 24 hours, please notify your surgeon.        All narcotic pain medications, along with inactivity and anesthesia, can cause constipation. Drinking plenty of liquids and increasing fiber intake will help.      For any questions of a medical nature, call your surgeon.      Do not make important decisions for 24 hours.      If you had general anesthesia, you may have a sore throat for a couple of days related to the breathing tube used during surgery.  You may use Cepacol lozenges to help with this discomfort.  If it worsens or if you develop a fever, contact your surgeon.       If you feel your pain is not well managed with the pain medications prescribed by your surgeon, please contact your surgeon's office to let them know so they can address your concerns.     Reasons to contact your surgeon:    1. Signs of  possible infection: Check your incision daily for redness, swelling, warmth, red streaks or foul drainage.   2. Elevated temperature.  3. Pain not controlled with pain medication and/or rest.   4. Uncontrolled nausea or vomiting.  5. Any questions or concerns.      **If you have questions or concerns about your procedure,   call Dr. Mehta at [phone]**

## 2019-07-26 NOTE — OR NURSING
"PNDS met, I/O per flowsheet. Pain \"much better\" per patient, see MAR for dosing. Transferred to Phase II.  "

## 2019-07-26 NOTE — ANESTHESIA POSTPROCEDURE EVALUATION
Patient: Ángel Camachonathan    Procedure(s):  MICRODIRECT LARYNGOSCOPY WITH BIOPSY OF LEFT LARYNGEAL MASS    Diagnosis:LEFT LARYNGEAL MASS  Diagnosis Additional Information: No value filed.    Anesthesia Type:  General, ETT    Note:  Anesthesia Post Evaluation    Patient location during evaluation: PACU  Patient participation: Able to fully participate in evaluation  Level of consciousness: awake and alert  Pain management: satisfactory to patient  Airway patency: patent  Cardiovascular status: hemodynamically stable  Respiratory status: acceptable and unassisted  Hydration status: balanced  PONV: none     Anesthetic complications: None          Last vitals:  Vitals:    07/26/19 0915 07/26/19 0930 07/26/19 1015   BP: 150/78 141/90 136/78   Pulse: 74 84    Resp: 11 18    Temp:      SpO2: 97% 96% 96%         Electronically Signed By: Gentry Beebe MD  July 26, 2019  12:27 PM

## 2019-07-30 LAB — COPATH REPORT: NORMAL

## 2019-08-05 ENCOUNTER — TRANSFERRED RECORDS (OUTPATIENT)
Dept: FAMILY MEDICINE | Facility: CLINIC | Age: 64
End: 2019-08-05

## 2019-08-12 ENCOUNTER — TRANSFERRED RECORDS (OUTPATIENT)
Dept: FAMILY MEDICINE | Facility: CLINIC | Age: 64
End: 2019-08-12

## 2019-08-13 ENCOUNTER — TELEPHONE (OUTPATIENT)
Dept: FAMILY MEDICINE | Facility: CLINIC | Age: 64
End: 2019-08-13

## 2019-08-13 NOTE — LETTER
[facility] [address] [place] [address] [phone]      August 14, 2019    To whom it may concern:     RE:  Ángel Pham    Please excuse Garcia from work as he continues his evaluation and treatment for throat cancer. His treatment plan has not yet been determined but I expect this to be more clear in the next 2-3 weeks. He is working closely with his specialists to further clarify this and will be completing the necessary FMLA and/or short term disability forms.     Thank you for your understanding and efforts in accommodating Garcia's needs during this difficult time.     Sincerely,           Cesar Dye MD

## 2019-08-26 ENCOUNTER — TRANSFERRED RECORDS (OUTPATIENT)
Dept: FAMILY MEDICINE | Facility: CLINIC | Age: 64
End: 2019-08-26

## 2019-08-29 NOTE — TELEPHONE ENCOUNTER
8/13/19 patient calls requesting letter for employer regarding missing work as he continues workup and treatment for throat cancer. States his specialists will be completing fmla/disability paperwork when plan is finalized but since plan (chemo, radiation or combo) has been determined just yet, requesting interim letter from  if willing.    agrees and letter done and mailed to patient per his request.  Josephine Sarkar RN

## 2019-09-09 ENCOUNTER — TRANSFERRED RECORDS (OUTPATIENT)
Dept: FAMILY MEDICINE | Facility: CLINIC | Age: 64
End: 2019-09-09

## 2019-10-22 ENCOUNTER — TRANSFERRED RECORDS (OUTPATIENT)
Dept: FAMILY MEDICINE | Facility: CLINIC | Age: 64
End: 2019-10-22

## 2019-11-01 DIAGNOSIS — Z23 NEED FOR INFLUENZA VACCINATION: Primary | ICD-10-CM

## 2019-11-01 PROCEDURE — 90686 IIV4 VACC NO PRSV 0.5 ML IM: CPT | Performed by: FAMILY MEDICINE

## 2019-11-01 PROCEDURE — 90471 IMMUNIZATION ADMIN: CPT | Performed by: FAMILY MEDICINE

## 2019-11-15 ENCOUNTER — TRANSFERRED RECORDS (OUTPATIENT)
Dept: FAMILY MEDICINE | Facility: CLINIC | Age: 64
End: 2019-11-15

## 2019-12-04 ENCOUNTER — OFFICE VISIT (OUTPATIENT)
Dept: FAMILY MEDICINE | Facility: CLINIC | Age: 64
End: 2019-12-04

## 2019-12-04 VITALS
WEIGHT: 107 LBS | OXYGEN SATURATION: 97 % | SYSTOLIC BLOOD PRESSURE: 128 MMHG | BODY MASS INDEX: 17.27 KG/M2 | DIASTOLIC BLOOD PRESSURE: 62 MMHG | HEART RATE: 69 BPM

## 2019-12-04 DIAGNOSIS — Z01.818 PREOP GENERAL PHYSICAL EXAM: Primary | ICD-10-CM

## 2019-12-04 DIAGNOSIS — K08.9 POOR DENTITION: ICD-10-CM

## 2019-12-04 DIAGNOSIS — C14.0 THROAT CANCER (H): ICD-10-CM

## 2019-12-04 PROCEDURE — 99213 OFFICE O/P EST LOW 20 MIN: CPT | Performed by: FAMILY MEDICINE

## 2019-12-04 RX ORDER — FENTANYL 12.5 UG/1
1 PATCH TRANSDERMAL
COMMUNITY
End: 2021-01-14

## 2019-12-04 RX ORDER — FENTANYL 25 UG/1
1 PATCH TRANSDERMAL
COMMUNITY
End: 2021-01-14

## 2019-12-04 RX ORDER — OXYCODONE HYDROCHLORIDE 15 MG/1
5 TABLET ORAL EVERY 4 HOURS PRN
COMMUNITY
End: 2021-10-06

## 2019-12-04 NOTE — PROGRESS NOTES
[facility]  [address] [place] [id number] [phone]  Dept: [phone]    PRE-OP EVALUATION:  Today's date: 2019    Ángel Pham (: 1955) presents for pre-operative evaluation assessment as requested by his oral surgeon.  He requires evaluation and anesthesia risk assessment prior to undergoing surgery/procedure for treatment of new teeth.    Proposed Surgery/ Procedure: dental implant surgery  Date of Surgery/ Procedure: TBD  Time of Surgery/ Procedure: TBD  Hospital/Surgical Facility: [facility]  Fax number for surgical facility: [phone]  Primary Physician: Pamela Jones  Type of Anesthesia Anticipated: to be determined    Patient has a Health Care Directive or Living Will:  NO    1. NO - Do you have a history of heart attack, stroke, stent, bypass or surgery on an artery in the head, neck, heart or legs?  2. NO - Do you ever have any pain or discomfort in your chest?  3. NO - Do you have a history of  Heart Failure?  4. NO - Are you troubled by shortness of breath when: walking on the level, up a slight hill or at night?  5. NO - Do you currently have a cold, bronchitis or other respiratory infection?  6. YES - Do you have a cough, shortness of breath or wheezing?  7. NO - Do you sometimes get pains in the calves of your legs when you walk?  8. NO - Do you or anyone in your family have previous history of blood clots?  9. NO - Do you or does anyone in your family have a serious bleeding problem such as prolonged bleeding following surgeries or cuts?  10. NO - Have you ever had problems with anemia or been told to take iron pills?  11. NO - Have you had any abnormal blood loss such as black, tarry or bloody stools, or abnormal vaginal bleeding?  12. NO - Have you ever had a blood transfusion?  13. NO - Have you or any of your relatives ever had problems with anesthesia?  14. NO - Do you have sleep apnea, excessive snoring  or daytime drowsiness?  15. NO - Do you have any prosthetic heart valves?  16. NO - Do you have prosthetic joints?  17. NO - Is there any chance that you may be pregnant?      HPI:     HPI related to upcoming procedure: scheduled for oral surgery      See problem list for active medical problems.  Problems all longstanding and stable, except as noted/documented.  See ROS for pertinent symptoms related to these conditions.      MEDICAL HISTORY:     Patient Active Problem List    Diagnosis Date Noted     HTN, goal below 140/90      Priority: Medium     Hyperlipidemia with target LDL less than 100      Priority: Medium     Diagnosis updated by automated process. Provider to review and confirm.       Health Care Home 07/08/2014     Priority: Medium     State Tier 1       Allergy history unknown      Priority: Medium     COPD (chronic obstructive pulmonary disease) (H)      Priority: Medium     History of pneumothorax      Priority: Medium      Past Medical History:   Diagnosis Date     Allergy history unknown      COPD (chronic obstructive pulmonary disease) (H)      HTN, goal below 140/90      Hyperlipidaemia LDL goal < 100      Pneumothorax on left      Past Surgical History:   Procedure Laterality Date     ENT SURGERY      sinus     HC HEMORROIDECTOMY, EXTERNAL, SINGLE COLUMN/GROUP      left     HERNIA REPAIR      umbilical and groin     LARYNGOSCOPY WITH BIOPSY(IES) Left 7/26/2019    Procedure: MICRODIRECT LARYNGOSCOPY WITH BIOPSY OF LEFT LARYNGEAL MASS;  Surgeon: Boo Mehta MD;  Location: [facility] OR     THORACIC SURGERY Right 2005    pneumothorax     Current Outpatient Medications   Medication Sig Dispense Refill     albuterol (2.5 MG/3ML) 0.083% neb solution INHALE 3 MILLILITER BY NEBULIZATION ROUTE 4 TIMES EVERY DAY  11     albuterol (PROAIR HFA) 108 (90 BASE) MCG/ACT inhaler Inhale 2 puffs into the lungs as needed.       butalbital-acetaminophen-caffeine (FIORICET/ESGIC) -40 MG per tablet TAKE 1 TABLET  BY MOUTH EVERY 4-6 HOURS AS NEEDED  0     fluticasone-salmeterol (ADVAIR DISKUS) 250-50 MCG/DOSE diskus inhaler Inhale 1 puff into the lungs every 12 hours       HYDROcodone-acetaminophen (NORCO) 5-325 MG tablet Take 1-2 tablets by mouth every 4 hours as needed for moderate to severe pain 25 tablet 0     losartan (COZAAR) 100 MG tablet Take 1 tablet (100 mg) by mouth daily 90 tablet 3     umeclidinium (INCRUSE ELLIPTA) 62.5 MCG/INH inhaler Inhale 1 puff into the lungs daily       OTC products: None, except as noted above    Allergies   Allergen Reactions     Chantix [Varenicline] Other (See Comments)     Patient reports has tried this in past and had lightheadedness and nose bleeds.     Morphine Hcl Itching     On contact     Wellbutrin [Bupropion Hydrobromide]      syncope      Latex Allergy: NO    Social History     Tobacco Use     Smoking status: Current Every Day Smoker     Packs/day: 1.00     Years: 43.00     Pack years: 43.00     Types: Cigarettes     Smokeless tobacco: Never Used   Substance Use Topics     Alcohol use: Yes     Alcohol/week: 15.0 standard drinks     Types: 15 Cans of beer per week     Frequency: 4 or more times a week     Drinks per session: 1 or 2     History   Drug Use Unknown       REVIEW OF SYSTEMS:   CONSTITUTIONAL: NEGATIVE for fever, chills, change in weight  INTEGUMENTARY/SKIN: NEGATIVE for worrisome rashes, moles or lesions  RESP: NEGATIVE for significant cough or SOB  CV: NEGATIVE for chest pain, palpitations or peripheral edema    EXAM:   /62 (BP Location: Left arm, Patient Position: Sitting, Cuff Size: Adult Regular)   Pulse 69   Wt 48.5 kg (107 lb)   SpO2 97%   BMI 17.27 kg/m    GENERAL APPEARANCE: thin alert and no distress  RESP: lungs clear to auscultation - no rales, rhonchi or wheezes  CV: regular rate and rhythm, normal S1 S2, no S3 or S4 and no murmur, click or rub   ABDOMEN: soft, nontender, no HSM or masses and bowel sounds normal  NEURO: Normal strength and  tone, sensory exam grossly normal, mentation intact and speech normal    DIAGNOSTICS:   No labs or EKG required for low risk surgery (cataract, skin procedure, breast biopsy, etc)    Recent Labs   Lab Test 07/26/19  0616 07/25/19  1634 04/11/19  0945 04/20/18  0949  04/09/15  0645   HGB  --  14.0  --   --   --  14.7   PLT  --  237  --   --   --  234   NA  --   --  133* 138   < > 129*   POTASSIUM  --   --  5.3* 4.7   < > 4.1   CR 0.52*  --  0.69* 0.66*   < > 0.61*    < > = values in this interval not displayed.        IMPRESSION:   Reason for surgery/procedure: poor dentition  Diagnosis/reason for consult: preoperative clearance    The proposed surgical procedure is considered LOW risk.    REVISED CARDIAC RISK INDEX  The patient has the following serious cardiovascular risks for perioperative complications such as (MI, PE, VFib and 3  AV Block):  No serious cardiac risks  INTERPRETATION: 0 risks: Class I (very low risk - 0.4% complication rate)    The patient has the following additional risks for perioperative complications:  No identified additional risks      ICD-10-CM    1. Preop general physical exam Z01.818    2. Poor dentition K08.9    3. Throat cancer (H) C14.0        RECOMMENDATIONS:     --Patient is to take all scheduled medications on the day of surgery EXCEPT for modifications listed below.    APPROVAL GIVEN to proceed with proposed procedure, without further diagnostic evaluation     Signed Electronically by: Cesar Dye MD    Copy of this evaluation report is provided to requesting physician.    [facility] Preop Guidelines    Revised Cardiac Risk Index

## 2019-12-12 NOTE — PROGRESS NOTES
12/5/19 faxed preop to [facility] @ [phone]    9/6/19 faxed to correct fax number @ [phone]    Say Butler,   [facility]  [phone]

## 2020-01-13 ENCOUNTER — TRANSFERRED RECORDS (OUTPATIENT)
Dept: FAMILY MEDICINE | Facility: CLINIC | Age: 65
End: 2020-01-13

## 2020-03-16 ENCOUNTER — OFFICE VISIT (OUTPATIENT)
Dept: FAMILY MEDICINE | Facility: CLINIC | Age: 65
End: 2020-03-16

## 2020-03-16 VITALS
WEIGHT: 107.8 LBS | DIASTOLIC BLOOD PRESSURE: 60 MMHG | SYSTOLIC BLOOD PRESSURE: 126 MMHG | BODY MASS INDEX: 17.4 KG/M2 | TEMPERATURE: 99 F | OXYGEN SATURATION: 97 % | HEART RATE: 77 BPM | RESPIRATION RATE: 16 BRPM

## 2020-03-16 DIAGNOSIS — J43.9 PULMONARY EMPHYSEMA, UNSPECIFIED EMPHYSEMA TYPE (H): Primary | ICD-10-CM

## 2020-03-16 DIAGNOSIS — R05.9 COUGH: ICD-10-CM

## 2020-03-16 PROCEDURE — 99213 OFFICE O/P EST LOW 20 MIN: CPT | Performed by: NURSE PRACTITIONER

## 2020-03-16 NOTE — PROGRESS NOTES
Problem(s) Oriented visit        SUBJECTIVE:                                                    Ángel Pham is a 64 year old male who presents to clinic today for the following health issues :    Increased in shortness of breath and productive cough. Ángel reports an increase in sputum over the past two days.   Ángel is irritable during the appointment and offended he has to wear a face mask, education about the use of the face mask only seemed to make him more upset.   Ángel was unwilling to discuss symptoms or allow a physical exam.      I suggested a CBC and chest Xray, Ángel reports he has never had to do a test like this before at our clinic and left the room, ending the appointment.     Problem list, Medication list, Allergies, and Medical/Social/Surgical histories reviewed in EPIC and updated as appropriate.   Additional history: as documented    ROS:  As above     Histories:   Patient Active Problem List   Diagnosis     Allergy history unknown     COPD (chronic obstructive pulmonary disease) (H)     History of pneumothorax     Health Care Home     HTN, goal below 140/90     Hyperlipidemia with target LDL less than 100     Throat cancer (H)     Past Surgical History:   Procedure Laterality Date     ENT SURGERY      sinus     HC HEMORROIDECTOMY, EXTERNAL, SINGLE COLUMN/GROUP      left     HERNIA REPAIR      umbilical and groin     LARYNGOSCOPY WITH BIOPSY(IES) Left 7/26/2019    Procedure: MICRODIRECT LARYNGOSCOPY WITH BIOPSY OF LEFT LARYNGEAL MASS;  Surgeon: Boo Mehta MD;  Location: [facility] OR     THORACIC SURGERY Right 2005    pneumothorax       Social History     Tobacco Use     Smoking status: Current Every Day Smoker     Packs/day: 1.00     Years: 43.00     Pack years: 43.00     Types: Cigarettes     Smokeless tobacco: Never Used   Substance Use Topics     Alcohol use: Yes     Alcohol/week: 15.0 standard drinks     Types: 15 Cans of beer per week     Frequency: 4 or more times a week      Drinks per session: 1 or 2     Family History   Problem Relation Age of Onset     Breast Cancer Mother      Brain Cancer Father 55        brain ca           OBJECTIVE:                                                    /60   Pulse 77   Temp 99  F (37.2  C)   Resp 16   Wt 48.9 kg (107 lb 12.8 oz)   SpO2 97%   BMI 17.40 kg/m    Body mass index is 17.4 kg/m .   APPEARANCE: = alert and uncooperative  Conj/Eyelids = noninjected and lids and lashes are without inflammation  Resp effort = Calm regular breathing  Recent/Remote Memory = Alert and Oriented x 3  Mood/Affect =  affect flat, irritable      ASSESSMENT/PLAN:                                                        Ángel was seen today for uri.    Diagnoses and all orders for this visit:    Pulmonary emphysema, unspecified emphysema type (H)    Cough  -     Cancel: CBC with Diff/Plt ([facility]) (cancelled because Pt. Left before the blood draw)  -     Referral to [facility] for chest X-ray.         ASSESSMENT/PLAN:   Patient needs assistance with ADLs: none identified today  Patient needs assistance with iADLs: none identified today    The following health maintenance items are reviewed in Epic and correct as of today:  Health Maintenance   Topic Date Due     SPIROMETRY  1955     ZOSTER IMMUNIZATION (1 of 2) 06/22/2006     PREVENTIVE CARE VISIT  04/20/2019     PHQ-2  01/01/2020     LIPID  04/20/2023     ADVANCE CARE PLANNING  04/20/2023     DTAP/TDAP/TD IMMUNIZATION (2 - Td) 06/26/2023     COLORECTAL CANCER SCREENING  01/06/2025     COPD ACTION PLAN  Completed     INFLUENZA VACCINE  Completed     PNEUMOCOCCAL IMMUNIZATION 19-64 MEDIUM RISK  Completed     HEPATITIS C SCREENING  Addressed     HIV SCREENING  Addressed     IPV IMMUNIZATION  Aged Out     MENINGITIS IMMUNIZATION  Aged Out       Valery Doe NP  [facility]  Family Practice  [facility]  [phone]    For any issues my office # is [phone]

## 2020-04-03 ENCOUNTER — OFFICE VISIT (OUTPATIENT)
Dept: FAMILY MEDICINE | Facility: CLINIC | Age: 65
End: 2020-04-03

## 2020-04-03 VITALS
HEART RATE: 84 BPM | HEIGHT: 71 IN | RESPIRATION RATE: 16 BRPM | TEMPERATURE: 97.8 F | WEIGHT: 105 LBS | DIASTOLIC BLOOD PRESSURE: 68 MMHG | OXYGEN SATURATION: 99 % | SYSTOLIC BLOOD PRESSURE: 136 MMHG | BODY MASS INDEX: 14.7 KG/M2

## 2020-04-03 DIAGNOSIS — J43.9 PULMONARY EMPHYSEMA, UNSPECIFIED EMPHYSEMA TYPE (H): ICD-10-CM

## 2020-04-03 DIAGNOSIS — J44.1 COPD EXACERBATION (H): Primary | ICD-10-CM

## 2020-04-03 DIAGNOSIS — C14.0 THROAT CANCER (H): ICD-10-CM

## 2020-04-03 DIAGNOSIS — J34.89 RHINORRHEA: ICD-10-CM

## 2020-04-03 PROCEDURE — 99214 OFFICE O/P EST MOD 30 MIN: CPT | Performed by: FAMILY MEDICINE

## 2020-04-03 RX ORDER — OXYMETAZOLINE HYDROCHLORIDE 0.05 G/100ML
2 SPRAY NASAL 2 TIMES DAILY
Qty: 15 ML | Refills: 0 | Status: SHIPPED | OUTPATIENT
Start: 2020-04-03 | End: 2020-04-22

## 2020-04-03 RX ORDER — PREDNISONE 20 MG/1
20 TABLET ORAL 2 TIMES DAILY
Qty: 10 TABLET | Refills: 0 | Status: SHIPPED | OUTPATIENT
Start: 2020-04-03 | End: 2020-04-22

## 2020-04-03 RX ORDER — DOXYCYCLINE HYCLATE 100 MG
100 TABLET ORAL 2 TIMES DAILY
Qty: 14 TABLET | Refills: 0 | Status: SHIPPED | OUTPATIENT
Start: 2020-04-03 | End: 2020-04-22

## 2020-04-03 ASSESSMENT — MIFFLIN-ST. JEOR: SCORE: 1297.37

## 2020-04-03 NOTE — PATIENT INSTRUCTIONS
Patient Education     COPD Flare    You have had a flare-up of your COPD.  COPD (chronic obstructive pulmonary disease) is a common lung disease. It causes your airways to get irritated and narrower. This makes it harder for you to breathe. Emphysema and chronic bronchitis are both types of COPD. This is a long-term (chronic) condition. This means you always have it. Sometimes it gets worse. When this happens, it is called a flare-up.  Symptoms of COPD  People with COPD may have symptoms most of the time. In a flare-up, your symptoms get worse. These symptoms may mean you are having a flare-up:    Shortness of breath, shallow or rapid breathing, or wheezing that gets worse    Lung infection    Cough that gets worse    More mucus, thicker mucus or mucus of a different color    Tiredness, less energy, or trouble doing your normal activities    Fever    Chest tightness    Your symptoms don t get better even when you use your normal medicines, inhalers, and nebulizer    Trouble talking    You feel confused  Causes of flare-ups  Unfortunately, a flare-up can happen even if you did everything right. And even if you followed your healthcare provider s instructions. Some causes of flare-ups are:    Smoking or secondhand smoke    Colds, the flu, or respiratory infections    Air pollution    Sudden change in the weather    Dust, irritating chemicals, or strong fumes    Not taking your medicines as prescribed  Home care  Here are some things you can do at home to treat a flare-up:    Try not to panic. This makes it harder to breathe, and keeps you from doing the right things.    Don t smoke or be around others who are smoking.    Try to drink more fluids than normal during a flare-up, unless your healthcare provider has told you not to because of heart and kidney problems. More fluids can help loosen the mucus.    Use your inhalers and nebulizer, if you have one, as you have been told to.    If you were given antibiotics,  take them until they are used up or your provider tells you to stop. It s important to finish the antibiotics, even though you feel better. This will make sure the infection has cleared.    If you were given prednisone or another steroid, finish it even if you feel better.  Preventing a flare-up  Flare-ups happen. But the best way to treat one is to prevent it before it starts. Here are some pointers:    Don t smoke or be around others who are smoking.    Take your medicines as discussed with your healthcare provider.    Talk with your provider about getting a flu shot every year. Also find out if you need a pneumonia shot.    If there is a weather advisory warning to stay indoors, try to stay inside when possible.    Try to eat healthy, exercise, and get plenty of sleep.    Try to stay away from things that normally set you off. These include dust, chemical fumes, hairsprays, or strong perfumes.  Follow-up care  Follow up with your healthcare provider, or as advised.  If a culture was done, you will be told if your treatment needs to be changed. You can call as directed for the results.  If X-rays were done, you will be told of any new findings that may affect your care.  Call 911  Call 911 if any of these occur:    You have trouble breathing    You feel confused or it s hard to wake you up    You faint or lose consciousness    You have a rapid heart rate    You have new pain in your chest, arm, shoulder, neck, or upper back  When to seek medical advice  Call your healthcare provider right away if any of these occur:    Wheezing or shortness of breath gets worse    You need to use your inhalers more often than normal without relief    Fever of 100.4 F (38 C) or higher, or as directed by your healthcare provider    Coughing up lots of dark-colored or bloody mucus (sputum)    Chest pain with each breath    You don't start to get better within 24 hours    Swelling of your ankles gets worse    Dizziness or  weakness  Date Last Reviewed: 9/1/2016 2000-2019 [employer]. [address] [place] [address], [place] [address]. All rights reserved. This information is not intended as a substitute for professional medical care. Always follow your healthcare professional's instructions.

## 2020-04-03 NOTE — PROGRESS NOTES
SUBJECTIVE:                                                    Ángel Pham is a 64 year old male who presents to clinic today for the following health issues :    COPD: longtime smoker.  reports several weeks of increased cough, increased sputum production and slightly more SOB.  He has not had fever, chest pain, chills.  He is bothered by rhinorrhea.  Using maintenance inhalers as recommended.  Using albuterol inhaler and neb with transient improvement.  Was seen here 3/16, note reviewed.  No additional concerns.      Problem list, Medication list, Allergies, and Medical/Social/Surgical histories reviewed in EPIC and updated as appropriate.   Additional history: as documented    ROS:    A 10 system review was completed and is as noted in HPI and otherwise negative.      Histories:   Patient Active Problem List   Diagnosis     Allergy history unknown     COPD (chronic obstructive pulmonary disease) (H)     History of pneumothorax     Health Care Home     HTN, goal below 140/90     Hyperlipidemia with target LDL less than 100     Throat cancer (H)     Past Surgical History:   Procedure Laterality Date     ENT SURGERY      sinus     HC HEMORROIDECTOMY, EXTERNAL, SINGLE COLUMN/GROUP      left     HERNIA REPAIR      umbilical and groin     LARYNGOSCOPY WITH BIOPSY(IES) Left 7/26/2019    Procedure: MICRODIRECT LARYNGOSCOPY WITH BIOPSY OF LEFT LARYNGEAL MASS;  Surgeon: Boo Mehta MD;  Location: [facility] OR     THORACIC SURGERY Right 2005    pneumothorax       Social History     Tobacco Use     Smoking status: Current Every Day Smoker     Packs/day: 1.00     Years: 43.00     Pack years: 43.00     Types: Cigarettes     Smokeless tobacco: Never Used   Substance Use Topics     Alcohol use: Yes     Alcohol/week: 15.0 standard drinks     Types: 15 Cans of beer per week     Frequency: 4 or more times a week     Drinks per session: 1 or 2     Family History   Problem Relation Age of Onset     Breast Cancer Mother       "Brain Cancer Father 55        brain ca           OBJECTIVE:                                                    /68   Pulse 84   Temp 97.8  F (36.6  C) (Oral)   Resp 16   Ht 1.81 m (5' 11.25\")   Wt 47.6 kg (105 lb)   SpO2 99%   BMI 14.54 kg/m    Body mass index is 14.54 kg/m .     General: Appears gaunt but not in distress  Nares: Clear rhinorrhea  Oropharynx: Clear  Neck: Supple. No lymphadenopathy. No thyromegaly  Chest: Distant breath sounds.  No focal crackles or wheezing.  No increased WOB  Abdomen: Soft, nontender, nondistended.  NABS. No HSM.  No masses  Extremities: No edema.  DP and PT pulses 2+ and symmetric  Skin: Clear  Psych: Normal mood and affect       ASSESSMENT/PLAN:                                                        Ángel was seen today for sinus problem.    Diagnoses and all orders for this visit:    COPD exacerbation (H): clinical diagnosis.  No distress, normal oxygen, reassuring exam  -     doxycycline hyclate (VIBRA-TABS) 100 MG tablet; Take 1 tablet (100 mg) by mouth 2 times daily for 7 days  -     predniSONE (DELTASONE) 20 MG tablet; Take 1 tablet (20 mg) by mouth 2 times daily for 5 days  - If not improving will follow up, sooner if worsening and would consider CXR    Rhinorrhea  -     oxymetazoline (AFRIN) 0.05 % nasal spray; Spray 2 sprays into both nostrils 2 times daily for 3 days            Maxim Goodman MD  [facility]      "

## 2020-04-22 ENCOUNTER — VIRTUAL VISIT (OUTPATIENT)
Dept: FAMILY MEDICINE | Facility: CLINIC | Age: 65
End: 2020-04-22

## 2020-04-22 DIAGNOSIS — J44.1 COPD EXACERBATION (H): Primary | ICD-10-CM

## 2020-04-22 DIAGNOSIS — R09.81 SINUS CONGESTION: ICD-10-CM

## 2020-04-22 PROCEDURE — 99214 OFFICE O/P EST MOD 30 MIN: CPT | Mod: 95 | Performed by: FAMILY MEDICINE

## 2020-04-22 RX ORDER — PREDNISONE 10 MG/1
TABLET ORAL
Qty: 30 TABLET | Refills: 0 | Status: SHIPPED | OUTPATIENT
Start: 2020-04-22 | End: 2021-01-14

## 2020-04-22 NOTE — PROGRESS NOTES
"Reason for visit today with Dr. Goodman :   Cough and lung congestion persisting. No fever. Was better for 5 days after pred and DCN rx'd at 4/6/20 visit with Dr. Goodman. Using COPD meds as directed including albuterol nebs QID, which do help.   Med list and allergies reviewed and updated.  Josephine Sarkar RN      Problem(s) Oriented visit      Ángel Pham is being evaluated via a billable telephone visit.      The patient has been notified of following:     \"This video visit will be conducted via a call between you and your physician/provider. We have found that certain health care needs can be provided without the need for an in-person physical exam.  This service lets us provide the care you need with a video conversation.  If a prescription is necessary we can send it directly to your pharmacy.  If lab work is needed we can place an order for that and you can then stop by our lab to have the test done at a later time.    If during the course of the call the physician/provider feels a video visit is not appropriate, you will not be charged for this service.\"     Physician has received verbal consent for a Video Visit from the patient? Yes    SUBJECTIVE:                                                      Ángel Pham is a 64 year old male who is being seen through telephone consult for evaluation.  He was seen 4/3 and diagnosed clinically with a COPD exacerbation.  He was treated with doxy and prednisone with good symptoms improvement but ongoing nasal congestion and mild breathing issues.  He has been using his respiratory meds as instructed.  He has not had fever, chills, chest pain or significant SOB.  Continues to feel as though lungs are not at baseline and nasal drainage and cough persists.  He denies any severe symptoms.      Histories:   Patient Active Problem List   Diagnosis     Allergy history unknown     COPD (chronic obstructive pulmonary disease) (H)     History of pneumothorax     " "Health Care Home     HTN, goal below 140/90     Hyperlipidemia with target LDL less than 100     Throat cancer (H)     Past Surgical History:   Procedure Laterality Date     ENT SURGERY      sinus     HC HEMORROIDECTOMY, EXTERNAL, SINGLE COLUMN/GROUP      left     HERNIA REPAIR      umbilical and groin     LARYNGOSCOPY WITH BIOPSY(IES) Left 7/26/2019    Procedure: MICRODIRECT LARYNGOSCOPY WITH BIOPSY OF LEFT LARYNGEAL MASS;  Surgeon: Boo Mehta MD;  Location: [facility] OR     THORACIC SURGERY Right 2005    pneumothorax       Social History     Tobacco Use     Smoking status: Current Every Day Smoker     Packs/day: 1.00     Years: 43.00     Pack years: 43.00     Types: Cigarettes     Smokeless tobacco: Never Used   Substance Use Topics     Alcohol use: Yes     Alcohol/week: 15.0 standard drinks     Types: 15 Cans of beer per week     Frequency: 4 or more times a week     Drinks per session: 1 or 2     Family History   Problem Relation Age of Onset     Breast Cancer Mother      Brain Cancer Father 55        brain ca           Reviewed and updated as needed this visit by Provider         ROS:     ROS: 10 point ROS neg other than the symptoms noted above in the HPI.            OBJECTIVE:                                                      Objective    There were no vitals taken for this visit.  Estimated body mass index is 14.54 kg/m  as calculated from the following:    Height as of 4/3/20: 1.81 m (5' 11.25\").    Weight as of 4/3/20: 47.6 kg (105 lb).      Gen: No apparent distress while speaking in full sentences  Resp: Breathing comfortably, NAD  Psych: normal mood and affect                ASSESSMENT/PLAN:                                                        Patient states usually needs second round of treatment.  No red flags at this time.  Aware again that could have COVID-19 but not in distress and feel ED not warranted at this time.  Trial of augmentin to better cover strep and possible sinusitis.  pred " taper.  Potential s/e discussed with patient.  If not improving will need CXR and re-evaluation.  Phone follow up next week, sooner if worsen    Patient is agreement with the assessment and plan as outlined above.  All questions answered.  Red flag symptoms that should prompt emergent evaluation discussed and understood.      COPD exacerbation (H)  -     amoxicillin-clavulanate (AUGMENTIN) 875-125 MG tablet; Take 1 tablet by mouth 2 times daily for 10 days  -     predniSONE (DELTASONE) 10 MG tablet; TAKE 4 TABLETS DAILY FOR 3 DAYS, THEN 3 TABS every day X 3 DAYS, THEN DECREASE BY 1 TAB EVERY 3 DAYS UNTIL GONE.    Sinus congestion  -     amoxicillin-clavulanate (AUGMENTIN) 875-125 MG tablet; Take 1 tablet by mouth 2 times daily for 10 days

## 2020-07-21 ENCOUNTER — TRANSFERRED RECORDS (OUTPATIENT)
Dept: FAMILY MEDICINE | Facility: CLINIC | Age: 65
End: 2020-07-21

## 2020-07-27 ENCOUNTER — TRANSFERRED RECORDS (OUTPATIENT)
Dept: FAMILY MEDICINE | Facility: CLINIC | Age: 65
End: 2020-07-27

## 2020-10-07 ENCOUNTER — TRANSFERRED RECORDS (OUTPATIENT)
Dept: FAMILY MEDICINE | Facility: CLINIC | Age: 65
End: 2020-10-07

## 2020-11-02 ENCOUNTER — VIRTUAL VISIT (OUTPATIENT)
Dept: FAMILY MEDICINE | Facility: CLINIC | Age: 65
End: 2020-11-02

## 2020-11-02 DIAGNOSIS — R05.9 COUGH: Primary | ICD-10-CM

## 2020-11-02 DIAGNOSIS — J44.1 COPD EXACERBATION (H): ICD-10-CM

## 2020-11-02 DIAGNOSIS — R09.81 NASAL CONGESTION: ICD-10-CM

## 2020-11-02 PROCEDURE — 99214 OFFICE O/P EST MOD 30 MIN: CPT | Mod: 95 | Performed by: FAMILY MEDICINE

## 2020-11-02 RX ORDER — OXYMETAZOLINE HYDROCHLORIDE 0.05 G/100ML
2 SPRAY NASAL 2 TIMES DAILY
Qty: 1 BOTTLE | Refills: 0 | Status: ON HOLD | OUTPATIENT
Start: 2020-11-02 | End: 2022-07-31

## 2020-11-02 RX ORDER — AZITHROMYCIN 250 MG/1
TABLET, FILM COATED ORAL
Qty: 6 TABLET | Refills: 0 | Status: SHIPPED | OUTPATIENT
Start: 2020-11-02 | End: 2020-11-07

## 2020-11-02 NOTE — PROGRESS NOTES
"  Problem(s) Oriented visit      Video-Visit Details    Type of service:  Video Visit    Video Start Time (time video started): 1122    Video End Time (time video stopped): 1129    Originating Location (pt. Location): Home    Distant Location (provider location):  [facility]     Mode of Communication:  Telephone    Physician has received verbal consent for a Video Visit from the patient? Yes      Maxim Goodman MD        Ángel Pham is being evaluated via a billable video visit.      The patient has been notified of following:     \"This video visit will be conducted via a call between you and your physician/provider. We have found that certain health care needs can be provided without the need for an in-person physical exam.  This service lets us provide the care you need with a video conversation.  If a prescription is necessary we can send it directly to your pharmacy.  If lab work is needed we can place an order for that and you can then stop by our lab to have the test done at a later time.    If during the course of the call the physician/provider feels a video visit is not appropriate, you will not be charged for this service.\"     Physician has received verbal consent for a Video Visit from the patient? Yes    SUBJECTIVE:                                                      Ángel Pham is a 65 year old male who is being seen through video consult for evaluation.  Reports a couple weeks of stuffy nose, chest congestion, productive cough.  Has been very isolated and denies any COVID exposures.  No fever or chills.  No significant SOB. No chest pain.    He was seen 4/2020 with COPD exacerbation and prescribed doxy and prednisone without significant improvement.  Then prescribed augmentin without improvement.  He states z-pack is what works for him.        Histories:   Patient Active Problem List   Diagnosis     Allergy history unknown     COPD (chronic obstructive pulmonary " "disease) (H)     History of pneumothorax     Health Care Home     HTN, goal below 140/90     Hyperlipidemia with target LDL less than 100     Throat cancer (H)     Past Surgical History:   Procedure Laterality Date     ENT SURGERY      sinus     HC HEMORROIDECTOMY, EXTERNAL, SINGLE COLUMN/GROUP      left     HERNIA REPAIR      umbilical and groin     LARYNGOSCOPY WITH BIOPSY(IES) Left 7/26/2019    Procedure: MICRODIRECT LARYNGOSCOPY WITH BIOPSY OF LEFT LARYNGEAL MASS;  Surgeon: Boo Mehta MD;  Location: [facility] OR     THORACIC SURGERY Right 2005    pneumothorax       Social History     Tobacco Use     Smoking status: Current Every Day Smoker     Packs/day: 1.00     Years: 43.00     Pack years: 43.00     Types: Cigarettes     Smokeless tobacco: Never Used   Substance Use Topics     Alcohol use: Yes     Alcohol/week: 15.0 standard drinks     Types: 15 Cans of beer per week     Frequency: 4 or more times a week     Drinks per session: 1 or 2     Family History   Problem Relation Age of Onset     Breast Cancer Mother      Brain Cancer Father 55        brain ca           Reviewed and updated as needed this visit by Provider                 ROS:    A 10 system review was completed and is as noted in HPI and otherwise negative.            OBJECTIVE:                                                      Objective    There were no vitals taken for this visit.  Estimated body mass index is 14.54 kg/m  as calculated from the following:    Height as of 4/3/20: 1.81 m (5' 11.25\").    Weight as of 4/3/20: 47.6 kg (105 lb).      Gen: Well sounding, no obviously short of breath              ASSESSMENT/PLAN:                                                        Cough: treat empirically as COPD exacerbation vs bacterial lower RTI.  If not improving will need COVID testing and CXR if negative.  Symptomatic cares.  Close follow up.  All questions answered.  z-pack prescribed due to reasons documented in HPI.  -     azithromycin " (ZITHROMAX) 250 MG tablet; Take 2 tablets (500 mg) by mouth daily for 1 day, THEN 1 tablet (250 mg) daily for 4 days.  -     oxymetazoline (AFRIN) 0.05 % nasal spray; Spray 2 sprays into both nostrils 2 times daily For up to 3 days    Nasal congestion  -     azithromycin (ZITHROMAX) 250 MG tablet; Take 2 tablets (500 mg) by mouth daily for 1 day, THEN 1 tablet (250 mg) daily for 4 days.  -     oxymetazoline (AFRIN) 0.05 % nasal spray; Spray 2 sprays into both nostrils 2 times daily For up to 3 days    COPD exacerbation (H)  -     azithromycin (ZITHROMAX) 250 MG tablet; Take 2 tablets (500 mg) by mouth daily for 1 day, THEN 1 tablet (250 mg) daily for 4 days.

## 2020-11-09 ENCOUNTER — TELEPHONE (OUTPATIENT)
Dept: FAMILY MEDICINE | Facility: CLINIC | Age: 65
End: 2020-11-09

## 2020-11-09 DIAGNOSIS — J44.1 COPD EXACERBATION (H): Primary | ICD-10-CM

## 2020-11-09 RX ORDER — PREDNISONE 20 MG/1
20 TABLET ORAL 2 TIMES DAILY
Qty: 10 TABLET | Refills: 0 | Status: SHIPPED | OUTPATIENT
Start: 2020-11-09 | End: 2020-11-14

## 2020-11-09 NOTE — TELEPHONE ENCOUNTER
Patient calls reporting his COPD flare is only slightly better since 11/2 visit with Dr. Goodman and tae. Breathing is what is slightly better. Still congested chest and nasal. Occasional wheeze. Some cough with whitish/yellow sputum. No fever, aches, headache, anosmia or ageusia. States feels like typical COPD flare to him that he gets in Fall and Spring. States isolates self due to COVID and his health conditions and practices strict precautions when has to go out to grocery etc.  Plan: per Dr. Goodman - try prednisone 20mg BID x 5 days. If no improvement would advise COVID testing and visit. Call sooner prn. Encouraged continued COVID protection measures. Patient informed and agrees. Rx sent to pharmacy.  Josephine Sarkar RN

## 2020-11-28 DIAGNOSIS — I10 HTN, GOAL BELOW 140/90: ICD-10-CM

## 2020-11-30 RX ORDER — LOSARTAN POTASSIUM 100 MG/1
TABLET ORAL
Qty: 90 TABLET | Refills: 3 | Status: SHIPPED | OUTPATIENT
Start: 2020-11-30 | End: 2021-09-24

## 2020-11-30 NOTE — TELEPHONE ENCOUNTER
L[place]ARTAN  LOV (VV-URI) 11/2/2020  LOV (VV-COUGH) 4/22/2020  LOV (BREATHING) 4/3/2020  LAST LABS 4/11/19    BP Readings from Last 3 Encounters:   04/03/20 136/68   03/16/20 126/60   12/04/19 128/62       Last Comprehensive Metabolic Panel:  Sodium   Date Value Ref Range Status   04/11/2019 133 (L) 134 - 144 mmol/L Final     Potassium   Date Value Ref Range Status   04/11/2019 5.3 (H) 3.5 - 5.2 mmol/L Final     Chloride   Date Value Ref Range Status   04/11/2019 96 96 - 106 mmol/L Final     Carbon Dioxide   Date Value Ref Range Status   04/09/2015 26 20 - 32 mmol/L Final     Anion Gap   Date Value Ref Range Status   04/09/2015 6 3 - 14 mmol/L Final     Glucose   Date Value Ref Range Status   04/11/2019 89 65 - 99 mg/dL Final     Urea Nitrogen   Date Value Ref Range Status   04/11/2019 7 (L) 8 - 27 mg/dL Final     BUN/Creatinine Ratio   Date Value Ref Range Status   04/11/2019 10 10 - 24 Final     Creatinine   Date Value Ref Range Status   07/26/2019 0.52 (L) 0.66 - 1.25 mg/dL Final     GFR Estimate   Date Value Ref Range Status   07/26/2019 >90 >60 mL/min/[1.73_m2] Final     Comment:     Non  GFR Calc  Starting 12/18/2018, serum creatinine based estimated GFR (eGFR) will be   calculated using the Chronic Kidney Disease Epidemiology Collaboration   (CKD-EPI) equation.       Calcium   Date Value Ref Range Status   04/11/2019 9.2 8.6 - 10.2 mg/dL Final

## 2020-12-10 ENCOUNTER — TRANSFERRED RECORDS (OUTPATIENT)
Dept: FAMILY MEDICINE | Facility: CLINIC | Age: 65
End: 2020-12-10

## 2021-01-06 ENCOUNTER — TRANSFERRED RECORDS (OUTPATIENT)
Dept: HEALTH INFORMATION MANAGEMENT | Facility: CLINIC | Age: 66
End: 2021-01-06

## 2021-01-06 ENCOUNTER — TRANSFERRED RECORDS (OUTPATIENT)
Dept: FAMILY MEDICINE | Facility: CLINIC | Age: 66
End: 2021-01-06

## 2021-01-13 ENCOUNTER — TRANSFERRED RECORDS (OUTPATIENT)
Dept: FAMILY MEDICINE | Facility: CLINIC | Age: 66
End: 2021-01-13

## 2021-01-13 DIAGNOSIS — Z11.59 ENCOUNTER FOR SCREENING FOR OTHER VIRAL DISEASES: ICD-10-CM

## 2021-01-14 ENCOUNTER — OFFICE VISIT (OUTPATIENT)
Dept: FAMILY MEDICINE | Facility: CLINIC | Age: 66
End: 2021-01-14

## 2021-01-14 VITALS
HEART RATE: 68 BPM | OXYGEN SATURATION: 95 % | HEIGHT: 71 IN | BODY MASS INDEX: 15.82 KG/M2 | TEMPERATURE: 99.2 F | WEIGHT: 113 LBS | DIASTOLIC BLOOD PRESSURE: 68 MMHG | SYSTOLIC BLOOD PRESSURE: 122 MMHG | RESPIRATION RATE: 16 BRPM

## 2021-01-14 DIAGNOSIS — J43.9 PULMONARY EMPHYSEMA, UNSPECIFIED EMPHYSEMA TYPE (H): ICD-10-CM

## 2021-01-14 DIAGNOSIS — Z11.59 ENCOUNTER FOR SCREENING FOR OTHER VIRAL DISEASES: ICD-10-CM

## 2021-01-14 DIAGNOSIS — C14.0 THROAT CANCER (H): ICD-10-CM

## 2021-01-14 DIAGNOSIS — Z01.818 PREOP GENERAL PHYSICAL EXAM: Primary | ICD-10-CM

## 2021-01-14 DIAGNOSIS — I10 HTN, GOAL BELOW 140/90: ICD-10-CM

## 2021-01-14 LAB
% GRANULOCYTES: 84.6 % (ref 42.2–75.2)
HCT VFR BLD AUTO: 42.6 % (ref 39–51)
HEMOGLOBIN: 14.2 G/DL (ref 13.4–17.5)
LYMPHOCYTES NFR BLD AUTO: 11.2 % (ref 20.5–51.1)
MCH RBC QN AUTO: 33.9 PG (ref 27–31)
MCHC RBC AUTO-ENTMCNC: 33.4 G/DL (ref 33–37)
MCV RBC AUTO: 101.5 FL (ref 80–100)
MONOCYTES NFR BLD AUTO: 4.2 % (ref 1.7–9.3)
PLATELET # BLD AUTO: 286 K/UL (ref 140–450)
RBC # BLD AUTO: 4.19 X10/CMM (ref 4.2–5.9)
WBC # BLD AUTO: 4.7 X10/CMM (ref 3.8–11)

## 2021-01-14 PROCEDURE — 85025 COMPLETE CBC W/AUTO DIFF WBC: CPT | Performed by: NURSE PRACTITIONER

## 2021-01-14 PROCEDURE — U0003 INFECTIOUS AGENT DETECTION BY NUCLEIC ACID (DNA OR RNA); SEVERE ACUTE RESPIRATORY SYNDROME CORONAVIRUS 2 (SARS-COV-2) (CORONAVIRUS DISEASE [COVID-19]), AMPLIFIED PROBE TECHNIQUE, MAKING USE OF HIGH THROUGHPUT TECHNOLOGIES AS DESCRIBED BY CMS-2020-01-R: HCPCS

## 2021-01-14 PROCEDURE — 36415 COLL VENOUS BLD VENIPUNCTURE: CPT | Performed by: NURSE PRACTITIONER

## 2021-01-14 PROCEDURE — 99215 OFFICE O/P EST HI 40 MIN: CPT | Performed by: NURSE PRACTITIONER

## 2021-01-14 PROCEDURE — U0005 INFEC AGEN DETEC AMPLI PROBE: HCPCS

## 2021-01-14 PROCEDURE — 93000 ELECTROCARDIOGRAM COMPLETE: CPT | Performed by: NURSE PRACTITIONER

## 2021-01-14 ASSESSMENT — MIFFLIN-ST. JEOR: SCORE: 1324.69

## 2021-01-14 NOTE — PROGRESS NOTES
[facility]  [address] [place] [address]  Phone: [phone]  Fax: [phone]  Primary Provider: Maxim Goodman  Pre-op Performing Provider: VERA SANCHEZ    PREOPERATIVE EVALUATION:  Today's date: 1/14/2021  Preoperative Questionnaire:   No - Have you ever had a heart attack or stroke?  No - Have you ever had surgery on your heart or blood vessels, such as a stent, coronary (heart) bypass, or surgery on an artery in the head, neck, heart, or legs?  No - Do you have chest pain when you are physically active?  No - Do you have a history of heart failure?  No - Do you currently have a cold, bronchitis, or symptoms of other respiratory (head and chest) infections?  Yes - Do you have a cough, shortness of breath, or wheezing?  No - Do you or anyone in your family have a history of blood clots?  No - Do you or anyone in your family have a serious bleeding problem, such as long-lasting bleeding after surgeries or cuts?  No - Have you ever had anemia or been told to take iron pills?  No - Have you had any abnormal blood loss such as black, tarry or bloody stools, or abnormal vaginal bleeding?  No - Have you ever had a blood transfusion?  Yes - Are you willing to have a blood transfusion if it is medically needed before, during, or after your surgery?  No - Have you or anyone in your family ever had problems with anesthesia (sedation for surgery)?  No - Do you have sleep apnea, excessive snoring, or daytime drowsiness?   No - Do you have any artifical heart valves or other implanted medical devices, such as a pacemaker, defibrillator, or continuous glucose monitor?  No - Do you have any artifical joints?  No - Are you allergic to latex?  No - Is there any chance that you may be pregnant?        Ángel Pham is a 65 year old male who presents for a preoperative evaluation.    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 Having bronchoscopy done with biopsy to evaluate.    No flowsheet data found.    Health Care Directive:  Patient does not have a Health Care Directive or Living Will: Discussed advance care planning with patient; however, patient declined at this time.    Preoperative Review of :   reviewed - controlled substances reflected in medication list.  PDMP Review       Value Time User    State PDMP site checked  Yes 1/14/2021  1:45 PM Marlene Cosby, APRN CNP          Status of Chronic Conditions:  COPD - Patient has a longstanding history of moderate-severe COPD . Patient has been doing well overall noting GRAHAM and COUGH and continues on medication regimen consisting of Advair, Incruse ellipta without adverse reactions or side effects. Albuterol prn    HYPERTENSION - Patient has longstanding history of HTN , currently denies any symptoms referable to elevated blood pressure. Specifically denies chest pain, palpitations, dyspnea, orthopnea, PND or peripheral edema. Blood pressure readings have been in normal range. Current medication regimen is as listed below. Patient denies any side effects of medication.       Review of Systems  CONSTITUTIONAL: NEGATIVE for fever, chills, change in weight  INTEGUMENTARY/SKIN: NEGATIVE for worrisome rashes, moles or lesions  EYES: NEGATIVE for vision changes or irritation  ENT/MOUTH: NEGATIVE for ear, mouth and throat problems  RESP: NEGATIVE for significant cough or SOB  BREAST: NEGATIVE for masses, tenderness or discharge  CV: NEGATIVE for chest pain, palpitations or peripheral edema  GI: NEGATIVE for nausea, abdominal pain,  heartburn, or change in bowel habits  : NEGATIVE for frequency, dysuria, or hematuria  MUSCULOSKELETAL: NEGATIVE for significant arthralgias or myalgia  NEURO: NEGATIVE for weakness, dizziness or paresthesias  ENDOCRINE: NEGATIVE for temperature intolerance, skin/hair changes  HEME: NEGATIVE for bleeding problems  PSYCHIATRIC: NEGATIVE for changes in mood or affect    Patient Active Problem List    Diagnosis Date Noted     Throat cancer (H) 12/04/2019     Priority: Medium     HTN, goal below 140/90      Priority: Medium     Allergy history unknown      Priority: Medium     COPD (chronic obstructive pulmonary disease) (H)      Priority: Medium     History of pneumothorax      Priority: Medium      Past Medical History:   Diagnosis Date     Allergy history unknown      COPD (chronic obstructive pulmonary disease) (H)      HTN, goal below 140/90      Hyperlipidaemia LDL goal < 100      Pneumothorax on left      Past Surgical History:   Procedure Laterality Date     ENT SURGERY      sinus     HC HEMORROIDECTOMY, EXTERNAL, SINGLE COLUMN/GROUP      left     HERNIA REPAIR      umbilical and groin     LARYNGOSCOPY WITH BIOPSY(IES) Left 7/26/2019    Procedure: MICRODIRECT LARYNGOSCOPY WITH BIOPSY OF LEFT LARYNGEAL MASS;  Surgeon: Boo Mehta MD;  Location: [facility] OR     THORACIC SURGERY Right 2005    pneumothorax     Current Outpatient Medications   Medication Sig Dispense Refill     albuterol (2.5 MG/3ML) 0.083% neb solution INHALE 3 MILLILITER BY NEBULIZATION ROUTE 4 TIMES EVERY DAY  11     albuterol (PROAIR HFA) 108 (90 BASE) MCG/ACT inhaler Inhale 2 puffs into the lungs as needed.       butalbital-acetaminophen-caffeine (FIORICET/ESGIC) -40 MG per tablet TAKE 1 TABLET BY MOUTH EVERY 4-6 HOURS AS NEEDED  0     fluticasone-salmeterol (ADVAIR DISKUS) 250-50 MCG/DOSE diskus inhaler Inhale 1 puff into the lungs every 12 hours       losartan (COZAAR) 100 MG tablet TAKE 1 TABLET BY MOUTH  DAILY 90 tablet 3      "oxyCODONE IR (ROXICODONE) 15 MG tablet Take 5 mg by mouth every 4 hours as needed for severe pain       oxymetazoline (AFRIN) 0.05 % nasal spray Spray 2 sprays into both nostrils 2 times daily For up to 3 days 1 Bottle 0     umeclidinium (INCRUSE ELLIPTA) 62.5 MCG/INH inhaler Inhale 1 puff into the lungs daily         Allergies   Allergen Reactions     Chantix [Varenicline] Other (See Comments)     Patient reports has tried this in past and had lightheadedness and nose bleeds.     Morphine Hcl Itching     On contact     Wellbutrin [Bupropion Hydrobromide]      syncope        Social History     Tobacco Use     Smoking status: Current Every Day Smoker     Packs/day: 1.00     Years: 43.00     Pack years: 43.00     Types: Cigarettes     Smokeless tobacco: Never Used   Substance Use Topics     Alcohol use: Yes     Alcohol/week: 15.0 standard drinks     Types: 15 Cans of beer per week     Frequency: 4 or more times a week     Drinks per session: 1 or 2       History   Drug Use Unknown         Objective     /68   Pulse 68   Temp 99.2  F (37.3  C)   Resp 16   Ht 1.803 m (5' 11\")   Wt 51.3 kg (113 lb)   SpO2 95%   BMI 15.76 kg/m      Physical Exam    GENERAL APPEARANCE: Unhealthy appearing adult male in mild distress     EYES: EOMI,  PERRL     HENT: nose and mouth without ulcers or lesions     NECK: no asymmetry, masses, or scars     RESP: decreased breath sounds throughout all fields, no crackles or wheezing     CV: regular rates and rhythm, normal S1 S2, no S3 or S4 and no murmur, click or rub     ABDOMEN: Flat, nontender, normal bowel sounds, no masses     MS: generalized weakness     SKIN: no suspicious lesions or rashes     NEURO: Normal strength and tone, sensory exam grossly normal, mentation intact and speech normal     PSYCH: normal affect & mood    Recent Labs   Lab Test 07/26/19  0616 07/25/19  1634 04/11/19  0945   HGB  --  14.0  --    PLT  --  237  --    NA  --   --  133*   POTASSIUM  --   --  5.3* "   CR 0.52*  --  0.69*        Diagnostics:  Labs pending at this time.  Results will be reviewed when available.  Recent Results (from the past 24 hour(s))   CBC with Diff/Plt ([facility])    Collection Time: 01/14/21  2:00 PM   Result Value Ref Range    WBC x10/cmm 4.7 3.8 - 11.0 x10/cmm    % Lymphocytes 11.2 (A) 20.5 - 51.1 %    % Monocytes 4.2 1.7 - 9.3 %    % Granulocytes 84.6 (A) 42.2 - 75.2 %    RBC x10/cmm 4.19 (A) 4.2 - 5.9 x10/cmm    Hemoglobin 14.2 13.4 - 17.5 g/dl    Hematocrit 42.6 39 - 51 %    .5 (A) 80 - 100 fL    MCH 33.9 (A) 27.0 - 31.0 pg    MCHC 33.4 33.0 - 37.0 g/dL    Platelet Count 286 140 - 450 K/uL      EKG: appears normal, NSR, normal axis, normal intervals, no acute ST/T changes c/w ischemia, no LVH by voltage criteria, unchanged from previous tracings    Revised Cardiac Risk Index (RCRI):  The patient has the following serious cardiovascular risks for perioperative complications:   - No serious cardiac risks = 0 points     RCRI Interpretation: 0 points: Class I (very low risk - 0.4% complication rate)       Assessment & Plan   The proposed surgical procedure is considered INTERMEDIATE risk.    Ángel was seen today for pre-op exam.    Diagnoses and all orders for this visit:    Preop general physical exam  -     VENOUS COLLECTION  Approval given to proceed with proposed procedure, without further diagnostic evaluation.  Discontinue ASA 7 days prior to procedure to reduce bleeding risk.  Aspirin held for 7 days prior to scheduled surgery & will be resumed post-op  Discontinue NSAIDS 5 days prior to procedure to reduce bleeding risk.  On the morning of surgery, take medications with a small sip of water, hold all other medications.   Resume all medications post-operatively at the same doses.    Throat cancer (H)  -     CBC with Diff/Plt ([facility])  -     VENOUS COLLECTION  Possible mets to lungs prompting bronchoscopy with biopsy    Pulmonary emphysema, unspecified emphysema type (H)  -     EKG  12-lead complete w/read - Clinics  -     VENOUS COLLECTION  Chronic productive cough. Taking medications as prescribed. Continues to smoke without interest in quitting.  Discussed general issues of COPD, including pathophysiology, ways it will affect the pt., when to seek help, reviewed the typical medications (how they are taken, how they help)      HTN, goal below 140/90  -     Basic Metabolic Panel (8) ([facility])  -     VENOUS COLLECTION  Reviewed his current HTN management. Discussed our goal for him is a systolic pressure at or below 128 and diastolic pressure at or below 83.  We today managed his prescriptions with refills ensured to ensure availabilty of current medications.  Discussed the importance for aggressive management of HTN to prevent vascular complications later.  Recommended lower fat, lower carbohydrate, and lower sodium (<2000 mg)diet. Required intervals for follow up on HTN, lab studies reviewed.    Strongly recommened he follow his blood pressures outside the clinic to ensure that BPs are remaining within guidelines,.  Instructed to contact me if the readings are not within guidelines on a regular basis so we can adjust treatment as needed.    Risks and Recommendations:  The patient has the following additional risks and recommendations for perioperative complications:   - Consult Hospitalist / IM to assist with post-op medical management  Pulmonary:    - Incentive spirometry post-op    Medication Instructions:  Patient is to take all scheduled medications on the day of surgery   - ACE/ARB: May be continued on the day of surgery.    - LABA, inhaled corticosteroid, long-acting anticholinergics: Continue without modification.   - rescue Inhaler: Continue PRN. Bring to hospital on the day of surgery.    RECOMMENDATION:  APPROVAL GIVEN to proceed with proposed procedure, without further diagnostic evaluation.    Signed Electronically by: ALY Wagner CNP    Copy of this evaluation report is  provided to requesting physician.    Preop [facility] Preop Guidelines    Revised Cardiac Risk Index

## 2021-01-14 NOTE — LETTER
[facility] [address] [place]  [address]  Phone: [phone]    January 15, 2021      Ángel Pham  [address] [facility] [address]            Dear Ángel,     It was so nice to meet you at your recent appointment! I have summarized your lab results below.   Glucose slightly elevated but if not fasting this is normal.   Normal kidney function and electrolytes (basic metabolic panel) with minor abnormalities of no concern to you.   CBC shows abnormalities that can be present with infection or inflammation.     Please let us know if you have questions or concerns.     Sincerely,   ALY Wagner CNP       Results for orders placed or performed in visit on 01/14/21   CBC with Diff/Plt ([facility])     Status: Abnormal   Result Value Ref Range    WBC x10/cmm 4.7 3.8 - 11.0 x10/cmm    % Lymphocytes 11.2 (A) 20.5 - 51.1 %    % Monocytes 4.2 1.7 - 9.3 %    % Granulocytes 84.6 (A) 42.2 - 75.2 %    RBC x10/cmm 4.19 (A) 4.2 - 5.9 x10/cmm    Hemoglobin 14.2 13.4 - 17.5 g/dl    Hematocrit 42.6 39 - 51 %    .5 (A) 80 - 100 fL    MCH 33.9 (A) 27.0 - 31.0 pg    MCHC 33.4 33.0 - 37.0 g/dL    Platelet Count 286 140 - 450 K/uL    Impression    Verified by romelia/Yola Saeed MA January 14, 2021 2:21 PM     Basic Metabolic Panel (8) ([facility])     Status: Abnormal   Result Value Ref Range    Glucose 110 (H) 65 - 99 mg/dL    Urea Nitrogen 8 8 - 27 mg/dL    Creatinine 0.47 (L) 0.76 - 1.27 mg/dL    eGFR If NonAfricn Am 117 >59 mL/min/1.73    eGFR If Africn Am 136 >59 mL/min/1.73    BUN/Creatinine Ratio 17 10 - 24    Sodium 134 134 - 144 mmol/L    Potassium 4.4 3.5 - 5.2 mmol/L    Chloride 96 96 - 106 mmol/L    Total CO2 21 20 - 29 mmol/L    Calcium 9.1 8.6 - 10.2 mg/dL    Narrative    Performed at:  01 - [facility] [address], [place], [place]  [id number]  : Joe Hanson MD, Phone:  [phone]   Results for orders placed or performed in visit on 01/14/21    Asymptomatic COVID-19 Virus (Coronavirus) by PCR     Status: None    Specimen: Nasopharyngeal   Result Value Ref Range    COVID-19 Virus PCR to [facility] - Source Nasopharyngeal     COVID-19 Virus PCR to [facility] - Result Not Detected

## 2021-01-14 NOTE — PATIENT INSTRUCTIONS
"Okay to take medicines with small amount of water morning of procedure    Patient Instructions   - Nothing to eat/drink starting midnight the night before, unless surgical team tells you otherwise     - Avoid ibuprofen, naproxen (aleve), aspirin, fish oil, vitamin e 1 week prior to surgery     - If you have pain - ok to take Acetaminophen (Tylenol) 650 mg every 4-6 hours as needed.     - If you fall ill prior to surgery - (ie - fever, chills, nausea, vomiting or diarrhea, cough, etc) please let me and your surgeon know asap    Preparing for Your Surgery  Getting started  A surgery nurse will call you to review your health history and instructions. They will give you an arrival time based on your scheduled surgery time.  Please be ready to share the following:    Your doctor's clinic name and phone number    Your medical, surgical and anesthesia history    A list of allergies and sensitivities    A list of medicines, including herbal treatments and over-the-counter drugs    Whether the patient has a legal guardian (ask how to send us the papers in advance)  If your child is having surgery, please ask for a copy of Preparing for Your Child's Surgery.    Preparing for surgery    Within 30 days of surgery: Have an exam at your family clinic (primary care clinic), or go to a pre-operative clinic. This exam is called a \"History and Physical,\" or H&P.    At your H&P exam, talk to your care team about all medicines you take. If you need to stop any medicines before surgery, ask when to start taking them again.  ? We do this for your safety. Many medicines can make you bleed too much during surgery. Some change how well surgery (anesthesia) drugs work.    Call your insurance company to see what it will and won't pay for. Ask if they need to pre-approve the surgery. (If no insurance, call [phone].)    Call your surgeon's clinic if there's any change in your health. This includes signs of a cold or flu (sore throat, runny " nose, cough, rash, fever). It also includes a scrape or scratch near the surgery site.    If you have questions on the day of surgery, call your surgery center.  Eating and drinking guidelines  For your safety: Unless your surgeon tells you otherwise, follow the guidelines below.    Eat and drink as usual until 8 hours before surgery. After that, no food or milk.    Drink clear liquids until 2 hours before surgery. These are liquids you can see through, like water, Gatorade and Propel Water. You may also have black coffee and tea (no cream or milk).    Nothing by mouth within 2 hours of surgery. This includes gum, candy and breath mints.    Stop alcohol the midnight before surgery.    If your family clinic tells you to take medicine on the morning of surgery, it's okay to take it with a sip of water.  Preventing infection    Shower or bathe the night before and morning of your surgery. Follow the instructions your clinic gave you. (If no instructions, use regular soap.)    Don't shave or clip hair near your surgery site. This can lead to skin infection.    Don't smoke the morning of surgery. Smoking increases the risk of infection. You may chew nicotine gum up to 2 hours before surgery. A nicotine patch is okay.  ? Note: Some surgeries require you to completely quit smoking and nicotine. Check with your surgeon.    Your care team will make every effort to keep you safe from infection. We will:  ? Clean our hands often with soap and water (or an alcohol-based hand rub).  ? Clean the skin at your surgery site with a special soap that kills germs. We'll also remove hair from the site as needed.  ? Wear special hair covers, masks, gowns and gloves during surgery.  ? Give antibiotic medicine, if prescribed. Not all surgeries need antibiotics.  What to bring on the day of surgery    Photo ID and insurance card    Copy of your health care directive, if you have one    Glasses and hearing aides (bring cases)  ? You can't  wear contacts during surgery    Inhaler and eye drops, if you use them (tell us about these when you arrive)    CPAP machine or breathing device, if you use them    A few personal items, if spending the night    If you have . . .  ? A pacemaker or ICD (cardiac defibrillator): Bring the ID card.  ? An implanted stimulator: Bring the remote control.  ? A legal guardian: Bring a copy of the certified (court-stamped) guardianship papers.  Please remove any jewelry, including body piercings. Leave jewelry and other valuables at home.  If you're going home the day of surgery  Important: If you don't follow the rules below, we must cancel your surgery.     Arrange for someone to drive you home after surgery. You may not drive, take a taxi or take public transportation by yourself (unless you'll have local anesthesia only).    Arrange for a responsible adult to stay with you overnight. If you don't, we may keep you in the hospital overnight, and you may need to pay the costs yourself.  Questions?   If you have any questions for your care team, list them here: _________________________________________________________________________________________________________________________________________________________________________________________________________________________________________________________________________________________________________________________  For informational purposes only. Not to replace the advice of your health care provider. Copyright   2899-8171 [facility]. All rights reserved. Clinically reviewed by Elenita Jackson MD. [employer] 348383 - REV 07/19.

## 2021-01-15 LAB
BUN SERPL-MCNC: 8 MG/DL (ref 8–27)
BUN/CREATININE RATIO: 17 (ref 10–24)
CALCIUM SERPL-MCNC: 9.1 MG/DL (ref 8.6–10.2)
CHLORIDE SERPLBLD-SCNC: 96 MMOL/L (ref 96–106)
CREAT SERPL-MCNC: 0.47 MG/DL (ref 0.76–1.27)
EGFR IF AFRICN AM: 136 ML/MIN/1.73
EGFR IF NONAFRICN AM: 117 ML/MIN/1.73
GLUCOSE SERPL-MCNC: 110 MG/DL (ref 65–99)
POTASSIUM SERPL-SCNC: 4.4 MMOL/L (ref 3.5–5.2)
SARS-COV-2 RNA RESP QL NAA+PROBE: NOT DETECTED
SODIUM SERPL-SCNC: 134 MMOL/L (ref 134–144)
SPECIMEN SOURCE: NORMAL
TOTAL CO2: 21 MMOL/L (ref 20–29)

## 2021-01-18 ENCOUNTER — HOSPITAL ENCOUNTER (OUTPATIENT)
Facility: CLINIC | Age: 66
Discharge: HOME | End: 2021-01-18
Attending: THORACIC SURGERY (CARDIOTHORACIC VASCULAR SURGERY) | Admitting: THORACIC SURGERY (CARDIOTHORACIC VASCULAR SURGERY)
Payer: COMMERCIAL

## 2021-01-18 ENCOUNTER — ANESTHESIA EVENT (OUTPATIENT)
Dept: SURGERY | Facility: CLINIC | Age: 66
End: 2021-01-18
Payer: COMMERCIAL

## 2021-01-18 ENCOUNTER — ANESTHESIA (OUTPATIENT)
Dept: SURGERY | Facility: CLINIC | Age: 66
End: 2021-01-18
Payer: COMMERCIAL

## 2021-01-18 VITALS
HEART RATE: 71 BPM | TEMPERATURE: 97.6 F | RESPIRATION RATE: 14 BRPM | SYSTOLIC BLOOD PRESSURE: 146 MMHG | OXYGEN SATURATION: 97 % | BODY MASS INDEX: 15.84 KG/M2 | DIASTOLIC BLOOD PRESSURE: 75 MMHG | WEIGHT: 113.6 LBS

## 2021-01-18 PROCEDURE — 710N000010 HC RECOVERY PHASE 1, LEVEL 2, PER MIN: Performed by: THORACIC SURGERY (CARDIOTHORACIC VASCULAR SURGERY)

## 2021-01-18 PROCEDURE — 999N000141 HC STATISTIC PRE-PROCEDURE NURSING ASSESSMENT: Performed by: THORACIC SURGERY (CARDIOTHORACIC VASCULAR SURGERY)

## 2021-01-18 PROCEDURE — 250N000009 HC RX 250: Performed by: THORACIC SURGERY (CARDIOTHORACIC VASCULAR SURGERY)

## 2021-01-18 PROCEDURE — 88172 CYTP DX EVAL FNA 1ST EA SITE: CPT | Mod: TC | Performed by: THORACIC SURGERY (CARDIOTHORACIC VASCULAR SURGERY)

## 2021-01-18 PROCEDURE — 250N000011 HC RX IP 250 OP 636: Performed by: NURSE ANESTHETIST, CERTIFIED REGISTERED

## 2021-01-18 PROCEDURE — 258N000003 HC RX IP 258 OP 636: Performed by: NURSE ANESTHETIST, CERTIFIED REGISTERED

## 2021-01-18 PROCEDURE — 88173 CYTOPATH EVAL FNA REPORT: CPT | Mod: TC | Performed by: THORACIC SURGERY (CARDIOTHORACIC VASCULAR SURGERY)

## 2021-01-18 PROCEDURE — 88172 CYTP DX EVAL FNA 1ST EA SITE: CPT | Mod: 26 | Performed by: PATHOLOGY

## 2021-01-18 PROCEDURE — 250N000009 HC RX 250: Performed by: NURSE ANESTHETIST, CERTIFIED REGISTERED

## 2021-01-18 PROCEDURE — 370N000017 HC ANESTHESIA TECHNICAL FEE, PER MIN: Performed by: THORACIC SURGERY (CARDIOTHORACIC VASCULAR SURGERY)

## 2021-01-18 PROCEDURE — 88173 CYTOPATH EVAL FNA REPORT: CPT | Mod: 26 | Performed by: PATHOLOGY

## 2021-01-18 PROCEDURE — 250N000025 HC SEVOFLURANE, PER MIN: Performed by: THORACIC SURGERY (CARDIOTHORACIC VASCULAR SURGERY)

## 2021-01-18 PROCEDURE — 710N000012 HC RECOVERY PHASE 2, PER MINUTE: Performed by: THORACIC SURGERY (CARDIOTHORACIC VASCULAR SURGERY)

## 2021-01-18 PROCEDURE — 360N000076 HC SURGERY LEVEL 3, PER MIN: Performed by: THORACIC SURGERY (CARDIOTHORACIC VASCULAR SURGERY)

## 2021-01-18 PROCEDURE — 258N000003 HC RX IP 258 OP 636: Performed by: SURGERY

## 2021-01-18 PROCEDURE — 272N000001 HC OR GENERAL SUPPLY STERILE: Performed by: THORACIC SURGERY (CARDIOTHORACIC VASCULAR SURGERY)

## 2021-01-18 RX ORDER — NALOXONE HYDROCHLORIDE 0.4 MG/ML
0.4 INJECTION, SOLUTION INTRAMUSCULAR; INTRAVENOUS; SUBCUTANEOUS
Status: DISCONTINUED | OUTPATIENT
Start: 2021-01-18 | End: 2021-01-18 | Stop reason: HOSPADM

## 2021-01-18 RX ORDER — FENTANYL CITRATE 0.05 MG/ML
25-50 INJECTION, SOLUTION INTRAMUSCULAR; INTRAVENOUS
Status: DISCONTINUED | OUTPATIENT
Start: 2021-01-18 | End: 2021-01-18 | Stop reason: HOSPADM

## 2021-01-18 RX ORDER — ONDANSETRON 4 MG/1
4 TABLET, ORALLY DISINTEGRATING ORAL EVERY 30 MIN PRN
Status: DISCONTINUED | OUTPATIENT
Start: 2021-01-18 | End: 2021-01-18 | Stop reason: HOSPADM

## 2021-01-18 RX ORDER — LIDOCAINE HYDROCHLORIDE 20 MG/ML
INJECTION, SOLUTION INFILTRATION; PERINEURAL PRN
Status: DISCONTINUED | OUTPATIENT
Start: 2021-01-18 | End: 2021-01-18

## 2021-01-18 RX ORDER — MEPERIDINE HYDROCHLORIDE 25 MG/ML
12.5 INJECTION INTRAMUSCULAR; INTRAVENOUS; SUBCUTANEOUS
Status: DISCONTINUED | OUTPATIENT
Start: 2021-01-18 | End: 2021-01-18 | Stop reason: HOSPADM

## 2021-01-18 RX ORDER — ONDANSETRON 2 MG/ML
INJECTION INTRAMUSCULAR; INTRAVENOUS PRN
Status: DISCONTINUED | OUTPATIENT
Start: 2021-01-18 | End: 2021-01-18

## 2021-01-18 RX ORDER — IBUPROFEN 600 MG/1
600 TABLET, FILM COATED ORAL
Status: DISCONTINUED | OUTPATIENT
Start: 2021-01-18 | End: 2021-01-18 | Stop reason: HOSPADM

## 2021-01-18 RX ORDER — SODIUM CHLORIDE, SODIUM LACTATE, POTASSIUM CHLORIDE, CALCIUM CHLORIDE 600; 310; 30; 20 MG/100ML; MG/100ML; MG/100ML; MG/100ML
INJECTION, SOLUTION INTRAVENOUS CONTINUOUS
Status: DISCONTINUED | OUTPATIENT
Start: 2021-01-18 | End: 2021-01-18 | Stop reason: HOSPADM

## 2021-01-18 RX ORDER — NALOXONE HYDROCHLORIDE 0.4 MG/ML
0.2 INJECTION, SOLUTION INTRAMUSCULAR; INTRAVENOUS; SUBCUTANEOUS
Status: DISCONTINUED | OUTPATIENT
Start: 2021-01-18 | End: 2021-01-18 | Stop reason: HOSPADM

## 2021-01-18 RX ORDER — ONDANSETRON 2 MG/ML
4 INJECTION INTRAMUSCULAR; INTRAVENOUS EVERY 30 MIN PRN
Status: DISCONTINUED | OUTPATIENT
Start: 2021-01-18 | End: 2021-01-18 | Stop reason: HOSPADM

## 2021-01-18 RX ORDER — PROPOFOL 10 MG/ML
INJECTION, EMULSION INTRAVENOUS PRN
Status: DISCONTINUED | OUTPATIENT
Start: 2021-01-18 | End: 2021-01-18

## 2021-01-18 RX ORDER — FENTANYL CITRATE 50 UG/ML
INJECTION, SOLUTION INTRAMUSCULAR; INTRAVENOUS PRN
Status: DISCONTINUED | OUTPATIENT
Start: 2021-01-18 | End: 2021-01-18

## 2021-01-18 RX ORDER — DEXAMETHASONE SODIUM PHOSPHATE 4 MG/ML
INJECTION, SOLUTION INTRA-ARTICULAR; INTRALESIONAL; INTRAMUSCULAR; INTRAVENOUS; SOFT TISSUE PRN
Status: DISCONTINUED | OUTPATIENT
Start: 2021-01-18 | End: 2021-01-18

## 2021-01-18 RX ORDER — PROPOFOL 10 MG/ML
INJECTION, EMULSION INTRAVENOUS CONTINUOUS PRN
Status: DISCONTINUED | OUTPATIENT
Start: 2021-01-18 | End: 2021-01-18

## 2021-01-18 RX ORDER — MAGNESIUM HYDROXIDE 1200 MG/15ML
LIQUID ORAL PRN
Status: DISCONTINUED | OUTPATIENT
Start: 2021-01-18 | End: 2021-01-18 | Stop reason: HOSPADM

## 2021-01-18 RX ADMIN — FENTANYL CITRATE 50 MCG: 50 INJECTION, SOLUTION INTRAMUSCULAR; INTRAVENOUS at 12:52

## 2021-01-18 RX ADMIN — ROCURONIUM BROMIDE 50 MG: 10 INJECTION INTRAVENOUS at 12:52

## 2021-01-18 RX ADMIN — FENTANYL CITRATE 50 MCG: 50 INJECTION, SOLUTION INTRAMUSCULAR; INTRAVENOUS at 12:58

## 2021-01-18 RX ADMIN — PHENYLEPHRINE HYDROCHLORIDE 100 MCG: 10 INJECTION INTRAVENOUS at 13:48

## 2021-01-18 RX ADMIN — LIDOCAINE HYDROCHLORIDE 80 MG: 20 INJECTION, SOLUTION INFILTRATION; PERINEURAL at 12:52

## 2021-01-18 RX ADMIN — SODIUM CHLORIDE, POTASSIUM CHLORIDE, SODIUM LACTATE AND CALCIUM CHLORIDE: 600; 310; 30; 20 INJECTION, SOLUTION INTRAVENOUS at 12:47

## 2021-01-18 RX ADMIN — PROPOFOL 60 MG: 10 INJECTION, EMULSION INTRAVENOUS at 13:27

## 2021-01-18 RX ADMIN — PHENYLEPHRINE HYDROCHLORIDE 100 MCG: 10 INJECTION INTRAVENOUS at 13:31

## 2021-01-18 RX ADMIN — DEXAMETHASONE SODIUM PHOSPHATE 4 MG: 4 INJECTION, SOLUTION INTRA-ARTICULAR; INTRALESIONAL; INTRAMUSCULAR; INTRAVENOUS; SOFT TISSUE at 13:02

## 2021-01-18 RX ADMIN — MIDAZOLAM 2 MG: 1 INJECTION INTRAMUSCULAR; INTRAVENOUS at 12:47

## 2021-01-18 RX ADMIN — ONDANSETRON 4 MG: 2 INJECTION INTRAMUSCULAR; INTRAVENOUS at 13:46

## 2021-01-18 RX ADMIN — SUGAMMADEX 100 MG: 100 INJECTION, SOLUTION INTRAVENOUS at 13:52

## 2021-01-18 RX ADMIN — PROPOFOL 140 MG: 10 INJECTION, EMULSION INTRAVENOUS at 12:52

## 2021-01-18 RX ADMIN — PHENYLEPHRINE HYDROCHLORIDE 100 MCG: 10 INJECTION INTRAVENOUS at 13:04

## 2021-01-18 RX ADMIN — LIDOCAINE HYDROCHLORIDE 20 MG: 20 INJECTION, SOLUTION INFILTRATION; PERINEURAL at 13:43

## 2021-01-18 RX ADMIN — PHENYLEPHRINE HYDROCHLORIDE 100 MCG: 10 INJECTION INTRAVENOUS at 13:10

## 2021-01-18 RX ADMIN — PHENYLEPHRINE HYDROCHLORIDE 100 MCG: 10 INJECTION INTRAVENOUS at 13:42

## 2021-01-18 RX ADMIN — PROPOFOL 150 MCG/KG/MIN: 10 INJECTION, EMULSION INTRAVENOUS at 12:58

## 2021-01-18 RX ADMIN — PROPOFOL 30 MG: 10 INJECTION, EMULSION INTRAVENOUS at 13:36

## 2021-01-18 RX ADMIN — PHENYLEPHRINE HYDROCHLORIDE 100 MCG: 10 INJECTION INTRAVENOUS at 13:19

## 2021-01-18 ASSESSMENT — LIFESTYLE VARIABLES: TOBACCO_USE: 1

## 2021-01-18 ASSESSMENT — COPD QUESTIONNAIRES
CAT_SEVERITY: MODERATE
COPD: 1

## 2021-01-18 ASSESSMENT — ENCOUNTER SYMPTOMS: DYSRHYTHMIAS: 0

## 2021-01-18 NOTE — ANESTHESIA PREPROCEDURE EVALUATION
Anesthesia Pre-Procedure Evaluation    Patient: Ángel Pham   MRN: [id number] : 1955        Preoperative Diagnosis: Mediastinal adenopathy [R59.0]   Procedure : Procedure(s):  BRONCHOSCOPY, WITH ENDOBRONCHIAL ULTRASOUND     Past Medical History:   Diagnosis Date     Allergy history unknown      Carcinoma of supraglottis (H)      COPD (chronic obstructive pulmonary disease) (H)      HTN, goal below 140/90      Hyperlipidaemia LDL goal < 100      Migraine      Pneumothorax on left       Past Surgical History:   Procedure Laterality Date     BRONCHOSCOPY       ENT SURGERY      sinus     HC HEMORROIDECTOMY, EXTERNAL, SINGLE COLUMN/GROUP      left     HERNIA REPAIR      umbilical and groin     LARYNGOSCOPY WITH BIOPSY(IES) Left 2019    Procedure: MICRODIRECT LARYNGOSCOPY WITH BIOPSY OF LEFT LARYNGEAL MASS;  Surgeon: Boo Mehta MD;  Location: [facility] OR     THORACIC SURGERY Right     pneumothorax      Allergies   Allergen Reactions     Chantix [Varenicline] Other (See Comments)     Patient reports has tried this in past and had lightheadedness and nose bleeds.     Morphine Hcl Itching     On contact     Wellbutrin [Bupropion Hydrobromide]      syncope      Social History     Tobacco Use     Smoking status: Current Every Day Smoker     Packs/day: 1.00     Years: 43.00     Pack years: 43.00     Types: Cigarettes     Smokeless tobacco: Never Used   Substance Use Topics     Alcohol use: Yes     Alcohol/week: 15.0 standard drinks     Types: 15 Cans of beer per week     Frequency: 4 or more times a week     Drinks per session: 1 or 2     Comment: beer      Wt Readings from Last 1 Encounters:   21 51.5 kg (113 lb 9.6 oz)        Allergies   Allergen Reactions     Chantix [Varenicline] Other (See Comments)     Patient reports has tried this in past and had lightheadedness and nose bleeds.     Morphine Hcl Itching     On contact     Wellbutrin [Bupropion Hydrobromide]      syncope     Social History      Tobacco Use     Smoking status: Current Every Day Smoker     Packs/day: 1.00     Years: 43.00     Pack years: 43.00     Types: Cigarettes     Smokeless tobacco: Never Used   Substance Use Topics     Alcohol use: Yes     Alcohol/week: 15.0 standard drinks     Types: 15 Cans of beer per week     Frequency: 4 or more times a week     Drinks per session: 1 or 2     Comment: beer     Wt Readings from Last 1 Encounters:   01/18/21 51.5 kg (113 lb 9.6 oz)      Prior to Admission medications    Medication Sig Start Date End Date Taking? Authorizing Provider   albuterol (2.5 MG/3ML) 0.083% neb solution INHALE 3 MILLILITER BY NEBULIZATION ROUTE 4 TIMES EVERY DAY 3/9/18  Yes Reported, Patient   albuterol (PROAIR HFA) 108 (90 BASE) MCG/ACT inhaler Inhale 2 puffs into the lungs as needed.   Yes Reported, Patient   butalbital-acetaminophen-caffeine (FIORICET/ESGIC) -40 MG per tablet TAKE 1 TABLET BY MOUTH EVERY 4-6 HOURS AS NEEDED 4/6/18  Yes Reported, Patient   fluticasone-salmeterol (ADVAIR DISKUS) 250-50 MCG/DOSE diskus inhaler Inhale 1 puff into the lungs every 12 hours   Yes Reported, Patient   losartan (COZAAR) 100 MG tablet TAKE 1 TABLET BY MOUTH  DAILY 11/30/20  Yes Maxim Goodman MD   oxyCODONE IR (ROXICODONE) 15 MG tablet Take 5 mg by mouth every 4 hours as needed for severe pain   Yes Reported, Patient   oxymetazoline (AFRIN) 0.05 % nasal spray Spray 2 sprays into both nostrils 2 times daily For up to 3 days 11/2/20  Yes Maxim Goodman MD   umeclidinium (INCRUSE ELLIPTA) 62.5 MCG/INH inhaler Inhale 1 puff into the lungs daily   Yes Reported, Patient     Current Facility-Administered Medications Ordered in Epic   Medication Dose Route Frequency Last Rate Last Admin     lactated ringers infusion   Intravenous Continuous         lidocaine 1 % 0.1-1 mL  0.1-1 mL Other Q1H PRN         PRE OP antibiotics NOT needed for this surgical procedure.  1 each As instructed Continuous         No current  [facility]-ordered outpatient medications on file.       lactated ringers       no pre procedure antibiotic needed       Recent Labs   Lab Test 01/14/21  1400 07/26/19  0616 04/11/19  0945 04/09/15  0645 04/09/15  0645     --  133*   < > 129*   POTASSIUM 4.4  --  5.3*   < > 4.1   CHLORIDE 96  --  96   < > 97   CO2  --   --   --   --  26   ANIONGAP  --   --   --   --  6   *  --  89   < > 73   BUN 8  17  --  7*  10   < > 6*   CR 0.47* 0.52* 0.69*   < > 0.61*   MOSHE 9.1  --  9.2   < > 8.4*    < > = values in this interval not displayed.     Recent Labs   Lab Test 01/14/21  1400 07/25/19  1634   WBC 4.7 4.9   HGB 14.2 14.0    237     No results for input(s): ABO, RH in the last 88755 hours.  No results for input(s): TROPI in the last 40314 hours.  No results for input(s): PH, PCO2, PO2, HCO3 in the last 79671 hours.  No results for input(s): HCG in the last 85250 hours.  No results found for this or any previous visit (from the past 744 hour(s)).  No results found for this or any previous visit (from the past 4320 hour(s)).      RECENT LABS:       Anesthesia Evaluation            ROS/MED HX  ENT/Pulmonary: Comment: H/o L PTX    (+) tobacco use, Current use, moderate,  COPD,     Neurologic:     (+) migraines,     Cardiovascular:     (+) Dyslipidemia hypertension-----GRAHAM.  (-) CHF and arrhythmias   METS/Exercise Tolerance:     Hematologic:       Musculoskeletal:       GI/Hepatic:  - neg GI/hepatic ROS     Renal/Genitourinary:       Endo:       Psychiatric/Substance Use: Comment: Etoh abuse in past      Infectious Disease:       Malignancy: Comment: H/o throat cancer/supraglottis  (+) Malignancy,     Other:            Physical Exam    Airway        Mallampati: II   TM distance: > 3 FB   Neck ROM: full   Mouth opening: > 3 cm    Respiratory Devices and Support         Dental  no notable dental history   Comment: Permanent upper denture implants    (+) upper dentures      Cardiovascular   cardiovascular exam  normal          Pulmonary           (+) decreased breath sounds           OUTSIDE LABS:  CBC:   Lab Results   Component Value Date    WBC 4.7 01/14/2021    WBC 4.9 07/25/2019    HGB 14.2 01/14/2021    HGB 14.0 07/25/2019    HCT 42.6 01/14/2021    HCT 41.1 07/25/2019     01/14/2021     07/25/2019     BMP:   Lab Results   Component Value Date     01/14/2021     (L) 04/11/2019    POTASSIUM 4.4 01/14/2021    POTASSIUM 5.3 (H) 04/11/2019    CHLORIDE 96 01/14/2021    CHLORIDE 96 04/11/2019    CO2 26 04/09/2015    CO2 27 06/15/2009    BUN 8 01/14/2021    BUN 17 01/14/2021    CR 0.47 (L) 01/14/2021    CR 0.52 (L) 07/26/2019     (H) 01/14/2021    GLC 89 04/11/2019     COAGS: No results found for: PTT, INR, FIBR  POC: No results found for: BGM, HCG, HCGS  HEPATIC:   Lab Results   Component Value Date    ALBUMIN 4.4 11/25/2015    PROTTOTAL 6.6 11/25/2015    ALT 21 11/25/2015    AST 28 11/25/2015    ALKPHOS 82 11/25/2015    BILITOTAL <0.2 11/25/2015     OTHER:   Lab Results   Component Value Date    MOSHE 9.1 01/14/2021    LIPASE 157 04/09/2015       Anesthesia Plan     History & Physical Review      ASA Status:  3. NPO Status:  NPO Appropriate. .  Plan for General with Intravenous and Propofol induction. Device: ETT Maintenance will be Balanced.         PONV prophylaxis:  Ondansetron (or other 5HT-3) and Dexamethasone or Solumedrol.       Consents  Anesthesia Plan(s) and associated risks, benefits, and realistic alternatives discussed.    Questions answered and patient/representative(s) expressed understanding.    Discussed with:  Patient.             Postoperative Care  Postoperative pain management: IV analgesics.           Nathaniel Salomon MD

## 2021-01-18 NOTE — ANESTHESIA CARE TRANSFER NOTE
Patient: Ángel MCCAIN [facility]    Procedure(s):  BRONCHOSCOPY, WITH ENDOBRONCHIAL ULTRASOUND    Diagnosis: Mediastinal adenopathy [R59.0]  Diagnosis Additional Information: No value filed.    Anesthesia Type:   General     Note:    Oropharynx: oropharynx clear of all foreign objects  Level of Consciousness: awake  Oxygen Supplementation: face mask  Level of Supplemental Oxygen: 6  Independent Airway: airway patency satisfactory and stable  Dentition: dentition unchanged  Vital Signs Stable: post-procedure vital signs reviewed and stable  Report to RN Given: handoff report given  Patient transferred to: Phase II    Handoff Report: Identifed the Patient, Identified the Reponsible Provider, Reviewed the pertinent medical history, Discussed the surgical course, Reviewed Intra-OP anesthesia mangement and issues during anesthesia, Set expectations for post-procedure period and Allowed opportunity for questions and acknowledgement of understanding      Vitals: (Last set prior to Anesthesia Care Transfer)  CRNA VITALS  1/18/2021 1329 - 1/18/2021 1405      1/18/2021             SpO2:  100 %    Resp Rate (set):  10        Electronically Signed By: ALY Sampson CRNA  January 18, 2021  2:05 PM

## 2021-01-18 NOTE — ANESTHESIA PROCEDURE NOTES
Airway   Date/Time: 1/18/2021 12:55 PM   Patient location during procedure: OR  Staff -   Anesthesiologist:  Nathaniel Salomon MD  CRNA: Sarwat Mcclain APRN CRNA  Performed By: CRNA    Consent for Airway   Urgency: elective    Indications and Patient Condition  Indications for airway management: loren-procedural  Induction type:intravenousMask difficulty assessment: 2 - vent by mask + OA or adjuvant +/- NMBA    Final Airway Details  Final airway type: endotracheal airway  Successful airway:ETT - single and Oral  Endotracheal Airway Details   ETT size (mm): 8.0  Cuffed: yes  Successful intubation technique: direct laryngoscopy  Grade View of Cords: 1  Adjucts: stylet  Measured from: lips  Secured with: pink tape  Bite block used: None    Post intubation assessment   Placement verified by: capnometry, equal breath sounds and chest rise   Number of attempts at approach: 1  Number of other approaches attempted: 0  Secured with:pink tape  Ease of procedure: easy  Dentition: Unchanged

## 2021-01-18 NOTE — DISCHARGE INSTRUCTIONS
Same Day Surgery Discharge Instructions for  Sedation and General Anesthesia       It's not unusual to feel dizzy, light-headed or faint for up to 24 hours after surgery or while taking pain medication.  If you have these symptoms: sit for a few minutes before standing and have someone assist you when you get up to walk or use the bathroom.      You should rest and relax for the next 24 hours. We recommend you make arrangements to have an adult stay with you for at least 24 hours after your discharge.  Avoid hazardous and strenuous activity.      DO NOT DRIVE any vehicle or operate mechanical equipment for 24 hours following the end of your surgery.  Even though you may feel normal, your reactions may be affected by the medication you have received.      Do not drink alcoholic beverages for 24 hours following surgery.       Slowly progress to your regular diet as you feel able. It's not unusual to feel nauseated and/or vomit after receiving anesthesia.  If you develop these symptoms, drink clear liquids (apple juice, ginger ale, broth, 7-up, etc. ) until you feel better.  If your nausea and vomiting persists for 24 hours, please notify your surgeon.        All narcotic pain medications, along with inactivity and anesthesia, can cause constipation. Drinking plenty of liquids and increasing fiber intake will help.      For any questions of a medical nature, call your surgeon.      Do not make important decisions for 24 hours.      If you had general anesthesia, you may have a sore throat for a couple of days related to the breathing tube used during surgery.  You may use Cepacol lozenges to help with this discomfort.  If it worsens or if you develop a fever, contact your surgeon.       If you feel your pain is not well managed with the pain medications prescribed by your surgeon, please contact your surgeon's office to let them know so they can address your concerns.       CoVid 19 Information    We want to give you  information regarding Covid. Please consult your primary care provider with any questions you might have.     Patient who have symptoms (cough, fever, or shortness of breath), need to isolate for 7 days from when symptoms started OR 72 hours after fever resolves (without fever reducing medications) AND improvement of respiratory symptoms (whichever is longer).      Isolate yourself at home (in own room/own bathroom if possible)    Do Not allow any visitors    Do Not go to work or school    Do Not go to [employer],  centers, shopping, or other public places.    Do Not shake hands.    Avoid close and intimate contact with others (hugging, kissing).    Follow CDC recommendations for household cleaning of frequently touched services.     After the initial 7 days, continue to isolate yourself from household members as much as possible. To continue decrease the risk of community spread and exposure, you and any members of your household should limit activities in public for 14 days after starting home isolation.     You can reference the following CDC link for helpful home isolation/care tips:  https://www.cdc.gov/coronavirus/2019-ncov/downloads/10Things.pdf    Protect Others:    Cover Your Mouth and Nose with a mask, disposable tissue or wash cloth to avoid spreading germs to others.    Wash your hands and face frequently with soap and water    Call Your Primary Doctor If: Breathing difficulty develops or you become worse.    For more information about COVID19 and options for caring for yourself at home, please visit the CDC website at https://www.cdc.gov/coronavirus/2019-ncov/about/steps-when-sick.html  For more options for care at [facility], please visit our website at [email or web address]      Discharge Instructions for Bronchoscopy  Recovering at Home  Once home, follow any instructions you have been given. These include:  Take pain medication as directed.  Do not eat or  drink until swallowing returns to normal. As soon as you can swallow comfortably, drink plenty of water.  Use throat lozenges as advised by your doctor to help ease throat soreness.  Rest your voice as instructed by your doctor  Activity as tolerated--frequent short walks followed by rest periods is recommended.  When to Call the Doctor  After you get home, call the doctor if you have any of the following:  Chest pain or trouble breathing (call 911 or other emergency service)  Fever of 100.4 F (38 C) or higher, or as directed by your healthcare provider  Trouble swallowing doesn t improve or gets worse  Pain that does not go away even after taking pain medication  Severely hoarse voice  Severe nausea or vomiting  Bloody vomit  Cough that brings up more than tiny specks of blood   Follow-Up       Follow up with surgeon as instructed.           **If you have questions or concerns about your procedure,   call Dr. Yasir Combs at [phone]**

## 2021-01-18 NOTE — PROCEDURES
Pre-Operative Diagnosis:   1) History of Squamous Cell Carcinoma of the Head and Neck  2) Mediastinal and Hilar Lymphadenopathy  3) Pulmonary Nodules     Post-Operative Diagnosis: Same    Procedure: Diagnostic Bronchoscopy; Endobronchial Ultrasound (EBUS) with Fine Needle Aspiration    Surgeon: Yasir Combs MD    Assistant(s): N/A    Indication for Surgery: 65 male with history of stage II head and neck cancer (squamous cell carcinoma) with new pulmonary nodules and mediastinal/hilar lymphadenopathy with PET avidity. EBUS w/FNA of mediastinal nodes indicated for diagnostic purposes.     Anesthesia: General    Description of Procedure: A bronchoscope was passed through the endotracheal tube into the airway. The trachea, mainstem bronchi, lobar and segmental bronchi were all evaluated.  There were no lesions or masses within the airway, and there were no signs of external compression.  There was no inflammation of the airways, and the secretions were thin and nonpurulent.  There was thick clear secretions within the segmental/subsegmental air ways in both lungs that was suctioned free. The bronchoscope was then removed from the airway and the endobronchial ultrasound scope was advanced into the airway.  The endobronchial ultrasound scope was advanced in the airway to the position(s) where the mediastinal adenopathy was expected on preoperative imaging at station(s) 4L, 7, and 10R.  Endobronchial ultrasound was then performed at station(s) 4L, 7, and 10L, with identification of the lymph nodes of interest at 7 and 4L, which were confirmed based on appearance, location, and Doppler evaluation.  Given the angulation of the right upper lobe take off and the limitations of the scope, I was unable to identify the 10R lymph node with ultrasound.  Fine-needle aspirations were performed with a 21-gauge needle under ultrasound visualization.  Intraoperative examination of the tissue slides showed adequate tissue sampling at  station(s) 4L and 7 consistent with edwin tissue for evaluation.  Further edwin biopsies at station(s) 4L and 7 were taken and sent for block.    After completion of all endobronchial ultrasound-guided biopsies the EBUS scope was removed and the endobronchial ultrasound balloon was accounted for.  A regular bronchoscope was reintroduced into the airway through the endotracheal tube and the airways were cleared of any small amount of secretions or blood.  There were no signs of bleeding from or within the airway.  The bronchoscope was removed, and the patient was extubated.     Yasir Combs MD  Thoracic Surgery  [facility]

## 2021-01-18 NOTE — ANESTHESIA POSTPROCEDURE EVALUATION
Patient: Ángel Camacholanddanielle    Procedure(s):  BRONCHOSCOPY, WITH ENDOBRONCHIAL ULTRASOUND    Diagnosis:Mediastinal adenopathy [R59.0]  Diagnosis Additional Information: No value filed.    Anesthesia Type:  General    Note:  Disposition: Inpatient   Postop Pain Control: Uneventful            Sign Out: Well controlled pain   PONV: No   Neuro/Psych: Uneventful            Sign Out: Acceptable/Baseline neuro status   Airway/Respiratory: Uneventful            Sign Out: Acceptable/Baseline resp. status   CV/Hemodynamics: Uneventful            Sign Out: Acceptable CV status   Other NRE: NONE   DID A NON-ROUTINE EVENT OCCUR? No         Last vitals:  Vitals:    01/18/21 1401 01/18/21 1415 01/18/21 1430   BP: (!) 151/100 (!) 142/82 (!) 157/76   Pulse:  68 73   Resp: 18 12 13   Temp: 36.7  C (98.1  F) 37.3  C (99.1  F) 37.3  C (99.1  F)   SpO2: 99% 100% 98%       Electronically Signed By: Nathaniel Salomon MD  January 18, 2021  2:35 PM

## 2021-01-19 LAB — COPATH REPORT: NORMAL

## 2021-03-11 DIAGNOSIS — Z23 HIGH PRIORITY FOR COVID-19 VIRUS VACCINATION: Primary | ICD-10-CM

## 2021-03-11 PROCEDURE — 91301 PR COVID VAC MODERNA 100 MCG/0.5 ML IM: CPT | Performed by: FAMILY MEDICINE

## 2021-03-11 PROCEDURE — 0011A PR COVID VAC MODERNA 100 MCG/0.5 ML IM: CPT | Performed by: FAMILY MEDICINE

## 2021-03-21 ENCOUNTER — HEALTH MAINTENANCE LETTER (OUTPATIENT)
Age: 66
End: 2021-03-21

## 2021-04-08 DIAGNOSIS — Z23 HIGH PRIORITY FOR COVID-19 VIRUS VACCINATION: Primary | ICD-10-CM

## 2021-04-08 PROCEDURE — 91301 PR COVID VAC MODERNA 100 MCG/0.5 ML IM: CPT | Performed by: FAMILY MEDICINE

## 2021-04-08 PROCEDURE — 0012A PR COVID VAC MODERNA 100 MCG/0.5 ML IM: CPT | Performed by: FAMILY MEDICINE

## 2021-09-05 ENCOUNTER — HEALTH MAINTENANCE LETTER (OUTPATIENT)
Age: 66
End: 2021-09-05

## 2021-09-15 DIAGNOSIS — R51.9 HEAD ACHE: ICD-10-CM

## 2021-09-15 LAB
% GRANULOCYTES: 83.2 % (ref 42.2–75.2)
HCT VFR BLD AUTO: 38.9 % (ref 39–51)
HEMOGLOBIN: 14.1 G/DL (ref 13.4–17.5)
LYMPHOCYTES NFR BLD AUTO: 12.9 % (ref 20.5–51.1)
MCH RBC QN AUTO: 34.5 PG (ref 27–31)
MCHC RBC AUTO-ENTMCNC: 36.2 G/DL (ref 33–37)
MCV RBC AUTO: 95.2 FL (ref 80–100)
MONOCYTES NFR BLD AUTO: 3.9 % (ref 1.7–9.3)
PLATELET # BLD AUTO: 262 K/UL (ref 140–450)
RBC # BLD AUTO: 4.08 X10/CMM (ref 4.2–5.9)
WBC # BLD AUTO: 4.4 X10/CMM (ref 3.8–11)

## 2021-09-15 PROCEDURE — 36415 COLL VENOUS BLD VENIPUNCTURE: CPT | Performed by: FAMILY MEDICINE

## 2021-09-15 PROCEDURE — 85025 COMPLETE CBC W/AUTO DIFF WBC: CPT | Performed by: FAMILY MEDICINE

## 2021-09-16 LAB
ALT SERPL-CCNC: 20 IU/L (ref 0–44)
AST SERPL-CCNC: 33 IU/L (ref 0–40)

## 2021-09-24 DIAGNOSIS — I10 HTN, GOAL BELOW 140/90: ICD-10-CM

## 2021-09-27 RX ORDER — LOSARTAN POTASSIUM 100 MG/1
100 TABLET ORAL DAILY
Qty: 90 TABLET | Refills: 3 | Status: SHIPPED | OUTPATIENT
Start: 2021-09-27 | End: 2022-05-12

## 2021-10-06 ENCOUNTER — OFFICE VISIT (OUTPATIENT)
Dept: FAMILY MEDICINE | Facility: CLINIC | Age: 66
End: 2021-10-06

## 2021-10-06 VITALS
WEIGHT: 103 LBS | HEART RATE: 80 BPM | BODY MASS INDEX: 14.75 KG/M2 | SYSTOLIC BLOOD PRESSURE: 156 MMHG | HEIGHT: 70 IN | DIASTOLIC BLOOD PRESSURE: 90 MMHG | OXYGEN SATURATION: 97 %

## 2021-10-06 DIAGNOSIS — R91.1 LEFT UPPER LOBE PULMONARY NODULE: ICD-10-CM

## 2021-10-06 DIAGNOSIS — F17.218 CIGARETTE NICOTINE DEPENDENCE WITH OTHER NICOTINE-INDUCED DISORDER: ICD-10-CM

## 2021-10-06 DIAGNOSIS — Z13.6 SCREENING FOR AAA (ABDOMINAL AORTIC ANEURYSM): ICD-10-CM

## 2021-10-06 DIAGNOSIS — Z00.00 WELCOME TO MEDICARE PREVENTIVE VISIT: Primary | ICD-10-CM

## 2021-10-06 DIAGNOSIS — C14.0 THROAT CANCER (H): ICD-10-CM

## 2021-10-06 DIAGNOSIS — Z13.220 SCREENING FOR LIPOID DISORDERS: ICD-10-CM

## 2021-10-06 DIAGNOSIS — Z92.3 S/P RADIATION THERAPY: ICD-10-CM

## 2021-10-06 DIAGNOSIS — I10 HTN, GOAL BELOW 140/90: ICD-10-CM

## 2021-10-06 DIAGNOSIS — F10.10 MILD ALCOHOL USE DISORDER: ICD-10-CM

## 2021-10-06 DIAGNOSIS — J43.9 PULMONARY EMPHYSEMA, UNSPECIFIED EMPHYSEMA TYPE (H): ICD-10-CM

## 2021-10-06 DIAGNOSIS — R63.6 UNDERWEIGHT: ICD-10-CM

## 2021-10-06 DIAGNOSIS — E46 PROTEIN-CALORIE MALNUTRITION, UNSPECIFIED SEVERITY (H): ICD-10-CM

## 2021-10-06 PROCEDURE — 76706 US ABDL AORTA SCREEN AAA: CPT | Performed by: FAMILY MEDICINE

## 2021-10-06 PROCEDURE — 36415 COLL VENOUS BLD VENIPUNCTURE: CPT | Performed by: FAMILY MEDICINE

## 2021-10-06 PROCEDURE — 99214 OFFICE O/P EST MOD 30 MIN: CPT | Mod: 25 | Performed by: FAMILY MEDICINE

## 2021-10-06 PROCEDURE — G0402 INITIAL PREVENTIVE EXAM: HCPCS | Performed by: FAMILY MEDICINE

## 2021-10-06 RX ORDER — AMLODIPINE BESYLATE 5 MG/1
5 TABLET ORAL DAILY
Qty: 90 TABLET | Refills: 0 | Status: SHIPPED | OUTPATIENT
Start: 2021-10-06 | End: 2021-12-07

## 2021-10-06 RX ORDER — OXYCODONE HYDROCHLORIDE 5 MG/1
TABLET ORAL
Status: ON HOLD | COMMUNITY
Start: 2021-09-30 | End: 2023-11-21

## 2021-10-06 ASSESSMENT — MIFFLIN-ST. JEOR: SCORE: 1250.51

## 2021-10-06 NOTE — PROGRESS NOTES
SUBJECTIVE:   Ángel Pham is a 66 year old male who presents for Preventive Visit.    Patient has been advised of split billing requirements and indicates understanding: Yes  Are you in the first 12 months of your Medicare Part B coverage?  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  Has completed radiation treatment.  Follows with Dr Lopes.    COPD: follows with Dr Mireles.  On Trelegy.  Breathing is stable.    Nicotine dependence: continues to smoke.    AISHWARYA nodule: identified on recent CT 2021.  Underwent bronch and LN sampling with Dr Ann without evidence of metastatic disease.    HTN: on losartan.  No CP, headache.        Physical Health:    In general, how would you rate your overall physical health? good    Outside of work, how many days during the week do you exercise? none    Outside of work, approximately how many minutes a day do you exercise?not applicable  If you drink alcohol do you typically have >3 drinks per day or >7 drinks per week? Yes - AUDIT SCORE:     AUDIT - Alcohol Use Disorders Identification Test - Reproduced from the World Health Organization Audit 2001 (Second Edition) 10/6/2021   1.  How often do you have a drink containing alcohol? 4 or more times a week   2.  How many drinks containing alcohol do you have on a typical day when you are drinking? 3 or 4   3.  How often do you have five or more drinks on one occasion? Less than monthly   4.  How often during the last year have you found that you were not able to stop drinking once you had started? Never   5.  How often during the last year have you failed to do what was normally expected of you because of drinking? Never   6.  How often during the last year have you needed a first drink in the morning to get yourself going after a heavy drinking session? Never   7.  How often during the last year have you had a feeling of guilt or remorse after drinking? Never   8.  How often during the last year have you been unable to  "remember what happened the night before because of your drinking? Never   9.  Have you or someone else been injured because of your drinking? No   10. Has a relative, friend, doctor or other health care worker been concerned about your drinking or suggested you cut down? No   TOTAL SCORE 6         Do you usually eat at least 4 servings of fruit and vegetables a day, include whole grains & fiber and avoid regularly eating high fat or \"junk\" foods? Yes    Do you have any problems taking medications regularly?  No    Do you have any side effects from medications? none    Needs assistance for the following daily activities: no assistance needed    Which of the following safety concerns are present in your home?  none identified     Hearing impairment: missed 4000Hz bilateral    In the past 6 months, have you been bothered by leaking of urine? no    Mental Health:    In general, how would you rate your overall mental or emotional health? good  PHQ-2 Score:      Do you feel safe in your environment? Yes    Have you ever done Advance Care Planning? (For example, a Health Directive, POLST, or a discussion with a medical provider or your loved ones about your wishes): No, advance care planning information given to patient to review.  Patient plans to discuss their wishes with loved ones or provider.      Fall risk:  Fallen 2 or more times in the past year?: No  Any fall with injury in the past year?: No    Cognitive Screenin) Repeat 3 items (Leader, Season, Table)    2) Clock draw: NORMAL  3) 3 item recall: Recalls 3 objects  Results: 3 items recalled: COGNITIVE IMPAIRMENT LESS LIKELY    Mini-CogTM Copyright LEONARDO Kim. Licensed by the author for use in [facility]; reprinted with permission (austen@.[facility]). All rights reserved.      Do you have sleep apnea, excessive snoring or daytime drowsiness?: no    Reviewed and updated as needed this visit by clinical staff  Tobacco  Allergies  Meds          " "    Reviewed and updated as needed this visit by Provider                Social History     Tobacco Use     Smoking status: Current Every Day Smoker     Packs/day: 1.00     Years: 43.00     Pack years: 43.00     Types: Cigarettes     Smokeless tobacco: Never Used   Substance Use Topics     Alcohol use: Yes     Alcohol/week: 15.0 standard drinks     Types: 15 Cans of beer per week     Comment: syed                           Current providers sharing in care for this patient include:   Patient Care Team:  Maxim Goodman MD as PCP - General (Family Practice)  Maxim Goodman MD as Assigned PCP    The following health maintenance items are reviewed in Epic and correct as of today:  Health Maintenance   Topic Date Due     SPIROMETRY  Never done     ZOSTER IMMUNIZATION (1 of 2) Never done     FALL RISK ASSESSMENT  Never done     AORTIC ANEURYSM SCREENING (SYSTEM ASSIGNED)  06/22/2021     INFLUENZA VACCINE (1) 09/01/2021     MEDICARE ANNUAL WELLNESS VISIT  10/06/2022     LIPID  04/20/2023     ADVANCE CARE PLANNING  04/20/2023     Pneumococcal Vaccine: Pediatrics (0 to 5 Years) and At-Risk Patients (6 to 64 Years) (2 of 2 - PPSV23) 04/20/2023     Pneumococcal Vaccine: 65+ Years (2 of 2 - PPSV23) 04/20/2023     DTAP/TDAP/TD IMMUNIZATION (2 - Td or Tdap) 06/26/2023     COLORECTAL CANCER SCREENING  01/06/2025     COPD ACTION PLAN  Completed     PHQ-2  Completed     COVID-19 Vaccine  Completed     HEPATITIS C SCREENING  Addressed     HIV SCREENING  Addressed     IPV IMMUNIZATION  Aged Out     MENINGITIS IMMUNIZATION  Aged Out     HEPATITIS B IMMUNIZATION  Aged Out     Lab work is in process  Last 3 Pap and HPV Results:      ROS:  Constitutional, HEENT, cardiovascular, pulmonary, GI, , musculoskeletal, neuro, skin, endocrine and psych systems are negative, except as otherwise noted.    OBJECTIVE:   Ht 1.765 m (5' 9.5\")   Wt 46.7 kg (103 lb)   BMI 14.99 kg/m   Estimated body mass index is 14.99 kg/m  as " "calculated from the following:    Height as of this encounter: 1.765 m (5' 9.5\").    Weight as of this encounter: 46.7 kg (103 lb).  EXAM:   GENERAL: alert, no distress and appears older than stated age  EYES: Eyes grossly normal to inspection, PERRL and conjunctivae and sclerae normal  HENT: ear canals and TM's normal, nose and mouth without ulcers or lesions  NECK: no adenopathy, no asymmetry, masses, or scars and thyroid normal to palpation  RESP: decreased breath sounds throughout  CV: regular rate and rhythm, normal S1 S2, no S3 or S4, no murmur, click or rub, no peripheral edema and peripheral pulses strong  ABDOMEN: soft, nontender, no hepatosplenomegaly, no masses and bowel sounds normal  MS: no gross musculoskeletal defects noted, no edema  SKIN: no suspicious lesions or rashes  NEURO: Normal strength and tone, mentation intact and speech normal  PSYCH: mentation appears normal, affect normal/bright    Diagnostic Test Results:  Labs reviewed in Epic    ASSESSMENT / PLAN:   Welcome to Medicare preventive visit  - discussed preventative guidelines, healthy diet, exercise and weight management    Pulmonary emphysema, unspecified emphysema type (H)  Stable  --cont Trelegy  --discussed cessation in detail.  Unwilling to try new medication.  \"I need to do it on my own\"    Throat cancer (H)  --cont oncology follow up    HTN, goal below 140/90  Above goal  - add amlodipine  - recheck 1 month  - Basic Metabolic Panel (8) ([facility])  - amLODIPine (NORVASC) 5 MG tablet; Take 1 tablet (5 mg) by mouth daily  - VENOUS COLLECTION    Left upper lobe pulmonary nodule  S/p LN sampling, reassuring    Screening for lipoid disorders  - Lipid Panel ([facility])  - VENOUS COLLECTION    S/P radiation therapy  - TSH reflex to T4 ([facility])    Cigarette nicotine dependence with other nicotine-induced disorder  - Radiology Referral; Future    Mild alcohol use disorder  Not interested in cutting back    Screening for AAA (abdominal aortic " "aneurysm)  - Abdominal Aortic Aneurysm Screening/Tracking      Patient has been advised of split billing requirements and indicates understanding: Yes    COUNSELING:  Reviewed preventive health counseling, as reflected in patient instructions       Consider AAA screening for ages 65-75 and smoking history    Estimated body mass index is 14.99 kg/m  as calculated from the following:    Height as of this encounter: 1.765 m (5' 9.5\").    Weight as of this encounter: 46.7 kg (103 lb).    Weight management plan noted, stable and monitoring    He reports that he has been smoking cigarettes. He has a 43.00 pack-year smoking history. He has never used smokeless tobacco.  Tobacco Cessation Action Plan:   Information offered: Patient not interested at this time    Appropriate preventive services were discussed with this patient, including applicable screening as appropriate for cardiovascular disease, diabetes, osteopenia/osteoporosis, and glaucoma.  As appropriate for age/gender, discussed screening for colorectal cancer, prostate cancer, breast cancer, and cervical cancer. Checklist reviewing preventive services available has been given to the patient.    Reviewed patients plan of care and provided an AVS. The Basic Care Plan (routine screening as documented in Health Maintenance) for Ángel meets the Care Plan requirement. This Care Plan has been established and reviewed with the Patient.    Counseling Resources:  ATP IV Guidelines  Pooled Cohorts Equation Calculator  Breast Cancer Risk Calculator  BRCA-Related Cancer Risk Assessment: FHS-7 Tool  FRAX Risk Assessment  ICSI Preventive Guidelines  Dietary Guidelines for Americans, 2010  USDA's MyPlate  ASA Prophylaxis  Lung CA Screening    Maxim Goodman MD  [facility]  "

## 2021-10-06 NOTE — LETTER
[facility] [address] [place]  [address]  Phone: [phone]    October 8, 2021      Ángel Pham  [address] [facility] [address]              Dear Dr. Rex Neal is out today. Your included test results from your recent visit are within normal ranges. I do not suggest that we make any changes at this time.     Lit Guevara M.D./barak                    Results for orders placed or performed in visit on 10/06/21   TSH reflex to T4 ([facility])     Status: None   Result Value Ref Range    TSH 4.210 0.450 - 4.500 uIU/mL    Narrative    Performed at:  01 - [facility] [address], [place], [place]  [id number]  : Joe Hanson MD, Phone:  [phone]   Lipid Panel ([facility])     Status: None   Result Value Ref Range    Cholesterol 174 100 - 199 mg/dL    Triglycerides 48 0 - 149 mg/dL    HDL Cholesterol 112 >39 mg/dL    VLDL Cholesterol Damian 10 5 - 40 mg/dL    LDL Cholesterol Calculated 52 0 - 99 mg/dL    LDL/HDL Ratio 0.5 0.0 - 3.6 ratio    Narrative    Performed at:  01 - [facility] [address], [place], [place]  [id number]  : Joe Hanson MD, Phone:  [phone]   Basic Metabolic Panel (8) ([facility])     Status: Abnormal   Result Value Ref Range    Glucose 86 65 - 99 mg/dL    Urea Nitrogen 6 (L) 8 - 27 mg/dL    Creatinine 0.40 (L) 0.76 - 1.27 mg/dL    eGFR If NonAfricn Am 125 >59 mL/min/1.73    eGFR If Africn Am 144 >59 mL/min/1.73    BUN/Creatinine Ratio 15 10 - 24    Sodium 128 (L) 134 - 144 mmol/L    Potassium 4.6 3.5 - 5.2 mmol/L    Chloride 92 (L) 96 - 106 mmol/L    Total CO2 21 20 - 29 mmol/L    Calcium 8.9 8.6 - 10.2 mg/dL    Narrative    Performed at:  01 - [facility] [address] [place], [place]  [id number]  : Joe Hanson MD, Phone:  [phone]

## 2021-10-06 NOTE — PATIENT INSTRUCTIONS
Preventive Health Recommendations:     See your health care provider every year to    Review health changes.     Discuss preventive care.      Review your medicines if your doctor has prescribed any.      Talk with your health care provider about whether you should have a test to screen for prostate cancer (PSA).    Every 3 years, have a diabetes test (fasting glucose). If you are at risk for diabetes, you should have this test more often.    Every 5 years, have a cholesterol test. Have this test more often if you are at risk for high cholesterol or heart disease.     Every 10 years, have a colonoscopy. Or, have a yearly FIT test (stool test). These exams will check for colon cancer.    Talk to with your health care provider about screening for Abdominal Aortic Aneurysm if you have a family history of AAA or have a history of smoking.    Shots:     Get a flu shot each year.     Get a tetanus shot every 10 years.     Talk to your doctor about your pneumonia vaccines. There are now two you should receive - Pneumovax (PPSV 23) and Prevnar (PCV 13).     Talk to your pharmacist about a shingles vaccine.     Talk to your doctor about the hepatitis B vaccine.  Nutrition:     Eat at least 5 servings of fruits and vegetables each day.     Eat whole-grain bread, whole-wheat pasta and brown rice instead of white grains and rice.     Get adequate Calcium and Vitamin D.   Lifestyle    Exercise for at least 150 minutes a week (30 minutes a day, 5 days a week). This will help you control your weight and prevent disease.     Limit alcohol to one drink per day.     No smoking.     Wear sunscreen to prevent skin cancer.    See your dentist every six months for an exam and cleaning.    See your eye doctor every 1 to 2 years to screen for conditions such as glaucoma, macular degeneration, cataracts, etc.    Personalized Prevention Plan  You are due for the preventive services outlined below.  Your care team is available to assist you  in scheduling these services.  If you have already completed any of these items, please share that information with your care team to update in your medical record.  Health Maintenance Due   Topic Date Due     Breathing Capacity Test  Never done     Zoster (Shingles) Vaccine (1 of 2) Never done     FALL RISK ASSESSMENT  Never done     AORTIC ANEURYSM SCREENING (SYSTEM ASSIGNED)  06/22/2021     Flu Vaccine (1) 09/01/2021     Patient Education   Personalized Prevention Plan  You are due for the preventive services outlined below.  Your care team is available to assist you in scheduling these services.  If you have already completed any of these items, please share that information with your care team to update in your medical record.  Health Maintenance Due   Topic Date Due     Breathing Capacity Test  Never done     Zoster (Shingles) Vaccine (1 of 2) Never done     FALL RISK ASSESSMENT  Never done     AORTIC ANEURYSM SCREENING (SYSTEM ASSIGNED)  06/22/2021     Flu Vaccine (1) 09/01/2021

## 2021-10-07 LAB
BUN SERPL-MCNC: 6 MG/DL (ref 8–27)
BUN/CREATININE RATIO: 15 (ref 10–24)
CALCIUM SERPL-MCNC: 8.9 MG/DL (ref 8.6–10.2)
CHLORIDE SERPLBLD-SCNC: 92 MMOL/L (ref 96–106)
CHOLEST SERPL-MCNC: 174 MG/DL (ref 100–199)
CREAT SERPL-MCNC: 0.4 MG/DL (ref 0.76–1.27)
EGFR IF AFRICN AM: 144 ML/MIN/1.73
EGFR IF NONAFRICN AM: 125 ML/MIN/1.73
GLUCOSE SERPL-MCNC: 86 MG/DL (ref 65–99)
HDLC SERPL-MCNC: 112 MG/DL
LDL/HDL RATIO: 0.5 RATIO (ref 0–3.6)
LDLC SERPL CALC-MCNC: 52 MG/DL (ref 0–99)
POTASSIUM SERPL-SCNC: 4.6 MMOL/L (ref 3.5–5.2)
SODIUM SERPL-SCNC: 128 MMOL/L (ref 134–144)
TOTAL CO2: 21 MMOL/L (ref 20–29)
TRIGL SERPL-MCNC: 48 MG/DL (ref 0–149)
TSH BLD-ACNC: 4.21 UIU/ML (ref 0.45–4.5)
VLDLC SERPL CALC-MCNC: 10 MG/DL (ref 5–40)

## 2021-10-08 NOTE — RESULT ENCOUNTER NOTE
Dear Dr. Rex Neal is out today. Your included test results from your recent visit are within normal ranges. I do not suggest that we make any changes at this time.    Lit Guevara M.D.

## 2021-10-11 ENCOUNTER — TRANSFERRED RECORDS (OUTPATIENT)
Dept: FAMILY MEDICINE | Facility: CLINIC | Age: 66
End: 2021-10-11

## 2021-10-13 ENCOUNTER — TELEPHONE (OUTPATIENT)
Dept: FAMILY MEDICINE | Facility: CLINIC | Age: 66
End: 2021-10-13

## 2021-10-13 DIAGNOSIS — I73.9 PERIPHERAL VASCULAR DISEASE (H): ICD-10-CM

## 2021-10-13 DIAGNOSIS — E87.1 LOW SODIUM LEVELS: Primary | ICD-10-CM

## 2021-10-13 RX ORDER — ATORVASTATIN CALCIUM 40 MG/1
40 TABLET, FILM COATED ORAL DAILY
Qty: 90 TABLET | Refills: 3 | Status: SHIPPED | OUTPATIENT
Start: 2021-10-13 | End: 2022-05-12

## 2021-10-13 NOTE — TELEPHONE ENCOUNTER
Called and spoke with patient regarding lab results and U/S results per Dr Goodman. Patient agrees and will start atorvastatin and baby ASA-prescriptions sent to pharmacy. He is advised that Na was low and he should hydrated and have this recheck. Patient was also started on amlodipine at 10/6/21 office visit and was to return to clinic in 2 weeks for BP check, he will have BMP drawn as well to check Na. Scheduled patient for lab only appointment 10/25/21-future lab orders entered and medications sent to pharmacy. Verónica Goodman, MD Cedric Villanueva Rachel, LPN  Can you let him know his sodium came back low (128).  Would like him to hydrate and repeat BMP this week and if still low will do additional tests

## 2021-10-13 NOTE — TELEPHONE ENCOUNTER
----- Message from Maxim Goodman MD sent at 10/13/2021 12:33 PM CDT -----  Please let patient know his ultrasound showed no aneurysm, which is good.  As the aorta was very mildly dilated a repeat ultrasound in 5 years is reasonable.    They did notice a large amount of calcifications in the blood vessel.  This suggests peripheral vascular disease.  It is the equivalent of heart disease but in the peripheral blood vessels.  Although his cholesterol levels are normal, it is recommended he start a statin (Atorvastatin 40 mg) and consider ASA 81 mg as well.  This will help prevent further plaque in the aorta and lower risk of heart attack and stroke.      Maxim Goodman MD

## 2021-10-25 VITALS — HEART RATE: 84 BPM | DIASTOLIC BLOOD PRESSURE: 82 MMHG | SYSTOLIC BLOOD PRESSURE: 130 MMHG | OXYGEN SATURATION: 99 %

## 2021-10-25 DIAGNOSIS — E87.1 LOW SODIUM LEVELS: ICD-10-CM

## 2021-10-25 PROCEDURE — 36415 COLL VENOUS BLD VENIPUNCTURE: CPT | Performed by: FAMILY MEDICINE

## 2021-10-26 LAB
BUN SERPL-MCNC: 6 MG/DL (ref 8–27)
BUN/CREATININE RATIO: 12 (ref 10–24)
CALCIUM SERPL-MCNC: 9.1 MG/DL (ref 8.6–10.2)
CHLORIDE SERPLBLD-SCNC: 89 MMOL/L (ref 96–106)
CREAT SERPL-MCNC: 0.52 MG/DL (ref 0.76–1.27)
EGFR IF AFRICN AM: 129 ML/MIN/1.73
EGFR IF NONAFRICN AM: 112 ML/MIN/1.73
GLUCOSE SERPL-MCNC: 94 MG/DL (ref 65–99)
POTASSIUM SERPL-SCNC: 4.9 MMOL/L (ref 3.5–5.2)
SODIUM SERPL-SCNC: 129 MMOL/L (ref 134–144)
TOTAL CO2: 25 MMOL/L (ref 20–29)

## 2021-11-05 ENCOUNTER — TRANSFERRED RECORDS (OUTPATIENT)
Dept: FAMILY MEDICINE | Facility: CLINIC | Age: 66
End: 2021-11-05

## 2021-12-07 DIAGNOSIS — I10 HTN, GOAL BELOW 140/90: ICD-10-CM

## 2021-12-07 RX ORDER — AMLODIPINE BESYLATE 5 MG/1
TABLET ORAL
Qty: 90 TABLET | Refills: 3 | Status: SHIPPED | OUTPATIENT
Start: 2021-12-07 | End: 2022-08-08

## 2021-12-07 NOTE — TELEPHONE ENCOUNTER
Amlodipine  LOV 10/6/21  BP Readings from Last 3 Encounters:   10/25/21 130/82   10/06/21 (!) 156/90   01/18/21 (!) 146/75     HTN, goal below 140/90  Above goal  - add amlodipine  - recheck 1 month  - Basic Metabolic Panel (8) ([facility])  - amLODIPine (NORVASC) 5 MG tablet; Take 1 tablet (5 mg) by mouth daily  - VENOUS COLLECTION  Component      Latest Ref Rng & Units 10/25/2021   Glucose      65 - 99 mg/dL 94   Urea Nitrogen      8 - 27 mg/dL 6 (L)   Creatinine      0.76 - 1.27 mg/dL 0.52 (L)   eGFR If NonAfricn Am      >59 mL/min/1.73 112   eGFR If Africn Am      >59 mL/min/1.73 129   BUN/Creatinine Ratio      10 - 24 12   Sodium      134 - 144 mmol/L 129 (L)   Potassium      3.5 - 5.2 mmol/L 4.9   Chloride      96 - 106 mmol/L 89 (L)   Total CO2      20 - 29 mmol/L 25   Calcium      8.6 - 10.2 mg/dL 9.1

## 2022-01-31 ENCOUNTER — TRANSFERRED RECORDS (OUTPATIENT)
Dept: FAMILY MEDICINE | Facility: CLINIC | Age: 67
End: 2022-01-31

## 2022-04-05 ENCOUNTER — TRANSFERRED RECORDS (OUTPATIENT)
Dept: FAMILY MEDICINE | Facility: CLINIC | Age: 67
End: 2022-04-05

## 2022-05-11 DIAGNOSIS — I73.9 PERIPHERAL VASCULAR DISEASE (H): ICD-10-CM

## 2022-05-11 DIAGNOSIS — I10 HTN, GOAL BELOW 140/90: ICD-10-CM

## 2022-05-12 RX ORDER — LOSARTAN POTASSIUM 100 MG/1
TABLET ORAL
Qty: 100 TABLET | Refills: 2 | Status: SHIPPED | OUTPATIENT
Start: 2022-05-12 | End: 2023-01-25

## 2022-05-12 RX ORDER — ATORVASTATIN CALCIUM 40 MG/1
TABLET, FILM COATED ORAL
Qty: 100 TABLET | Refills: 2 | Status: SHIPPED | OUTPATIENT
Start: 2022-05-12 | End: 2023-01-25

## 2022-07-30 ENCOUNTER — HOSPITAL ENCOUNTER (OUTPATIENT)
Facility: CLINIC | Age: 67
Setting detail: OBSERVATION
Discharge: HOME | End: 2022-08-01
Attending: EMERGENCY MEDICINE | Admitting: INTERNAL MEDICINE
Payer: COMMERCIAL

## 2022-07-30 ENCOUNTER — APPOINTMENT (OUTPATIENT)
Dept: CT IMAGING | Facility: CLINIC | Age: 67
End: 2022-07-30
Attending: EMERGENCY MEDICINE
Payer: COMMERCIAL

## 2022-07-30 DIAGNOSIS — F10.929 ALCOHOLIC INTOXICATION WITH COMPLICATION (H): ICD-10-CM

## 2022-07-30 DIAGNOSIS — T07.XXXA ABRASIONS OF MULTIPLE SITES: ICD-10-CM

## 2022-07-30 DIAGNOSIS — E87.1 HYPONATREMIA: ICD-10-CM

## 2022-07-30 DIAGNOSIS — E16.2 HYPOGLYCEMIA: ICD-10-CM

## 2022-07-30 LAB
ALBUMIN SERPL-MCNC: 3.4 G/DL (ref 3.4–5)
ALP SERPL-CCNC: 80 U/L (ref 40–150)
ALT SERPL W P-5'-P-CCNC: 35 U/L (ref 0–70)
ANION GAP SERPL CALCULATED.3IONS-SCNC: 7 MMOL/L (ref 3–14)
AST SERPL W P-5'-P-CCNC: 45 U/L (ref 0–45)
ATRIAL RATE - MUSE: 72 BPM
BASOPHILS # BLD AUTO: 0.1 10E3/UL (ref 0–0.2)
BASOPHILS NFR BLD AUTO: 1 %
BILIRUB SERPL-MCNC: 0.5 MG/DL (ref 0.2–1.3)
BUN SERPL-MCNC: 4 MG/DL (ref 7–30)
CALCIUM SERPL-MCNC: 8.4 MG/DL (ref 8.5–10.1)
CHLORIDE BLD-SCNC: 88 MMOL/L (ref 94–109)
CO2 SERPL-SCNC: 24 MMOL/L (ref 20–32)
CREAT SERPL-MCNC: 0.39 MG/DL (ref 0.66–1.25)
DIASTOLIC BLOOD PRESSURE - MUSE: NORMAL MMHG
EOSINOPHIL # BLD AUTO: 0.1 10E3/UL (ref 0–0.7)
EOSINOPHIL NFR BLD AUTO: 2 %
ERYTHROCYTE [DISTWIDTH] IN BLOOD BY AUTOMATED COUNT: 13.4 % (ref 10–15)
ETHANOL SERPL-MCNC: 0.24 G/DL
GFR SERPL CREATININE-BSD FRML MDRD: >90 ML/MIN/1.73M2
GLUCOSE BLD-MCNC: 66 MG/DL (ref 70–99)
HCT VFR BLD AUTO: 36.5 % (ref 40–53)
HGB BLD-MCNC: 13 G/DL (ref 13.3–17.7)
HOLD SPECIMEN: NORMAL
IMM GRANULOCYTES # BLD: 0 10E3/UL
IMM GRANULOCYTES NFR BLD: 0 %
INTERPRETATION ECG - MUSE: NORMAL
LYMPHOCYTES # BLD AUTO: 0.6 10E3/UL (ref 0.8–5.3)
LYMPHOCYTES NFR BLD AUTO: 11 %
MCH RBC QN AUTO: 33.5 PG (ref 26.5–33)
MCHC RBC AUTO-ENTMCNC: 35.6 G/DL (ref 31.5–36.5)
MCV RBC AUTO: 94 FL (ref 78–100)
MONOCYTES # BLD AUTO: 0.7 10E3/UL (ref 0–1.3)
MONOCYTES NFR BLD AUTO: 12 %
NEUTROPHILS # BLD AUTO: 4.4 10E3/UL (ref 1.6–8.3)
NEUTROPHILS NFR BLD AUTO: 74 %
NRBC # BLD AUTO: 0 10E3/UL
NRBC BLD AUTO-RTO: 0 /100
P AXIS - MUSE: 84 DEGREES
PLATELET # BLD AUTO: 199 10E3/UL (ref 150–450)
POTASSIUM BLD-SCNC: 4 MMOL/L (ref 3.4–5.3)
PR INTERVAL - MUSE: 160 MS
PROT SERPL-MCNC: 6.3 G/DL (ref 6.8–8.8)
QRS DURATION - MUSE: 100 MS
QT - MUSE: 404 MS
QTC - MUSE: 442 MS
R AXIS - MUSE: 34 DEGREES
RBC # BLD AUTO: 3.88 10E6/UL (ref 4.4–5.9)
SODIUM SERPL-SCNC: 119 MMOL/L (ref 133–144)
SYSTOLIC BLOOD PRESSURE - MUSE: NORMAL MMHG
T AXIS - MUSE: 81 DEGREES
VENTRICULAR RATE- MUSE: 72 BPM
WBC # BLD AUTO: 6 10E3/UL (ref 4–11)

## 2022-07-30 PROCEDURE — C9803 HOPD COVID-19 SPEC COLLECT: HCPCS

## 2022-07-30 PROCEDURE — 70486 CT MAXILLOFACIAL W/O DYE: CPT

## 2022-07-30 PROCEDURE — 80053 COMPREHEN METABOLIC PANEL: CPT | Performed by: EMERGENCY MEDICINE

## 2022-07-30 PROCEDURE — 96361 HYDRATE IV INFUSION ADD-ON: CPT

## 2022-07-30 PROCEDURE — 70450 CT HEAD/BRAIN W/O DYE: CPT

## 2022-07-30 PROCEDURE — 258N000003 HC RX IP 258 OP 636: Performed by: EMERGENCY MEDICINE

## 2022-07-30 PROCEDURE — U0003 INFECTIOUS AGENT DETECTION BY NUCLEIC ACID (DNA OR RNA); SEVERE ACUTE RESPIRATORY SYNDROME CORONAVIRUS 2 (SARS-COV-2) (CORONAVIRUS DISEASE [COVID-19]), AMPLIFIED PROBE TECHNIQUE, MAKING USE OF HIGH THROUGHPUT TECHNOLOGIES AS DESCRIBED BY CMS-2020-01-R: HCPCS | Performed by: EMERGENCY MEDICINE

## 2022-07-30 PROCEDURE — 93005 ELECTROCARDIOGRAM TRACING: CPT

## 2022-07-30 PROCEDURE — 36415 COLL VENOUS BLD VENIPUNCTURE: CPT | Performed by: EMERGENCY MEDICINE

## 2022-07-30 PROCEDURE — 82077 ASSAY SPEC XCP UR&BREATH IA: CPT | Performed by: EMERGENCY MEDICINE

## 2022-07-30 PROCEDURE — 72125 CT NECK SPINE W/O DYE: CPT

## 2022-07-30 PROCEDURE — 99285 EMERGENCY DEPT VISIT HI MDM: CPT | Mod: 25

## 2022-07-30 PROCEDURE — 96360 HYDRATION IV INFUSION INIT: CPT

## 2022-07-30 PROCEDURE — 85025 COMPLETE CBC W/AUTO DIFF WBC: CPT | Performed by: EMERGENCY MEDICINE

## 2022-07-30 RX ADMIN — SODIUM CHLORIDE 1000 ML: 9 INJECTION, SOLUTION INTRAVENOUS at 23:02

## 2022-07-31 PROBLEM — E16.2 HYPOGLYCEMIA: Status: ACTIVE | Noted: 2022-07-31

## 2022-07-31 PROBLEM — F10.929 ALCOHOLIC INTOXICATION WITH COMPLICATION (H): Status: ACTIVE | Noted: 2022-07-31

## 2022-07-31 PROBLEM — E87.1 HYPONATREMIA: Status: ACTIVE | Noted: 2022-07-31

## 2022-07-31 LAB
ANION GAP SERPL CALCULATED.3IONS-SCNC: 9 MMOL/L (ref 3–14)
BUN SERPL-MCNC: 3 MG/DL (ref 7–30)
CALCIUM SERPL-MCNC: 8.2 MG/DL (ref 8.5–10.1)
CHLORIDE BLD-SCNC: 98 MMOL/L (ref 94–109)
CO2 SERPL-SCNC: 22 MMOL/L (ref 20–32)
CREAT SERPL-MCNC: 0.33 MG/DL (ref 0.66–1.25)
ERYTHROCYTE [DISTWIDTH] IN BLOOD BY AUTOMATED COUNT: 13.6 % (ref 10–15)
GFR SERPL CREATININE-BSD FRML MDRD: >90 ML/MIN/1.73M2
GLUCOSE BLD-MCNC: 74 MG/DL (ref 70–99)
GLUCOSE BLDC GLUCOMTR-MCNC: 111 MG/DL (ref 70–99)
GLUCOSE BLDC GLUCOMTR-MCNC: 144 MG/DL (ref 70–99)
GLUCOSE BLDC GLUCOMTR-MCNC: 63 MG/DL (ref 70–99)
HCT VFR BLD AUTO: 38.5 % (ref 40–53)
HGB BLD-MCNC: 13.4 G/DL (ref 13.3–17.7)
MCH RBC QN AUTO: 33.5 PG (ref 26.5–33)
MCHC RBC AUTO-ENTMCNC: 34.8 G/DL (ref 31.5–36.5)
MCV RBC AUTO: 96 FL (ref 78–100)
PLATELET # BLD AUTO: 209 10E3/UL (ref 150–450)
POTASSIUM BLD-SCNC: 3.8 MMOL/L (ref 3.4–5.3)
RBC # BLD AUTO: 4 10E6/UL (ref 4.4–5.9)
SARS-COV-2 RNA RESP QL NAA+PROBE: NEGATIVE
SODIUM SERPL-SCNC: 128 MMOL/L (ref 133–144)
SODIUM SERPL-SCNC: 128 MMOL/L (ref 133–144)
SODIUM SERPL-SCNC: 129 MMOL/L (ref 133–144)
SODIUM SERPL-SCNC: 130 MMOL/L (ref 133–144)
SODIUM SERPL-SCNC: 131 MMOL/L (ref 133–144)
WBC # BLD AUTO: 4.1 10E3/UL (ref 4–11)

## 2022-07-31 PROCEDURE — 258N000003 HC RX IP 258 OP 636: Performed by: INTERNAL MEDICINE

## 2022-07-31 PROCEDURE — 84295 ASSAY OF SERUM SODIUM: CPT | Performed by: INTERNAL MEDICINE

## 2022-07-31 PROCEDURE — 96361 HYDRATE IV INFUSION ADD-ON: CPT

## 2022-07-31 PROCEDURE — 250N000013 HC RX MED GY IP 250 OP 250 PS 637: Performed by: INTERNAL MEDICINE

## 2022-07-31 PROCEDURE — 258N000001 HC RX 258: Performed by: EMERGENCY MEDICINE

## 2022-07-31 PROCEDURE — 99233 SBSQ HOSP IP/OBS HIGH 50: CPT | Performed by: INTERNAL MEDICINE

## 2022-07-31 PROCEDURE — 85027 COMPLETE CBC AUTOMATED: CPT | Performed by: INTERNAL MEDICINE

## 2022-07-31 PROCEDURE — 36415 COLL VENOUS BLD VENIPUNCTURE: CPT | Performed by: INTERNAL MEDICINE

## 2022-07-31 PROCEDURE — 96374 THER/PROPH/DIAG INJ IV PUSH: CPT

## 2022-07-31 PROCEDURE — 120N000001 HC R&B MED SURG/OB

## 2022-07-31 PROCEDURE — 250N000009 HC RX 250: Performed by: INTERNAL MEDICINE

## 2022-07-31 PROCEDURE — 250N000013 HC RX MED GY IP 250 OP 250 PS 637: Performed by: PHYSICIAN ASSISTANT

## 2022-07-31 PROCEDURE — 82310 ASSAY OF CALCIUM: CPT | Performed by: INTERNAL MEDICINE

## 2022-07-31 RX ORDER — ALBUTEROL SULFATE 0.83 MG/ML
2.5 SOLUTION RESPIRATORY (INHALATION) EVERY 4 HOURS PRN
Status: DISCONTINUED | OUTPATIENT
Start: 2022-07-31 | End: 2022-08-01 | Stop reason: HOSPADM

## 2022-07-31 RX ORDER — ACETAMINOPHEN 650 MG/1
650 SUPPOSITORY RECTAL EVERY 6 HOURS PRN
Status: DISCONTINUED | OUTPATIENT
Start: 2022-07-31 | End: 2022-08-01 | Stop reason: HOSPADM

## 2022-07-31 RX ORDER — OXYCODONE HYDROCHLORIDE 5 MG/1
5 TABLET ORAL EVERY 4 HOURS PRN
Status: DISCONTINUED | OUTPATIENT
Start: 2022-07-31 | End: 2022-08-01 | Stop reason: HOSPADM

## 2022-07-31 RX ORDER — NALOXONE HYDROCHLORIDE 0.4 MG/ML
0.4 INJECTION, SOLUTION INTRAMUSCULAR; INTRAVENOUS; SUBCUTANEOUS
Status: DISCONTINUED | OUTPATIENT
Start: 2022-07-31 | End: 2022-08-01 | Stop reason: HOSPADM

## 2022-07-31 RX ORDER — NALOXONE HYDROCHLORIDE 0.4 MG/ML
0.2 INJECTION, SOLUTION INTRAMUSCULAR; INTRAVENOUS; SUBCUTANEOUS
Status: DISCONTINUED | OUTPATIENT
Start: 2022-07-31 | End: 2022-08-01 | Stop reason: HOSPADM

## 2022-07-31 RX ORDER — ATORVASTATIN CALCIUM 40 MG/1
40 TABLET, FILM COATED ORAL DAILY
Status: DISCONTINUED | OUTPATIENT
Start: 2022-07-31 | End: 2022-08-01 | Stop reason: HOSPADM

## 2022-07-31 RX ORDER — LIDOCAINE 40 MG/G
CREAM TOPICAL
Status: DISCONTINUED | OUTPATIENT
Start: 2022-07-31 | End: 2022-08-01 | Stop reason: HOSPADM

## 2022-07-31 RX ORDER — ALBUTEROL SULFATE 90 UG/1
2 AEROSOL, METERED RESPIRATORY (INHALATION)
Status: DISCONTINUED | OUTPATIENT
Start: 2022-07-31 | End: 2022-08-01 | Stop reason: HOSPADM

## 2022-07-31 RX ORDER — ALBUTEROL SULFATE 90 UG/1
2 AEROSOL, METERED RESPIRATORY (INHALATION) EVERY 6 HOURS PRN
Status: DISCONTINUED | OUTPATIENT
Start: 2022-07-31 | End: 2022-07-31

## 2022-07-31 RX ORDER — AMLODIPINE BESYLATE 5 MG/1
5 TABLET ORAL DAILY
Status: DISCONTINUED | OUTPATIENT
Start: 2022-07-31 | End: 2022-08-01 | Stop reason: HOSPADM

## 2022-07-31 RX ORDER — OXYCODONE HYDROCHLORIDE 5 MG/1
5 TABLET ORAL EVERY 6 HOURS PRN
Status: DISCONTINUED | OUTPATIENT
Start: 2022-07-31 | End: 2022-07-31

## 2022-07-31 RX ORDER — BUTALBITAL, ACETAMINOPHEN AND CAFFEINE 50; 325; 40 MG/1; MG/1; MG/1
1 TABLET ORAL EVERY 4 HOURS PRN
Status: DISCONTINUED | OUTPATIENT
Start: 2022-07-31 | End: 2022-08-01 | Stop reason: HOSPADM

## 2022-07-31 RX ORDER — ACETAMINOPHEN 325 MG/1
650 TABLET ORAL EVERY 6 HOURS PRN
Status: DISCONTINUED | OUTPATIENT
Start: 2022-07-31 | End: 2022-08-01 | Stop reason: HOSPADM

## 2022-07-31 RX ORDER — DEXTROSE MONOHYDRATE 25 G/50ML
50 INJECTION, SOLUTION INTRAVENOUS ONCE
Status: COMPLETED | OUTPATIENT
Start: 2022-07-31 | End: 2022-07-31

## 2022-07-31 RX ORDER — ONDANSETRON 2 MG/ML
4 INJECTION INTRAMUSCULAR; INTRAVENOUS EVERY 6 HOURS PRN
Status: DISCONTINUED | OUTPATIENT
Start: 2022-07-31 | End: 2022-08-01 | Stop reason: HOSPADM

## 2022-07-31 RX ORDER — ONDANSETRON 4 MG/1
4 TABLET, ORALLY DISINTEGRATING ORAL EVERY 6 HOURS PRN
Status: DISCONTINUED | OUTPATIENT
Start: 2022-07-31 | End: 2022-08-01 | Stop reason: HOSPADM

## 2022-07-31 RX ORDER — ALBUTEROL SULFATE 90 UG/1
2 AEROSOL, METERED RESPIRATORY (INHALATION) EVERY 4 HOURS PRN
Status: DISCONTINUED | OUTPATIENT
Start: 2022-07-31 | End: 2022-07-31 | Stop reason: ALTCHOICE

## 2022-07-31 RX ADMIN — ALBUTEROL SULFATE 2 PUFF: 90 INHALANT RESPIRATORY (INHALATION) at 21:27

## 2022-07-31 RX ADMIN — ALBUTEROL SULFATE 2 PUFF: 108 INHALANT RESPIRATORY (INHALATION) at 18:10

## 2022-07-31 RX ADMIN — DEXTROSE MONOHYDRATE 50 ML: 25 INJECTION, SOLUTION INTRAVENOUS at 00:30

## 2022-07-31 RX ADMIN — DEXTROSE AND SODIUM CHLORIDE: 5; 900 INJECTION, SOLUTION INTRAVENOUS at 12:24

## 2022-07-31 RX ADMIN — ALBUTEROL SULFATE 2.5 MG: 2.5 SOLUTION RESPIRATORY (INHALATION) at 07:10

## 2022-07-31 RX ADMIN — ALBUTEROL SULFATE 2.5 MG: 2.5 SOLUTION RESPIRATORY (INHALATION) at 18:08

## 2022-07-31 RX ADMIN — DEXTROSE AND SODIUM CHLORIDE: 5; 900 INJECTION, SOLUTION INTRAVENOUS at 02:18

## 2022-07-31 RX ADMIN — OXYCODONE HYDROCHLORIDE 5 MG: 5 TABLET ORAL at 16:16

## 2022-07-31 RX ADMIN — ATORVASTATIN CALCIUM 40 MG: 40 TABLET, FILM COATED ORAL at 08:37

## 2022-07-31 RX ADMIN — ALBUTEROL SULFATE 2.5 MG: 2.5 SOLUTION RESPIRATORY (INHALATION) at 12:19

## 2022-07-31 RX ADMIN — OXYCODONE HYDROCHLORIDE 5 MG: 5 TABLET ORAL at 21:26

## 2022-07-31 RX ADMIN — ALBUTEROL SULFATE 2.5 MG: 2.5 SOLUTION RESPIRATORY (INHALATION) at 02:10

## 2022-07-31 RX ADMIN — ALBUTEROL SULFATE 2 PUFF: 108 INHALANT RESPIRATORY (INHALATION) at 12:19

## 2022-07-31 RX ADMIN — AMLODIPINE BESYLATE 5 MG: 5 TABLET ORAL at 08:37

## 2022-07-31 ASSESSMENT — ACTIVITIES OF DAILY LIVING (ADL)
ADLS_ACUITY_SCORE: 31
ADLS_ACUITY_SCORE: 31
ADLS_ACUITY_SCORE: 35
ADLS_ACUITY_SCORE: 31
ADLS_ACUITY_SCORE: 35

## 2022-07-31 NOTE — ED PROVIDER NOTES
History   Chief Complaint:  Fall and Alcohol Intoxication     The history is provided by the patient and medical records (& RN). History limited by: intoxication.      Ángel Pham is a 67 year old male with history of COPD, pneumothorax, hypertension, throat cancer, left pulmonary nodule, alcohol use disorder, malnutrition, and PAD who presents by EMS following a fall and alcohol intoxication.  He reportedly called EMS and thinks he passed out for 10 minutes after a fall, though he is not able to provide any clear and consistent report of what happened earlier today. The patient has blood and wounds on his mouth and nose. RN states the patient lives alone and that his daughter is a nurse.  He gave his wife, Marianna as an emergency contact, but also states his wife has passed away.    Review of Systems   Reason unable to perform ROS: intoxication.     Allergies:  Chantix   Morphine   Wellbutrin    Medications:  Albuterol nebulizer  Albuterol inhaler  Norvasc  Aspirin  Lipitor  Fioricet   Cozaar  Roxicodone  Afrin nasal spray   Trelegy ellipta inhaler    Past Medical History:     COPD  Pneumothorax  Hypertension  Throat cancer  Left upper lobe pulmonary nodule  Alcohol use disorder  Protein calorie malnutrition  Peripheral arterial disease  Unknown allergy history    Past Surgical History:    Bronchoscopy  Sinus surgery  Hemorrhoidectomy  Hernia repair  Laryngoscopy with biopsy  Right pneumothorax procedure    Family History:    Mother: brain cancer  Father: brain cancer    Social History:  The patient presents to the ED alone  PCP: [facility]    Physical Exam     Patient Vitals for the past 24 hrs:   BP Temp Temp src Pulse Resp SpO2 Height Weight   07/31/22 0151 -- -- -- -- 18 -- -- --   07/31/22 0149 102/69 -- -- -- -- 98 % -- --   07/31/22 0148 -- -- -- 71 13 98 % -- --   07/31/22 0100 117/71 -- -- 72 14 98 % -- --   07/31/22 0030 120/73 -- -- 71 14 99 % -- --   07/31/22 0000 133/77 -- -- 76 9  "100 % -- --   07/30/22 2330 123/73 -- -- 75 14 100 % -- --   07/30/22 2300 126/77 -- -- 74 20 99 % -- --   07/30/22 2208 (!) 156/86 97.1  F (36.2  C) Temporal 71 16 99 % 1.676 m (5' 6\") 44.9 kg (99 lb)     Physical Exam  General: Male recumbent in room 18  HENT: full painless ROM mandible, no bony deformity, OP clear, no difficulty controlling secretions, no obvious dental injury though slight swlling to contused upper inner lip  Eyes: PERRL without proptosis, no nystagmus  CV:  regular rhythm, cap refill normal in all extremities, no murmur audible, no LE edema  Resp: normal effort, speaks in full phrases, no stridor  GI: abdomen soft, nontender, no guarding  MSK:  Cervical spine: C-collar in place, no palpable acute deformity though exam limited by intoxication   thoracic spine: no midline tenderness, no CVAT  Lumbar spine: no midline tenderness  Chest wall: nontender without crepitus  Pelvis stable  Extremities: no focal tenderness  Skin:   Abrasions to nose, upper lip, dried blood to face, also abrasions to bilateral anterior knees   Neuro: Drowsy but arouses to sternal rub and loud voice,  equal, can lift both legs off bed though poor historian and spends most of time resting in bed with eyes closed  Psych: cooperative with basic cares, no obvious hallucinations    Emergency Department Course   ECG  ECG taken at 2216, ECG read at 2225  Normal sinus rhythm    Rate 72 bpm. ND interval 160 ms. QRS duration 100 ms. QT/QTc 404/442 ms. P-R-T axes 84 34 81.     Imaging:  CT Cervical Spine w/o Contrast   Final Result   IMPRESSION:   HEAD CT:   1.  No acute intracranial hemorrhage or calvarial fracture.   2.  Mild to moderate volume loss and mild presumed chronic small vessel ischemic changes.      FACIAL BONE CT:   1.  No facial bone or mandibular fracture.      CERVICAL SPINE CT:   1.  No fracture or posttraumatic subluxation.   2.  Degenerative changes as highlighted above.   3.  Presumed postradiation changes " involving the hypopharynx and supraglottic larynx.      CT Facial Bones without Contrast   Final Result   IMPRESSION:   HEAD CT:   1.  No acute intracranial hemorrhage or calvarial fracture.   2.  Mild to moderate volume loss and mild presumed chronic small vessel ischemic changes.      FACIAL BONE CT:   1.  No facial bone or mandibular fracture.      CERVICAL SPINE CT:   1.  No fracture or posttraumatic subluxation.   2.  Degenerative changes as highlighted above.   3.  Presumed postradiation changes involving the hypopharynx and supraglottic larynx.      CT Head w/o Contrast   Final Result   IMPRESSION:   HEAD CT:   1.  No acute intracranial hemorrhage or calvarial fracture.   2.  Mild to moderate volume loss and mild presumed chronic small vessel ischemic changes.      FACIAL BONE CT:   1.  No facial bone or mandibular fracture.      CERVICAL SPINE CT:   1.  No fracture or posttraumatic subluxation.   2.  Degenerative changes as highlighted above.   3.  Presumed postradiation changes involving the hypopharynx and supraglottic larynx.        Report per radiology    Laboratory:  Labs Ordered and Resulted from Time of ED Arrival to Time of ED Departure   COMPREHENSIVE METABOLIC PANEL - Abnormal       Result Value    Sodium 119 (*)     Potassium 4.0      Chloride 88 (*)     Carbon Dioxide (CO2) 24      Anion Gap 7      Urea Nitrogen 4 (*)     Creatinine 0.39 (*)     Calcium 8.4 (*)     Glucose 66 (*)     Alkaline Phosphatase 80      AST 45      ALT 35      Protein Total 6.3 (*)     Albumin 3.4      Bilirubin Total 0.5      GFR Estimate >90     ETHYL ALCOHOL LEVEL - Abnormal    Alcohol ethyl 0.24 (*)    CBC WITH PLATELETS AND DIFFERENTIAL - Abnormal    WBC Count 6.0      RBC Count 3.88 (*)     Hemoglobin 13.0 (*)     Hematocrit 36.5 (*)     MCV 94      MCH 33.5 (*)     MCHC 35.6      RDW 13.4      Platelet Count 199      % Neutrophils 74      % Lymphocytes 11      % Monocytes 12      % Eosinophils 2      % Basophils 1       % Immature Granulocytes 0      NRBCs per 100 WBC 0      Absolute Neutrophils 4.4      Absolute Lymphocytes 0.6 (*)     Absolute Monocytes 0.7      Absolute Eosinophils 0.1      Absolute Basophils 0.1      Absolute Immature Granulocytes 0.0      Absolute NRBCs 0.0     GLUCOSE BY METER - Abnormal    GLUCOSE BY METER POCT 63 (*)    GLUCOSE BY METER - Abnormal    GLUCOSE BY METER POCT 144 (*)    COVID-19 VIRUS (CORONAVIRUS) BY PCR - Normal    SARS CoV2 PCR Negative     GLUCOSE MONITOR NURSING POCT   GLUCOSE MONITOR NURSING POCT      Emergency Department Course:     Reviewed:  I reviewed nursing notes, vitals, past medical history and Care Everywhere    Assessments:  2224 I obtained history and examined the patient as noted above.   2325 I rechecked the patient and explained findings.     Consults:  0028 I spoke with Dr. Antonio Monterroso of the hospitalist service, who agreed to admit the patient.    Interventions:  Medications   0.9% sodium chloride BOLUS (0 mLs Intravenous Stopped 7/31/22 0020)   dextrose 50 % injection 50 mL (50 mLs Intravenous Given 7/31/22 0030)     Disposition:  The patient was admitted to the hospital under the care of Dr. Monterroso.     Impression & Plan     Medical Decision Making:  There were no witnesses to the events earlier this evening, so it is unclear if he had syncope, possibly seizure, chemical fall, etc.  Work-up reveals evidence of significant hyponatremia as well as elevated alcohol level which in combination can explain his altered mental status.  From a trauma standpoint, he has multiple abrasions though nothing amenable to suture.  CT imaging shows no fracture, intracranial hemorrhage, or other more immediately dangerous condition.  I think he requires hospitalization for close monitoring and further care.  He has slightly low blood sugar, of unclear cause, and given his altered mental status that has prevented him from eating to this point, he was given dextrose with subsequent  improvement in his blood sugar.  I have arranged for admission to a monitored bed under the care of the hospitalist service after discussion of the above concerns.    Diagnosis:    ICD-10-CM    1. Hyponatremia  E87.1    2. Alcoholic intoxication with complication (H)  F10.929    3. Hypoglycemia  E16.2    4. Abrasions of multiple sites  T07.XXXA      Scribe Disclosure:  I, Mana Abe, am serving as a scribe at 10:29 PM on 7/30/2022 to document services personally performed by Michael Mead MD based on my observations and the provider's statements to me.          Michael Mead MD  07/31/22 0151

## 2022-07-31 NOTE — H&P
67 year old patient with fall and facial trauma and etoh intoxication with Na of 119 being admitted for Na correction, home safety.    Dictation # [id number]      Antonio Monterroso MD

## 2022-07-31 NOTE — PHARMACY-ADMISSION MEDICATION HISTORY
Pharmacy Medication History  Admission medication history interview status for the 7/30/2022  admission is complete. See EPIC admission navigator for prior to admission medications     Location of Interview: Patient room  Medication history sources: Patient and Surescripts    Significant changes made to the medication list:    - added Incruse Ellipta inhaler to medication list - pt switched to this inhaler from Trelegy Ellipta (pt stated that was supposed to be on Advair but insurance doesn't cover)  - pt states not taking aspirin 81 mg and never was  - removed Afrin nasal spray - pt not taking    Additional medication history information:     - none     Medication reconciliation completed by provider prior to medication history? No    Time spent in this activity: 15 minutes    Prior to Admission medications    Medication Sig Last Dose Taking? Auth Provider Long Term End Date   albuterol (2.5 MG/3ML) 0.083% neb solution Take 2.5 mg (1 vial) by nebulization 6 times daily 7/30/2022 at Unknown time Yes Reported, Patient Yes    albuterol (PROAIR HFA/PROVENTIL HFA/VENTOLIN HFA) 108 (90 Base) MCG/ACT inhaler Inhale 2 puffs into the lungs as needed. 7/30/2022 at Unknown time Yes Reported, Patient Yes    amLODIPine (NORVASC) 5 MG tablet TAKE 1 TABLET BY MOUTH  DAILY 7/30/2022 at Unknown time Yes Maxim Goodman MD Yes    atorvastatin (LIPITOR) 40 MG tablet TAKE 1 TABLET BY MOUTH  DAILY 7/30/2022 at Unknown time Yes Marlene Cosby APRN CNP Yes    butalbital-acetaminophen-caffeine (FIORICET/ESGIC) -40 MG per tablet TAKE 1 TABLET BY MOUTH EVERY 4-6 HOURS AS NEEDED 7/30/2022 at took 1/2 tab  Yes Reported, Patient     losartan (COZAAR) 100 MG tablet TAKE 1 TABLET BY MOUTH  DAILY 7/30/2022 at Unknown time Yes Marlene Cosby APRN CNP Yes    oxyCODONE (ROXICODONE) 5 MG tablet TAKE 1 TABLET BY MOUTH EVERY 3 TO 4 HOURS AS NEEDED FOR PAIN 7/30/2022 at Unknown time Yes Reported, Patient No    umeclidinium  (INCRUSE ELLIPTA) 62.5 MCG/INH inhaler Inhale 1 puff into the lungs daily 7/30/2022 at Unknown time Yes Unknown, Entered By History     aspirin (ASA) 81 MG EC tablet Take 1 tablet (81 mg) by mouth daily  Patient not taking: Reported on 7/31/2022 Not Taking at Unknown time  Maxim Goodman MD         The information provided in this note is only as accurate as the sources available at the time of update(s)

## 2022-07-31 NOTE — ED NOTES
"[facility]  ED Nurse Handoff Report    ED Chief complaint: Fall and Alcohol Intoxication      ED Diagnosis:   Final diagnoses:   Hyponatremia   Alcoholic intoxication with complication (H)       Code Status: To be determined by admitting provider.     Allergies:   Allergies   Allergen Reactions     Chantix [Varenicline] Other (See Comments)     Patient reports has tried this in past and had lightheadedness and nose bleeds.     Morphine Hcl Itching     On contact     Wellbutrin [Bupropion Hydrobromide]      syncope       Patient Story:  Patient lives at home alone. States drank 7 beers today. States \"passed out\" and hit face on floor. Thinks he was out for about 10 minutes. States hes on blood thinners. A&Ox4. States everyday drinker for last 3 years. Abrasions to nose, lips and left knee. Hx of COPD and throat cancer, which he takes oxycodone for everyday. ETOH 0.24.    Focused Assessment:    Patient nods off while talking to him and requires sternal rub to rouse. Breathing rate, rhythm and SPO2 WDL. HR WDL. Denies chest pain. States SOB from COPD. Facial pain and left knee pain from fall.     Labs Ordered and Resulted from Time of ED Arrival to Time of ED Departure   COMPREHENSIVE METABOLIC PANEL - Abnormal       Result Value    Sodium 119 (*)     Potassium 4.0      Chloride 88 (*)     Carbon Dioxide (CO2) 24      Anion Gap 7      Urea Nitrogen 4 (*)     Creatinine 0.39 (*)     Calcium 8.4 (*)     Glucose 66 (*)     Alkaline Phosphatase 80      AST 45      ALT 35      Protein Total 6.3 (*)     Albumin 3.4      Bilirubin Total 0.5      GFR Estimate >90     ETHYL ALCOHOL LEVEL - Abnormal    Alcohol ethyl 0.24 (*)    CBC WITH PLATELETS AND DIFFERENTIAL - Abnormal    WBC Count 6.0      RBC Count 3.88 (*)     Hemoglobin 13.0 (*)     Hematocrit 36.5 (*)     MCV 94      MCH 33.5 (*)     MCHC 35.6      RDW 13.4      Platelet Count 199      % Neutrophils 74      % Lymphocytes 11      % Monocytes 12      % " "Eosinophils 2      % Basophils 1      % Immature Granulocytes 0      NRBCs per 100 WBC 0      Absolute Neutrophils 4.4      Absolute Lymphocytes 0.6 (*)     Absolute Monocytes 0.7      Absolute Eosinophils 0.1      Absolute Basophils 0.1      Absolute Immature Granulocytes 0.0      Absolute NRBCs 0.0     COVID-19 VIRUS (CORONAVIRUS) BY PCR       CT Head w/o Contrast    (Results Pending)   CT Facial Bones without Contrast    (Results Pending)   CT Cervical Spine w/o Contrast    (Results Pending)         Treatments and/or interventions provided:  Medications   0.9% sodium chloride BOLUS (1,000 mLs Intravenous New Bag 7/30/22 2302)       Patient's response to treatments and/or interventions:  Patient remains stable and unchanged.     To be done/followed up on inpatient unit:   See any in-patient orders. Manage sodium.     Does this patient have any cognitive concerns?: Alcohol/Drugs temporary cognitive concerns    Activity level - Baseline/Home:    Independent    Activity Level - Current:    Unknown    Patient's Preferred language: English     Needed?: No    Isolation: None  Infection: Not Applicable  Patient tested for COVID 19 prior to admission: YES    Bariatric?: No    Vital Signs:   Vitals:    07/30/22 2208 07/30/22 2300   BP: (!) 156/86 126/77   Pulse: 71 74   Resp: 16 20   Temp: 97.1  F (36.2  C)    TempSrc: Temporal    SpO2: 99% 99%   Weight: 44.9 kg (99 lb)    Height: 1.676 m (5' 6\")        Cardiac Rhythm:     Was the PSS-3 completed:   Yes  What interventions are required if any?                 Family Comments: N/A    OBS brochure/video discussed/provided to patient/family: N/A              Name of person given brochure if not patient: N/A              Relationship to patient: N/A    For the majority of the shift this patient's behavior was Green.  Behavioral interventions performed were N/A.    ED NURSE PHONE NUMBER: *[phone]  "

## 2022-07-31 NOTE — PLAN OF CARE
Goal Outcome Evaluation:    Admitting Diagnosis: Hyponatremia. Hypoglycemia. Abrasions of multiple sites nose bandage inplace and upper lip. Alcoholic intoxication with complication.    Patient A&Ox4, Can be confused at time. CMS  Intact, no tremor noted at the moment. VSS. Na+ 129, MD notified no oder place at the moment.  C/o SOB MD kelley, Rosina order PRN,  albuterol (PRVENTIL inhaler  And PROVENTIL  neb  given and was effective. Coughing infrequently with productive cough. C/o of pain, declined Ty;enol. PIV RA D 5% infusing @ 100 mL/hrr.  Patient on seizure precaution. Assist of x1 GB/W, up to BR, voiding adequately, urinal at bedside.

## 2022-07-31 NOTE — ED TRIAGE NOTES
"BIBA from home. Patient lives at home alone. States drank 7 beers today. States \"passed out\" and hit face on floor. Thinks he was out for about 10 minutes. States hes on blood thinners. A&Ox4. States everyday drinker for last 3 years. Abrasions to nose, lips and left knee. Hx of COPD and throat cancer, which he takes oxycodone for everyday. BG 65. C-collar in place by EMS.     Triage Assessment     Row Name 07/30/22 6347       Triage Assessment (Adult)    Airway WDL WDL       Respiratory WDL    Respiratory WDL WDL       Skin Circulation/Temperature WDL    Skin Circulation/Temperature WDL --  Abrasions to face, lip and left knee       Cardiac WDL    Cardiac WDL WDL       Peripheral/Neurovascular WDL    Peripheral Neurovascular WDL --  Fall to front of face. states blacked out for 10 min. blood thinners       Cognitive/Neuro/Behavioral WDL    Cognitive/Neuro/Behavioral WDL --  ETOH on board              "

## 2022-07-31 NOTE — UTILIZATION REVIEW
"[facility] Utilization Review  Admission Status; Secondary Review Determination     Admission Date: 7/30/2022  9:59 PM      Under the authority of the Utilization Management Committee, the utilization review process indicated a secondary review on the above patient.  The review outcome is based on review of the medical records, discussions with staff, and applying clinical experience noted on the date of the review.        (X) Observation Status Appropriate - This patient does not meet hospital inpatient criteria and is placed in observation status. If this patient's primary payer is Medicare and was admitted as an inpatient, Condition Code 44 should be used and patient status changed to \"observation\".   () Observation Status concurrent Review           RATIONALE FOR DETERMINATION   Ángel Pham is a 67 year old male with past medical history of alcohol abuse, who presented with fall after alcohol intoxication.  He was admitted with severe hyponatremia (sodium 119 mgs), soft tissue injuries for close monitoring, rehydration and electrolyte monitoring and correction, and CIWA protocol.  He is started on IV fluids with improvement in sodium levels.  Currently remained stable without signs of alcohol withdrawal.Based on severity of illness and intensity of service, patient does not meet criteria for inpatient admission.  No signs of seizure, altered mental status due to and low sodium levels.  No signs of significant alcohol withdrawal.  Observation status is appropriate for care as mentioned above.  Recommendation is communicated to the primary team (Dr. Vizcaino).      The information on this document is developed by the utilization review team in order for the business office to ensure compliance.  This only denotes the appropriateness of proper admission status and does not reflect the quality of care rendered.              Sincerely,       Myesha Garrett MD, MS  Physician Advisor  Utilization " Review-[place]    Phone: [phone]

## 2022-07-31 NOTE — PROGRESS NOTES
Pt arrived from ED ~0200, A&Ox3-4, confused at times to place, but redirectable. VSS on RA, pt feels that he is short of breath at times, albuterol neb given which was effective. On seizure precautions. Not oob this shift, continent, using urinal. Has bruises and abrasions on upper lip and nose from the fall. Continue to monitor

## 2022-07-31 NOTE — ED NOTES
DATE:  7/30/2022   TIME OF RECEIPT FROM LAB:  2952  LAB TEST:  sodium  LAB VALUE:  119 mmol/L  RESULTS GIVEN WITH READ-BACK TO (PROVIDER):  Alyce  TIME LAB VALUE REPORTED TO PROVIDER:   0508

## 2022-07-31 NOTE — ED NOTES
Bed: ED18  Expected date:   Expected time:   Means of arrival:   Comments:  Donna 3 67M etoh, fall, facial injury eta 0524

## 2022-07-31 NOTE — H&P
Admitted: 07/30/2022    PRIMARY CARE PHYSICIAN:  Dr. Maxim Goodman.    CHIEF COMPLAINT:  Fall, after intoxication.    HISTORY OF PRESENT ILLNESS:  Ángel Pham is a 67-year-old gentleman with history of COPD with complications of pneumothorax, hypertension, throat cancer, left pulmonary nodule with chronic alcohol use disorder, malnutrition, peripheral artery disease, who presents via EMS after a fall with alcohol intoxication.  The patient told EMS that he may have passed out about 10 minutes after a fall.  The patient does have much recollection of events of earlier in the day.  The patient has blood and wounds over his nose and mouth.  The patient lives alone and his daughter is a nurse.    The patient was seen in the Emergency Department by Dr. Ben Mead.  The patient was afebrile, hypertensive, normal oxygen saturation.  Blood work revealed a sodium of 119, potassium 4, normal kidney function, LFTs are also unremarkable.  White count 6, hemoglobin 13, platelets 199.  COVID test was negative.  Alcohol was 0.24.  He had imaging studies of CT of the head without contrast, which showed no acute intracranial hemorrhage or calvarial fracture, mild to moderate volume loss.  No facial bone or mandibular fracture and cervical spine showed no fracture or posttraumatic subluxation.  There are degenerative changes.  There are also presumed postradiation changes in the hypopharynx and supraglottic larynx from his throat cancer.  A 12-lead ECG showed normal sinus rhythm without any ischemic changes.  He was also hypoglycemic with blood sugars in the 60s that required an amp of D50.  The patient is being admitted for hypoglycemia, alcohol intoxication, fall with facial trauma.    PAST MEDICAL HISTORY:   1.  COPD with  pneumothorax.  2.  Hypertension.  3.  Throat cancer with history of radiation.  4.  The left upper lobe pulmonary nodule.  5.  Chronic alcohol use disorder.  6.  Protein-calorie malnutrition.  7.   Peripheral artery disease.    PAST SURGICAL HISTORY:  Bronchoscopy, sinus surgery, hemorrhoidectomy, hernia repair, laryngoscopy with biopsy and a right pneumothorax procedure.    FAMILY HISTORY:  Mother and father, brain cancer.    SOCIAL HISTORY:  Positive tobacco, alcohol.  Lives alone, apparently.    ALLERGIES:  CHANTIX, MORPHINE, WELLBUTRIN.    CURRENT MEDICATION LIST:  Significant for   1.  Amlodipine.  2.  Aspirin  3.  Lipitor.  4.  Cozaar.  5.  Albuterol metered dose inhaler.  6.  And albuterol nebs.  7.  Fioricet.  8.  Oxycodone.  9.  Incruse Ellipta.    REVIEW OF SYSTEMS:  The patient is intoxicated and does not give a very coherent history.    PHYSICAL EXAMINATION:    VITAL SIGNS:  Temperature 97.1, heart rate 70, respirations 16, blood pressure 156/86, sats are 92 % on room air.  GENERAL:  The patient is a very cachectic 67-year-old  gentleman.  HEENT:  He has got a contusion of his upper lip.  Pupils equal.    NECK:  No midline neck tenderness.  PULMONARY:  Lungs are clear to auscultation.  CARDIOVASCULAR:  S1, S2, regular rate and rhythm.  GASTROINTESTINAL:  Abdomen is flat, scaphoid.  No tenderness.  MUSCULOSKELETAL:  Shows no edema.  SKIN:  Warm, dry.  NEUROLOGIC:  He does move all 4 extremities.  Cranial nerves grossly intact.  He is intoxicated, but does follow some simple commands.  PSYCHIATRIC:  He is cooperative.    LABORATORY DATA:  As dictated in history of present illness.    ASSESSMENT:  Ángel Pham is 67, who presents with a fall, alcohol intoxication, severe hyponatremia, being admitted for further treatment.    PLAN:    1.  Hyponatremia.  This may be due to decreased oral solute intake due to ongoing alcohol intake.  The patient will be on normal saline with dextrose.  We will monitor his sodium every 4 hours.  2.  Hypoglycemia.  Likely due to combination of poor synthetic liver function and ongoing alcohol intake.  The patient is not diabetic.  We will continue to add  dextrose until he is more alert and able to take oral.  3.  Fall with facial trauma.  Imaging studies showed no acute fracture of his head, cervical spine or facial bones.  He has got contusions.  The patient will be seen by PT and OT.  4.  Alcohol intoxication.  The patient will be on multivitamins.  We will monitor for any alcohol withdrawal symptoms.  5.  Hypertension.  We will continue the patient on amlodipine and Cozaar.  6.  History of chronic obstructive pulmonary disease.  We will continue the patient on Incruse and albuterol nebs and metered dose inhaler.  7.  Hyperlipidemia.  Will continue the patient on his Lipitor.  8.  Deep venous thrombosis prophylaxis.  The patient received compression boots.    CODE STATUS:  Full.    Antonio Monterroso MD        D: 2022   T: 2022   MT: JERRELL    Name:     MATI DUNN  MRN:      [id number]        Account:     [id number]   :      1955           Admitted:    2022       Document: [id number]    cc:  Maxim Goodman MD

## 2022-07-31 NOTE — PROGRESS NOTES
RECEIVING UNIT ED HANDOFF REVIEW    ED Nurse Handoff Report was reviewed by: Francheska Howard RN on July 31, 2022 at 1:34 AM

## 2022-08-01 VITALS
TEMPERATURE: 98.7 F | BODY MASS INDEX: 15.91 KG/M2 | WEIGHT: 99 LBS | SYSTOLIC BLOOD PRESSURE: 159 MMHG | DIASTOLIC BLOOD PRESSURE: 86 MMHG | RESPIRATION RATE: 16 BRPM | HEART RATE: 74 BPM | OXYGEN SATURATION: 96 % | HEIGHT: 66 IN

## 2022-08-01 PROBLEM — T07.XXXA ABRASIONS OF MULTIPLE SITES: Status: ACTIVE | Noted: 2022-08-01

## 2022-08-01 PROCEDURE — 250N000013 HC RX MED GY IP 250 OP 250 PS 637: Performed by: INTERNAL MEDICINE

## 2022-08-01 PROCEDURE — 99217 PR OBSERVATION CARE DISCHARGE: CPT | Performed by: INTERNAL MEDICINE

## 2022-08-01 PROCEDURE — G0378 HOSPITAL OBSERVATION PER HR: HCPCS

## 2022-08-01 PROCEDURE — 250N000013 HC RX MED GY IP 250 OP 250 PS 637: Performed by: PHYSICIAN ASSISTANT

## 2022-08-01 PROCEDURE — 96361 HYDRATE IV INFUSION ADD-ON: CPT

## 2022-08-01 PROCEDURE — 250N000009 HC RX 250: Performed by: INTERNAL MEDICINE

## 2022-08-01 RX ADMIN — AMLODIPINE BESYLATE 5 MG: 5 TABLET ORAL at 08:09

## 2022-08-01 RX ADMIN — ATORVASTATIN CALCIUM 40 MG: 40 TABLET, FILM COATED ORAL at 08:09

## 2022-08-01 RX ADMIN — ALBUTEROL SULFATE 2.5 MG: 2.5 SOLUTION RESPIRATORY (INHALATION) at 10:21

## 2022-08-01 RX ADMIN — OXYCODONE HYDROCHLORIDE 5 MG: 5 TABLET ORAL at 08:13

## 2022-08-01 RX ADMIN — ALBUTEROL SULFATE 2 PUFF: 90 INHALANT RESPIRATORY (INHALATION) at 10:18

## 2022-08-01 RX ADMIN — ALBUTEROL SULFATE 2.5 MG: 2.5 SOLUTION RESPIRATORY (INHALATION) at 05:57

## 2022-08-01 RX ADMIN — ALBUTEROL SULFATE 2 PUFF: 90 INHALANT RESPIRATORY (INHALATION) at 05:47

## 2022-08-01 NOTE — DISCHARGE SUMMARY
Pt AOX4, VS stable, medically stable, voiding, tolerating regular diet, dressing change on left knee, band aids changed on nose. AVS printed, reviewed, instructions given with pt stating understanding. All belongings sent home with pt., no medications to send. Pt was wheeled by NA to door 6 and picked up by daughter.

## 2022-08-01 NOTE — PLAN OF CARE
Goal Outcome Evaluation:      A&Ox4. VSS on room air. Pt complaining of pain in legs and throat which is helped with hot packs and oxy. Pt has SOB which is helped with PRN albuterol inhaler and nebulizer. Has frequent productive cough. D5 NS running at 100ml/hr. Ao1 GBW. Voiding using urinal. Pt requested that time intervals of his albutrol inhaler and oxy be changed, and that his PTA Fioricet be added to his orders. On call was contacted and changes applied. Pt now appears agreeable to current orders and time intervals of PRN medications. discharge pending.

## 2022-08-01 NOTE — PROGRESS NOTES
Pt A&O x4, VSS. Up x SBA with walker and GB. Pain managed with Oxy. PRN Neb and Inhaler given for SOB. Pt discharged home at 1330.

## 2022-08-01 NOTE — PROGRESS NOTES
Patient A/O X4, sleeping all night, in bed and voiding in urinal. Maintenance IV discontinued by provider. Has abrasions on nose and upper lip from previous fall, multiple scattered bruises denies pain and discomfort at this time. Denies SOB, no obvious signs of respiratory distress. as diminished lung sounds and moist productive cough, requested and was given albuterol neb and inhaler. Will continue to monitor.

## 2022-08-01 NOTE — DISCHARGE SUMMARY
[facility]  Discharge Summary        Ángel Pham MRN# [id number]   YOB: 1955 Age: 67 year old     Date of Admission:  7/30/2022  Date of Discharge:  8/1/2022  Admitting Physician:  Antonio Monterroso MD  Discharge Physician: Antonio Monterroso MD  Discharging Service: Hospitalist     Primary Provider:  Maxim Goodman  Primary Care Physician Phone Number: [phone]         Discharge Diagnoses/Problem Oriented Hospital Course (Providers):    Ángel Pham was admitted on 7/30/2022 by Antonio Monterroso MD and I would refer you to their history and physical.  The following problems were addressed during his hospitalization:    ASSESSMENT:  Ángel Pham is 67, who presents with a fall, alcohol intoxication, severe hyponatremia, being admitted for further treatment.     PLAN:    1.  Hyponatremia.   -- due to decreased oral solute intake due to ongoing alcohol intake.    -- Na improved to 130 with NS  2.  Hypoglycemia.   -- due to combination of poor synthetic liver function and ongoing alcohol intake.   -- was on dextrose in IVF  -- now tolerating diet with improved BG  3.  Fall with facial trauma.   -- Imaging studies showed no acute fracture of his head, cervical spine or facial bones.   -- He has got contusions.    -- He is stand by assist  4.  Alcohol intoxication.   -- continue on multivitamins.    -- no alcohol withdrawal symptoms.  -- counseled on minimizing etoh intake  5.  Hypertension.   -- continue the patient on amlodipine and Cozaar.  6.  History of chronic obstructive pulmonary disease.   -- continue the patient on Incruse and albuterol nebs and metered dose inhaler.  7.  Hyperlipidemia.    -- continue the patient on his Lipitor.              Code Status:      Full Code         Important Results:      See below         Pending Results:        Unresulted Labs Ordered in the Past 30 Days of this Admission     No orders found from 6/30/2022 to  7/31/2022.               Discharge Instructions and Follow-Up:      Follow-up Appointments     Follow-up and recommended labs and tests       Follow up with primary care provider, Maxim Goodman as needed                  Discharge Disposition:      Discharged to home         Discharge Medications:        Current Discharge Medication List      CONTINUE these medications which have NOT CHANGED    Details   albuterol (2.5 MG/3ML) 0.083% neb solution Take 2.5 mg by nebulization 6 times daily  Refills: 11      albuterol (PROAIR HFA/PROVENTIL HFA/VENTOLIN HFA) 108 (90 Base) MCG/ACT inhaler Inhale 2 puffs into the lungs as needed.      amLODIPine (NORVASC) 5 MG tablet TAKE 1 TABLET BY MOUTH  DAILY  Qty: 90 tablet, Refills: 3    Comments: Requesting 1 year supply  Associated Diagnoses: HTN, goal below 140/90      atorvastatin (LIPITOR) 40 MG tablet TAKE 1 TABLET BY MOUTH  DAILY  Qty: 100 tablet, Refills: 2    Comments: Requesting 1 year supply  Associated Diagnoses: Peripheral vascular disease (H)      butalbital-acetaminophen-caffeine (FIORICET/ESGIC) -40 MG per tablet TAKE 1 TABLET BY MOUTH EVERY 4-6 HOURS AS NEEDED  Refills: 0      losartan (COZAAR) 100 MG tablet TAKE 1 TABLET BY MOUTH  DAILY  Qty: 100 tablet, Refills: 2    Comments: Requesting 1 year supply  Associated Diagnoses: HTN, goal below 140/90      oxyCODONE (ROXICODONE) 5 MG tablet TAKE 1 TABLET BY MOUTH EVERY 3 TO 4 HOURS AS NEEDED FOR PAIN      umeclidinium (INCRUSE ELLIPTA) 62.5 MCG/INH inhaler Inhale 1 puff into the lungs daily      aspirin (ASA) 81 MG EC tablet Take 1 tablet (81 mg) by mouth daily  Qty: 90 tablet, Refills: 3    Associated Diagnoses: Peripheral vascular disease (H)                  Allergies:         Allergies   Allergen Reactions     Chantix [Varenicline] Other (See Comments)     Patient reports has tried this in past and had lightheadedness and nose bleeds.     Morphine Hcl Itching     On contact     Wellbutrin [Bupropion  Hydrobromide]      syncope            Consultations This Hospital Stay:      No consultations were requested during this admission          Discharge Orders      After Care Instructions     Activity      Your activity upon discharge: activity as tolerated         Diet      Follow this diet upon discharge: Orders Placed This Encounter      Combination Diet Regular Diet Adult                    Discharge Time:      Less  than 30 minutes.        Image Results From This Hospital Stay (For Non-EPIC Providers):        Results for orders placed or performed during the hospital encounter of 07/30/22   CT Head w/o Contrast    Narrative    EXAM: CT HEAD W/O CONTRAST, CT CERVICAL SPINE W/O CONTRAST, CT FACIAL BONES WITHOUT CONTRAST  LOCATION: [facility]  DATE/TIME: 7/30/2022 11:00 PM    INDICATION: Fall, pain, trauma, intoxicated, nonfocal neuro exam, head, face and neck injury.  COMPARISON: None.  TECHNIQUE:   1) Routine CT Head without IV contrast. Multiplanar reformats. Dose reduction techniques were used.  2) Routine CT Facial Bones without IV contrast. Multiplanar reformats. Dose reduction techniques were used.  3) Routine CT Cervical Spine without IV contrast. Multiplanar reformats. Dose reduction techniques were used.    FINDINGS:  HEAD CT:   INTRACRANIAL CONTENTS: No intracranial hemorrhage, extraaxial collection, or mass effect. No CT evidence of acute infarct. Mild presumed chronic small vessel ischemic changes. Mild to moderate generalized volume loss. No hydrocephalus.     OSSEOUS STRUCTURES/SOFT TISSUES: No significant abnormality.     FACIAL BONE CT:  OSSEOUS STRUCTURES/SOFT TISSUES: No localized soft tissue swelling/inflammation. No facial bone fracture or malalignment. Multifocal dental decay.    ORBITAL CONTENTS: No acute abnormality.    SINUSES: No paranasal sinus mucosal disease.    CERVICAL SPINE CT:   VERTEBRA: Normal vertebral body heights and alignment. No fracture or  posttraumatic subluxation.     CANAL/FORAMINA: Moderate left C3-C4 foraminal stenosis.    PARASPINAL: Stable appearing presumed posttreatment changes involving the hypopharynx and supraglottic larynx. There are fibrotic changes in the upper lobes.      Impression    IMPRESSION:  HEAD CT:  1.  No acute intracranial hemorrhage or calvarial fracture.  2.  Mild to moderate volume loss and mild presumed chronic small vessel ischemic changes.    FACIAL BONE CT:  1.  No facial bone or mandibular fracture.    CERVICAL SPINE CT:  1.  No fracture or posttraumatic subluxation.  2.  Degenerative changes as highlighted above.  3.  Presumed postradiation changes involving the hypopharynx and supraglottic larynx.   CT Facial Bones without Contrast    Narrative    EXAM: CT HEAD W/O CONTRAST, CT CERVICAL SPINE W/O CONTRAST, CT FACIAL BONES WITHOUT CONTRAST  LOCATION: [facility]  DATE/TIME: 7/30/2022 11:00 PM    INDICATION: Fall, pain, trauma, intoxicated, nonfocal neuro exam, head, face and neck injury.  COMPARISON: None.  TECHNIQUE:   1) Routine CT Head without IV contrast. Multiplanar reformats. Dose reduction techniques were used.  2) Routine CT Facial Bones without IV contrast. Multiplanar reformats. Dose reduction techniques were used.  3) Routine CT Cervical Spine without IV contrast. Multiplanar reformats. Dose reduction techniques were used.    FINDINGS:  HEAD CT:   INTRACRANIAL CONTENTS: No intracranial hemorrhage, extraaxial collection, or mass effect. No CT evidence of acute infarct. Mild presumed chronic small vessel ischemic changes. Mild to moderate generalized volume loss. No hydrocephalus.     OSSEOUS STRUCTURES/SOFT TISSUES: No significant abnormality.     FACIAL BONE CT:  OSSEOUS STRUCTURES/SOFT TISSUES: No localized soft tissue swelling/inflammation. No facial bone fracture or malalignment. Multifocal dental decay.    ORBITAL CONTENTS: No acute abnormality.    SINUSES: No paranasal sinus  mucosal disease.    CERVICAL SPINE CT:   VERTEBRA: Normal vertebral body heights and alignment. No fracture or posttraumatic subluxation.     CANAL/FORAMINA: Moderate left C3-C4 foraminal stenosis.    PARASPINAL: Stable appearing presumed posttreatment changes involving the hypopharynx and supraglottic larynx. There are fibrotic changes in the upper lobes.      Impression    IMPRESSION:  HEAD CT:  1.  No acute intracranial hemorrhage or calvarial fracture.  2.  Mild to moderate volume loss and mild presumed chronic small vessel ischemic changes.    FACIAL BONE CT:  1.  No facial bone or mandibular fracture.    CERVICAL SPINE CT:  1.  No fracture or posttraumatic subluxation.  2.  Degenerative changes as highlighted above.  3.  Presumed postradiation changes involving the hypopharynx and supraglottic larynx.   CT Cervical Spine w/o Contrast    Narrative    EXAM: CT HEAD W/O CONTRAST, CT CERVICAL SPINE W/O CONTRAST, CT FACIAL BONES WITHOUT CONTRAST  LOCATION: [facility]  DATE/TIME: 7/30/2022 11:00 PM    INDICATION: Fall, pain, trauma, intoxicated, nonfocal neuro exam, head, face and neck injury.  COMPARISON: None.  TECHNIQUE:   1) Routine CT Head without IV contrast. Multiplanar reformats. Dose reduction techniques were used.  2) Routine CT Facial Bones without IV contrast. Multiplanar reformats. Dose reduction techniques were used.  3) Routine CT Cervical Spine without IV contrast. Multiplanar reformats. Dose reduction techniques were used.    FINDINGS:  HEAD CT:   INTRACRANIAL CONTENTS: No intracranial hemorrhage, extraaxial collection, or mass effect. No CT evidence of acute infarct. Mild presumed chronic small vessel ischemic changes. Mild to moderate generalized volume loss. No hydrocephalus.     OSSEOUS STRUCTURES/SOFT TISSUES: No significant abnormality.     FACIAL BONE CT:  OSSEOUS STRUCTURES/SOFT TISSUES: No localized soft tissue swelling/inflammation. No facial bone fracture or  malalignment. Multifocal dental decay.    ORBITAL CONTENTS: No acute abnormality.    SINUSES: No paranasal sinus mucosal disease.    CERVICAL SPINE CT:   VERTEBRA: Normal vertebral body heights and alignment. No fracture or posttraumatic subluxation.     CANAL/FORAMINA: Moderate left C3-C4 foraminal stenosis.    PARASPINAL: Stable appearing presumed posttreatment changes involving the hypopharynx and supraglottic larynx. There are fibrotic changes in the upper lobes.      Impression    IMPRESSION:  HEAD CT:  1.  No acute intracranial hemorrhage or calvarial fracture.  2.  Mild to moderate volume loss and mild presumed chronic small vessel ischemic changes.    FACIAL BONE CT:  1.  No facial bone or mandibular fracture.    CERVICAL SPINE CT:  1.  No fracture or posttraumatic subluxation.  2.  Degenerative changes as highlighted above.  3.  Presumed postradiation changes involving the hypopharynx and supraglottic larynx.           Most Recent Lab Results In EPIC (For Non-EPIC Providers):    Most Recent 3 CBC's:  Recent Labs   Lab Test 07/31/22  0645 07/30/22  2225 09/15/21  0000   WBC 4.1 6.0 4.4   HGB 13.4 13.0* 14.1   MCV 96 94 95.2    199 262      Most Recent 3 BMP's:  Recent Labs   Lab Test 07/31/22  2300 07/31/22  1909 07/31/22  1533 07/31/22  1040 07/31/22  0645 07/31/22  0307 07/31/22  0143 07/31/22  0111 07/31/22  0020 07/30/22  2225 10/25/21  0913 11/25/15  1033 04/09/15  0645   * 131* 128*   < > 129*  129*   < >  --   --   --  119* 129*   < > 129*   POTASSIUM  --   --   --   --  3.8  --   --   --   --  4.0 4.9   < > 4.1   CHLORIDE  --   --   --   --  98  --   --   --   --  88* 89*   < > 97   CO2  --   --   --   --  22  --   --   --   --  24  --   --  26   BUN  --   --   --   --  3*  --   --   --   --  4* 6*  12   < > 6*   CR  --   --   --   --  0.33*  --   --   --   --  0.39* 0.52*   < > 0.61*   ANIONGAP  --   --   --   --  9  --   --   --   --  7  --   --  6   MOSHE  --   --   --   --  8.2*  --    --   --   --  8.4* 9.1   < > 8.4*   GLC  --   --   --   --  74  --  111* 144*   < > 66* 94   < > 73    < > = values in this interval not displayed.     Most Recent 3 Troponin's:No lab results found.    Invalid input(s): TROP, TROPONINIES  Most Recent 3 INR's:No lab results found.  Most Recent 2 LFT's:  Recent Labs   Lab Test 07/30/22  2225 09/15/21  0915 11/25/15  1033   AST 45 33 28   ALT 35 20 21   ALKPHOS 80  --  82   BILITOTAL 0.5  --  <0.2     Most Recent Cholesterol Panel:  Recent Labs   Lab Test 10/06/21  1030   CHOL 174   LDL 52      TRIG 48     Most Recent 6 Bacteria Isolates From Any Culture (See EPIC Reports for Culture Details):No lab results found.  Most Recent TSH, T4 and HgbA1c:No lab results found.

## 2022-08-06 DIAGNOSIS — I10 HTN, GOAL BELOW 140/90: ICD-10-CM

## 2022-08-08 RX ORDER — AMLODIPINE BESYLATE 5 MG/1
TABLET ORAL
Qty: 100 TABLET | Refills: 2 | Status: SHIPPED | OUTPATIENT
Start: 2022-08-08 | End: 2023-05-15

## 2022-08-12 ENCOUNTER — TELEPHONE (OUTPATIENT)
Dept: FAMILY MEDICINE | Facility: CLINIC | Age: 67
End: 2022-08-12

## 2022-08-12 NOTE — TELEPHONE ENCOUNTER
"  \"I see that you were in the (ER/UC/IP) on 7/30/22-8/1/22.    How are you doing now that you are home?\" Good    Is patient experiencing symptoms that may require a hospital visit?  No    Discharge Instructions    \"Let's review your discharge instructions.  What is/are the follow-up recommendations?  Pt. Response: I was going to call you Monday.    \"Were you instructed to make a follow-up appointment?\"  Pt. Response: Yes.  Has appointment been made?   Yes      \"When you see the provider, I would recommend that you bring your discharge instructions with you. And bring medications/or list along with you.    Medications    \"How many new medications are you on since your hospitalization/ED visit?\"    0-1  \"How many of your current medicines changed (dose, timing, name, etc.) while you were in the hospital/ED visit?\"   0-1  \"Do you have questions about your medications?\"   No  \"Were you newly diagnosed with heart failure, COPD, diabetes, dvt, blood clot, pulmonary emboli or did you have a heart attack?\"   No  For patients on insulin: \"Did you start on insulin in the hospital or did you have your insulin dose changed?\"   No    Medication reconciliation completed? Yes    Was MTM referral placed (*Make sure to put transitions as reason for referral)?   No    Call Summary    \"Do you have any questions or concerns about your condition or care plan at the moment?\"    No  Triage advice given: pt will follow up with PCP             pt will continue with ED advise             pt will  contact hospital, if symptoms return or get worse    Patient was in ER 1 in the past year (assess appropriateness of ER visits.)      \"If you have questions or things don't continue to improve, we encourage you contact us through the main clinic number,  [phone]. If the clinic is not open, the on-call provider is available to help you.     \"Thank you for your time and take care!\"          "

## 2022-08-24 ENCOUNTER — OFFICE VISIT (OUTPATIENT)
Dept: FAMILY MEDICINE | Facility: CLINIC | Age: 67
End: 2022-08-24

## 2022-08-24 VITALS
WEIGHT: 100.2 LBS | BODY MASS INDEX: 16.17 KG/M2 | HEART RATE: 82 BPM | OXYGEN SATURATION: 100 % | DIASTOLIC BLOOD PRESSURE: 76 MMHG | SYSTOLIC BLOOD PRESSURE: 148 MMHG

## 2022-08-24 DIAGNOSIS — E43 SEVERE PROTEIN-CALORIE MALNUTRITION (H): ICD-10-CM

## 2022-08-24 DIAGNOSIS — I70.0 AORTIC CALCIFICATION (H): ICD-10-CM

## 2022-08-24 DIAGNOSIS — C14.0 THROAT CANCER (H): ICD-10-CM

## 2022-08-24 DIAGNOSIS — F11.20 UNCOMPLICATED OPIOID DEPENDENCE (H): ICD-10-CM

## 2022-08-24 DIAGNOSIS — G89.28 OTHER CHRONIC POSTPROCEDURAL PAIN: ICD-10-CM

## 2022-08-24 DIAGNOSIS — F10.10 MILD ALCOHOL USE DISORDER: ICD-10-CM

## 2022-08-24 DIAGNOSIS — Z23 ENCOUNTER FOR IMMUNIZATION: ICD-10-CM

## 2022-08-24 DIAGNOSIS — E87.1 HYPONATREMIA: Primary | ICD-10-CM

## 2022-08-24 DIAGNOSIS — J43.9 PULMONARY EMPHYSEMA, UNSPECIFIED EMPHYSEMA TYPE (H): ICD-10-CM

## 2022-08-24 PROBLEM — F10.929 ALCOHOLIC INTOXICATION WITH COMPLICATION (H): Status: RESOLVED | Noted: 2022-07-31 | Resolved: 2022-08-24

## 2022-08-24 PROBLEM — T07.XXXA ABRASIONS OF MULTIPLE SITES: Status: RESOLVED | Noted: 2022-08-01 | Resolved: 2022-08-24

## 2022-08-24 PROBLEM — E16.2 HYPOGLYCEMIA: Status: RESOLVED | Noted: 2022-07-31 | Resolved: 2022-08-24

## 2022-08-24 PROCEDURE — 90677 PCV20 VACCINE IM: CPT | Performed by: FAMILY MEDICINE

## 2022-08-24 PROCEDURE — G0009 ADMIN PNEUMOCOCCAL VACCINE: HCPCS | Mod: 59 | Performed by: FAMILY MEDICINE

## 2022-08-24 PROCEDURE — 99214 OFFICE O/P EST MOD 30 MIN: CPT | Mod: 25 | Performed by: FAMILY MEDICINE

## 2022-08-24 PROCEDURE — 36415 COLL VENOUS BLD VENIPUNCTURE: CPT | Performed by: FAMILY MEDICINE

## 2022-08-24 NOTE — PROGRESS NOTES
Latasha Neal is a 67 year old patient who presents to clinic for hospital follow up.    He was admitted on 7/30/22 after he fell while intoxicated.  He had a sodium of 119 on admission which improved with NS.  He has been eating more and drinking less beer.  Down to 3-5 beers per day.      Protein calorie malnutrition: severe.  Intake has improved some.  BMI 16.    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  Has completed radiation treatment.  Follows with Dr Tavares.     COPD: follows with Dr Mireles.  On Trelegy.  Breathing is stable.     Nicotine dependence: continues to smoke.     AISHWARYA nodule: identified on recent CT 2021.  Underwent bronch and LN sampling with Dr Ann without evidence of metastatic disease.    Review of Systems   Constitutional, HEENT, cardiovascular, pulmonary, GI, , musculoskeletal, neuro, skin, endocrine and psych systems are negative, except as otherwise noted.      Objective    BP (!) 148/76   Pulse 82   Wt 45.5 kg (100 lb 3.2 oz)   SpO2 100%   BMI 16.17 kg/m      General: Frail, NAD  Skin: Clear  MSK: muscle wasting and atrophy noted diffusely  Psych: normal mood and affect        No results found for this or any previous visit (from the past 24 hour(s)).    Hyponatremia  recheck  - Basic Metabolic Panel (8) ([facility])  - VENOUS COLLECTION    Aortic calcification (H)  Noted on imaging, on statin    Severe protein-calorie malnutrition (H)  Weight stable, intake improving.  Secondary to chronic throat pain from radiation and alcohol abuse    Pulmonary emphysema, unspecified emphysema type (H)  Stable, cont trelegy  - VENOUS COLLECTION    Throat cancer (H)  Follows with dr tavares.  No evidence of recurrent disease s/p radiation    Mild alcohol use disorder  Cont efforts to reduce use    Other chronic postprocedural pain  On chronic opiates prescribed by [facility]    Encounter for immunization  - VACCINE ADMINISTRATION, INITIAL    Follow up in 2-3 months for AWV

## 2022-08-25 LAB
BUN SERPL-MCNC: 5 MG/DL (ref 8–27)
BUN/CREATININE RATIO: 9 (ref 10–24)
CALCIUM SERPL-MCNC: 8.9 MG/DL (ref 8.6–10.2)
CHLORIDE SERPLBLD-SCNC: 91 MMOL/L (ref 96–106)
CREAT SERPL-MCNC: 0.56 MG/DL (ref 0.76–1.27)
EGFR: 109 ML/MIN/1.73
GLUCOSE SERPL-MCNC: 83 MG/DL (ref 65–99)
POTASSIUM SERPL-SCNC: 4.8 MMOL/L (ref 3.5–5.2)
SODIUM SERPL-SCNC: 128 MMOL/L (ref 134–144)
TOTAL CO2: 23 MMOL/L (ref 20–29)

## 2022-09-22 ENCOUNTER — TRANSFERRED RECORDS (OUTPATIENT)
Dept: FAMILY MEDICINE | Facility: CLINIC | Age: 67
End: 2022-09-22

## 2022-10-23 ENCOUNTER — HEALTH MAINTENANCE LETTER (OUTPATIENT)
Age: 67
End: 2022-10-23

## 2022-11-09 ENCOUNTER — ANCILLARY PROCEDURE (OUTPATIENT)
Dept: CT IMAGING | Facility: CLINIC | Age: 67
End: 2022-11-09
Attending: INTERNAL MEDICINE
Payer: COMMERCIAL

## 2022-11-09 DIAGNOSIS — C32.1 MALIGNANT NEOPLASM OF SUPRAGLOTTIS (H): ICD-10-CM

## 2022-11-09 PROCEDURE — 255N000002 HC RX 255 OP 636: Performed by: INTERNAL MEDICINE

## 2022-11-09 PROCEDURE — 71260 CT THORAX DX C+: CPT

## 2022-11-09 RX ADMIN — IOHEXOL 80 ML: 350 INJECTION, SOLUTION INTRAVENOUS at 07:58

## 2022-12-10 ENCOUNTER — HEALTH MAINTENANCE LETTER (OUTPATIENT)
Age: 67
End: 2022-12-10

## 2023-01-25 DIAGNOSIS — I73.9 PERIPHERAL VASCULAR DISEASE (H): ICD-10-CM

## 2023-01-25 DIAGNOSIS — I10 HTN, GOAL BELOW 140/90: ICD-10-CM

## 2023-01-25 RX ORDER — ATORVASTATIN CALCIUM 40 MG/1
40 TABLET, FILM COATED ORAL DAILY
Qty: 100 TABLET | Refills: 2 | Status: SHIPPED | OUTPATIENT
Start: 2023-01-25 | End: 2024-02-19

## 2023-01-25 RX ORDER — LOSARTAN POTASSIUM 100 MG/1
100 TABLET ORAL DAILY
Qty: 100 TABLET | Refills: 2 | Status: SHIPPED | OUTPATIENT
Start: 2023-01-25 | End: 2024-02-19

## 2023-01-25 NOTE — TELEPHONE ENCOUNTER
Lipitor and cozaar   LOV 8/24/22  Follow up in 2-3 months for AWV- pt due for appt please call and schedule pre MD   BP Readings from Last 3 Encounters:   08/24/22 (!) 148/76   08/01/22 (!) 159/86   10/25/21 130/82     Component      Latest Ref Rng & Units 8/24/2022   Glucose      65 - 99 mg/dL 83   Urea Nitrogen      8 - 27 mg/dL 5 (L)   Creatinine      0.76 - 1.27 mg/dL 0.56 (L)   eGFR       >59 mL/min/1.73 109   BUN/Creatinine Ratio      10 - 24 9 (L)   Sodium      134 - 144 mmol/L 128 (L)   Potassium      3.5 - 5.2 mmol/L 4.8   Chloride      96 - 106 mmol/L 91 (L)   Total CO2      20 - 29 mmol/L 23   Calcium      8.6 - 10.2 mg/dL 8.9

## 2023-01-26 NOTE — TELEPHONE ENCOUNTER
1/26/23 left message on daughters voicemail to call the office.  Did not say why because was not sure had the correct number until after I hung up and looked at patient demographics.    Say Butler,   [facility]  [phone]

## 2023-02-06 ENCOUNTER — HOSPITAL ENCOUNTER (OUTPATIENT)
Dept: PET IMAGING | Facility: CLINIC | Age: 68
Discharge: HOME | End: 2023-02-06
Attending: INTERNAL MEDICINE | Admitting: INTERNAL MEDICINE
Payer: COMMERCIAL

## 2023-02-06 DIAGNOSIS — C32.9: ICD-10-CM

## 2023-02-06 DIAGNOSIS — C32.1 MALIGNANT NEOPLASM OF SUPRAGLOTTIS (H): ICD-10-CM

## 2023-02-06 PROCEDURE — 250N000011 HC RX IP 250 OP 636: Performed by: INTERNAL MEDICINE

## 2023-02-06 PROCEDURE — A9552 F18 FDG: HCPCS | Performed by: INTERNAL MEDICINE

## 2023-02-06 PROCEDURE — 71260 CT THORAX DX C+: CPT

## 2023-02-06 PROCEDURE — 343N000001 HC RX 343: Performed by: INTERNAL MEDICINE

## 2023-02-06 PROCEDURE — 78815 PET IMAGE W/CT SKULL-THIGH: CPT | Mod: PI

## 2023-02-06 RX ORDER — IOPAMIDOL 755 MG/ML
0-135 INJECTION, SOLUTION INTRAVASCULAR ONCE
Status: COMPLETED | OUTPATIENT
Start: 2023-02-06 | End: 2023-02-06

## 2023-02-06 RX ADMIN — FLUDEOXYGLUCOSE F-18 12.81 MCI.: 500 INJECTION, SOLUTION INTRAVENOUS at 09:01

## 2023-02-06 RX ADMIN — IOPAMIDOL 54 ML: 755 INJECTION, SOLUTION INTRAVENOUS at 09:02

## 2023-02-13 ENCOUNTER — OFFICE VISIT (OUTPATIENT)
Dept: FAMILY MEDICINE | Facility: CLINIC | Age: 68
End: 2023-02-13

## 2023-02-13 VITALS
OXYGEN SATURATION: 98 % | DIASTOLIC BLOOD PRESSURE: 80 MMHG | HEIGHT: 70 IN | HEART RATE: 85 BPM | WEIGHT: 98 LBS | SYSTOLIC BLOOD PRESSURE: 149 MMHG | BODY MASS INDEX: 14.03 KG/M2

## 2023-02-13 DIAGNOSIS — I70.0 AORTIC CALCIFICATION (H): ICD-10-CM

## 2023-02-13 DIAGNOSIS — R91.1 PULMONARY NODULE: ICD-10-CM

## 2023-02-13 DIAGNOSIS — G89.28 OTHER CHRONIC POSTPROCEDURAL PAIN: ICD-10-CM

## 2023-02-13 DIAGNOSIS — J43.9 PULMONARY EMPHYSEMA, UNSPECIFIED EMPHYSEMA TYPE (H): ICD-10-CM

## 2023-02-13 DIAGNOSIS — F11.20 UNCOMPLICATED OPIOID DEPENDENCE (H): ICD-10-CM

## 2023-02-13 DIAGNOSIS — R06.01 ORTHOPNEA: ICD-10-CM

## 2023-02-13 DIAGNOSIS — F10.20 UNCOMPLICATED ALCOHOL DEPENDENCE (H): ICD-10-CM

## 2023-02-13 DIAGNOSIS — E87.1 HYPONATREMIA: ICD-10-CM

## 2023-02-13 DIAGNOSIS — I10 HTN, GOAL BELOW 140/90: ICD-10-CM

## 2023-02-13 DIAGNOSIS — Z00.00 ENCOUNTER FOR MEDICARE ANNUAL WELLNESS EXAM: Primary | ICD-10-CM

## 2023-02-13 DIAGNOSIS — E43 SEVERE PROTEIN-CALORIE MALNUTRITION (H): ICD-10-CM

## 2023-02-13 PROBLEM — C14.0 THROAT CANCER (H): Status: RESOLVED | Noted: 2019-12-04 | Resolved: 2023-02-13

## 2023-02-13 LAB — B-TYPE NATRIURETIC PEPTIDE: 72 PG/ML (ref 0–100)

## 2023-02-13 PROCEDURE — 36415 COLL VENOUS BLD VENIPUNCTURE: CPT | Performed by: FAMILY MEDICINE

## 2023-02-13 PROCEDURE — G0439 PPPS, SUBSEQ VISIT: HCPCS | Performed by: FAMILY MEDICINE

## 2023-02-13 PROCEDURE — 99214 OFFICE O/P EST MOD 30 MIN: CPT | Mod: 25 | Performed by: FAMILY MEDICINE

## 2023-02-13 PROCEDURE — 83880 ASSAY OF NATRIURETIC PEPTIDE: CPT | Performed by: FAMILY MEDICINE

## 2023-02-13 RX ORDER — FLUTICASONE FUROATE, UMECLIDINIUM BROMIDE AND VILANTEROL TRIFENATATE 200; 62.5; 25 UG/1; UG/1; UG/1
POWDER RESPIRATORY (INHALATION)
COMMUNITY
Start: 2023-01-10

## 2023-02-13 ASSESSMENT — ACTIVITIES OF DAILY LIVING (ADL): CURRENT_FUNCTION: NEEDS ASSISTANCE

## 2023-02-13 NOTE — PATIENT INSTRUCTIONS
Patient Education   Personalized Prevention Plan  You are due for the preventive services outlined below.  Your care team is available to assist you in scheduling these services.  If you have already completed any of these items, please share that information with your care team to update in your medical record.  Health Maintenance Due   Topic Date Due     Breathing Capacity Test  Never done     Zoster (Shingles) Vaccine (1 of 2) Never done     FALL RISK ASSESSMENT  10/06/2022     PHQ-2 (once per calendar year)  01/01/2023

## 2023-02-13 NOTE — PROGRESS NOTES
"  SUBJECTIVE:   Ángel Pham is a 67 year old male who presents for Preventive Visit.    Patient has been advised of split billing requirements and indicates understanding: Yes  Are you in the first 12 months of your Medicare Part B coverage?  No    Protein calorie malnutrition: severe.  Intake stable. Drinks protein shakes. BMI 14.     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  Has completed radiation treatment.  No evidence of recurrent disease.  Follows with Dr Lopes and now Dr Dreyer at [facility].     COPD: follows with Dr Mireles.  On Trelegy.  Breathing is stable with exception of increased \"hyperventilation\" and SOB when lying flat lately.  Denies new exertional symptoms.  No LE edema.  No history of CHF.     Nicotine dependence: continues to smoke.     AISHWARYA nodule: identified on recent CT 2021.  Underwent bronch and LN sampling with Dr Ann without evidence of metastatic disease.    EtOH Dependence: 5-6 beers per day.  Severe hyponatremia causing hospitalization in July 2022.  Uncertain if related.  Not interested in reducing use.    Chronic postoperative pain and opiate dependence: takes oxycodone 3x/day.  Awaiting appt at pain clinic.    HTN: on amlodipine and losartan.  No cp, headache.    Atherosclerosis of aorta: noted on imaging.  On atorvastatin.  He stopped aspirin.    Physical Health:    In general, how would you rate your overall physical health? fair    Outside of work, how many days during the week do you exercise? none    Outside of work, approximately how many minutes a day do you exercise?not applicable  If you drink alcohol do you typically have >3 drinks per day or >7 drinks per week? Yes - AUDIT SCORE:     AUDIT - Alcohol Use Disorders Identification Test - Reproduced from the World Health Organization Audit 2001 (Second Edition) 10/6/2021   1.  How often do you have a drink containing alcohol? 4 or more times a week   2.  How many drinks containing alcohol do you have on a " "typical day when you are drinking? 3 or 4   3.  How often do you have five or more drinks on one occasion? Less than monthly   4.  How often during the last year have you found that you were not able to stop drinking once you had started? Never   5.  How often during the last year have you failed to do what was normally expected of you because of drinking? Never   6.  How often during the last year have you needed a first drink in the morning to get yourself going after a heavy drinking session? Never   7.  How often during the last year have you had a feeling of guilt or remorse after drinking? Never   8.  How often during the last year have you been unable to remember what happened the night before because of your drinking? Never   9.  Have you or someone else been injured because of your drinking? No   10. Has a relative, friend, doctor or other health care worker been concerned about your drinking or suggested you cut down? No   TOTAL SCORE 6       Do you usually eat at least 4 servings of fruit and vegetables a day, include whole grains & fiber and avoid regularly eating high fat or \"junk\" foods? Yes    Do you have any problems taking medications regularly?  No    Do you have any side effects from medications? yes, morphine    Needs assistance for the following daily activities: transportation    Which of the following safety concerns are present in your home?  none identified     Hearing impairment: No    In the past 6 months, have you been bothered by leaking of urine? no    Hearing Screening:  Not completed today due to known hearing loss.    Mental Health:    In general, how would you rate your overall mental or emotional health? good  PHQ-2 Score:      Do you feel safe in your environment? Yes    Have you ever done Advance Care Planning? (For example, a Health Directive, POLST, or a discussion with a medical provider or your loved ones about your wishes): No, advance care planning information given to " patient to review.  Patient plans to discuss their wishes with loved ones or provider.      Additional concerns to address?  YES, hyperventilating     Fall risk:  Fallen 2 or more times in the past year?: Yes  Any fall with injury in the past year?: Yes    Cognitive Screenin) Repeat 3 items (Leader, Season, Table)   2) Clock draw: NORMAL  3) 3 item recall: Recalls 3 objects  Results: 3 items recalled: COGNITIVE IMPAIRMENT LESS LIKELY  Mini-CogTM Copyright LEONARDO Kim. Licensed by the author for use in [facility]; reprinted with permission (austen@[facility]). All rights reserved.      Do you have sleep apnea, excessive snoring or daytime drowsiness?: no        -------------------------------------    Reviewed and updated as needed this visit by clinical staff   Tobacco  Allergies  Meds              Reviewed and updated as needed this visit by Provider                 Social History     Tobacco Use     Smoking status: Every Day     Packs/day: 1.00     Years: 43.00     Pack years: 43.00     Types: Cigarettes     Smokeless tobacco: Never   Substance Use Topics     Alcohol use: Yes     Alcohol/week: 15.0 standard drinks     Types: 15 Cans of beer per week     Comment: beer                           Current providers sharing in care for this patient include:   Patient Care Team:  Maxim Goodman MD as PCP - General (Family Practice)  Maxim Goodman MD as Assigned PCP    The following health maintenance items are reviewed in Epic and correct as of today:  Health Maintenance   Topic Date Due     SPIROMETRY  Never done     ZOSTER IMMUNIZATION (1 of 2) Never done     DTAP/TDAP/TD IMMUNIZATION (2 - Td or Tdap) 2023     LUNG CANCER SCREENING  2024     NICOTINE/TOBACCO CESSATION COUNSELING Q 1 YR  2024     MEDICARE ANNUAL WELLNESS VISIT  2024     FALL RISK ASSESSMENT  2024     COLORECTAL CANCER SCREENING  2025     LIPID  10/06/2026     ADVANCE CARE PLANNING   "02/13/2028     COPD ACTION PLAN  Completed     PHQ-2 (once per calendar year)  Completed     INFLUENZA VACCINE  Completed     Pneumococcal Vaccine: 65+ Years  Completed     AORTIC ANEURYSM SCREENING (SYSTEM ASSIGNED)  Completed     COVID-19 Vaccine  Completed     HEPATITIS C SCREENING  Addressed     IPV IMMUNIZATION  Aged Out     MENINGITIS IMMUNIZATION  Aged Out     Lab work is in process      ROS:  Constitutional, HEENT, cardiovascular, pulmonary, GI, , musculoskeletal, neuro, skin, endocrine and psych systems are negative, except as otherwise noted.    OBJECTIVE:   BP (!) 149/80   Pulse 85   Ht 1.765 m (5' 9.5\")   Wt 44.5 kg (98 lb)   SpO2 98%   BMI 14.26 kg/m   Estimated body mass index is 14.26 kg/m  as calculated from the following:    Height as of this encounter: 1.765 m (5' 9.5\").    Weight as of this encounter: 44.5 kg (98 lb).  EXAM:   GEN: frail appearing, appears older than stated age  EYES: Eyes grossly normal to inspection, PERRL and conjunctivae and sclerae normal  HENT: ear canals and TM's normal, nose and mouth without ulcers or lesions  NECK: no adenopathy, no asymmetry, masses, or scars and thyroid normal to palpation  RESP: decreased breath sounds throughout  CV: regular rate and rhythm, normal S1 S2, no S3 or S4, no murmur, click or rub, no peripheral edema and peripheral pulses strong  ABDOMEN: soft, nontender, no hepatosplenomegaly, no masses and bowel sounds normal  MS: no gross musculoskeletal defects noted, no edema  SKIN: no suspicious lesions or rashes  NEURO: Normal strength and tone, mentation intact and speech normal  PSYCH: mentation appears normal, affect normal/bright    Diagnostic Test Results:  Labs reviewed in Epic  Results for orders placed or performed in visit on 02/13/23 (from the past 24 hour(s))   B-Type Natriuretic Peptide - BNP ([facility])   Result Value Ref Range    B-TYPE NATRIURETIC PEPTIDE 72.0 0 - 100 pg/mL       ASSESSMENT / PLAN:   Encounter for Medicare annual " "wellness exam  - discussed preventative guidelines, healthy diet, exercise and weight management  - VENOUS COLLECTION    Severe protein-calorie malnutrition (H)  Severe, discussed with patient and daughter nutrition and possible interventions.  They will continue to work to increase caloric intake and will monitor closely.    - VENOUS COLLECTION    Pulmonary emphysema, unspecified emphysema type (H)  Severe COPD radiographically.  Cont trelegy and pulm follow up.  - VENOUS COLLECTION    Uncomplicated opioid dependence (H)  Has appt with pain clinic, on oxycodone for chronic postoperative pain  - VENOUS COLLECTION    Aortic calcification (H)  On statin.  He stopped his aspirin and prefers not to restart  - VENOUS COLLECTION    HTN, goal below 140/90  Above goal today.  Cont current meds.  Follow outside of clinic and follow up if remains elecated  - VENOUS COLLECTION    Other chronic postprocedural pain  See above  - VENOUS COLLECTION    Pulmonary nodule  New worrisome appearing nodule on CT in left apex.  Will discuss with Dr Dreyer about arranging re-evaluation with Dr Ann.  The area in question appears to not be easily accessed for biopsy.  - VENOUS COLLECTION    Uncomplicated alcohol dependence (H)  Counseled to reduce use.  He declines  - VENOUS COLLECTION    Orthopnea  BNP reassuring.  Likely related to chronic pulmonary issues.  If not improving recommend re-eval with Dr Mireles  - B-Type Natriuretic Peptide - BNP ([facility])  - VENOUS COLLECTION    Hyponatremia  recheck  - Basic Metabolic Panel (8) ([facility])  - VENOUS COLLECTION      Patient has been advised of split billing requirements and indicates understanding: Yes    COUNSELING:  Reviewed preventive health counseling, as reflected in patient instructions       Healthy diet/nutrition       Fall risk prevention    Estimated body mass index is 14.26 kg/m  as calculated from the following:    Height as of this encounter: 1.765 m (5' 9.5\").    Weight as of " this encounter: 44.5 kg (98 lb).    Weight management plan noted, stable and monitoring    He reports that he has been smoking cigarettes. He has a 43.00 pack-year smoking history. He has never used smokeless tobacco.  Nicotine/Tobacco Cessation Plan:   Information offered: Patient not interested at this time    Appropriate preventive services were discussed with this patient, including applicable screening as appropriate for cardiovascular disease, diabetes, osteopenia/osteoporosis, and glaucoma.  As appropriate for age/gender, discussed screening for colorectal cancer, prostate cancer, breast cancer, and cervical cancer. Checklist reviewing preventive services available has been given to the patient.    Reviewed patients plan of care and provided an AVS. The Complex Care Plan (for patients with higher acuity and needing more deliberate coordination of services) for Ángel meets the Care Plan requirement. This Care Plan has been established and reviewed with the Patient and daughter.    Counseling Resources:  ATP IV Guidelines  Pooled Cohorts Equation Calculator  Breast Cancer Risk Calculator  BRCA-Related Cancer Risk Assessment: FHS-7 Tool  FRAX Risk Assessment  ICSI Preventive Guidelines  Dietary Guidelines for Americans, 2010  USDA's MyPlate  ASA Prophylaxis  Lung CA Screening    Maxim Goodman MD  [facility]

## 2023-02-13 NOTE — LETTER
[facility] [address] [place]  [address]  Phone: [phone]    February 20, 2023      Ángel Pham  [address] [facility] [id number]            Dear Garcia,     your sodium remains low but is improved from last check.  We can continue to monitor this.    Maxim Goodman MD      Results for orders placed or performed in visit on 02/13/23   Basic Metabolic Panel (8) ([facility])     Status: Abnormal   Result Value Ref Range    Glucose 83 70 - 99 mg/dL    Urea Nitrogen 9 8 - 27 mg/dL    Creatinine 0.47 (L) 0.76 - 1.27 mg/dL    eGFR  115 >59 mL/min/1.73    BUN/Creatinine Ratio 19 10 - 24    Sodium 131 (L) 134 - 144 mmol/L    Potassium 4.6 3.5 - 5.2 mmol/L    Chloride 92 (L) 96 - 106 mmol/L    Total CO2 23 20 - 29 mmol/L    Calcium 9.4 8.6 - 10.2 mg/dL    Narrative    Performed at:  01 - [facility] [address], [place], [place]  [id number]  : Joe Hanson MD, Phone:  [phone]   B-Type Natriuretic Peptide - BNP ([facility])     Status: None   Result Value Ref Range    B-TYPE NATRIURETIC PEPTIDE 72.0 0 - 100 pg/mL

## 2023-02-14 LAB
BUN SERPL-MCNC: 9 MG/DL (ref 8–27)
BUN/CREATININE RATIO: 19 (ref 10–24)
CALCIUM SERPL-MCNC: 9.4 MG/DL (ref 8.6–10.2)
CHLORIDE SERPLBLD-SCNC: 92 MMOL/L (ref 96–106)
CREAT SERPL-MCNC: 0.47 MG/DL (ref 0.76–1.27)
EGFR: 115 ML/MIN/1.73
GLUCOSE SERPL-MCNC: 83 MG/DL (ref 70–99)
POTASSIUM SERPL-SCNC: 4.6 MMOL/L (ref 3.5–5.2)
SODIUM SERPL-SCNC: 131 MMOL/L (ref 134–144)
TOTAL CO2: 23 MMOL/L (ref 20–29)

## 2023-03-08 ENCOUNTER — TRANSFERRED RECORDS (OUTPATIENT)
Dept: FAMILY MEDICINE | Facility: CLINIC | Age: 68
End: 2023-03-08

## 2023-04-06 ENCOUNTER — DOCUMENTATION ONLY (OUTPATIENT)
Dept: OTHER | Facility: CLINIC | Age: 68
End: 2023-04-06
Payer: COMMERCIAL

## 2023-04-20 ENCOUNTER — APPOINTMENT (OUTPATIENT)
Dept: GENERAL RADIOLOGY | Facility: CLINIC | Age: 68
End: 2023-04-20
Attending: EMERGENCY MEDICINE
Payer: COMMERCIAL

## 2023-04-20 ENCOUNTER — HOSPITAL ENCOUNTER (EMERGENCY)
Facility: CLINIC | Age: 68
Discharge: HOME | End: 2023-04-20
Attending: EMERGENCY MEDICINE | Admitting: EMERGENCY MEDICINE
Payer: COMMERCIAL

## 2023-04-20 VITALS
BODY MASS INDEX: 14.9 KG/M2 | DIASTOLIC BLOOD PRESSURE: 71 MMHG | RESPIRATION RATE: 20 BRPM | HEART RATE: 81 BPM | TEMPERATURE: 99.1 F | SYSTOLIC BLOOD PRESSURE: 136 MMHG | HEIGHT: 68 IN | OXYGEN SATURATION: 94 %

## 2023-04-20 DIAGNOSIS — J18.9 PNEUMONIA OF LOWER LOBE DUE TO INFECTIOUS ORGANISM, UNSPECIFIED LATERALITY: ICD-10-CM

## 2023-04-20 LAB
ALBUMIN SERPL BCG-MCNC: 4.1 G/DL (ref 3.5–5.2)
ALP SERPL-CCNC: 98 U/L (ref 40–129)
ALT SERPL W P-5'-P-CCNC: 25 U/L (ref 10–50)
ANION GAP SERPL CALCULATED.3IONS-SCNC: 13 MMOL/L (ref 7–15)
AST SERPL W P-5'-P-CCNC: 28 U/L (ref 10–50)
BASOPHILS # BLD AUTO: 0.1 10E3/UL (ref 0–0.2)
BASOPHILS NFR BLD AUTO: 0 %
BILIRUB SERPL-MCNC: 0.4 MG/DL
BUN SERPL-MCNC: 13.1 MG/DL (ref 8–23)
CALCIUM SERPL-MCNC: 9.6 MG/DL (ref 8.8–10.2)
CHLORIDE SERPL-SCNC: 90 MMOL/L (ref 98–107)
CREAT SERPL-MCNC: 0.53 MG/DL (ref 0.67–1.17)
DEPRECATED HCO3 PLAS-SCNC: 25 MMOL/L (ref 22–29)
EOSINOPHIL # BLD AUTO: 0 10E3/UL (ref 0–0.7)
EOSINOPHIL NFR BLD AUTO: 0 %
ERYTHROCYTE [DISTWIDTH] IN BLOOD BY AUTOMATED COUNT: 13.6 % (ref 10–15)
FLUAV RNA SPEC QL NAA+PROBE: NEGATIVE
FLUBV RNA RESP QL NAA+PROBE: NEGATIVE
GFR SERPL CREATININE-BSD FRML MDRD: >90 ML/MIN/1.73M2
GLUCOSE SERPL-MCNC: 92 MG/DL (ref 70–99)
HCO3 BLDV-SCNC: 28 MMOL/L (ref 21–28)
HCT VFR BLD AUTO: 42.8 % (ref 40–53)
HGB BLD-MCNC: 15 G/DL (ref 13.3–17.7)
HOLD SPECIMEN: NORMAL
IMM GRANULOCYTES # BLD: 0.1 10E3/UL
IMM GRANULOCYTES NFR BLD: 1 %
LACTATE BLD-SCNC: 1.7 MMOL/L
LYMPHOCYTES # BLD AUTO: 0.5 10E3/UL (ref 0.8–5.3)
LYMPHOCYTES NFR BLD AUTO: 3 %
MCH RBC QN AUTO: 32.8 PG (ref 26.5–33)
MCHC RBC AUTO-ENTMCNC: 35 G/DL (ref 31.5–36.5)
MCV RBC AUTO: 94 FL (ref 78–100)
MONOCYTES # BLD AUTO: 1.1 10E3/UL (ref 0–1.3)
MONOCYTES NFR BLD AUTO: 8 %
NEUTROPHILS # BLD AUTO: 11.7 10E3/UL (ref 1.6–8.3)
NEUTROPHILS NFR BLD AUTO: 88 %
NRBC # BLD AUTO: 0 10E3/UL
NRBC BLD AUTO-RTO: 0 /100
PCO2 BLDV: 44 MM HG (ref 40–50)
PH BLDV: 7.42 [PH] (ref 7.32–7.43)
PLATELET # BLD AUTO: 203 10E3/UL (ref 150–450)
PO2 BLDV: 27 MM HG (ref 25–47)
POTASSIUM SERPL-SCNC: 4.6 MMOL/L (ref 3.4–5.3)
PROT SERPL-MCNC: 6.8 G/DL (ref 6.4–8.3)
RBC # BLD AUTO: 4.57 10E6/UL (ref 4.4–5.9)
RSV RNA SPEC NAA+PROBE: NEGATIVE
SAO2 % BLDV: 52 % (ref 94–100)
SARS-COV-2 RNA RESP QL NAA+PROBE: NEGATIVE
SODIUM SERPL-SCNC: 128 MMOL/L (ref 136–145)
TROPONIN T SERPL HS-MCNC: 22 NG/L
TROPONIN T SERPL HS-MCNC: 29 NG/L
WBC # BLD AUTO: 13.4 10E3/UL (ref 4–11)

## 2023-04-20 PROCEDURE — 250N000011 HC RX IP 250 OP 636: Performed by: EMERGENCY MEDICINE

## 2023-04-20 PROCEDURE — 80053 COMPREHEN METABOLIC PANEL: CPT | Performed by: EMERGENCY MEDICINE

## 2023-04-20 PROCEDURE — 84484 ASSAY OF TROPONIN QUANT: CPT | Mod: 91 | Performed by: EMERGENCY MEDICINE

## 2023-04-20 PROCEDURE — 87637 SARSCOV2&INF A&B&RSV AMP PRB: CPT | Performed by: EMERGENCY MEDICINE

## 2023-04-20 PROCEDURE — 96367 TX/PROPH/DG ADDL SEQ IV INF: CPT

## 2023-04-20 PROCEDURE — 250N000009 HC RX 250: Performed by: EMERGENCY MEDICINE

## 2023-04-20 PROCEDURE — 96368 THER/DIAG CONCURRENT INF: CPT

## 2023-04-20 PROCEDURE — 83605 ASSAY OF LACTIC ACID: CPT

## 2023-04-20 PROCEDURE — C9803 HOPD COVID-19 SPEC COLLECT: HCPCS

## 2023-04-20 PROCEDURE — 84484 ASSAY OF TROPONIN QUANT: CPT | Performed by: EMERGENCY MEDICINE

## 2023-04-20 PROCEDURE — 99284 EMERGENCY DEPT VISIT MOD MDM: CPT | Mod: 25,CS

## 2023-04-20 PROCEDURE — 96375 TX/PRO/DX INJ NEW DRUG ADDON: CPT

## 2023-04-20 PROCEDURE — 36415 COLL VENOUS BLD VENIPUNCTURE: CPT | Performed by: EMERGENCY MEDICINE

## 2023-04-20 PROCEDURE — 258N000003 HC RX IP 258 OP 636: Performed by: EMERGENCY MEDICINE

## 2023-04-20 PROCEDURE — 96365 THER/PROPH/DIAG IV INF INIT: CPT

## 2023-04-20 PROCEDURE — 71046 X-RAY EXAM CHEST 2 VIEWS: CPT

## 2023-04-20 PROCEDURE — 85025 COMPLETE CBC W/AUTO DIFF WBC: CPT | Performed by: EMERGENCY MEDICINE

## 2023-04-20 RX ORDER — AZITHROMYCIN 250 MG/1
TABLET, FILM COATED ORAL
Qty: 6 TABLET | Refills: 0 | Status: SHIPPED | OUTPATIENT
Start: 2023-04-20 | End: 2023-04-25

## 2023-04-20 RX ORDER — AZITHROMYCIN 500 MG/1
500 INJECTION, POWDER, LYOPHILIZED, FOR SOLUTION INTRAVENOUS ONCE
Status: COMPLETED | OUTPATIENT
Start: 2023-04-20 | End: 2023-04-20

## 2023-04-20 RX ORDER — METHYLPREDNISOLONE SODIUM SUCCINATE 125 MG/2ML
125 INJECTION, POWDER, LYOPHILIZED, FOR SOLUTION INTRAMUSCULAR; INTRAVENOUS ONCE
Status: COMPLETED | OUTPATIENT
Start: 2023-04-20 | End: 2023-04-20

## 2023-04-20 RX ORDER — IPRATROPIUM BROMIDE AND ALBUTEROL SULFATE 2.5; .5 MG/3ML; MG/3ML
3 SOLUTION RESPIRATORY (INHALATION) ONCE
Status: COMPLETED | OUTPATIENT
Start: 2023-04-20 | End: 2023-04-20

## 2023-04-20 RX ORDER — PREDNISONE 20 MG/1
TABLET ORAL
Qty: 10 TABLET | Refills: 0 | Status: SHIPPED | OUTPATIENT
Start: 2023-04-20 | End: 2023-10-09

## 2023-04-20 RX ORDER — CEFTRIAXONE 2 G/1
2 INJECTION, POWDER, FOR SOLUTION INTRAMUSCULAR; INTRAVENOUS ONCE
Status: COMPLETED | OUTPATIENT
Start: 2023-04-20 | End: 2023-04-20

## 2023-04-20 RX ADMIN — IPRATROPIUM BROMIDE AND ALBUTEROL SULFATE 3 ML: 2.5; .5 SOLUTION RESPIRATORY (INHALATION) at 10:07

## 2023-04-20 RX ADMIN — CEFTRIAXONE SODIUM 2 G: 2 INJECTION, POWDER, FOR SOLUTION INTRAMUSCULAR; INTRAVENOUS at 12:16

## 2023-04-20 RX ADMIN — METHYLPREDNISOLONE SODIUM SUCCINATE 125 MG: 125 INJECTION, POWDER, FOR SOLUTION INTRAMUSCULAR; INTRAVENOUS at 10:07

## 2023-04-20 RX ADMIN — AZITHROMYCIN MONOHYDRATE 500 MG: 500 INJECTION, POWDER, LYOPHILIZED, FOR SOLUTION INTRAVENOUS at 12:49

## 2023-04-20 RX ADMIN — FOLIC ACID: 5 INJECTION, SOLUTION INTRAMUSCULAR; INTRAVENOUS; SUBCUTANEOUS at 11:26

## 2023-04-20 ASSESSMENT — ENCOUNTER SYMPTOMS
NERVOUS/ANXIOUS: 1
FLANK PAIN: 1
SHORTNESS OF BREATH: 1

## 2023-04-20 ASSESSMENT — ACTIVITIES OF DAILY LIVING (ADL)
ADLS_ACUITY_SCORE: 35
ADLS_ACUITY_SCORE: 35

## 2023-04-20 NOTE — ED TRIAGE NOTES
EMS report: history of lung caner and COPD, active smoker. Increasing SOB since last night. EMS administered duoneb and albuterol neb.     Triage Assessment     Row Name 04/20/23 0951       Triage Assessment (Adult)    Airway WDL WDL       Respiratory WDL    Respiratory WDL rhythm/pattern    Rhythm/Pattern, Respiratory shortness of breath       Skin Circulation/Temperature WDL    Skin Circulation/Temperature WDL WDL       Cardiac WDL    Cardiac WDL WDL       Peripheral/Neurovascular WDL    Peripheral Neurovascular WDL WDL       Cognitive/Neuro/Behavioral WDL    Cognitive/Neuro/Behavioral WDL WDL

## 2023-04-20 NOTE — ED PROVIDER NOTES
History     Chief Complaint:  Shortness of Breath       HPI   Ángel Pham is a 67 year old male with a history of COPD, lung cancer not currently being treated, pneumothorax and hypertension who presents with EMS for evaluation of shortness of breath. The patient reports onset of increasing shortness of breath and left flank pain last night around 1900. He states that he felt worse when getting up and going to the bathroom and hyperventilated through most of the night. He was 91% on room air for EMS this morning. His flank pain is now a 4-5 severity.     Independent Historian: Independent      Review of External Notes: PCP note from 2/13/23 and discharge from 8/1/23    ROS:  Review of Systems   Respiratory: Positive for shortness of breath.    Cardiovascular: Negative for chest pain.   Genitourinary: Positive for flank pain.   Psychiatric/Behavioral: The patient is nervous/anxious.    10 point review of systems performed and is negative except as above and in HPI.    Allergies:  Chantix   Morphine Hcl  Wellbutrin     Medications:    albuterol   Amlodipine   Lipitor  Losartan   Oxycodone     Past Medical History:    Alcoholic intoxication with complication  COPD  Lung cancer   Hyperlipidemia    Hypertension   Pneumothorax   Peripheral artery disease   Aortic calcification   Uncomplicated opioid dependence    Past Surgical History:    Bronchoscopy   ENT surgery   Hemorrhoidectomy    Hernia repair   Laryngoscopy with biopsy   Pneumothorax repair, right     Family History:     Father: brain cancer  Mother: breast cancer     Social History:  He presents via EMS  He lives alone with his dog   He has a history of cigarette use  PCP: Maxim Goodman     Physical Exam     Patient Vitals for the past 24 hrs:   BP Temp Temp src Pulse Resp SpO2 Height   04/20/23 1400 136/71 -- -- 81 -- 94 % --   04/20/23 1330 135/73 -- -- 82 -- 95 % --   04/20/23 1300 127/72 -- -- 85 -- 95 % --   04/20/23 1230 135/72 -- -- 83  "-- 94 % --   04/20/23 1200 (!) 155/77 -- -- 84 -- 97 % --   04/20/23 1130 (!) 154/75 -- -- 82 -- 94 % --   04/20/23 1100 104/74 -- -- 90 -- 93 % --   04/20/23 1059 -- -- -- -- -- 92 % --   04/20/23 1035 -- -- -- -- -- 95 % --   04/20/23 1025 98/77 -- -- -- -- 100 % --   04/20/23 0947 130/87 99.1  F (37.3  C) Temporal 99 20 93 % 1.727 m (5' 8\")        Physical Exam  General: Resting on the gurney, appears uncomfortable. Cachectic  Head:  The scalp, face, and head appear normal  Mouth/Throat: Mucus membranes are moist  CV:  Regular rate    Normal S1 and S2  No pathological murmur   Resp:  Breath sounds diminished all fields, somewhat more on the left. Faint wheezing present.    Non-labored, no retractions or accessory muscle use    No coarseness  GI:  Abdomen is soft, no rigidity    No tenderness to palpation  MS:  Normal motor assessment of all extremities.    Good capillary refill noted.    Skin:   No rash or lesions noted.  Neuro:   Speech is normal and fluent. No apparent deficit.  Psych: Awake. Alert.  Normal affect.      Appropriate interactions.  Emergency Department Course   Imaging:  XR Chest 2 Views   Final Result   IMPRESSION: New patchy consolidation at the left lung base could be   developing acute airspace disease including pneumonia. Other   etiologies of airspace disease not entirely excluded. Hyperinflated   lungs consistent with COPD. Normal cardiac silhouette size.      MAGNUS RIGGS MD            SYSTEM ID:  [id number]        Report per radiology    Laboratory:  Labs Ordered and Resulted from Time of ED Arrival to Time of ED Departure   COMPREHENSIVE METABOLIC PANEL - Abnormal       Result Value    Sodium 128 (*)     Potassium 4.6      Chloride 90 (*)     Carbon Dioxide (CO2) 25      Anion Gap 13      Urea Nitrogen 13.1      Creatinine 0.53 (*)     Calcium 9.6      Glucose 92      Alkaline Phosphatase 98      AST 28      ALT 25      Protein Total 6.8      Albumin 4.1      Bilirubin Total 0.4      GFR " Estimate >90     TROPONIN T, HIGH SENSITIVITY - Abnormal    Troponin T, High Sensitivity 29 (*)    CBC WITH PLATELETS AND DIFFERENTIAL - Abnormal    WBC Count 13.4 (*)     RBC Count 4.57      Hemoglobin 15.0      Hematocrit 42.8      MCV 94      MCH 32.8      MCHC 35.0      RDW 13.6      Platelet Count 203      % Neutrophils 88      % Lymphocytes 3      % Monocytes 8      % Eosinophils 0      % Basophils 0      % Immature Granulocytes 1      NRBCs per 100 WBC 0      Absolute Neutrophils 11.7 (*)     Absolute Lymphocytes 0.5 (*)     Absolute Monocytes 1.1      Absolute Eosinophils 0.0      Absolute Basophils 0.1      Absolute Immature Granulocytes 0.1      Absolute NRBCs 0.0     ISTAT GASES LACTATE VENOUS POCT - Abnormal    Lactic Acid POCT 1.7      Bicarbonate Venous POCT 28      O2 Sat, Venous POCT 52 (*)     pCO2V Venous POCT 44      pH Venous POCT 7.42      pO2 Venous POCT 27     INFLUENZA A/B, RSV, & SARS-COV2 PCR - Normal    Influenza A PCR Negative      Influenza B PCR Negative      RSV PCR Negative      SARS CoV2 PCR Negative     TROPONIN T, HIGH SENSITIVITY - Normal    Troponin T, High Sensitivity 22          Emergency Department Course & Assessments:    Interventions:  Medications   ipratropium - albuterol 0.5 mg/2.5 mg/3 mL (DUONEB) neb solution 3 mL (3 mLs Nebulization $Given 4/20/23 1007)   ipratropium - albuterol 0.5 mg/2.5 mg/3 mL (DUONEB) neb solution 3 mL (3 mLs Nebulization $Given 4/20/23 1007)   methylPREDNISolone sodium succinate (solu-MEDROL) injection 125 mg (125 mg Intravenous $Given 4/20/23 1007)   sodium chloride 0.9 % 1,000 mL with Infuvite Adult 10 mL, thiamine 100 mg, folic acid 1 mg infusion ( Intravenous Stopped 4/20/23 1219)   cefTRIAXone (ROCEPHIN) 2 g vial to attach to  ml bag for ADULTS or NS 50 ml bag for PEDS (0 g Intravenous Stopped 4/20/23 1248)   azithromycin (ZITHROMAX) 500 mg vial to attach to  mL bag (0 mg Intravenous Stopped 4/20/23 1358)        Independent  Interpretation (X-rays, CTs, rhythm strip):  XR read      Consultations/Discussion of Management or Tests:  Discussed results with patient     Social Determinants of Health affecting care:  Social Connections/Isolation     Assessments:  0950 I evaluated the patient and obtained history    1341 I rechecked the patient, discussed results and plan of care.     Disposition:  The patient was discharged to home.     Impression & Plan    CMS Diagnoses: None    Medical Decision Making:  Ángel Pham is a 67 year old male who presents for evaluation of shortness of breath.  History, physical exam and imaging studies are consistent with pneumonia.  The first dose of antibiotics was given in ED.  There are no signs of complications of pneumonia at this point such as septic shock, bacteremia, empyema, hypoxia, respiratory failure or compromise.  Supportive outpatient management is therefore indicated.  This seems to be community acquired pneumonia and there are no risk factors at this point for coverage of antibiotic-resistant strains.  Patient will follow-up with primary care physician regardless of disease course.  Signs for return visit to ED were discussed with patient and pneumonia precautions given for home.        Diagnosis:    ICD-10-CM    1. Pneumonia of lower lobe due to infectious organism, unspecified laterality  J18.9            Discharge Medications:  Discharge Medication List as of 4/20/2023  1:59 PM      START taking these medications    Details   azithromycin (ZITHROMAX) 250 MG tablet Take 2 tablets (500 mg) by mouth daily for 1 day, THEN 1 tablet (250 mg) daily for 4 days., Disp-6 tablet, R-0, E-Prescribe      predniSONE (DELTASONE) 20 MG tablet Take two tablets (= 40mg) each day for 5 (five) days, Disp-10 tablet, R-0, E-Prescribe                Scribe Disclosure:  Morales DENNY, am serving as a scribe at 9:56 AM on 4/20/2023 to document services personally performed by Manju Franz MD based  on my observations and the provider's statements to me.    4/20/2023   Manju Franz MD Debroux, Karah M, MD  04/20/23 3350

## 2023-04-20 NOTE — ED NOTES
Bed: ED08  Expected date:   Expected time:   Means of arrival:   Comments:  Dorie - 598 - 67 M SOB eta 2806

## 2023-04-21 ENCOUNTER — PATIENT OUTREACH (OUTPATIENT)
Dept: FAMILY MEDICINE | Facility: CLINIC | Age: 68
End: 2023-04-21

## 2023-04-21 NOTE — TELEPHONE ENCOUNTER
PC to pt, spoke to his daughter as pt was asleep. She reports he was feeling better last night,was able to eat dinner and sleep well. He has an appt with pcp for follow up on 4/24. SW informed daughter to have pt call clinic or this writer with questions or concerns prior to appt.    CAROLYNE Wall  , Care Coordination  [facility]  Cell: [phone]  Clinic: [phone]  [email or web address]

## 2023-04-24 ENCOUNTER — OFFICE VISIT (OUTPATIENT)
Dept: FAMILY MEDICINE | Facility: CLINIC | Age: 68
End: 2023-04-24

## 2023-04-24 VITALS
WEIGHT: 95 LBS | HEART RATE: 79 BPM | SYSTOLIC BLOOD PRESSURE: 132 MMHG | DIASTOLIC BLOOD PRESSURE: 73 MMHG | BODY MASS INDEX: 14.44 KG/M2 | OXYGEN SATURATION: 100 %

## 2023-04-24 DIAGNOSIS — E43 SEVERE PROTEIN-CALORIE MALNUTRITION (H): ICD-10-CM

## 2023-04-24 DIAGNOSIS — J18.9 PNEUMONIA OF LEFT LOWER LOBE DUE TO INFECTIOUS ORGANISM: Primary | ICD-10-CM

## 2023-04-24 DIAGNOSIS — E87.1 HYPONATREMIA: ICD-10-CM

## 2023-04-24 LAB
% GRANULOCYTES: 88 % (ref 42.2–75.2)
HCT VFR BLD AUTO: 39.7 % (ref 39–51)
HEMOGLOBIN: 13.6 G/DL (ref 13.4–17.5)
LYMPHOCYTES NFR BLD AUTO: 9 % (ref 20.5–51.1)
MCH RBC QN AUTO: 32 PG (ref 27–31)
MCHC RBC AUTO-ENTMCNC: 34.4 G/DL (ref 33–37)
MCV RBC AUTO: 93.1 FL (ref 80–100)
MONOCYTES NFR BLD AUTO: 3 % (ref 1.7–9.3)
PLATELET # BLD AUTO: 292 K/UL (ref 140–450)
RBC # BLD AUTO: 4.26 X10/CMM (ref 4.2–5.9)
WBC # BLD AUTO: 5.9 X10/CMM (ref 3.8–11)

## 2023-04-24 PROCEDURE — 99214 OFFICE O/P EST MOD 30 MIN: CPT | Performed by: NURSE PRACTITIONER

## 2023-04-24 PROCEDURE — 85025 COMPLETE CBC W/AUTO DIFF WBC: CPT | Performed by: NURSE PRACTITIONER

## 2023-04-24 PROCEDURE — 36415 COLL VENOUS BLD VENIPUNCTURE: CPT | Performed by: NURSE PRACTITIONER

## 2023-04-24 NOTE — PROGRESS NOTES
Problem(s) Oriented visit        SUBJECTIVE:                                                    Ángel Pham is a 67 year old male who presents to clinic today for the following health issues :    Follow-up from ED visit 4 days ago on 4/20/2023 for shortness of breath, diagnosed with left lower lobe pneumonia. Given prednisone, rocephin & azithromycin in ED then discharged with oral prednisone 40 mg x 5 days & Z-pack. Patient states today he does not feel much improvement and has needed more antibiotics in the past when diagnosed with pneumonia. Nighttime symptoms worsen. No fevers. States he does hyperventilate especially at night and have wheezing, shortness of breath. Denies chest pain, pressure, palpitations.     Sodium level also lower than usual when he was in ED. Would like this rechecked.     Problem list, Medication list, Allergies, and Medical/Social/Surgical histories reviewed in EPIC and updated as appropriate.   Additional history: as documented    ROS:  5 point ROS completed and negative except noted above, including Gen, CV, Resp, GI, MS    OBJECTIVE:                                                    /73   Pulse 79   Wt 43.1 kg (95 lb)   SpO2 100%   BMI 14.44 kg/m    Body mass index is 14.44 kg/m .   GENERAL: Very frail appearing man, looks older than stated age  RESP: crackles in left lower lobe, diminished throughout  CV: regular rates and rhythm, normal S1 S2, no S3 or S4 and no murmur, click or rub  MS: generalized weakness  PSYCH: depressed mood     ASSESSMENT/PLAN:                                                      Ángel was seen today for er f/u.    Diagnoses and all orders for this visit:    Pneumonia of left lower lobe due to infectious organism  -     amoxicillin-clavulanate (AUGMENTIN) 875-125 MG tablet; Take 1 tablet by mouth 2 times daily for 10 days  -     CBC with Diff/Plt ([facility])  -     VENOUS COLLECTION  Adding Augmentin BID x 10 days. Patient to follow-up later  this week if not starting to improve, sooner with new/worsening symptoms.     Hyponatremia  -     Sodium  Serum ([facility])  -     VENOUS COLLECTION  Rechecking sodium level today    Severe protein-calorie malnutrition (H)  BMI 14.44 today. Low oral intake. Chronic throat pain from alcohol abuse & history of radiation.     See Patient Instructions  Patient Instructions   Take Augmentin (Amoxicillin-Clavulanic acid) 1 tab twice daily for 10 days.  Watch out for any severe diarrhea due to antibiotic.  Take Culturelle or an over the counter probiotic, 1 capsule twice daily if this occurs.  Stop antibiotic AND call clinic if severe.      Take with food    Call Thursday/Friday if not turning a corner        ALY Wagner CNP  [facility]  Family Practice  [facility]  [phone]    For any issues my office # is [phone]

## 2023-04-24 NOTE — PATIENT INSTRUCTIONS
Take Augmentin (Amoxicillin-Clavulanic acid) 1 tab twice daily for 10 days.  Watch out for any severe diarrhea due to antibiotic.  Take Culturelle or an over the counter probiotic, 1 capsule twice daily if this occurs.  Stop antibiotic AND call clinic if severe.      Take with food    Call Thursday/Friday if not turning a corner

## 2023-04-25 LAB — SODIUM SERPL-SCNC: 132 MMOL/L (ref 134–144)

## 2023-05-05 ENCOUNTER — TRANSFERRED RECORDS (OUTPATIENT)
Dept: FAMILY MEDICINE | Facility: CLINIC | Age: 68
End: 2023-05-05

## 2023-05-08 ENCOUNTER — OFFICE VISIT (OUTPATIENT)
Dept: FAMILY MEDICINE | Facility: CLINIC | Age: 68
End: 2023-05-08

## 2023-05-08 VITALS
BODY MASS INDEX: 14.78 KG/M2 | OXYGEN SATURATION: 99 % | WEIGHT: 97.2 LBS | SYSTOLIC BLOOD PRESSURE: 145 MMHG | DIASTOLIC BLOOD PRESSURE: 72 MMHG | HEART RATE: 79 BPM

## 2023-05-08 DIAGNOSIS — R05.1 ACUTE COUGH: Primary | ICD-10-CM

## 2023-05-08 DIAGNOSIS — E87.1 HYPONATREMIA: ICD-10-CM

## 2023-05-08 DIAGNOSIS — J43.9 PULMONARY EMPHYSEMA, UNSPECIFIED EMPHYSEMA TYPE (H): ICD-10-CM

## 2023-05-08 PROCEDURE — 99214 OFFICE O/P EST MOD 30 MIN: CPT | Performed by: FAMILY MEDICINE

## 2023-05-08 RX ORDER — PREDNISONE 10 MG/1
TABLET ORAL
Qty: 30 TABLET | Refills: 0 | Status: SHIPPED | OUTPATIENT
Start: 2023-05-08 | End: 2023-05-20

## 2023-05-08 NOTE — PROGRESS NOTES
Subjective     Ángel is a 67 year old patient who presents to clinic for follow up with his daughter. He was diagnosed with a left basilar pneumonia and treated with azithromycin and prednisone.  He did not feel he was improving, saw Kristina Cosby, and put on augmentin.  Improved quite a bit but feels he has regressed now.  Increased cough, sputum and GRAHAM.  No fever or chills.      Lung nodule: CT scheduled.  He is not a surgical candidate or biopsy candidate.  Will review with Dr Ann when complete.    Review of Systems   Constitutional, HEENT, cardiovascular, pulmonary, GI, , musculoskeletal, neuro, skin, endocrine and psych systems are negative, except as otherwise noted.      Objective    BP (!) 145/72   Pulse 79   Wt 44.1 kg (97 lb 3.2 oz)   SpO2 99%   BMI 14.78 kg/m      General: Frail, nontoxic, NAD  HEENT: Clear  Heart: RRR, no murmur  Chest:distant breath sounds.  No focal crackles or wheezing  Skin: Clear  Psych: normal mood and affect      Acute cough  Well appearing, normal sats.  Will add longer course of prednisone and he will do CXR tomorrow.  If infiltrate appears worse would start levofloxacin.    - predniSONE (DELTASONE) 10 MG tablet; Take 4 tablets (40 mg) by mouth daily for 3 days, THEN 3 tablets (30 mg) daily for 3 days, THEN 2 tablets (20 mg) daily for 3 days, THEN 1 tablet (10 mg) daily for 3 days.  - XR Chest 2 Views; Future    Pulmonary emphysema, unspecified emphysema type (H)  - predniSONE (DELTASONE) 10 MG tablet; Take 4 tablets (40 mg) by mouth daily for 3 days, THEN 3 tablets (30 mg) daily for 3 days, THEN 2 tablets (20 mg) daily for 3 days, THEN 1 tablet (10 mg) daily for 3 days.  - XR Chest 2 Views; Future    Hyponatremia  Recheck recently improved back to baseline

## 2023-05-09 DIAGNOSIS — R05.1 ACUTE COUGH: ICD-10-CM

## 2023-05-09 DIAGNOSIS — J43.9 PULMONARY EMPHYSEMA, UNSPECIFIED EMPHYSEMA TYPE (H): ICD-10-CM

## 2023-05-09 PROCEDURE — 71046 X-RAY EXAM CHEST 2 VIEWS: CPT | Performed by: FAMILY MEDICINE

## 2023-05-10 ENCOUNTER — TELEPHONE (OUTPATIENT)
Dept: FAMILY MEDICINE | Facility: CLINIC | Age: 68
End: 2023-05-10

## 2023-05-10 NOTE — TELEPHONE ENCOUNTER
Per patient request results called to patient daughter Enedina. He will follow plan of care and is asked to call clinic if symptoms worsen or do not resolve. Verónica Andrews

## 2023-05-10 NOTE — TELEPHONE ENCOUNTER
----- Message from Maxim Goodman MD sent at 5/10/2023 10:14 AM CDT -----  Please call patient's daughter (he asked that she be told) that CXR does not show a new pneumonia.  He should continue current plan unless he is worsening or not improving.  He should also continue plan to have CT done and follow up with Dr Ann.    Maxim Goodman MD

## 2023-05-14 DIAGNOSIS — I10 HTN, GOAL BELOW 140/90: ICD-10-CM

## 2023-05-15 RX ORDER — AMLODIPINE BESYLATE 5 MG/1
TABLET ORAL
Qty: 100 TABLET | Refills: 2 | Status: ON HOLD | OUTPATIENT
Start: 2023-05-15 | End: 2023-11-19

## 2023-05-15 NOTE — TELEPHONE ENCOUNTER
amLODIPine (NORVASC) 5 MG    LOV 5/8/23  Last labs 4/20/23    BP Readings from Last 3 Encounters:   05/08/23 (!) 145/72   04/24/23 132/73   04/20/23 136/71     Last Comprehensive Metabolic Panel:  Lab Results   Component Value Date     (L) 04/24/2023    POTASSIUM 4.6 04/20/2023    CHLORIDE 90 (L) 04/20/2023    CO2 25 04/20/2023    ANIONGAP 13 04/20/2023    GLC 92 04/20/2023    BUN 13.1 04/20/2023    CR 0.53 (L) 04/20/2023    GFRESTIMATED >90 04/20/2023    MOSHE 9.6 04/20/2023       No future appt with STEVIE

## 2023-05-17 ENCOUNTER — TRANSFERRED RECORDS (OUTPATIENT)
Dept: FAMILY MEDICINE | Facility: CLINIC | Age: 68
End: 2023-05-17

## 2023-05-17 ENCOUNTER — ANCILLARY PROCEDURE (OUTPATIENT)
Dept: CT IMAGING | Facility: CLINIC | Age: 68
End: 2023-05-17
Attending: THORACIC SURGERY (CARDIOTHORACIC VASCULAR SURGERY)
Payer: COMMERCIAL

## 2023-05-17 DIAGNOSIS — R91.1 SOLITARY LUNG NODULE: ICD-10-CM

## 2023-05-17 PROCEDURE — 71250 CT THORAX DX C-: CPT

## 2023-06-01 ENCOUNTER — TRANSFERRED RECORDS (OUTPATIENT)
Dept: FAMILY MEDICINE | Facility: CLINIC | Age: 68
End: 2023-06-01

## 2023-07-12 ENCOUNTER — TRANSFERRED RECORDS (OUTPATIENT)
Dept: FAMILY MEDICINE | Facility: CLINIC | Age: 68
End: 2023-07-12

## 2023-10-06 NOTE — OP NOTE
Department of Anesthesiology  Preprocedure Note       Name:  Warren Hearn   Age:  79 y.o.  :  1943                                          MRN:  [id number]         Date:  10/6/2023      Surgeon: Sasha Saenz):  Sierra Livingston DO    Procedure: Procedure(s):  Laparoscopic Robotic Asisted Left Inguinal Hernia repair with mesh possible bilateral -135    Medications prior to admission:   Prior to Admission medications    Medication Sig Start Date End Date Taking?  Authorizing Provider   fluticasone (FLONASE) 50 MCG/ACT nasal spray  23   Tash Oconnor MD   temazepam (RESTORIL) 15 MG capsule take 1 capsule by mouth once daily at bedtime for sleep  Patient not taking: Reported on 10/6/2023 6/9/23   Tash Oconnor MD   benzonatate (TESSALON) 100 MG capsule take 1 capsule by mouth three times a day if needed for cough  Patient not taking: Reported on 2023   Tash Oconnor MD   budesonide-formoterol (SYMBICORT) 80-4.5 MCG/ACT AERO Inhale into the lungs 23   Tash Oconnor MD   mupirocin (BACTROBAN) 2 % ointment APPLY A SMALL AMOUNT INTO EACH NOSTRIL TWICE A DAY 23   Tash Oconnor MD   ipratropium (ATROVENT) 0.03 % nasal spray  23   Tash Oconnor MD   diphenhydrAMINE-APAP, sleep, (TYLENOL PM EXTRA STRENGTH)  MG tablet Diphenhydramine-Acetaminophen (Tylenol Pm Extra Strength)  mg Tablet Active 1 TAB PO bedtime 2023 12:00am 23   Tash Oconnor MD   VENTOLIN  (90 Base) MCG/ACT inhaler  22   Tash Oconnor MD   atorvastatin (LIPITOR) 20 MG tablet Take 1 tablet by mouth daily 17   Tash Oconnor MD   hydrochlorothiazide (HYDRODIURIL) 25 MG tablet Take 1 tablet by mouth daily 17   Tash Oconnor MD   metFORMIN (GLUCOPHAGE) 500 MG tablet Take 1 tablet by mouth daily (with breakfast) 17   Tsah Oconnor MD   alfuzosin (UROXATRAL) 10 MG extended release tablet Take 1 Procedure Date: 07/26/2019      DATE OF SERVICE:  07/26/2019.      PREOPERATIVE DIAGNOSIS:  Chronic throat        POSTOPERATIVE DIAGNOSIS:  Squamous cell carcinoma, left supraglottis.      PROCEDURE:  Microscopic direct laryngoscopy with biopsy.      SURGEON:  Boo Mehta MD      ANESTHESIA:  General.      ESTIMATED BLOOD LOSS:  2 mL.      SPECIMENS:  Left aryepiglottic fold mass.  Biopsies sent for frozen histopathologic evaluation and permanent histopathologic evaluation.      OPERATIVE INDICATIONS:  This is a 64-year-old gentleman who presented with persistent left throat pain.  He did not tolerate evaluation in the office with endoscopy, and was scheduled for CT imaging of the neck, which demonstrated a thickening of the aryepiglottic fold on the left with no clearcut mass identified.  No additional cervical lymphadenopathy was visualized.  Given these findings, a decision was made to proceed with the above procedure.      OPERATIVE PROCEDURE:  The patient was met in preinduction where the risks, benefits and limitations of the procedure were discussed in detail, his pertinent questions were answered and consent was obtained.  From there, the patient was taken to the operating room, he was placed in supine position.  General anesthetic was initiated, and the patient was endotracheally intubated.  Appropriate ventilation was established.  The patient was prepped and draped for the above procedure.  Bimanual palpation of the oropharynx, including tongue base, was negative for induration with palpably normal mucosa.  A moist gauze was placed over the hard palate, as the patient was edentulous and a Dedo laryngoscope was then introduced into the oral cavity and advanced into the oropharynx where inspection demonstrated normal-appearing mucosa.  Inspection of the tongue base was normal.  The vallecula was clear.  The lingual aspect of the epiglottis appeared normal.  Inspection of the supraglottis, including the  left aryepiglottic fold, demonstrated an exophytic ulcerative mass lesion.  Biopsies were taken and frozen histopathology returned positive for carcinoma.  The right aryepiglottic fold and arytenoid appeared normal.  The piriform sinuses appeared normal bilaterally, as did the true vocal cords bilaterally.  The mass in question encompassed the entire aryepiglottic fold, including a portion of the left arytenoid mucosa, the medial superior aspect of the false vocal fold mucosa and the posterior aspect of the laryngeal portion of the epiglottis.  Visualization into the piriform sinuses, bilaterally, demonstrated normal mucosa with no extension inferiorly on the left.  The procedure was then concluded and there were no intraoperative complications.  Microscope was used throughout the procedure, as was a 5 mm 0-degree telescope as needed for visualization and to obtain photographs.  At the conclusion, all retractors were removed and the gauze was removed from the palate.  There were no intraoperative complications and the patient was returned to the care of the Anesthesia Service.         ELIAN SOLORZANO MD             D: 2019   T: 2019   MT: ELISABETH      Name:     MATI DUNN   MRN:      [id number]        Account:        [id number]   :      1955           Procedure Date: 2019      Document: [id number]

## 2023-10-09 ENCOUNTER — OFFICE VISIT (OUTPATIENT)
Dept: FAMILY MEDICINE | Facility: CLINIC | Age: 68
End: 2023-10-09

## 2023-10-09 VITALS
BODY MASS INDEX: 14.66 KG/M2 | OXYGEN SATURATION: 100 % | WEIGHT: 96.4 LBS | SYSTOLIC BLOOD PRESSURE: 173 MMHG | HEART RATE: 80 BPM | DIASTOLIC BLOOD PRESSURE: 94 MMHG

## 2023-10-09 DIAGNOSIS — R91.1 LEFT UPPER LOBE PULMONARY NODULE: ICD-10-CM

## 2023-10-09 DIAGNOSIS — E43 SEVERE PROTEIN-CALORIE MALNUTRITION (H): ICD-10-CM

## 2023-10-09 DIAGNOSIS — F11.20 UNCOMPLICATED OPIOID DEPENDENCE (H): ICD-10-CM

## 2023-10-09 DIAGNOSIS — M54.16 LUMBAR RADICULOPATHY: ICD-10-CM

## 2023-10-09 DIAGNOSIS — G89.29 GROIN PAIN, CHRONIC, RIGHT: Primary | ICD-10-CM

## 2023-10-09 DIAGNOSIS — J43.9 PULMONARY EMPHYSEMA, UNSPECIFIED EMPHYSEMA TYPE (H): ICD-10-CM

## 2023-10-09 DIAGNOSIS — E87.1 HYPONATREMIA: ICD-10-CM

## 2023-10-09 DIAGNOSIS — R10.31 GROIN PAIN, CHRONIC, RIGHT: Primary | ICD-10-CM

## 2023-10-09 LAB
OSMOLALITY SERPL: 269 MMOL/KG (ref 280–301)
SODIUM SERPL-SCNC: 130 MMOL/L (ref 135–145)

## 2023-10-09 PROCEDURE — 99214 OFFICE O/P EST MOD 30 MIN: CPT | Mod: 25 | Performed by: FAMILY MEDICINE

## 2023-10-09 PROCEDURE — G0008 ADMIN INFLUENZA VIRUS VAC: HCPCS | Mod: 59 | Performed by: FAMILY MEDICINE

## 2023-10-09 PROCEDURE — 84295 ASSAY OF SERUM SODIUM: CPT | Mod: ORL | Performed by: FAMILY MEDICINE

## 2023-10-09 PROCEDURE — 36415 COLL VENOUS BLD VENIPUNCTURE: CPT | Performed by: FAMILY MEDICINE

## 2023-10-09 PROCEDURE — 90694 VACC AIIV4 NO PRSRV 0.5ML IM: CPT | Performed by: FAMILY MEDICINE

## 2023-10-09 PROCEDURE — 83930 ASSAY OF BLOOD OSMOLALITY: CPT | Mod: ORL | Performed by: FAMILY MEDICINE

## 2023-10-09 NOTE — PROGRESS NOTES
Latasha Neal is a 68 year old patient who presents to clinic for evaluation.  Reports about 2 years of intermittent right groin pain and pain behind the right knee.  Seems to be positional.  No swelling, redness, warmth.  No exertional symptoms.  Pain can be intense but resolves spontaneously in certain positions.      Hyponatremia: mild, persistent    AISHWARYA nodule: followed by Dr Ann, s/p radiation    Review of Systems   Constitutional, HEENT, cardiovascular, pulmonary, GI, , musculoskeletal, neuro, skin, endocrine and psych systems are negative, except as otherwise noted.      Objective    BP (!) 173/94   Pulse 80   Wt 43.7 kg (96 lb 6.4 oz)   SpO2 100%   BMI 14.66 kg/m      Musculoskeletal :    Normal ROM with normal flexion, extension, lateral flexion, and rotation without limitation.    No tenderness midline or paraspinous muscles, no tenderness with stress of the SI joint bilaterally.   Power : hip flexion iliopsoas (L3), knee extension (L4), foot dorsiflexion anterior tibialis / great toe flexion (L5), foot plantarflexion (S1), knee flexion (S2) : 5/5 throughout.  Heel (L5), toe (S1) gait intact.   Sensation: intact to knee and medial leg (L4), foot dorsum and great toe (L5), and lateral foot and plantar foot (S1).   Reflexes : 2+ patellar and achilles reflexes.  Pulses: 2+ DP and PT      Results for orders placed or performed in visit on 10/09/23 (from the past 24 hour(s))   Sodium   Result Value Ref Range    Sodium 130 (L) 135 - 145 mmol/L       Groin pain, chronic, right  Favor lumbar radiculopathy, trial of PT  Follow up if not improving  - Referral to [facility] - Physical Therapy; Future    Hyponatremia  Recheck and further work up  - Sodium; Future  - Osmolality; Future  - Osmolality urine; Future  - Sodium  - Osmolality  - Sodium random urine; Future  - Osmolality urine; Future  - VENOUS COLLECTION    Severe protein-calorie malnutrition (H24)  Severe, chronic.  Weight stable, cont  efforts to increase caloric intake  - VENOUS COLLECTION    Pulmonary emphysema, unspecified emphysema type (H)  Stable symptoms, cont trelegy    Left upper lobe pulmonary nodule  Cont close follow up with Dr Ann    Uncomplicated opioid dependence (H)  unchanged    Lumbar radiculopathy  See above  - Referral to [facility] - Physical Therapy; Future

## 2023-10-11 ENCOUNTER — TRANSFERRED RECORDS (OUTPATIENT)
Dept: FAMILY MEDICINE | Facility: CLINIC | Age: 68
End: 2023-10-11

## 2023-10-11 ENCOUNTER — ANCILLARY PROCEDURE (OUTPATIENT)
Dept: CT IMAGING | Facility: CLINIC | Age: 68
End: 2023-10-11
Attending: THORACIC SURGERY (CARDIOTHORACIC VASCULAR SURGERY)
Payer: COMMERCIAL

## 2023-10-11 DIAGNOSIS — R91.1 LUNG NODULE: ICD-10-CM

## 2023-10-11 DIAGNOSIS — C32.1: ICD-10-CM

## 2023-10-11 PROCEDURE — 71250 CT THORAX DX C-: CPT

## 2023-10-13 DIAGNOSIS — E87.1 HYPONATREMIA: ICD-10-CM

## 2023-10-13 LAB
OSMOLALITY UR: 222 MMOL/KG (ref 100–1200)
SODIUM UR-SCNC: 56 MMOL/L

## 2023-10-13 PROCEDURE — 84300 ASSAY OF URINE SODIUM: CPT | Mod: ORL | Performed by: FAMILY MEDICINE

## 2023-10-13 PROCEDURE — 83935 ASSAY OF URINE OSMOLALITY: CPT | Mod: ORL | Performed by: FAMILY MEDICINE

## 2023-10-17 ENCOUNTER — TRANSFERRED RECORDS (OUTPATIENT)
Dept: FAMILY MEDICINE | Facility: CLINIC | Age: 68
End: 2023-10-17

## 2023-10-18 DIAGNOSIS — E87.1 HYPONATREMIA: Primary | ICD-10-CM

## 2023-10-19 ENCOUNTER — TELEPHONE (OUTPATIENT)
Dept: FAMILY MEDICINE | Facility: CLINIC | Age: 68
End: 2023-10-19

## 2023-10-19 NOTE — TELEPHONE ENCOUNTER
Called patient and spoke with daughter Enedina, regarding sodium lab results.  Informed her that Dr. Goodman would like additional labs done (Cortisol and TSH) due to patient's low sodium levels.  Enedina agreed with plan, lab appointment scheduled for 10/24/2023 at 8:45am.  No further questions or concerns.  Ria Menon, LPN on 10/19/2023 at 10:51 AM

## 2023-10-19 NOTE — TELEPHONE ENCOUNTER
----- Message from Maxim Goodman MD sent at 10/18/2023  5:37 PM CDT -----  Please call patient with results:    Based on recent sodium testing would like to do a couple more tests to narrow down potential cause of his low sodium.  Will place orders for thyroid and cortisol testing.  Ideally should be done earlier in the morning.    Maxim Goodman MD

## 2023-10-24 DIAGNOSIS — E87.1 HYPONATREMIA: ICD-10-CM

## 2023-10-24 LAB
CORTIS SERPL-MCNC: 13.3 UG/DL
T4 FREE SERPL-MCNC: 0.97 NG/DL (ref 0.9–1.7)
TSH SERPL DL<=0.005 MIU/L-ACNC: 6.53 UIU/ML (ref 0.3–4.2)

## 2023-10-24 PROCEDURE — 84439 ASSAY OF FREE THYROXINE: CPT | Mod: ORL | Performed by: FAMILY MEDICINE

## 2023-10-24 PROCEDURE — 82533 TOTAL CORTISOL: CPT | Mod: ORL | Performed by: FAMILY MEDICINE

## 2023-10-24 PROCEDURE — 36415 COLL VENOUS BLD VENIPUNCTURE: CPT

## 2023-10-24 PROCEDURE — 84443 ASSAY THYROID STIM HORMONE: CPT | Mod: ORL | Performed by: FAMILY MEDICINE

## 2023-11-14 ENCOUNTER — TRANSFERRED RECORDS (OUTPATIENT)
Dept: FAMILY MEDICINE | Facility: CLINIC | Age: 68
End: 2023-11-14

## 2023-11-16 ENCOUNTER — APPOINTMENT (OUTPATIENT)
Dept: CT IMAGING | Facility: CLINIC | Age: 68
DRG: 193 | End: 2023-11-16
Attending: EMERGENCY MEDICINE
Payer: COMMERCIAL

## 2023-11-16 ENCOUNTER — HOSPITAL ENCOUNTER (INPATIENT)
Facility: CLINIC | Age: 68
LOS: 5 days | Discharge: HOME | DRG: 193 | End: 2023-11-21
Attending: EMERGENCY MEDICINE | Admitting: INTERNAL MEDICINE
Payer: COMMERCIAL

## 2023-11-16 ENCOUNTER — APPOINTMENT (OUTPATIENT)
Dept: GENERAL RADIOLOGY | Facility: CLINIC | Age: 68
DRG: 193 | End: 2023-11-16
Attending: EMERGENCY MEDICINE
Payer: COMMERCIAL

## 2023-11-16 DIAGNOSIS — J44.1 COPD WITH ACUTE EXACERBATION (H): ICD-10-CM

## 2023-11-16 DIAGNOSIS — J18.9 COMMUNITY ACQUIRED PNEUMONIA OF LEFT LUNG, UNSPECIFIED PART OF LUNG: ICD-10-CM

## 2023-11-16 DIAGNOSIS — R07.1 CHEST PAIN ON BREATHING: ICD-10-CM

## 2023-11-16 DIAGNOSIS — I10 HTN, GOAL BELOW 140/90: Primary | ICD-10-CM

## 2023-11-16 LAB
ALBUMIN SERPL BCG-MCNC: 4 G/DL (ref 3.5–5.2)
ALP SERPL-CCNC: 121 U/L (ref 40–150)
ALT SERPL W P-5'-P-CCNC: 22 U/L (ref 0–70)
ANION GAP SERPL CALCULATED.3IONS-SCNC: 14 MMOL/L (ref 7–15)
APTT PPP: 29 SECONDS (ref 22–38)
AST SERPL W P-5'-P-CCNC: 27 U/L (ref 0–45)
ATRIAL RATE - MUSE: 112 BPM
BASOPHILS # BLD AUTO: 0 10E3/UL (ref 0–0.2)
BASOPHILS NFR BLD AUTO: 0 %
BILIRUB SERPL-MCNC: 0.4 MG/DL
BUN SERPL-MCNC: 10.1 MG/DL (ref 8–23)
CALCIUM SERPL-MCNC: 9.5 MG/DL (ref 8.8–10.2)
CHLORIDE SERPL-SCNC: 91 MMOL/L (ref 98–107)
CREAT SERPL-MCNC: 0.58 MG/DL (ref 0.67–1.17)
D DIMER PPP FEU-MCNC: 0.94 UG/ML FEU (ref 0–0.5)
DEPRECATED HCO3 PLAS-SCNC: 25 MMOL/L (ref 22–29)
DIASTOLIC BLOOD PRESSURE - MUSE: NORMAL MMHG
EGFRCR SERPLBLD CKD-EPI 2021: >90 ML/MIN/1.73M2
EOSINOPHIL # BLD AUTO: 0 10E3/UL (ref 0–0.7)
EOSINOPHIL NFR BLD AUTO: 0 %
ERYTHROCYTE [DISTWIDTH] IN BLOOD BY AUTOMATED COUNT: 14 % (ref 10–15)
FLUAV RNA SPEC QL NAA+PROBE: NEGATIVE
FLUBV RNA RESP QL NAA+PROBE: NEGATIVE
GLUCOSE SERPL-MCNC: 96 MG/DL (ref 70–99)
HCO3 BLDV-SCNC: 29 MMOL/L (ref 21–28)
HCT VFR BLD AUTO: 43.7 % (ref 40–53)
HGB BLD-MCNC: 14.7 G/DL (ref 13.3–17.7)
IMM GRANULOCYTES # BLD: 0.1 10E3/UL
IMM GRANULOCYTES NFR BLD: 1 %
INR PPP: 0.99 (ref 0.85–1.15)
INTERPRETATION ECG - MUSE: NORMAL
LACTATE BLD-SCNC: 1.7 MMOL/L
LYMPHOCYTES # BLD AUTO: 0.6 10E3/UL (ref 0.8–5.3)
LYMPHOCYTES NFR BLD AUTO: 5 %
MAGNESIUM SERPL-MCNC: 2 MG/DL (ref 1.7–2.3)
MCH RBC QN AUTO: 33.3 PG (ref 26.5–33)
MCHC RBC AUTO-ENTMCNC: 33.6 G/DL (ref 31.5–36.5)
MCV RBC AUTO: 99 FL (ref 78–100)
MONOCYTES # BLD AUTO: 1.3 10E3/UL (ref 0–1.3)
MONOCYTES NFR BLD AUTO: 13 %
NEUTROPHILS # BLD AUTO: 8.4 10E3/UL (ref 1.6–8.3)
NEUTROPHILS NFR BLD AUTO: 81 %
NRBC # BLD AUTO: 0 10E3/UL
NRBC BLD AUTO-RTO: 0 /100
NT-PROBNP SERPL-MCNC: 530 PG/ML (ref 0–900)
P AXIS - MUSE: 80 DEGREES
PCO2 BLDV: 52 MM HG (ref 40–50)
PH BLDV: 7.35 [PH] (ref 7.32–7.43)
PHOSPHATE SERPL-MCNC: 4.2 MG/DL (ref 2.5–4.5)
PLATELET # BLD AUTO: 210 10E3/UL (ref 150–450)
PO2 BLDV: 14 MM HG (ref 25–47)
POTASSIUM SERPL-SCNC: 4.6 MMOL/L (ref 3.4–5.3)
PR INTERVAL - MUSE: 130 MS
PROCALCITONIN SERPL IA-MCNC: 0.22 NG/ML
PROT SERPL-MCNC: 7.2 G/DL (ref 6.4–8.3)
QRS DURATION - MUSE: 80 MS
QT - MUSE: 306 MS
QTC - MUSE: 417 MS
R AXIS - MUSE: -49 DEGREES
RBC # BLD AUTO: 4.42 10E6/UL (ref 4.4–5.9)
RSV RNA SPEC NAA+PROBE: NEGATIVE
SAO2 % BLDV: 15 % (ref 94–100)
SARS-COV-2 RNA RESP QL NAA+PROBE: NEGATIVE
SODIUM SERPL-SCNC: 130 MMOL/L (ref 135–145)
SYSTOLIC BLOOD PRESSURE - MUSE: NORMAL MMHG
T AXIS - MUSE: 86 DEGREES
TROPONIN T SERPL HS-MCNC: 14 NG/L
TROPONIN T SERPL HS-MCNC: 15 NG/L
TROPONIN T SERPL HS-MCNC: 18 NG/L
TROPONIN T SERPL HS-MCNC: 23 NG/L
VENTRICULAR RATE- MUSE: 112 BPM
WBC # BLD AUTO: 10.4 10E3/UL (ref 4–11)

## 2023-11-16 PROCEDURE — 85610 PROTHROMBIN TIME: CPT | Performed by: EMERGENCY MEDICINE

## 2023-11-16 PROCEDURE — 36415 COLL VENOUS BLD VENIPUNCTURE: CPT | Performed by: PHYSICIAN ASSISTANT

## 2023-11-16 PROCEDURE — 87637 SARSCOV2&INF A&B&RSV AMP PRB: CPT | Performed by: EMERGENCY MEDICINE

## 2023-11-16 PROCEDURE — 250N000013 HC RX MED GY IP 250 OP 250 PS 637: Performed by: PHYSICIAN ASSISTANT

## 2023-11-16 PROCEDURE — 83735 ASSAY OF MAGNESIUM: CPT | Performed by: PHYSICIAN ASSISTANT

## 2023-11-16 PROCEDURE — 36415 COLL VENOUS BLD VENIPUNCTURE: CPT | Performed by: EMERGENCY MEDICINE

## 2023-11-16 PROCEDURE — 99285 EMERGENCY DEPT VISIT HI MDM: CPT | Mod: 25

## 2023-11-16 PROCEDURE — 94640 AIRWAY INHALATION TREATMENT: CPT | Mod: 76

## 2023-11-16 PROCEDURE — 84100 ASSAY OF PHOSPHORUS: CPT | Performed by: PHYSICIAN ASSISTANT

## 2023-11-16 PROCEDURE — 84145 PROCALCITONIN (PCT): CPT | Performed by: EMERGENCY MEDICINE

## 2023-11-16 PROCEDURE — 94640 AIRWAY INHALATION TREATMENT: CPT

## 2023-11-16 PROCEDURE — 99223 1ST HOSP IP/OBS HIGH 75: CPT | Performed by: PHYSICIAN ASSISTANT

## 2023-11-16 PROCEDURE — 80053 COMPREHEN METABOLIC PANEL: CPT | Performed by: EMERGENCY MEDICINE

## 2023-11-16 PROCEDURE — 84484 ASSAY OF TROPONIN QUANT: CPT | Performed by: EMERGENCY MEDICINE

## 2023-11-16 PROCEDURE — 250N000009 HC RX 250: Performed by: PHYSICIAN ASSISTANT

## 2023-11-16 PROCEDURE — 83880 ASSAY OF NATRIURETIC PEPTIDE: CPT | Performed by: EMERGENCY MEDICINE

## 2023-11-16 PROCEDURE — 82803 BLOOD GASES ANY COMBINATION: CPT

## 2023-11-16 PROCEDURE — 93005 ELECTROCARDIOGRAM TRACING: CPT

## 2023-11-16 PROCEDURE — 85730 THROMBOPLASTIN TIME PARTIAL: CPT | Performed by: EMERGENCY MEDICINE

## 2023-11-16 PROCEDURE — 85025 COMPLETE CBC W/AUTO DIFF WBC: CPT | Performed by: EMERGENCY MEDICINE

## 2023-11-16 PROCEDURE — 258N000003 HC RX IP 258 OP 636: Performed by: PHYSICIAN ASSISTANT

## 2023-11-16 PROCEDURE — 71275 CT ANGIOGRAPHY CHEST: CPT

## 2023-11-16 PROCEDURE — 85379 FIBRIN DEGRADATION QUANT: CPT | Performed by: EMERGENCY MEDICINE

## 2023-11-16 PROCEDURE — 250N000013 HC RX MED GY IP 250 OP 250 PS 637: Performed by: EMERGENCY MEDICINE

## 2023-11-16 PROCEDURE — 250N000013 HC RX MED GY IP 250 OP 250 PS 637

## 2023-11-16 PROCEDURE — 71045 X-RAY EXAM CHEST 1 VIEW: CPT

## 2023-11-16 PROCEDURE — 96374 THER/PROPH/DIAG INJ IV PUSH: CPT | Mod: 59

## 2023-11-16 PROCEDURE — 84484 ASSAY OF TROPONIN QUANT: CPT | Performed by: PHYSICIAN ASSISTANT

## 2023-11-16 PROCEDURE — 250N000011 HC RX IP 250 OP 636: Performed by: EMERGENCY MEDICINE

## 2023-11-16 PROCEDURE — 999N000157 HC STATISTIC RCP TIME EA 10 MIN

## 2023-11-16 PROCEDURE — 210N000002 HC R&B HEART CARE

## 2023-11-16 PROCEDURE — 250N000011 HC RX IP 250 OP 636: Mod: JZ | Performed by: EMERGENCY MEDICINE

## 2023-11-16 PROCEDURE — 258N000003 HC RX IP 258 OP 636: Performed by: EMERGENCY MEDICINE

## 2023-11-16 PROCEDURE — 250N000009 HC RX 250: Performed by: EMERGENCY MEDICINE

## 2023-11-16 RX ORDER — AMOXICILLIN 250 MG
1 CAPSULE ORAL 2 TIMES DAILY PRN
Status: DISCONTINUED | OUTPATIENT
Start: 2023-11-16 | End: 2023-11-21 | Stop reason: HOSPADM

## 2023-11-16 RX ORDER — ACETAMINOPHEN 650 MG/1
650 SUPPOSITORY RECTAL EVERY 4 HOURS PRN
Status: DISCONTINUED | OUTPATIENT
Start: 2023-11-16 | End: 2023-11-21 | Stop reason: HOSPADM

## 2023-11-16 RX ORDER — IPRATROPIUM BROMIDE AND ALBUTEROL SULFATE 2.5; .5 MG/3ML; MG/3ML
3 SOLUTION RESPIRATORY (INHALATION)
Status: DISCONTINUED | OUTPATIENT
Start: 2023-11-16 | End: 2023-11-16

## 2023-11-16 RX ORDER — IPRATROPIUM BROMIDE AND ALBUTEROL SULFATE 2.5; .5 MG/3ML; MG/3ML
3 SOLUTION RESPIRATORY (INHALATION)
Status: DISCONTINUED | OUTPATIENT
Start: 2023-11-16 | End: 2023-11-21 | Stop reason: HOSPADM

## 2023-11-16 RX ORDER — ATORVASTATIN CALCIUM 40 MG/1
40 TABLET, FILM COATED ORAL DAILY
Status: DISCONTINUED | OUTPATIENT
Start: 2023-11-16 | End: 2023-11-21 | Stop reason: HOSPADM

## 2023-11-16 RX ORDER — CEFTRIAXONE 2 G/1
2 INJECTION, POWDER, FOR SOLUTION INTRAMUSCULAR; INTRAVENOUS EVERY 24 HOURS
Status: DISCONTINUED | OUTPATIENT
Start: 2023-11-17 | End: 2023-11-21 | Stop reason: HOSPADM

## 2023-11-16 RX ORDER — OXYCODONE HYDROCHLORIDE 5 MG/1
5 TABLET ORAL
Status: DISCONTINUED | OUTPATIENT
Start: 2023-11-16 | End: 2023-11-21 | Stop reason: HOSPADM

## 2023-11-16 RX ORDER — AMLODIPINE BESYLATE 5 MG/1
5 TABLET ORAL DAILY
Status: DISCONTINUED | OUTPATIENT
Start: 2023-11-17 | End: 2023-11-17

## 2023-11-16 RX ORDER — NALOXONE HYDROCHLORIDE 0.4 MG/ML
0.4 INJECTION, SOLUTION INTRAMUSCULAR; INTRAVENOUS; SUBCUTANEOUS
Status: DISCONTINUED | OUTPATIENT
Start: 2023-11-16 | End: 2023-11-21 | Stop reason: HOSPADM

## 2023-11-16 RX ORDER — POLYETHYLENE GLYCOL 3350 17 G/17G
17 POWDER, FOR SOLUTION ORAL 2 TIMES DAILY PRN
Status: DISCONTINUED | OUTPATIENT
Start: 2023-11-16 | End: 2023-11-21 | Stop reason: HOSPADM

## 2023-11-16 RX ORDER — LIDOCAINE 40 MG/G
CREAM TOPICAL
Status: DISCONTINUED | OUTPATIENT
Start: 2023-11-16 | End: 2023-11-21 | Stop reason: HOSPADM

## 2023-11-16 RX ORDER — AMOXICILLIN 250 MG
2 CAPSULE ORAL 2 TIMES DAILY PRN
Status: DISCONTINUED | OUTPATIENT
Start: 2023-11-16 | End: 2023-11-21 | Stop reason: HOSPADM

## 2023-11-16 RX ORDER — LIDOCAINE 40 MG/G
CREAM TOPICAL 2 TIMES DAILY PRN
COMMUNITY
End: 2024-03-28

## 2023-11-16 RX ORDER — CEFTRIAXONE 2 G/1
2 INJECTION, POWDER, FOR SOLUTION INTRAMUSCULAR; INTRAVENOUS ONCE
Status: COMPLETED | OUTPATIENT
Start: 2023-11-16 | End: 2023-11-16

## 2023-11-16 RX ORDER — AZITHROMYCIN 250 MG/1
500 TABLET, FILM COATED ORAL ONCE
Status: COMPLETED | OUTPATIENT
Start: 2023-11-16 | End: 2023-11-16

## 2023-11-16 RX ORDER — AMOXICILLIN 250 MG
2-3 CAPSULE ORAL AT BEDTIME
COMMUNITY

## 2023-11-16 RX ORDER — ONDANSETRON 2 MG/ML
4 INJECTION INTRAMUSCULAR; INTRAVENOUS EVERY 6 HOURS PRN
Status: DISCONTINUED | OUTPATIENT
Start: 2023-11-16 | End: 2023-11-21 | Stop reason: HOSPADM

## 2023-11-16 RX ORDER — IOPAMIDOL 755 MG/ML
49 INJECTION, SOLUTION INTRAVASCULAR ONCE
Status: COMPLETED | OUTPATIENT
Start: 2023-11-16 | End: 2023-11-16

## 2023-11-16 RX ORDER — GUAIFENESIN 600 MG/1
1200 TABLET, EXTENDED RELEASE ORAL 2 TIMES DAILY
Status: DISCONTINUED | OUTPATIENT
Start: 2023-11-16 | End: 2023-11-21 | Stop reason: HOSPADM

## 2023-11-16 RX ORDER — ONDANSETRON 4 MG/1
4 TABLET, ORALLY DISINTEGRATING ORAL EVERY 6 HOURS PRN
Status: DISCONTINUED | OUTPATIENT
Start: 2023-11-16 | End: 2023-11-21 | Stop reason: HOSPADM

## 2023-11-16 RX ORDER — SODIUM CHLORIDE 9 MG/ML
INJECTION, SOLUTION INTRAVENOUS CONTINUOUS
Status: DISCONTINUED | OUTPATIENT
Start: 2023-11-16 | End: 2023-11-17

## 2023-11-16 RX ORDER — NALOXONE HYDROCHLORIDE 0.4 MG/ML
0.2 INJECTION, SOLUTION INTRAMUSCULAR; INTRAVENOUS; SUBCUTANEOUS
Status: DISCONTINUED | OUTPATIENT
Start: 2023-11-16 | End: 2023-11-21 | Stop reason: HOSPADM

## 2023-11-16 RX ORDER — ACETAMINOPHEN 325 MG/1
650 TABLET ORAL EVERY 4 HOURS PRN
Status: DISCONTINUED | OUTPATIENT
Start: 2023-11-16 | End: 2023-11-21 | Stop reason: HOSPADM

## 2023-11-16 RX ORDER — LOSARTAN POTASSIUM 100 MG/1
100 TABLET ORAL DAILY
Status: DISCONTINUED | OUTPATIENT
Start: 2023-11-17 | End: 2023-11-21 | Stop reason: HOSPADM

## 2023-11-16 RX ORDER — IPRATROPIUM BROMIDE AND ALBUTEROL SULFATE 2.5; .5 MG/3ML; MG/3ML
3 SOLUTION RESPIRATORY (INHALATION) EVERY 4 HOURS PRN
Status: DISCONTINUED | OUTPATIENT
Start: 2023-11-16 | End: 2023-11-21 | Stop reason: HOSPADM

## 2023-11-16 RX ORDER — CALCIUM CARBONATE 500 MG/1
1000 TABLET, CHEWABLE ORAL 4 TIMES DAILY PRN
Status: DISCONTINUED | OUTPATIENT
Start: 2023-11-16 | End: 2023-11-21 | Stop reason: HOSPADM

## 2023-11-16 RX ORDER — AMOXICILLIN 250 MG
2 CAPSULE ORAL AT BEDTIME
Status: DISCONTINUED | OUTPATIENT
Start: 2023-11-16 | End: 2023-11-21 | Stop reason: HOSPADM

## 2023-11-16 RX ORDER — METHYLPREDNISOLONE SODIUM SUCCINATE 125 MG/2ML
60 INJECTION, POWDER, LYOPHILIZED, FOR SOLUTION INTRAMUSCULAR; INTRAVENOUS EVERY 12 HOURS
Status: DISCONTINUED | OUTPATIENT
Start: 2023-11-17 | End: 2023-11-18

## 2023-11-16 RX ORDER — BUTALBITAL, ACETAMINOPHEN AND CAFFEINE 50; 325; 40 MG/1; MG/1; MG/1
1 TABLET ORAL EVERY 6 HOURS PRN
Status: DISCONTINUED | OUTPATIENT
Start: 2023-11-16 | End: 2023-11-21 | Stop reason: HOSPADM

## 2023-11-16 RX ORDER — BISACODYL 10 MG
10 SUPPOSITORY, RECTAL RECTAL DAILY PRN
Status: DISCONTINUED | OUTPATIENT
Start: 2023-11-16 | End: 2023-11-21 | Stop reason: HOSPADM

## 2023-11-16 RX ADMIN — AZITHROMYCIN DIHYDRATE 500 MG: 250 TABLET, FILM COATED ORAL at 13:28

## 2023-11-16 RX ADMIN — SODIUM CHLORIDE: 9 INJECTION, SOLUTION INTRAVENOUS at 16:34

## 2023-11-16 RX ADMIN — IOPAMIDOL 49 ML: 755 INJECTION, SOLUTION INTRAVENOUS at 11:38

## 2023-11-16 RX ADMIN — IPRATROPIUM BROMIDE AND ALBUTEROL SULFATE 3 ML: .5; 3 SOLUTION RESPIRATORY (INHALATION) at 18:53

## 2023-11-16 RX ADMIN — SODIUM CHLORIDE 500 ML: 9 INJECTION, SOLUTION INTRAVENOUS at 09:37

## 2023-11-16 RX ADMIN — ATORVASTATIN CALCIUM 40 MG: 40 TABLET, FILM COATED ORAL at 16:34

## 2023-11-16 RX ADMIN — IPRATROPIUM BROMIDE AND ALBUTEROL SULFATE 3 ML: .5; 3 SOLUTION RESPIRATORY (INHALATION) at 09:36

## 2023-11-16 RX ADMIN — IPRATROPIUM BROMIDE AND ALBUTEROL SULFATE 3 ML: .5; 3 SOLUTION RESPIRATORY (INHALATION) at 09:37

## 2023-11-16 RX ADMIN — CEFTRIAXONE SODIUM 2 G: 2 INJECTION, POWDER, FOR SOLUTION INTRAMUSCULAR; INTRAVENOUS at 12:52

## 2023-11-16 RX ADMIN — IPRATROPIUM BROMIDE AND ALBUTEROL SULFATE 3 ML: .5; 3 SOLUTION RESPIRATORY (INHALATION) at 16:23

## 2023-11-16 RX ADMIN — OXYCODONE HYDROCHLORIDE 5 MG: 5 TABLET ORAL at 16:33

## 2023-11-16 RX ADMIN — OXYCODONE HYDROCHLORIDE 5 MG: 5 TABLET ORAL at 19:38

## 2023-11-16 RX ADMIN — ACETAMINOPHEN: 500 TABLET, FILM COATED ORAL at 22:02

## 2023-11-16 RX ADMIN — GUAIFENESIN 1200 MG: 600 TABLET, EXTENDED RELEASE ORAL at 22:02

## 2023-11-16 RX ADMIN — SODIUM CHLORIDE 78 ML: 9 INJECTION, SOLUTION INTRAVENOUS at 11:38

## 2023-11-16 ASSESSMENT — ACTIVITIES OF DAILY LIVING (ADL)
ADLS_ACUITY_SCORE: 37
ADLS_ACUITY_SCORE: 35
ADLS_ACUITY_SCORE: 35
ADLS_ACUITY_SCORE: 37
ADLS_ACUITY_SCORE: 37
ADLS_ACUITY_SCORE: 35
ADLS_ACUITY_SCORE: 37

## 2023-11-16 NOTE — ED NOTES
"Patient requesting oxygen mask, stating \"there is no air movement in here, I can't breath\". Patient 94% on RA. Oxygen mask placed at 3L for comfort.Patient refusing nasal cannula.   "

## 2023-11-16 NOTE — PROGRESS NOTES
Patient arrived to STO3 on BiPAP 10/5 21% for increased SOB. Patient RR normal with SpO2 in the high 90's. BiPAP was taken off and patient is now on room air. Lung sounds are very diminished after 2 duoneb treatments. BiPAP currently on standby in the room    Augustus Parra RRT on 11/16/2023 at 9:48 AM

## 2023-11-16 NOTE — PHARMACY-ADMISSION MEDICATION HISTORY
Pharmacist Admission Medication History    Admission medication history is complete. The information provided in this note is only as accurate as the sources available at the time of the update.    Information Source(s): Patient and CareEverywhere/SureScripts via in-person    Pertinent Information: none    Changes made to PTA medication list:  Added: aspercream, senna-s, excedrin PM  Deleted: None  Changed: None    Medication Affordability:  Not including over the counter (OTC) medications, was there a time in the past 3 months when you did not take your medications as prescribed because of cost?: No    Allergies reviewed with patient and updates made in EHR: yes    Medication History Completed By: Pricilla Raymond [facility] 11/16/2023 10:19 AM    PTA Med List   Medication Sig Last Dose    albuterol (2.5 MG/3ML) 0.083% neb solution Take 2.5 mg by nebulization 6 times daily 11/15/2023    amLODIPine (NORVASC) 5 MG tablet TAKE 1 TABLET BY MOUTH  DAILY 11/15/2023 at am    atorvastatin (LIPITOR) 40 MG tablet Take 1 tablet (40 mg) by mouth daily 11/15/2023 at am    diphenhydrAMINE-APAP, sleep,  MG TABS Take 2 tablets by mouth at bedtime 11/15/2023 at pm    lidocaine (LMX4) 4 % external cream Apply topically 2 times daily as needed for mild pain or other (restless legs) Past Week    oxyCODONE (ROXICODONE) 5 MG tablet TAKE 1 TABLET BY MOUTH EVERY 3 TO 4 HOURS AS NEEDED FOR PAIN Past Week    senna-docusate (SENOKOT-S/PERICOLACE) 8.6-50 MG tablet Take 2-3 tablets by mouth at bedtime 11/15/2023 at pm    TRELEGY ELLIPTA 200-62.5-25 MCG/ACT oral inhaler INHALE 1 PUFF BY INHALATION ROUTE EVERY DAY AT THE SAME TIME EACH DAY 11/15/2023 at am

## 2023-11-16 NOTE — ED PROVIDER NOTES
History     Chief Complaint:  Respiratory Distress       The history is provided by the patient and the EMS personnel.      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 He reports trouble walking in that time. Reports subjective fever yesterday. Reports left-sided chest pain today. He used a nebulizer treatment this morning without relief. Patient is not on supplemental oxygen at home. Denies history of heart disease. Patient follows with a pulmonologist and oncologist.    Independent Historian:   EMS report worsening sob over the past 3 days and much worse today. Patient took Trelegy inhaler, albuterol inhaler, and a nebulizer treatment at home without relief. EMS reports he was satting at 100% on room air. Respiratory rate was in the 20s. EMS delivered one Duoneb treatment, which did not seem to help. They then placed him on BiPAP. Delivered 4 Zofran and 125 solu medrol IV. 12 lead showed sinus tachycardia with intermittent PVCs.     Review of External Notes:  I reviewed the patient's recent primary care clinic note from 10/9/2023:  Patient has chronic groin pain, hyponatremia, severe protein calorie malnutrition as well as pulmonary emphysema and left upper lobe pulmonary nodule/cancer.    [place] oncology note reviewed from 10/11/2023:  Patient is being followed for an enlarging left upper lung nodule which has been shown to be reducing in size after radiation treatment.  Patient is on chronic oxycodone.     ROS:  See HPI    Allergies:  Chantix [Varenicline]  Morphine Hcl  Wellbutrin [Bupropion Hydrobromide]     Physical Exam   Patient Vitals for the past 24 hrs:   BP Temp Temp src Pulse Resp SpO2 Height Weight   11/16/23 1056 121/73 -- -- 96 17 94 % -- --   11/16/23 0955 -- -- --  "100 18 99 % -- --   11/16/23 0950 -- -- -- 105 23 97 % -- --   11/16/23 0945 (!) 158/78 -- -- 108 19 98 % -- --   11/16/23 0940 -- -- -- 113 27 99 % -- --   11/16/23 0935 (!) 151/86 -- -- 113 19 100 % -- --   11/16/23 0933 (!) 139/96 98.3  F (36.8  C) Temporal 110 22 96 % 1.854 m (6' 1\") 44.2 kg (97 lb 7.1 oz)        Physical Exam  General: Appears frail, malnourished and nontoxic.  Resting comfortably  Head:  Scalp, face, and head appear normal  Eyes:  Pupils are equal, round    Conjunctivae non-injected and sclerae white  ENT:    The external nose is normal    Pinnae are normal  Neck:  Normal range of motion    There is no rigidity noted    Trachea is in the midline  CV:  Regular rate and rhythm     Normal S1/S2, no S3/S4    No murmur or rub. Radial pulses 2+ bilaterally.  Resp:  Breath sounds are diminished throughout.  Moderate scattered wheezing.  + tachypnea    Mild increased work of breathing    No rhonchi  GI:  Abdomen is soft, no rigidity or guarding    No distension, or mass    No tenderness or rebound tenderness   MS:  Normal muscular tone    Symmetric motor strength    No lower extremity edema. No calf swelling or tenderness.  Skin:  No rash or acute skin lesions noted  Neuro:  Awake and alert  Speech is normal and fluent  Moves all extremities spontaneously  Psych: Normal affect. Appropriate interactions.     Emergency Department Course   ECG:  ECG results from 11/16/23   EKG 12-lead, tracing only     Value    Systolic Blood Pressure     Diastolic Blood Pressure     Ventricular Rate 112    Atrial Rate 112    NV Interval 130    QRS Duration 80        QTc 417    P Axis 80    R AXIS -49    T Axis 86    Interpretation ECG      Sinus tachycardia  Left axis deviation  Abnormal ECG  When compared with ECG of 30-JUL-2022 22:16,  No significant change was found.         Imaging:  CT Chest Pulmonary Embolism w Contrast   Final Result   IMPRESSION:   1.  No pulmonary emboli.   2.  New patchy opacities in the " left upper lobe and left lower lobe,   likely pneumonia. Recommend a follow-up chest CT in 3 months to ensure   resolution.   3.  Few irregular nodular opacities in the right lower lobe are   stable.   4.  Stable mediastinal lymphadenopathy.   5.  Normal variant duplicated SVC with the left SVC draining into the   coronary sinus.      JESUS MARCUS MD            SYSTEM ID:  [id number]      XR Chest Port 1 View   Preliminary Result   IMPRESSION: Emphysematous changes. No acute airspace consolidation.   Resolved airspace opacities at the left lung base since the prior   exam. Some scarring at the bilateral apices again noted appearing   stable. Stable cardiac silhouette.         Report per radiology    Laboratory:  Labs Ordered and Resulted from Time of ED Arrival to Time of ED Departure   D DIMER QUANTITATIVE - Abnormal       Result Value    D-Dimer Quantitative 0.94 (*)    COMPREHENSIVE METABOLIC PANEL - Abnormal    Sodium 130 (*)     Potassium 4.6      Carbon Dioxide (CO2) 25      Anion Gap 14      Urea Nitrogen 10.1      Creatinine 0.58 (*)     GFR Estimate >90      Calcium 9.5      Chloride 91 (*)     Glucose 96      Alkaline Phosphatase 121      AST 27      ALT 22      Protein Total 7.2      Albumin 4.0      Bilirubin Total 0.4     TROPONIN T, HIGH SENSITIVITY - Abnormal    Troponin T, High Sensitivity 23 (*)    CBC WITH PLATELETS AND DIFFERENTIAL - Abnormal    WBC Count 10.4      RBC Count 4.42      Hemoglobin 14.7      Hematocrit 43.7      MCV 99      MCH 33.3 (*)     MCHC 33.6      RDW 14.0      Platelet Count 210      % Neutrophils 81      % Lymphocytes 5      % Monocytes 13      % Eosinophils 0      % Basophils 0      % Immature Granulocytes 1      NRBCs per 100 WBC 0      Absolute Neutrophils 8.4 (*)     Absolute Lymphocytes 0.6 (*)     Absolute Monocytes 1.3      Absolute Eosinophils 0.0      Absolute Basophils 0.0      Absolute Immature Granulocytes 0.1      Absolute NRBCs 0.0     ISTAT GASES LACTATE  VENOUS POCT - Abnormal    Lactic Acid POCT 1.7      Bicarbonate Venous POCT 29 (*)     O2 Sat, Venous POCT 15 (*)     pCO2 Venous POCT 52 (*)     pH Venous POCT 7.35      pO2 Venous POCT 14 (*)    INR - Normal    INR 0.99     PARTIAL THROMBOPLASTIN TIME - Normal    aPTT 29     PROCALCITONIN - Normal    Procalcitonin 0.22     NT PROBNP INPATIENT - Normal    N terminal Pro BNP Inpatient 530     INFLUENZA A/B, RSV, & SARS-COV2 PCR - Normal    Influenza A PCR Negative      Influenza B PCR Negative      RSV PCR Negative      SARS CoV2 PCR Negative     TROPONIN T, HIGH SENSITIVITY - Normal    Troponin T, High Sensitivity 18        Emergency Department Course & Assessments:       Interventions:  Medications   ipratropium - albuterol 0.5 mg/2.5 mg/3 mL (DUONEB) neb solution 3 mL (3 mLs Nebulization $Given 11/16/23 0937)   cefTRIAXone (ROCEPHIN) 2 g vial to attach to  ml bag for ADULTS or NS 50 ml bag for PEDS (2 g Intravenous $New Bag 11/16/23 1252)   azithromycin (ZITHROMAX) tablet 500 mg (has no administration in time range)   sodium chloride 0.9% BOLUS 500 mL (0 mLs Intravenous Stopped 11/16/23 0956)   Saline flush (78 mLs Intravenous $Given 11/16/23 1138)   iopamidol (ISOVUE-370) solution 49 mL (49 mLs Intravenous $Given 11/16/23 1138)        Assessments, Independent Interpretation, Consult/Discussion of ManagementTests:  ED Course as of 11/16/23 1301   Thu Nov 16, 2023   0943 I obtained the history and examined the patient as noted above.    0942 Rechecked the patient.   0959 On my independent interpretation of the patient's chest radiograph(s) there is no pneumothorax or large pleural effusions.    1259 I consulted with Gonzalez Vásquez PA-C, of the hospitalist team, regarding the patient. They accepted the patient for admission on behalf of Dr. Platt.       Social Determinants of Health affecting care:  None    Disposition:  The patient was admitted to the hospital under the care of Dr. Platt.     Impression & Plan     Medical Decision Making:  Ángel Pham is a 68 year old male who presents to the ED for evaluation of worsening shortness of breath.  ED evaluation is consistent with COPD exacerbation due to underlying severe emphysema as well as left-sided pulmonary infiltrates concerning for pneumonia.  Patient is otherwise hemodynamically stable.  He initially arrived on BiPAP from EMS but was able to be transitioned to room air after nebulizer treatment.  EMS provided 125 mg of Solu-Medrol IV prior to patient arrival to the emergency department.  No additional steroids indicated at this time.  No pulmonary emboli.  No pneumothorax.  No evidence of CHF/pulmonary edema/volume overload.  EKG without evidence of acute ischemic changes or dysrhythmia.  Treatment for pneumonia was started with ceftriaxone and azithromycin. COVID/Influenza/RSV testing negative.  No evidence of acute coronary syndrome.  Remainder of ED evaluation is otherwise reassuring.  The case was discussed with the hospitalist and the patient was admitted in stable and improved condition.    Diagnosis:    ICD-10-CM    1. COPD with acute exacerbation (H)  J44.1       2. Community acquired pneumonia of left lung, unspecified part of lung  J18.9          Scribe Disclosure:  Marianna DENNY, am serving as a scribe at 9:40 AM on 11/16/2023 to document services personally performed by Sung Ortiz MD based on my observations and the provider's statements to me.    11/16/2023  Sung Ortiz MD    Historical Data:  ______________________________________________________________________  Medications:    albuterol (2.5 MG/3ML) 0.083% neb solution  amLODIPine (NORVASC) 5 MG tablet  atorvastatin (LIPITOR) 40 MG tablet  diphenhydrAMINE-APAP, sleep,  MG TABS  lidocaine (LMX4) 4 % external cream  oxyCODONE (ROXICODONE) 5 MG tablet  senna-docusate (SENOKOT-S/PERICOLACE) 8.6-50 MG tablet  TRELEGY ELLIPTA 200-62.5-25 MCG/ACT oral inhaler  albuterol (PROAIR  HFA/PROVENTIL HFA/VENTOLIN HFA) 108 (90 Base) MCG/ACT inhaler  butalbital-acetaminophen-caffeine (FIORICET/ESGIC) -40 MG per tablet  losartan (COZAAR) 100 MG tablet        Past Medical History:   Past Surgical History:     Past Medical History:   Diagnosis Date    Abrasions of multiple sites 8/1/2022    Alcoholic intoxication with complication (H24) 7/31/2022    Allergy history unknown     Carcinoma of supraglottis (H)     COPD (chronic obstructive pulmonary disease) (H)     HTN, goal below 140/90     Hyperlipidaemia LDL goal < 100     Hypoglycemia 7/31/2022    Migraine     Pneumothorax on left     Past Surgical History:   Procedure Laterality Date    BRONCHOSCOPY      BRONCHOSCOPY RIGID OR FLEXIBLE W/TRANSENDOSCOPIC ENDOBRONCHIAL ULTRASOUND GUIDED N/A 1/18/2021    Procedure: BRONCHOSCOPY, WITH ENDOBRONCHIAL ULTRASOUND;  Surgeon: Yasir Combs MD;  Location:  OR    ENT SURGERY      sinus    HC HEMORROIDECTOMY, EXTERNAL, SINGLE COLUMN/GROUP      left    HERNIA REPAIR      umbilical and groin    LARYNGOSCOPY WITH BIOPSY(IES) Left 7/26/2019    Procedure: MICRODIRECT LARYNGOSCOPY WITH BIOPSY OF LEFT LARYNGEAL MASS;  Surgeon: Boo Mehta MD;  Location:  OR    THORACIC SURGERY Right 2005    pneumothorax      Patient Active Problem List    Diagnosis Date Noted    COPD with acute exacerbation (H) 11/16/2023     Priority: Medium    Community acquired pneumonia of left lung, unspecified part of lung 11/16/2023     Priority: Medium    Uncomplicated alcohol dependence (H) 02/13/2023     Priority: Medium    Aortic calcification (H24) 08/24/2022     Priority: Medium    Other chronic postprocedural pain 08/24/2022     Priority: Medium    Uncomplicated opioid dependence (H) 08/24/2022     Priority: Medium    Hyponatremia 07/31/2022     Priority: Medium    Peripheral arterial disease (H24) 10/13/2021     Priority: Medium     Heavy calcifications in aorta on ultrasound 2021      Left upper lobe pulmonary nodule  10/06/2021     Priority: Medium    Mild alcohol use disorder 10/06/2021     Priority: Medium    Severe protein-calorie malnutrition (H24) 10/06/2021     Priority: Medium    HTN, goal below 140/90      Priority: Medium    COPD (chronic obstructive pulmonary disease) (H)      Priority: Medium    History of pneumothorax      Priority: Medium          Family History:    family history includes Brain Cancer (age of onset: 55) in his father; Breast Cancer in his mother. Social History:   reports that he has been smoking cigarettes. He has a 43.00 pack-year smoking history. He has never used smokeless tobacco. He reports current alcohol use of about 15.0 standard drinks of alcohol per week. He reports that he does not use drugs.     PCP: Maxim Goodman Ryan Clay, MD  11/16/23 3177

## 2023-11-16 NOTE — ED NOTES
[facility]  ED Nurse Handoff Report    ED Chief complaint: Respiratory Distress      ED Diagnosis:   Final diagnoses:   COPD with acute exacerbation (H)   Community acquired pneumonia of left lung, unspecified part of lung       Code Status: To be addressed    Allergies:   Allergies   Allergen Reactions    Chantix [Varenicline] Other (See Comments)     Patient reports has tried this in past and had lightheadedness and nose bleeds.    Morphine Hcl Itching     On contact    Wellbutrin [Bupropion Hydrobromide]      syncope       Patient Story: Patient comes from home for respiratory distress. Hx COPD and lung cancer   Focused Assessment:  Aox4, 94% on RA, patient requesting 3L mask for comfort    Treatments and/or interventions provided: IV, labs, imaging, abx  Patient's response to treatments and/or interventions: fair    To be done/followed up on inpatient unit:  n/a    Does this patient have any cognitive concerns?:  None    Activity level - Baseline/Home:  Independent  Activity Level - Current:   Stand with Assist    Patient's Preferred language: English   Needed?: No    Isolation: None  Infection: Not Applicable  Patient tested for COVID 19 prior to admission: YES  Bariatric?: No    Vital Signs:   Vitals:    11/16/23 0945 11/16/23 0950 11/16/23 0955 11/16/23 1056   BP: (!) 158/78   121/73   Pulse: 108 105 100 96   Resp: 19 23 18 17   Temp:       TempSrc:       SpO2: 98% 97% 99% 94%   Weight:       Height:           Cardiac Rhythm:Cardiac Rhythm: Sinus tachycardia    Was the PSS-3 completed:   Yes  What interventions are required if any?               Family Comments: Daughter at bedside in ED  OBS brochure/video discussed/provided to patient/family: N/A              Name of person given brochure if not patient:               Relationship to patient:     For the majority of the shift this patient's behavior was Green.   Behavioral interventions performed were n/a.    ED NURSE PHONE  NUMBER: [phone]

## 2023-11-16 NOTE — PLAN OF CARE
Assessment and plan  Principal hospital problem: COPD exacerbation, pneumonia  Coping/psychosocial: calm, cooperative.  Cognitive: alert and oriented x 4.   Vital signs: stable on 3 L oxy mask.  Cardiovascular: denied chest pain.  Pulmonary: reported shortness of breath. Lung sounds diminished, frequent, productive cough  Musculoskeletal: mildly impaired.  Pain: reported headaches 8/10, prn oxycodone administered with periodic relief.  IV Access/drains: IV fluid running at 75 ml/hr.  GI/: voiding without difficulty, feels constipated.  Gannon catheter: not present.  Diet: Reg diet, thin liquids.   Activity: assist of 1 with gait belt and walker.  Safety: alarms on, safety rounds completed.    End of shift summary: Report given to oncoming shift RN. Discharge pending safe disposition plan.

## 2023-11-16 NOTE — ED TRIAGE NOTES
Pt with hx of copd, lung and throat CA, increased sob for the past 3 days     Triage Assessment (Adult)       Row Name 11/16/23 7430          Triage Assessment    Airway WDL WDL        Respiratory WDL    Respiratory WDL rhythm/pattern     Rhythm/Pattern, Respiratory shortness of breath        Cardiac WDL    Cardiac Rhythm ST        Cognitive/Neuro/Behavioral WDL    Cognitive/Neuro/Behavioral WDL WDL

## 2023-11-16 NOTE — ED NOTES
Bed: ST03  Expected date:   Expected time:   Means of arrival:   Comments:  Dorie - 534 - 65 M ALYSSA eta 0512

## 2023-11-16 NOTE — H&P
[facility]  History and Physical - Hospitalist Service       Date of Admission:  11/16/2023  PRIMARY CARE PROVIDER:    Maxim Goodman    Assessment & Plan   Ángel Pham is a 68 year old male admitted on 11/16/2023.    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      Spoke with Dr. Ortiz and reviewed ED notes.  EMS was called due to worsening shortness of breath occurring over the previous 3 days prior to admission.  Initially, patient was placed on BiPAP by EMS and was brought into [facility].  BiPAP was removed and patient has tolerated being on room air.    Work-up in the ED has included an EKG that showed sinus tachycardia with left axis deviation.  A CMP that revealed a sodium of 130, chloride of 91, creatinine of 0.58 with a GFR greater than 90 otherwise within normal limits.  CBC with differential revealed an MCH of 33.3, absolute lymphocytes of 0.6 and absolute neutrophils of 8.4 otherwise within normal limits.  INR and PTT were within normal limits.  D-dimer was elevated at 0.94.  proBNP was within normal limits at 530.  Procalcitonin was mildly elevated at 0.22.  High-sensitivity troponin T was mildly elevated at 23.  Lactic acid level was within normal limits at 1.7.  VBG showed a pH of 7.35, PCO2 of 52, PO2 of 14, O2 saturation of 15 and bicarbonate of 29.  Respiratory viral PCR panel was all negative.  Repeat troponin T with improvement to 18.    A one-view portable chest x-ray showed emphysematous change without acute airspace consolidation and noted resolved  airspace opacities at the left lung base since the prior exam as well as some scarring at the bilateral apices again noted appearing stable.  Chest CT/PE study with contrast was negative for PE but revealed new patchy opacities in the left upper lobe and left lower lobe (likely pneumonia with recommendation for follow-up chest CT in approximately 3 months to ensure resolution), few irregular nodular opacities in the right lower lobe are stable, stable mediastinal lymphadenopathy and normal variant duplicated SVC with the left SVC draining into the coronary sinus.    Patient received 2 DuoNeb treatments and a 500 ml IV fluid bolus with normal saline.  IV ceftriaxone 2 g and 500 mg of oral azithromycin have been ordered.  Patient received 125 mg of solumedrol via EMS.      Left lung community acquired pneumonia  COPD exacerbation  *No longer requiring BiPAP and when assessed was on oxymask at 3-4 L/m.  Discussed with nursing staff and patient requested this be placed for comfort.    - IV Azithromycin 500 mg/d.    - IV ceftriaxone 2 g/d while hospitalized.    - IV fluids at 75 ml/hr.    - Supplemental O2 available as needed; wean as able.    - Vital signs every 4 hours.    - CBC ordered for the morning.   - Hold PTA inhaler (Trelegy).      - Scheduled DuoNebs every 4 hours and PRN.    - RCAT consult requested.    - Mucinex 1200 mg BID.    - IV solumedrol 62.5 mg BID with plans to transition to PO prednisone in the next 24-48 hours.    - Encourage use of IS/Flutter valve.      Troponin elevation  *Suspect secondary to demand due to CAP/COPD exacerbation.  - Trend troponin.    - Monitor on telemetry.    - ECHO ordered.      Tobacco Use D/O   Patient typically smokes up to 1.5 packs/day.  In the past week he has not been smoking much at all.    - Counseled regarding smoking cessation that should also continue with PCP.    - Nicoderm patch offered and patient declined.    HTN  - Resumed on PTA amlodipine 5 mg/d and  "losartan 100 mg/d.  Hold parameters in place.    - Vitals every 4 hours.      HLP  - Resumed on PTA atorvastatin 40 mg/d.      Chronic Pain  - Resumed on PTA PRN oxycodone 5 mg every 3 hours.    - PRN APAP available.      Hyponatremia, chronic  *Baseline sodium between 128-132 in the past year.    - IV fluids at 75 ml/hr.   - BMP ordered for the morning.      Alcohol use D/O  *Patient typically consumes 4-6 beers per day.  He reported that he has not had much or any alcohol in the past several days.    - Monitor on CIWA and if scoring 8 or higher contact Hospitalist to initiate PRN ativan/valium.     - Follow up with PCP.      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 Ann and Dr. Dreyer of [facility].      Severe protein calorie malnutrition  Cachexia  - Regular diet.      Insomnia  - Resumed on PTA APAP-PM (Benadryl-APAP) at bedtime.      Migraines  - Resumed on PTA PRN Fioricet.     --Patient reported that he ran out of this medication recently.        Clinically Significant Risk Factors Present on Admission                  # Hypertension: Noted on problem list      # Cachexia: Estimated body mass index is 12.86 kg/m  as calculated from the following:    Height as of this encounter: 1.854 m (6' 1\").    Weight as of this encounter: 44.2 kg (97 lb 7.1 oz).                   Diet: Regular Diet Adult    DVT Prophylaxis: Pneumatic Compression Devices  Gannon Catheter: Not present  Lines: None     Cardiac Monitoring: Ordered  Code Status: FULL CODE         Disposition Plan   Inpatient status.  Anticipate rater than 2 evening hospitalization while undergoing continued evaluation and management of left lung commune acquired pneumonia with COPD exacerbation.    The patient's care was discussed with the Bedside Nurse, Patient, Patient's Family, and Dr. Ortiz .    The patient has been discussed with Dr. Platt, who agrees with the " assessment and plan at this time.    GLENNA Higgins [facility]  Securely message with the [employer]ino Web Console (learn more here)  Text page via [email or web address] Paging/Directory    ______________________________________________________________________    Chief Complaint   Shortness of breath    History is obtained from the Dr. Ortiz, patient and EMR.      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      Spoke with Dr. Ortiz and reviewed ED notes.  EMS was called due to worsening shortness of breath occurring over the previous 3 days prior to admission.  Initially, patient was placed on BiPAP by EMS and was brought into [facility].  BiPAP was removed and patient has tolerated being on room air.    Work-up in the ED has included an EKG that showed sinus tachycardia with left axis deviation.  A CMP that revealed a sodium of 130, chloride of 91, creatinine of 0.58 with a GFR greater than 90 otherwise within normal limits.  CBC with differential revealed an MCH of 33.3, absolute lymphocytes of 0.6 and absolute neutrophils of 8.4 otherwise within normal limits.  INR and PTT were within normal limits.  D-dimer was elevated at 0.94.  proBNP was within normal limits at 530.  Procalcitonin was mildly elevated at 0.22.  High-sensitivity troponin T was mildly elevated at 23.  Lactic acid level was within normal limits at 1.7.  VBG showed a pH of 7.35, PCO2 of 52, PO2 of 14, O2 saturation of 15 and bicarbonate of 29.  Respiratory  viral PCR panel was all negative.  Repeat troponin T with improvement to 18.    A one-view portable chest x-ray showed emphysematous change without acute airspace consolidation and noted resolved airspace opacities at the left lung base since the prior exam as well as some scarring at the bilateral apices again noted appearing stable.  Chest CT/PE study with contrast was negative for PE but revealed new patchy opacities in the left upper lobe and left lower lobe (likely pneumonia with recommendation for follow-up chest CT in approximately 3 months to ensure resolution), few irregular nodular opacities in the right lower lobe are stable, stable mediastinal lymphadenopathy and normal variant duplicated SVC with the left SVC draining into the coronary sinus.    Patient received 2 DuoNeb treatments and a 500 ml IV fluid bolus with normal saline.  IV ceftriaxone 2 g and 500 mg of oral azithromycin have been ordered.  Patient received 125 mg of solumedrol via EMS.      Patient was seen in the ED with family present in the room.  He was seated on the gurney upon arrival with oxy mask in place.  Initially, we reviewed his medical history and some of his home medications.  We reviewed events that led to patient's presentation to the ED.    Upon questioning, patient indicated that he had a fever approximately 2 days ago.  He has been experiencing worsening and increased fatigue and weakness.  He describes hyperventilating when attempting to walk small/short distances.  He indicated that he becomes winded when doing activities that he could normally complete without any issues.  Patient reported that he bruises easily and has nightly headaches.  He complained of left-sided lateral chest wall rib pain that was worse yesterday and improved today.  He has had occasional right foot and right leg swelling (family mention that this is occurred twice in the last month).    Patient has felt short of breath and experiencing dyspnea on  exertion since Tuesday.  He is reported an increased cough with sputum production that is thick white and green.  Patient mentioned that his legs (bilaterally) hurt.  He is also had some episodes of lightheadedness when he stands up too fast.    Patient resides independently in his own house in [place], [place].  Typically he smokes 1 and half packs per day but indicated in the last week 1 pack is lasted him 3 days.  He typically consumes 4-6 beers a day but in the last week has had minimal alcohol consumption.  He occasionally utilizes marijuana.  He denies the use of a walker or cane.  He does not require the use of supplemental oxygen or CPAP machine.    Discussed CODE STATUS with patient and his family and he elected to be full code.    Past Medical History    I have reviewed this patient's medical history and updated it with pertinent information if needed.   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 Taking?   TRELEGY ELLIPTA 200-62.5-25 MCG/ACT oral inhaler 11/15/2023 at am Self Yes Yes   Sig: INHALE 1 PUFF BY INHALATION ROUTE EVERY DAY AT THE SAME TIME EACH DAY   albuterol (2.5 MG/3ML) 0.083% neb solution 11/15/2023 Self Yes Yes  "  Sig: Take 2.5 mg by nebulization 6 times daily   albuterol (PROAIR HFA/PROVENTIL HFA/VENTOLIN HFA) 108 (90 Base) MCG/ACT inhaler prn Self Yes No   Sig: Inhale 2 puffs into the lungs as needed   amLODIPine (NORVASC) 5 MG tablet 11/15/2023 at am Self No Yes   Sig: TAKE 1 TABLET BY MOUTH  DAILY   atorvastatin (LIPITOR) 40 MG tablet 11/15/2023 at am Self No Yes   Sig: Take 1 tablet (40 mg) by mouth daily   butalbital-acetaminophen-caffeine (FIORICET/ESGIC) -40 MG per tablet prn Self Yes No   Sig: TAKE 1 TABLET BY MOUTH EVERY 4-6 HOURS AS NEEDED   diphenhydrAMINE-APAP, sleep,  MG TABS 11/15/2023 at pm Self Yes Yes   Sig: Take 2 tablets by mouth at bedtime   lidocaine (LMX4) 4 % external cream Past Week Self Yes Yes   Sig: Apply topically 2 times daily as needed for mild pain or other (restless legs)   losartan (COZAAR) 100 MG tablet  Self No No   Sig: Take 1 tablet (100 mg) by mouth daily   oxyCODONE (ROXICODONE) 5 MG tablet Past Week Self Yes Yes   Sig: TAKE 1 TABLET BY MOUTH EVERY 3 TO 4 HOURS AS NEEDED FOR PAIN   senna-docusate (SENOKOT-S/PERICOLACE) 8.6-50 MG tablet 11/15/2023 at pm Self Yes Yes   Sig: Take 2-3 tablets by mouth at bedtime      Facility-Administered Medications: None     Allergies   Allergies   Allergen Reactions    Chantix [Varenicline] Other (See Comments)     Patient reports has tried this in past and had lightheadedness and nose bleeds.    Morphine Hcl Itching     On contact    Wellbutrin [Bupropion Hydrobromide]      syncope       Physical Exam   /79 (BP Location: Right arm)   Pulse 93   Temp 98.4  F (36.9  C) (Oral)   Resp 18   Ht 1.854 m (6' 1\")   Wt 44.2 kg (97 lb 7.1 oz)   SpO2 100%   BMI 12.86 kg/m      Constitutional: Awake, alert, cooperative, no apparent distress.  Cachectic appearance.    ENT: Normocephalic, without obvious abnormality, atraumatic, oral pharynx with dry mucus membranes.  Eyes extra occular movements intact.  Normal sclera.    Neck: Supple, " symmetrical, trachea midline, no adenopathy.  Pulmonary:   Cardiovascular: Regular rhythm and tachy, normal S1 and S2, no S3 or S4, and no murmur noted.  GI: Diminished breath sounds throughout the lung fields.  No wheezes/rhonchi/rales appreciated.  Oxymask in place.    Skin/Integumen: Ecchymosis noted on the upper extremities bilaterally.  Otherwise visualized skin appeared clear.  Neuro: CN II-XII grossly intact.  Upper and lower extremities strength, coordination and sensation intact bilaterally.    Psych:  Alert and oriented x 3. Flat affect.  Extremities: No lower extremity edema noted, and calves are non-tender to palpation bilaterally.     Medical Decision Making       Please see A&P for additional details of medical decision making.  Greater than 75 MINUTES SPENT BY ME on the date of service doing chart review, history, exam, documentation & further activities per the note.         Data   Data reviewed today: I reviewed all medications, new labs and imaging results over the last 24 hours. I personally reviewed the EKG tracing showing sinus tachycardia .      I have personally reviewed the following data over the past 24 hrs:    10.4  \   14.7   / 210     130 (L) 91 (L) 10.1 /  96   4.6 25 0.58 (L) \     ALT: 22 AST: 27 AP: 121 TBILI: 0.4   ALB: 4.0 TOT PROTEIN: 7.2 LIPASE: N/A     Trop: 18 BNP: 530     Procal: 0.22 CRP: N/A Lactic Acid: 1.7       INR:  0.99 PTT:  29   D-dimer:  0.94 (H) Fibrinogen:  N/A       Imaging results reviewed over the past 24 hrs:   Recent Results (from the past 24 hour(s))   XR Chest Port 1 View    Narrative    CHEST ONE VIEW PORTABLE    11/16/2023 9:50 AM     HISTORY: respiratory failure, emphysema.    COMPARISON: Chest x-ray 4/20/2023.      Impression    IMPRESSION: Emphysematous changes. No acute airspace consolidation.  Resolved airspace opacities at the left lung base since the prior  exam. Some scarring at the bilateral apices again noted appearing  stable. Stable cardiac  itz.    MAGNUS RIGGS MD         SYSTEM ID:  [id number]   CT Chest Pulmonary Embolism w Contrast    Narrative    CT CHEST PULMONARY EMBOLISM W CONTRAST 11/16/2023 11:50 AM    CLINICAL HISTORY: sob, emphysema, elevated DDimer, hx lung nodule and  throat cancer  TECHNIQUE: CT angiogram chest during arterial phase injection IV  contrast. 2D and 3D MIP reconstructions were performed by the CT  technologist. Dose reduction techniques were used.     CONTRAST: 49 mL Isovue-[id number]    COMPARISON: 10/11/2023, PET CT on 2/6/2023    FINDINGS:  ANGIOGRAM CHEST: Pulmonary arteries are normal caliber and negative  for pulmonary emboli. Thoracic aorta is negative for dissection. No CT  evidence of right heart strain.    LUNGS AND PLEURA: New scattered patchy opacities in the left upper  lobe and left lower lobe, likely due to pneumonia. Advanced  centrilobular emphysema. Stable biapical pleural scarring with  calcifications. A few stable pulmonary nodules. For example, a 5 mm  nodule along the right minor fissure (series 9, image 137) and a  posterior right lower lobe 8 mm linear nodular opacity (9/173), and a  triangular nodular opacity in the posteromedial right lower lobe  measuring 12 mm (9/157) are stable. No significant pleural effusion.  No pneumothorax. Layering secretions within the distal trachea.    MEDIASTINUM/AXILLAE: Stable mediastinal lymphadenopathy. For example,  a 19 mm aortopulmonic window lymph node is stable (series 7, image  101) and adjacent mediastinal lymphadenopathy is also stable. Few  calcified mediastinal and hilar lymph nodes, likely the sequela of  prior granulomatous disease. Normal variant duplicated superior vena  cava but the left SVC draining into the coronary sinus. No thoracic  aortic aneurysm. Stable mild coronary artery calcifications. Trace  pericardial effusion is stable.    UPPER ABDOMEN: No significant findings.    MUSCULOSKELETAL: No suspicious lesions in the bones.      Impression     IMPRESSION:  1.  No pulmonary emboli.  2.  New patchy opacities in the left upper lobe and left lower lobe,  likely pneumonia. Recommend a follow-up chest CT in 3 months to ensure  resolution.  3.  Few irregular nodular opacities in the right lower lobe are  stable.  4.  Stable mediastinal lymphadenopathy.  5.  Normal variant duplicated SVC with the left SVC draining into the  coronary sinus.    JESUS MARCUS MD         SYSTEM ID:  [id number]

## 2023-11-16 NOTE — ED NOTES
"[facility]  ED Nurse Handoff Report    ED Chief complaint: Respiratory Distress      ED Diagnosis:   Final diagnoses:   None       Code Status: Full Code    Allergies:   Allergies   Allergen Reactions    Chantix [Varenicline] Other (See Comments)     Patient reports has tried this in past and had lightheadedness and nose bleeds.    Morphine Hcl Itching     On contact    Wellbutrin [Bupropion Hydrobromide]      syncope       Patient Story: pt lives at home, increased sob for the past few days, cough, hx of lung and throat CA, no O2 at home, on bipap for ems, pt on RA currently. , alert and oriented.  Focused Assessment:  see chart    Treatments and/or interventions provided: see chart  Patient's response to treatments and/or interventions: see chart    To be done/followed up on inpatient unit:  see chart    Does this patient have any cognitive concerns?:  none    Activity level - Baseline/Home:  Independent  Activity Level - Current:   Independent    Patient's Preferred language: English   Needed?: No    Isolation: None and COVID r/o and special precautions  Infection: Not Applicable  COVID r/o and special precautions  Patient tested for COVID 19 prior to admission: YES  Bariatric?: No    Vital Signs:   Vitals:    11/16/23 0933   BP: (!) 139/96   Pulse: 110   Resp: 22   Temp: 98.3  F (36.8  C)   TempSrc: Temporal   SpO2: 96%   Weight: 44.2 kg (97 lb 7.1 oz)   Height: 1.854 m (6' 1\")       Cardiac Rhythm:Cardiac Rhythm: Sinus tachycardia    Was the PSS-3 completed:   Yes  What interventions are required if any?               Family Comments: none here  OBS brochure/video discussed/provided to patient/family: N/A              Name of person given brochure if not patient: na              Relationship to patient: na    For the majority of the shift this patient's behavior was Green.   Behavioral interventions performed were none.    ED NURSE PHONE NUMBER: [phone]       "

## 2023-11-17 ENCOUNTER — APPOINTMENT (OUTPATIENT)
Dept: PHYSICAL THERAPY | Facility: CLINIC | Age: 68
DRG: 193 | End: 2023-11-17
Attending: PHYSICIAN ASSISTANT
Payer: COMMERCIAL

## 2023-11-17 ENCOUNTER — APPOINTMENT (OUTPATIENT)
Dept: CARDIOLOGY | Facility: CLINIC | Age: 68
DRG: 193 | End: 2023-11-17
Attending: PHYSICIAN ASSISTANT
Payer: COMMERCIAL

## 2023-11-17 LAB
ANION GAP SERPL CALCULATED.3IONS-SCNC: 10 MMOL/L (ref 7–15)
BUN SERPL-MCNC: 12.5 MG/DL (ref 8–23)
CALCIUM SERPL-MCNC: 8.4 MG/DL (ref 8.8–10.2)
CHLORIDE SERPL-SCNC: 97 MMOL/L (ref 98–107)
CREAT SERPL-MCNC: 0.44 MG/DL (ref 0.67–1.17)
DEPRECATED HCO3 PLAS-SCNC: 23 MMOL/L (ref 22–29)
EGFRCR SERPLBLD CKD-EPI 2021: >90 ML/MIN/1.73M2
ERYTHROCYTE [DISTWIDTH] IN BLOOD BY AUTOMATED COUNT: 13.6 % (ref 10–15)
GLUCOSE SERPL-MCNC: 117 MG/DL (ref 70–99)
HCT VFR BLD AUTO: 32.6 % (ref 40–53)
HGB BLD-MCNC: 11.3 G/DL (ref 13.3–17.7)
LVEF ECHO: NORMAL
MAGNESIUM SERPL-MCNC: 2.3 MG/DL (ref 1.7–2.3)
MCH RBC QN AUTO: 32.8 PG (ref 26.5–33)
MCHC RBC AUTO-ENTMCNC: 34.7 G/DL (ref 31.5–36.5)
MCV RBC AUTO: 95 FL (ref 78–100)
PHOSPHATE SERPL-MCNC: 2.8 MG/DL (ref 2.5–4.5)
PLATELET # BLD AUTO: 231 10E3/UL (ref 150–450)
POTASSIUM SERPL-SCNC: 4.2 MMOL/L (ref 3.4–5.3)
RBC # BLD AUTO: 3.45 10E6/UL (ref 4.4–5.9)
SODIUM SERPL-SCNC: 130 MMOL/L (ref 135–145)
WBC # BLD AUTO: 7.7 10E3/UL (ref 4–11)

## 2023-11-17 PROCEDURE — 272N000202 HC AEROBIKA WITH MANOMETER

## 2023-11-17 PROCEDURE — 97161 PT EVAL LOW COMPLEX 20 MIN: CPT | Mod: GP

## 2023-11-17 PROCEDURE — 36415 COLL VENOUS BLD VENIPUNCTURE: CPT | Performed by: PHYSICIAN ASSISTANT

## 2023-11-17 PROCEDURE — 99233 SBSQ HOSP IP/OBS HIGH 50: CPT | Performed by: INTERNAL MEDICINE

## 2023-11-17 PROCEDURE — 258N000003 HC RX IP 258 OP 636: Performed by: PHYSICIAN ASSISTANT

## 2023-11-17 PROCEDURE — 97116 GAIT TRAINING THERAPY: CPT | Mod: GP

## 2023-11-17 PROCEDURE — 94640 AIRWAY INHALATION TREATMENT: CPT

## 2023-11-17 PROCEDURE — 93321 DOPPLER ECHO F-UP/LMTD STD: CPT

## 2023-11-17 PROCEDURE — 250N000013 HC RX MED GY IP 250 OP 250 PS 637: Performed by: PHYSICIAN ASSISTANT

## 2023-11-17 PROCEDURE — 250N000009 HC RX 250: Performed by: PHYSICIAN ASSISTANT

## 2023-11-17 PROCEDURE — 93308 TTE F-UP OR LMTD: CPT | Mod: 26 | Performed by: INTERNAL MEDICINE

## 2023-11-17 PROCEDURE — 84100 ASSAY OF PHOSPHORUS: CPT | Performed by: INTERNAL MEDICINE

## 2023-11-17 PROCEDURE — 83735 ASSAY OF MAGNESIUM: CPT | Performed by: INTERNAL MEDICINE

## 2023-11-17 PROCEDURE — 250N000013 HC RX MED GY IP 250 OP 250 PS 637: Performed by: INTERNAL MEDICINE

## 2023-11-17 PROCEDURE — 85027 COMPLETE CBC AUTOMATED: CPT | Performed by: PHYSICIAN ASSISTANT

## 2023-11-17 PROCEDURE — 93325 DOPPLER ECHO COLOR FLOW MAPG: CPT | Mod: 26 | Performed by: INTERNAL MEDICINE

## 2023-11-17 PROCEDURE — 250N000011 HC RX IP 250 OP 636: Mod: JZ | Performed by: PHYSICIAN ASSISTANT

## 2023-11-17 PROCEDURE — 94799 UNLISTED PULMONARY SVC/PX: CPT

## 2023-11-17 PROCEDURE — 94640 AIRWAY INHALATION TREATMENT: CPT | Mod: 76

## 2023-11-17 PROCEDURE — 93325 DOPPLER ECHO COLOR FLOW MAPG: CPT

## 2023-11-17 PROCEDURE — 999N000157 HC STATISTIC RCP TIME EA 10 MIN

## 2023-11-17 PROCEDURE — 82310 ASSAY OF CALCIUM: CPT | Performed by: PHYSICIAN ASSISTANT

## 2023-11-17 PROCEDURE — 210N000002 HC R&B HEART CARE

## 2023-11-17 PROCEDURE — 93321 DOPPLER ECHO F-UP/LMTD STD: CPT | Mod: 26 | Performed by: INTERNAL MEDICINE

## 2023-11-17 PROCEDURE — 250N000013 HC RX MED GY IP 250 OP 250 PS 637

## 2023-11-17 RX ORDER — AMLODIPINE BESYLATE 10 MG/1
10 TABLET ORAL DAILY
Status: DISCONTINUED | OUTPATIENT
Start: 2023-11-18 | End: 2023-11-21 | Stop reason: HOSPADM

## 2023-11-17 RX ORDER — HYDRALAZINE HYDROCHLORIDE 20 MG/ML
10 INJECTION INTRAMUSCULAR; INTRAVENOUS EVERY 4 HOURS PRN
Status: DISCONTINUED | OUTPATIENT
Start: 2023-11-17 | End: 2023-11-21 | Stop reason: HOSPADM

## 2023-11-17 RX ORDER — AMLODIPINE BESYLATE 5 MG/1
5 TABLET ORAL ONCE
Status: COMPLETED | OUTPATIENT
Start: 2023-11-17 | End: 2023-11-17

## 2023-11-17 RX ADMIN — IPRATROPIUM BROMIDE AND ALBUTEROL SULFATE 3 ML: .5; 3 SOLUTION RESPIRATORY (INHALATION) at 19:30

## 2023-11-17 RX ADMIN — IPRATROPIUM BROMIDE AND ALBUTEROL SULFATE 3 ML: .5; 3 SOLUTION RESPIRATORY (INHALATION) at 15:33

## 2023-11-17 RX ADMIN — AMLODIPINE BESYLATE 5 MG: 5 TABLET ORAL at 11:08

## 2023-11-17 RX ADMIN — IPRATROPIUM BROMIDE AND ALBUTEROL SULFATE 3 ML: .5; 3 SOLUTION RESPIRATORY (INHALATION) at 07:37

## 2023-11-17 RX ADMIN — CEFTRIAXONE SODIUM 2 G: 2 INJECTION, POWDER, FOR SOLUTION INTRAMUSCULAR; INTRAVENOUS at 11:08

## 2023-11-17 RX ADMIN — AZITHROMYCIN MONOHYDRATE 500 MG: 500 INJECTION, POWDER, LYOPHILIZED, FOR SOLUTION INTRAVENOUS at 12:14

## 2023-11-17 RX ADMIN — AMLODIPINE BESYLATE 5 MG: 5 TABLET ORAL at 08:47

## 2023-11-17 RX ADMIN — SODIUM CHLORIDE: 9 INJECTION, SOLUTION INTRAVENOUS at 04:06

## 2023-11-17 RX ADMIN — LOSARTAN POTASSIUM 100 MG: 100 TABLET, FILM COATED ORAL at 08:47

## 2023-11-17 RX ADMIN — METHYLPREDNISOLONE SODIUM SUCCINATE 62.5 MG: 125 INJECTION, POWDER, FOR SOLUTION INTRAMUSCULAR; INTRAVENOUS at 06:26

## 2023-11-17 RX ADMIN — GUAIFENESIN 1200 MG: 600 TABLET, EXTENDED RELEASE ORAL at 08:47

## 2023-11-17 RX ADMIN — ACETAMINOPHEN: 500 TABLET, FILM COATED ORAL at 21:16

## 2023-11-17 RX ADMIN — BUTALBITAL, ACETAMINOPHEN AND CAFFEINE 1 TABLET: 325; 50; 40 TABLET ORAL at 11:14

## 2023-11-17 RX ADMIN — OXYCODONE HYDROCHLORIDE 5 MG: 5 TABLET ORAL at 13:29

## 2023-11-17 RX ADMIN — IPRATROPIUM BROMIDE AND ALBUTEROL SULFATE 3 ML: .5; 3 SOLUTION RESPIRATORY (INHALATION) at 12:37

## 2023-11-17 RX ADMIN — Medication 30 G: at 13:28

## 2023-11-17 RX ADMIN — ATORVASTATIN CALCIUM 40 MG: 40 TABLET, FILM COATED ORAL at 08:47

## 2023-11-17 RX ADMIN — METHYLPREDNISOLONE SODIUM SUCCINATE 62.5 MG: 125 INJECTION, POWDER, FOR SOLUTION INTRAMUSCULAR; INTRAVENOUS at 18:33

## 2023-11-17 RX ADMIN — DOCUSATE SODIUM 50 MG AND SENNOSIDES 8.6 MG 2 TABLET: 8.6; 5 TABLET, FILM COATED ORAL at 21:16

## 2023-11-17 ASSESSMENT — ACTIVITIES OF DAILY LIVING (ADL)
ADLS_ACUITY_SCORE: 22

## 2023-11-17 NOTE — CONSULTS
"CLINICAL NUTRITION SERVICES  -  ASSESSMENT NOTE    Recommendations Ordered by Registered Dietitian (RD):   - Declined offer for protein supplements (listed options). Will monitor meal orders for adequacy.    Malnutrition:   % Weight Loss:  Weight loss does not meet criteria for malnutrition - underwt, no acute losses  % Intake:  Decreased intake does not meet criteria for malnutrition - eats three decent-sized meals at baseline  Subcutaneous Fat Loss:  Orbital region severe depletion, Upper arm region severe depletion, and Thoracic region severe depletion  Muscle Loss:  Temporal region severe depletion, Clavicle bone region severe depletion, Acromion bone region severe depletion, and Dorsal hand region severe depletion  Fluid Retention:  None noted    Malnutrition Diagnosis: Severe malnutrition  In Context of:  Chronic illness or disease     REASON FOR ASSESSMENT  Ángel Pham is a 68 year old male seen by Registered Dietitian for Nutrition Admission Risk Screen Received -   Have you recently lost weight without trying ? - \"unsure\"  Have you been eating poorly due to a decreased appetite ?- \"no\"    NUTRITION HISTORY  - Information obtained from chart review and patient report.   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 Presented with worsening SOB over 3-4 days.   - Smokes up to 1.5 PPD, drinks 4-6 beers daily.     - Pt seen in room. He reports that he does his own cooking, and eats meals TID. His daughter gets groceries for him as he cannot drive.   - Has 2 cases of protein shakes at home but has not been taking them - used to drink regularly, but got sick of them.    Breakfast: eggs, molina / pancake / cereal  Lunch: leftovers from dinner  Dinner: Fried chicken and mashed potatoes with peas / tacos / roast.     Eats ice cream around 1 pm. Lunch around 3 pm, and dinner is later - right before bed.     Pt once weighed " "closer to 140# (prior to cancer diagnoses), but lately has been trying to keep wt above 100#. Pt states \"they want me to stay above 100#\" - not clear that patient's personal goal is weight restoration, or if his family/provides are just encouraging it.     Pt appears to be eating enough to maintain, needs are increased due to malignancy.       CURRENT NUTRITION ORDERS  Diet Order:   Regular     Current Intake/Tolerance:  Pt ordered 2 large meals yesterday. He said he ate most of the food he received. Breakfast arrived during visit - eggs w/ cheese, sausage, milk, toast.     NUTRITION FOCUSED PHYSICAL ASSESSMENT FOR DIAGNOSING MALNUTRITION)  Yes          Observed:    Muscle wasting (refer to documentation in Malnutrition section) and Subcutaneous fat loss (refer to documentation in Malnutrition section)    Obtained from Chart/Interdisciplinary Team:  \"Frail, malnourished, nontoxic\"    ANTHROPOMETRICS  Height: 6' 1\"  Weight: 97 lbs 1.6 oz (44 kg)   Body mass index is 12.81 kg/m .  Weight Status:  Underweight BMI <18.5  IBW: 83.6 kg   % IBW: 53%  Weight History: Wt is consistent, despite being severely underweight.  Wt Readings from Last 10 Encounters:   11/17/23 44 kg (97 lb 1.6 oz)   10/09/23 43.7 kg (96 lb 6.4 oz)   05/08/23 44.1 kg (97 lb 3.2 oz)   04/24/23 43.1 kg (95 lb)   02/13/23 44.5 kg (98 lb)   08/24/22 45.5 kg (100 lb 3.2 oz)   07/30/22 44.9 kg (99 lb)   10/06/21 46.7 kg (103 lb)   01/18/21 51.5 kg (113 lb 9.6 oz)   01/14/21 51.3 kg (113 lb)       LABS  Labs reviewed  Na 130 (L)    MEDICATIONS  Medications reviewed  Solumedrol BID3  Senokot daily   NaCl IVF @ 75 mL/hr     ASSESSED NUTRITION NEEDS PER APPROVED PRACTICE GUIDELINES:  Dosing Weight 44 kg  Estimated Energy Needs: 6919-2860+ kcals (30-35+ Kcal/Kg)  Justification: minimum for repletion in setting of malignancy  Estimated Protein Needs: 53-66+ grams protein (1.2-1.5+ g pro/Kg)  Justification: repletion in setting of malignancy   Estimated Fluid " Needs: 1 mL/kcal   Justification: maintenance    MALNUTRITION:  % Weight Loss:  Weight loss does not meet criteria for malnutrition - underwt, no acute losses  % Intake:  Decreased intake does not meet criteria for malnutrition - eats three decent-sized meals at baseline  Subcutaneous Fat Loss:  Orbital region severe depletion, Upper arm region severe depletion, and Thoracic region severe depletion  Muscle Loss:  Temporal region severe depletion, Clavicle bone region severe depletion, Acromion bone region severe depletion, and Dorsal hand region severe depletion  Fluid Retention:  None noted    Malnutrition Diagnosis: Severe malnutrition  In Context of:  Chronic illness or disease    NUTRITION DIAGNOSIS:  Increased nutrient needs (energy/protein) related to malignancy, chronically underweight as evidenced by severe fat and muscle wasting with BMI <13 kg/m^2    NUTRITION INTERVENTIONS  Recommendations / Nutrition Prescription  Regular diet  Meals TID at minimum  Pt declined offer for protein supplements     Implementation  Nutrition education: Per Provider order if indicated     Nutrition Goals  Intake of at least 75% adequate trays for wt/muscle restoration.     MONITORING AND EVALUATION:  Progress towards goals will be monitored and evaluated per protocol and Practice Guidelines    Brittani Cancino RD, LD  Pager: [phone]  Weekend Pager: [phone]

## 2023-11-17 NOTE — PROGRESS NOTES
[facility]    HOSPITALIST PROGRESS NOTE :   --------------------------------------------------    Date of Admission:  11/16/2023    Cumulative Summary:   Ángel Pham is a 68 year old male admitted on 11/16/2023.     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 due to community acquired pneumonia with COPD exacerbation.       EMS was called due to worsening shortness of breath occurring over the previous 3 days prior to admission. Initially, patient was placed on BiPAP by EMSon arrival to ER , later removed and patient has tolerated being on room air.     Work-up in the ED has included an EKG that showed sinus tachycardia with left axis deviation. CMP revealed sodium of 130, chloride of 91, creatinine of 0.58 with a GFR greater than 90 otherwise within normal limits. D-dimer was elevated at 0.94. proBNP was within normal limits at 530.  Procalcitonin was mildly elevated at 0.22.  High-sensitivity troponin T was mildly elevated at 23.  Lactic acid level was within normal limits at 1.7.  VBG showed a pH of 7.35, PCO2 of 52, PO2 of 14, O2 saturation of 15 and bicarbonate of 29.  Respiratory viral PCR panel was all negative.  Repeat troponin T with improvement to 18.     One-view portable chest x-ray showed emphysematous change without acute airspace consolidation and noted resolved airspace opacities at the left lung base since the prior exam as well as some scarring at the bilateral apices again noted appearing stable. Chest CT/PE study with contrast was negative for PE but revealed new patchy opacities in the left upper lobe and left  lower lobe (likely pneumonia with recommendation for follow-up chest CT in approximately 3 months to ensure resolution), few irregular nodular opacities in the right lower lobe are stable, stable mediastinal lymphadenopathy and normal variant duplicated SVC with the left SVC draining into the coronary sinus.     Patient received 2 DuoNeb treatments and a 500 ml IV fluid bolus with normal saline.  IV ceftriaxone 2 g and 500 mg of oral azithromycin have been ordered.  Patient received 125 mg of solumedrol via EMS. Patient was admitted for further evaluation and management    Assessment & Plan     Left lung community acquired pneumonia  COPD exacerbation    -- Patient has been admitted for further evaluation and management  -- Patient care was assumed this morning , seen and examined, still feeling dyspneic and air hunger on minimal exertion-- IV Azithromycin 500 mg/d.    -- IV ceftriaxone 2 g/d while hospitalized.    -- We will discontinue IV fluids  -- Supplemental oxygen as needed to keep oxygen saturation more than 90%, currently maintaining oxygenation on room air.  -- Vital signs every 4 hours.    -- CBC reviewed from this AM, showing Hgb drop to 11.3 from 14  -- Hold PTA inhaler (Trelegy).      -- Scheduled DuoNebs every 4 hours and PRN.    -- RCAT consult requested.    -- Mucinex 1200 mg BID.    -- IV solumedrol 62.5 mg BID with plans to transition to PO prednisone in the next 24 hours.    -- Encourage use of IS/Flutter valve.       Troponin elevation, most likely type II NSTEMI from COPD Exacerbation    -- Trended troponin, does not have ACS pattern, repeat in normal range   -- Monitor on telemetry.    -- ECHO ordered, normal LV and systolic function of 55 to 60%, normal left ventricular wall motion.  -- EKG on admission showed sinus tachy and left Axis deviation      Tobacco Use D/O   Patient typically smokes up to 1.5 packs/day.  In the past week he has not been smoking much at all.      -- Counseled  "regarding smoking cessation that should also continue with PCP.    -- Nicoderm patch offered and patient declined.     Essential HTN    -- Continue PTA losartan 100 mg/d.  Hold parameters in place.    --Patient is noticed to be significantly hypertensive, will increase amlodipine to 10 mg p.o. daily on 11/17  -- Vitals every 4 hours.    -- will order Hydralazine for systolic BP > 170      HLP  -- Continue  PTA atorvastatin 40 mg/d.       Chronic Pain  -- Continue PTA PRN oxycodone 5 mg every 3 hours.    -- PRN APAP available.       Hyponatremia, chronic  *Baseline sodium between 128-132 in the past year.  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   -- BMP reviewed from this morning , continues to show sodium of 130.  --Will add Urena 30 mg p.o. twice daily  --Patient does not need further work-up for hyponatremia as it has been worked up in the past and at this time most likely secondary to combination of malnutrition and history of small cell carcinoma     Alcohol use D/O  *Patient typically consumes 4-6 beers per day. He reported that he has not had much or any alcohol in the past several days.      -- Continue to monitor on CIWA   -- Follow up with PCP.       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 Ann and Dr. Dreyer of [facility].       Severe protein calorie malnutrition  Cachexia  -- Regular diet.       Insomnia  -- Continue PTA APAP-PM (Benadryl-APAP) at bedtime.       Migraines  -- Continue PTA PRN Fioricet.                 -Patient reported that he ran out of this medication recently.       Clinically Significant Risk Factors Present on Admission                  # Hypertension: Noted on problem list      # Cachexia: Estimated body mass index is 12.81 kg/m  as calculated from the following:    Height as of this encounter: 1.854 m (6' 1\").    Weight as of this encounter: 44 kg (97 lb " 1.6 oz).              Diet: Regular Diet Adult    Gannon Catheter: Not present  DVT Prophylaxis: Pneumatic Compression Devices  Code Status: Full Code    The patient's care was discussed with the Patient.    Medical Decision Making     55 MINUTES SPENT BY ME on the date of service doing chart review, history, exam, documentation & further activities per the note.      Disposition Plan   PT/OT and Social work/CC consultation has been requested to plan safe disposition plan   Patient currently lives at home alone, daughter present in the area and does help patient.     Expected Discharge Date: 11/18/2023             Entered: Jeanna Platt MD 11/17/2023, 6:46 AM       Jeanna Platt MD, MD, FACP  Text Page (7am - 6pm)    ----------------------------------------------------------------------------------------------------------------------      Interval History     Patient care was assumed this morning, patient was seen and examined.  Patient is lying in bed, thin, told me that he has lost significant amount of weight in the last 5 years when he was diagnosed with malignancy but recently his weight has been stable.  Patient currently lives at home alone, daughter is in the area and helps patient but does not live very close by.  According to patient generally he is able to take care of himself and is able to make himself a meal but in the last few days he was not able to do that because of significant dyspnea on exertion and has had few bowls of cereal only in the last couple of days.  Patient is denying any chest pain and palpitations a still feels wheezy and felt dyspneic this morning when he went to the bathroom and started feeling hyperventilating.  We discussed about continuing patient on current medications.  I also told him that we will also get him evaluated by therapies.  All the questions were answered.    -Data reviewed today: I reviewed all new labs and imaging results over the last 24 hours.    I personally  reviewed echocardiogram results and chest x-ray along with CT chest results.  CT showing patchy opacities in the left upper and left lower lobes concerning for pneumonia.      Physical Exam   Temp: 98.2  F (36.8  C) Temp src: Oral BP: (!) 174/95 Pulse: 78   Resp: 18 SpO2: 98 % O2 Device: None (Room air) Oxygen Delivery: 2 LPM  Vitals:    11/16/23 0933 11/17/23 0630   Weight: 44.2 kg (97 lb 7.1 oz) 44 kg (97 lb 1.6 oz)     Vital Signs with Ranges  Temp:  [98.1  F (36.7  C)-98.4  F (36.9  C)] 98.2  F (36.8  C)  Pulse:  [] 78  Resp:  [17-27] 18  BP: (116-174)/(72-96) 174/95  SpO2:  [94 %-100 %] 98 %  I/O last 3 completed shifts:  In: 620 [P.O.:120; IV Piggyback:500]  Out: 200 [Urine:200]    GENERAL: Alert , awake and oriented. NAD. Conversational, appropriate.   HEENT: Normocephalic. EOMI. No icterus or injection. Nares normal.   LUNGS: Clear to auscultation. No dyspnea at rest.   HEART: Regular rate. Extremities perfused.   ABDOMEN: Soft, nontender, and nondistended. Positive bowel sounds.   EXTREMITIES: No LE edema noted.   NEUROLOGIC: Moves extremities x4 on command. No acute focal neurologic abnormalities noted.     Medications    sodium chloride 75 mL/hr at 11/17/23 0406      amLODIPine  5 mg Oral Daily    atorvastatin  40 mg Oral Daily    azithromycin  500 mg Intravenous Q24H    cefTRIAXone  2 g Intravenous Q24H    diphenhydrAMINE (BENADRYL) 50 mg, acetaminophen (TYLENOL) 1,000 mg alternative for Tylenol PM   Oral At Bedtime    guaiFENesin  1,200 mg Oral BID    ipratropium - albuterol 0.5 mg/2.5 mg/3 mL  3 mL Nebulization 4x daily    losartan  100 mg Oral Daily    methylPREDNISolone  62.5 mg Intravenous Q12H    senna-docusate  2 tablet Oral At Bedtime    sodium chloride (PF)  3 mL Intracatheter Q8H       Data   Recent Labs   Lab 11/17/23  0619 11/16/23  0942   WBC 7.7 10.4   HGB 11.3* 14.7   MCV 95 99    210   INR  --  0.99   NA  --  130*   POTASSIUM  --  4.6   CHLORIDE  --  91*   CO2  --  25   BUN   --  10.1   CR  --  0.58*   ANIONGAP  --  14   MOSHE  --  9.5   GLC  --  96   ALBUMIN  --  4.0   PROTTOTAL  --  7.2   BILITOTAL  --  0.4   ALKPHOS  --  121   ALT  --  22   AST  --  27       Imaging:   Recent Results (from the past 24 hour(s))   XR Chest Port 1 View    Narrative    CHEST ONE VIEW PORTABLE    11/16/2023 9:50 AM     HISTORY: respiratory failure, emphysema.    COMPARISON: Chest x-ray 4/20/2023.      Impression    IMPRESSION: Emphysematous changes. No acute airspace consolidation.  Resolved airspace opacities at the left lung base since the prior  exam. Some scarring at the bilateral apices again noted appearing  stable. Stable cardiac silhouette.    MAGNUS RIGGS MD         SYSTEM ID:  [id number]   CT Chest Pulmonary Embolism w Contrast    Narrative    CT CHEST PULMONARY EMBOLISM W CONTRAST 11/16/2023 11:50 AM    CLINICAL HISTORY: sob, emphysema, elevated DDimer, hx lung nodule and  throat cancer  TECHNIQUE: CT angiogram chest during arterial phase injection IV  contrast. 2D and 3D MIP reconstructions were performed by the CT  technologist. Dose reduction techniques were used.     CONTRAST: 49 mL Isovue-[id number]    COMPARISON: 10/11/2023, PET CT on 2/6/2023    FINDINGS:  ANGIOGRAM CHEST: Pulmonary arteries are normal caliber and negative  for pulmonary emboli. Thoracic aorta is negative for dissection. No CT  evidence of right heart strain.    LUNGS AND PLEURA: New scattered patchy opacities in the left upper  lobe and left lower lobe, likely due to pneumonia. Advanced  centrilobular emphysema. Stable biapical pleural scarring with  calcifications. A few stable pulmonary nodules. For example, a 5 mm  nodule along the right minor fissure (series 9, image 137) and a  posterior right lower lobe 8 mm linear nodular opacity (9/173), and a  triangular nodular opacity in the posteromedial right lower lobe  measuring 12 mm (9/157) are stable. No significant pleural effusion.  No pneumothorax. Layering secretions within the  distal trachea.    MEDIASTINUM/AXILLAE: Stable mediastinal lymphadenopathy. For example,  a 19 mm aortopulmonic window lymph node is stable (series 7, image  101) and adjacent mediastinal lymphadenopathy is also stable. Few  calcified mediastinal and hilar lymph nodes, likely the sequela of  prior granulomatous disease. Normal variant duplicated superior vena  cava but the left SVC draining into the coronary sinus. No thoracic  aortic aneurysm. Stable mild coronary artery calcifications. Trace  pericardial effusion is stable.    UPPER ABDOMEN: No significant findings.    MUSCULOSKELETAL: No suspicious lesions in the bones.      Impression    IMPRESSION:  1.  No pulmonary emboli.  2.  New patchy opacities in the left upper lobe and left lower lobe,  likely pneumonia. Recommend a follow-up chest CT in 3 months to ensure  resolution.  3.  Few irregular nodular opacities in the right lower lobe are  stable.  4.  Stable mediastinal lymphadenopathy.  5.  Normal variant duplicated SVC with the left SVC draining into the  coronary sinus.    JESUS MARCUS MD         SYSTEM ID:  [id number]

## 2023-11-17 NOTE — PLAN OF CARE
[id number]: A&Ox4. Slow deep voice. VSS on RA. Tele SR. NS infusing. Denies CP. GRAHAM. Non prod loose cough. CIWA 0s. Up SBA. Reports poor oral intake and constipation, last BM 11-14, will take prns in am per pt. Reports HA, declined prn med. Plan for echo.

## 2023-11-17 NOTE — CONSULTS
Care Management Initial Consult    General Information  Assessment completed with: Ángel Santo  Type of CM/SW Visit: Initial Assessment    Primary Care Provider verified and updated as needed: Yes   Readmission within the last 30 days: no previous admission in last 30 days      Reason for Consult: discharge planning  Advance Care Planning:            Communication Assessment  Patient's communication style: spoken language (English or Bilingual)    Hearing Difficulty or Deaf: no   Wear Glasses or Blind: yes    Cognitive  Cognitive/Neuro/Behavioral: WDL  Level of Consciousness: alert  Arousal Level: opens eyes spontaneously  Orientation: oriented x 4  Mood/Behavior: calm, cooperative  Best Language: 0 - No aphasia  Speech: clear (slow speech, sounds more clear now)    Living Environment:   People in home: alone     Current living Arrangements: house      Able to return to prior arrangements: yes       Family/Social Support:  Care provided by: self, child(efren)  Provides care for: no one  Marital Status: Single  Children          Description of Support System: Supportive    Support Assessment: Adequate social supports    Current Resources:   Patient receiving home care services: No     Community Resources: Housekeeping/Chore Agency  Equipment currently used at home: none  Supplies currently used at home: None    Employment/Financial:  Employment Status: retired        Financial Concerns: none   Referral to Financial Worker: No       Does the patient's insurance plan have a 3 day qualifying hospital stay waiver?  No    Lifestyle & Psychosocial Needs:  Social Determinants of Health     Food Insecurity: Not on file   Depression: Not at risk (2/13/2023)    PHQ-2     PHQ-2 Score: 0   Housing Stability: Not on file   Tobacco Use: High Risk (10/9/2023)    Patient History     Smoking Tobacco Use: Every Day     Smokeless Tobacco Use: Never     Passive Exposure: Not on file   Financial Resource Strain: Not on file   Alcohol  Use: Unknown (7/25/2019)    AUDIT-C     Frequency of Alcohol Consumption: 4 or more times a week     Average Number of Drinks: 1 or 2     Frequency of Binge Drinking: Not on file   Transportation Needs: Not on file   Physical Activity: Not on file   Interpersonal Safety: Not on file   Stress: Not on file   Social Connections: Not on file       Functional Status:  Prior to admission patient needed assistance:   Dependent ADLs:: Independent  Dependent IADLs:: Meal Preparation, Transportation, Money Management, Shopping  Assesssment of Functional Status: At functional baseline      Additional Information:  Writer was consulted for discharge planning. Writer reviewed patient's chart. Ángel Pham is a 68 year old male admitted on 11/16/2023. 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 Patient currently lives independently at home. Patient states he was previously taking care of his dog but she kept running away and he could not melissa after her so he was able to rehome it with some neighbors. The patient has his daughter help with with transportation, shopping, and appointments. Patient states he cooks for himself. He does have a lawn care service and a cleaning service that comes into the home. Patient plans to discharge home with his daughter providing transportation. Patient is recommended for outpatient therapy. Does not seem to have any care management needs.    Lauro Palma [facility]  Care Management

## 2023-11-17 NOTE — PLAN OF CARE
VSS, SR, RA. Remains GRAHAM. On nebs, steroids, abx for PNA and COPD. BP meds titrated up, PRN hydralazine available. Up with SBA, walker. Chronic headaches -uses PRN Esgic and oxy.

## 2023-11-17 NOTE — PLAN OF CARE
A/Ox4. VSS. On 3lpm oxymask. Tele SR. LS coarse in bases.  Frequent congested cough. Scheduled medications given. Pt has throat spasms at times from radiation. +bs, +flatus, -bm. Voiding adequately. Up assist x1. Tolerating diet.

## 2023-11-17 NOTE — PROGRESS NOTES
Occupational Therapy: Orders received. Chart reviewed and discussed with care team.? Pt admitted due to community acquired pneumonia with COPD exacerbation.  Spoke with PT, reports pt SBA with ADl's and functional mobility, main limitation is impaired activity tolerance. OT not warranted, PT to continue to work with Pt on activity tolerance.  Defer discharge recommendations to PT and medical team.? Will complete orders.

## 2023-11-17 NOTE — PROGRESS NOTES
11/17/23 1100   Appointment Info   Signing Clinician's Name / Credentials (PT) Claudia Jauregui, PT   Living Environment   People in Home alone   Current Living Arrangements house   Home Accessibility no concerns   Transportation Anticipated family or friend will provide   Living Environment Comments Daughter can provide assist at discharge as needed.   Self-Care   Usual Activity Tolerance moderate   Current Activity Tolerance fair   Regular Exercise No   Equipment Currently Used at Home none   Fall history within last six months no   General Information   Onset of Illness/Injury or Date of Surgery 11/16/23   Referring Physician Dr. Platt   Patient/Family Therapy Goals Statement (PT) To go home   Pertinent History of Current Problem (include personal factors and/or comorbidities that impact the POC) Pt is a 68 year old male admitted with COPD and SOB.   Existing Precautions/Restrictions fall   Cognition   Affect/Mental Status (Cognition) WFL   Orientation Status (Cognition) oriented x 4   Follows Commands (Cognition) WFL   Pain Assessment   Patient Currently in Pain No   Range of Motion (ROM)   Range of Motion ROM is WFL   Strength (Manual Muscle Testing)   Strength (Manual Muscle Testing) strength is WFL   Bed Mobility   Comment, (Bed Mobility) SBA   Transfers   Comment, (Transfers) CGA   Gait/Stairs (Locomotion)   Comment, (Gait/Stairs) 25' IV pole CGA, forward bent posture, decreased avi   Balance   Balance Comments Good in sitting, fair in standing   Clinical Impression   Criteria for Skilled Therapeutic Intervention Yes, treatment indicated   PT Diagnosis (PT) Impaired ambulation   Influenced by the following impairments Decreased endurance, decreased balance   Functional limitations due to impairments Difficulty with bed mobility, transfers, ambulation   Clinical Presentation (PT Evaluation Complexity) stable   Clinical Presentation Rationale VSS, pain controlled   Clinical Decision Making (Complexity) low  complexity   Planned Therapy Interventions (PT) patient/family education;transfer training;bed mobility training;gait training   Risk & Benefits of therapy have been explained evaluation/treatment results reviewed;care plan/treatment goals reviewed;risks/benefits reviewed;participants voiced agreement with care plan;current/potential barriers reviewed;participants included;patient   PT Total Evaluation Time   PT Eval, Low Complexity Minutes (06011) 10   PT Discharge Planning   PT Plan Progress ambulation distance, activity tolerance   PT Discharge Recommendation (DC Rec) home with assist;home with outpatient physical therapy   PT Rationale for DC Rec Anticipate patient to progress to independence and be safe to discharge home with resumption of outpatient services.   PT Brief overview of current status SBA

## 2023-11-17 NOTE — PROGRESS NOTES
SPIRITUAL HEALTH SERVICES - Progress Note   [facility]     Referral Source: Admission Request      I met with Ángel per admission request. He declined any need for spiritual/emotional support at this time. He is supported by his [employer]  and his family.      Plan: Ángel was informed how to request another visit. [facility] remains available for support.       Maureen Mcfarlane (they/themJustin Hoang  Spiritual Health Services  Chaplain Resident    [facility] routine referrals?*[phone]  [facility] available 24/7 for emergent requests/referrals, either by paging the on-call  or by entering an ASAP/STAT consult in Epic (this will also page the on-call ).

## 2023-11-18 LAB
ANION GAP SERPL CALCULATED.3IONS-SCNC: 9 MMOL/L (ref 7–15)
BUN SERPL-MCNC: 15.6 MG/DL (ref 8–23)
CALCIUM SERPL-MCNC: 8.9 MG/DL (ref 8.8–10.2)
CHLORIDE SERPL-SCNC: 98 MMOL/L (ref 98–107)
CREAT SERPL-MCNC: 0.35 MG/DL (ref 0.67–1.17)
DEPRECATED HCO3 PLAS-SCNC: 24 MMOL/L (ref 22–29)
EGFRCR SERPLBLD CKD-EPI 2021: >90 ML/MIN/1.73M2
ERYTHROCYTE [DISTWIDTH] IN BLOOD BY AUTOMATED COUNT: 13.7 % (ref 10–15)
GLUCOSE SERPL-MCNC: 125 MG/DL (ref 70–99)
HCT VFR BLD AUTO: 34.4 % (ref 40–53)
HGB BLD-MCNC: 11.7 G/DL (ref 13.3–17.7)
MAGNESIUM SERPL-MCNC: 2.2 MG/DL (ref 1.7–2.3)
MCH RBC QN AUTO: 33.1 PG (ref 26.5–33)
MCHC RBC AUTO-ENTMCNC: 34 G/DL (ref 31.5–36.5)
MCV RBC AUTO: 98 FL (ref 78–100)
PHOSPHATE SERPL-MCNC: 2.9 MG/DL (ref 2.5–4.5)
PLATELET # BLD AUTO: 267 10E3/UL (ref 150–450)
POTASSIUM SERPL-SCNC: 4.2 MMOL/L (ref 3.4–5.3)
RBC # BLD AUTO: 3.53 10E6/UL (ref 4.4–5.9)
SODIUM SERPL-SCNC: 131 MMOL/L (ref 135–145)
WBC # BLD AUTO: 6.7 10E3/UL (ref 4–11)

## 2023-11-18 PROCEDURE — 85018 HEMOGLOBIN: CPT | Performed by: INTERNAL MEDICINE

## 2023-11-18 PROCEDURE — 99233 SBSQ HOSP IP/OBS HIGH 50: CPT | Performed by: INTERNAL MEDICINE

## 2023-11-18 PROCEDURE — 250N000013 HC RX MED GY IP 250 OP 250 PS 637: Performed by: INTERNAL MEDICINE

## 2023-11-18 PROCEDURE — 250N000011 HC RX IP 250 OP 636: Mod: JZ | Performed by: PHYSICIAN ASSISTANT

## 2023-11-18 PROCEDURE — 250N000012 HC RX MED GY IP 250 OP 636 PS 637: Performed by: INTERNAL MEDICINE

## 2023-11-18 PROCEDURE — 83735 ASSAY OF MAGNESIUM: CPT | Performed by: INTERNAL MEDICINE

## 2023-11-18 PROCEDURE — 94640 AIRWAY INHALATION TREATMENT: CPT | Mod: 76

## 2023-11-18 PROCEDURE — 258N000003 HC RX IP 258 OP 636: Performed by: PHYSICIAN ASSISTANT

## 2023-11-18 PROCEDURE — 999N000157 HC STATISTIC RCP TIME EA 10 MIN

## 2023-11-18 PROCEDURE — 94640 AIRWAY INHALATION TREATMENT: CPT

## 2023-11-18 PROCEDURE — 36415 COLL VENOUS BLD VENIPUNCTURE: CPT | Performed by: INTERNAL MEDICINE

## 2023-11-18 PROCEDURE — 250N000013 HC RX MED GY IP 250 OP 250 PS 637: Performed by: PHYSICIAN ASSISTANT

## 2023-11-18 PROCEDURE — 210N000002 HC R&B HEART CARE

## 2023-11-18 PROCEDURE — 250N000009 HC RX 250: Performed by: PHYSICIAN ASSISTANT

## 2023-11-18 PROCEDURE — 250N000013 HC RX MED GY IP 250 OP 250 PS 637

## 2023-11-18 PROCEDURE — 84100 ASSAY OF PHOSPHORUS: CPT | Performed by: INTERNAL MEDICINE

## 2023-11-18 PROCEDURE — 80048 BASIC METABOLIC PNL TOTAL CA: CPT | Performed by: INTERNAL MEDICINE

## 2023-11-18 RX ORDER — PREDNISONE 20 MG/1
40 TABLET ORAL DAILY
Status: DISCONTINUED | OUTPATIENT
Start: 2023-11-18 | End: 2023-11-21 | Stop reason: HOSPADM

## 2023-11-18 RX ORDER — SODIUM CHLORIDE 1 G/1
1 TABLET ORAL 2 TIMES DAILY WITH MEALS
Status: DISCONTINUED | OUTPATIENT
Start: 2023-11-18 | End: 2023-11-21 | Stop reason: HOSPADM

## 2023-11-18 RX ORDER — PANTOPRAZOLE SODIUM 40 MG/1
40 TABLET, DELAYED RELEASE ORAL
Status: DISCONTINUED | OUTPATIENT
Start: 2023-11-18 | End: 2023-11-21 | Stop reason: HOSPADM

## 2023-11-18 RX ADMIN — LOSARTAN POTASSIUM 100 MG: 100 TABLET, FILM COATED ORAL at 08:06

## 2023-11-18 RX ADMIN — PANTOPRAZOLE SODIUM 40 MG: 40 TABLET, DELAYED RELEASE ORAL at 11:54

## 2023-11-18 RX ADMIN — ATORVASTATIN CALCIUM 40 MG: 40 TABLET, FILM COATED ORAL at 08:06

## 2023-11-18 RX ADMIN — Medication 30 G: at 08:08

## 2023-11-18 RX ADMIN — IPRATROPIUM BROMIDE AND ALBUTEROL SULFATE 3 ML: .5; 3 SOLUTION RESPIRATORY (INHALATION) at 15:44

## 2023-11-18 RX ADMIN — PREDNISONE 40 MG: 20 TABLET ORAL at 13:13

## 2023-11-18 RX ADMIN — METHYLPREDNISOLONE SODIUM SUCCINATE 62.5 MG: 125 INJECTION, POWDER, FOR SOLUTION INTRAMUSCULAR; INTRAVENOUS at 06:02

## 2023-11-18 RX ADMIN — OXYCODONE HYDROCHLORIDE 5 MG: 5 TABLET ORAL at 19:40

## 2023-11-18 RX ADMIN — AZITHROMYCIN MONOHYDRATE 500 MG: 500 INJECTION, POWDER, LYOPHILIZED, FOR SOLUTION INTRAVENOUS at 12:37

## 2023-11-18 RX ADMIN — IPRATROPIUM BROMIDE AND ALBUTEROL SULFATE 3 ML: .5; 3 SOLUTION RESPIRATORY (INHALATION) at 12:05

## 2023-11-18 RX ADMIN — AMLODIPINE BESYLATE 10 MG: 10 TABLET ORAL at 08:07

## 2023-11-18 RX ADMIN — BUTALBITAL, ACETAMINOPHEN AND CAFFEINE 1 TABLET: 325; 50; 40 TABLET ORAL at 08:15

## 2023-11-18 RX ADMIN — DOCUSATE SODIUM 50 MG AND SENNOSIDES 8.6 MG 2 TABLET: 8.6; 5 TABLET, FILM COATED ORAL at 21:32

## 2023-11-18 RX ADMIN — OXYCODONE HYDROCHLORIDE 5 MG: 5 TABLET ORAL at 11:54

## 2023-11-18 RX ADMIN — ACETAMINOPHEN: 500 TABLET, FILM COATED ORAL at 21:32

## 2023-11-18 RX ADMIN — CEFTRIAXONE SODIUM 2 G: 2 INJECTION, POWDER, FOR SOLUTION INTRAMUSCULAR; INTRAVENOUS at 11:55

## 2023-11-18 RX ADMIN — SODIUM CHLORIDE TAB 1 GM 1 G: 1 TAB at 19:33

## 2023-11-18 ASSESSMENT — ACTIVITIES OF DAILY LIVING (ADL)
ADLS_ACUITY_SCORE: 22

## 2023-11-18 NOTE — PROGRESS NOTES
[facility]    HOSPITALIST PROGRESS NOTE :   --------------------------------------------------    Date of Admission:  11/16/2023    Cumulative Summary:   Ángel Pham is a 68 year old male admitted on 11/16/2023.     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       EMS was called due to worsening shortness of breath occurring over the previous 3 days prior to admission. Initially, patient was placed on BiPAP by EMSon arrival to ER , later removed and patient has tolerated being on room air.     Work-up in the ED has included an EKG that showed sinus tachycardia with left axis deviation. CMP revealed sodium of 130, chloride of 91, creatinine of 0.58 with a GFR greater than 90 otherwise within normal limits. D-dimer was elevated at 0.94. proBNP was within normal limits at 530.  Procalcitonin was mildly elevated at 0.22.  High-sensitivity troponin T was mildly elevated at 23.  Lactic acid level was within normal limits at 1.7.  VBG showed a pH of 7.35, PCO2 of 52, PO2 of 14, O2 saturation of 15 and bicarbonate of 29.  Respiratory viral PCR panel was all negative.  Repeat troponin T with improvement to 18.     One-view portable chest x-ray showed emphysematous change without acute airspace consolidation and noted resolved airspace opacities at the left lung base since the prior exam as well as some scarring at the bilateral apices again noted appearing stable. Chest CT/PE study with contrast was negative for PE but revealed new patchy opacities in the left upper lobe and left  lower lobe (likely pneumonia with recommendation for follow-up chest CT in approximately 3 months to ensure resolution), few irregular nodular opacities in the right lower lobe are stable, stable mediastinal lymphadenopathy and normal variant duplicated SVC with the left SVC draining into the coronary sinus.     Patient received 2 DuoNeb treatments and a 500 ml IV fluid bolus with normal saline.  IV ceftriaxone 2 g and 500 mg of oral azithromycin have been ordered.  Patient received 125 mg of solumedrol via EMS. Patient was admitted for further evaluation and management,. Patient received IV solumedrol for first 48 hours and is switched to oral prednisone on 11/18/23         Assessment & Plan     Left lung community acquired pneumonia  COPD exacerbation    -- Patient has been admitted for further evaluation and management  -- Patient was seen and examined, daughter also present at the bedside , all questions answered   -- IV Azithromycin 500 mg/d.    -- IV ceftriaxone 2 g/d while hospitalized.    -- Off IV fluids since 11/17  -- Supplemental oxygen as needed to keep oxygen saturation more than 90%, currently maintaining oxygenation on room air.  -- Vital signs every 4 hours.    -- Hold PTA inhaler (Trelegy).      -- Scheduled DuoNebs every 4 hours and PRN.    -- RCAT consult requested.    -- Mucinex 1200 mg BID.    -- Stop IV solumedrol 62.5 mg BID , transition to PO prednisone 40 mg from this morning   -- Encourage use of IS/Flutter valve.    -- Of note, patient has been following up with outpatient pulmonary      Troponin elevation, most likely type II NSTEMI from COPD Exacerbation    -- Trended troponin, does not have ACS pattern, repeat in normal range   -- Monitor on telemetry.    -- ECHO ordered, normal LV and systolic function of 55 to 60%, normal left ventricular wall motion.  -- EKG on admission showed sinus tachy and left Axis deviation   -- No further inpatient evaluation is needed      Tobacco Use D/O    Patient typically smokes up to 1.5 packs/day.  In the past week he has not been smoking much at all.      -- Counseled regarding smoking cessation that should also continue with PCP.    -- Nicoderm patch offered and patient declined.  -- Patient told me that he will continue to work on it , has been smoking since age of 15 and thinks that he will think about cutting down      Essential HTN: Initially high , per patient runs high at home also     -- Continue PTA losartan 100 mg/d.  Hold parameters in place.    -- Increased amlodipine to 10 mg p.o. daily on 11/17  -- Vitals every 4 hours, BP starting to get better this morning   -- no further adjustments for medications today   -- If needed will consider adding hydralazine scheduled if needed tomorrow     -- will order Hydralazine for systolic BP > 170      HLP  -- Continue  PTA atorvastatin 40 mg/d.       Chronic Pain  -- Continue PTA PRN oxycodone 5 mg every 3 hours.    -- PRN APAP available.       Hyponatremia, chronic  *Baseline sodium between 128-132 in the past year.  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 twice daily on 11/17, patient does not want to drink that and is requesting an aletrnative   -- After discussion with patient and daughter , will start on Salt tablet BID   -- Check BMP tomorrow   --Patient does not need further work-up for hyponatremia as it has been worked up in the past and at this time most likely secondary to combination of malnutrition and history of small cell carcinoma     Alcohol use D/O  *Patient typically consumes 4-6 beers per day. He reported that he has not had much or any alcohol in the past several days.      -- Continue to monitor on CIWA   -- Follow up with PCP.       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 Ann and  "Dr. Dreyer of [facility].      Anemia:  -- will check iron profile with ferritin   -- Check Vitamin B 12 and Folate level   -- No signs for acute bleeding   -- will order FOBT      Severe protein calorie malnutrition  Cachexia  -- most likely sec to Previous Malignancies and COPD  -- Regular diet.       Insomnia  -- Continue PTA APAP-PM (Benadryl-APAP) at bedtime.       Migraines  -- Continue PTA PRN Fioricet.                 -Patient reported that he ran out of this medication recently, will ask patient if he would like the refill       Clinically Significant Risk Factors          # Hypocalcemia: Lowest Ca = 8.4 mg/dL in last 2 days, will monitor and replace as appropriate         # Hypertension: Noted on problem list        # Cachexia: Estimated body mass index is 12.88 kg/m  as calculated from the following:    Height as of this encounter: 1.854 m (6' 1\").    Weight as of this encounter: 44.3 kg (97 lb 9.6 oz)., PRESENT ON ADMISSION  # Severe Malnutrition: based on nutrition assessment, PRESENT ON ADMISSION   # Financial/Environmental Concerns: none         Diet: Regular Diet Adult    Gannon Catheter: Not present  DVT Prophylaxis: Pneumatic Compression Devices  Code Status: Full Code    The patient's care was discussed with the Patient, bedside nursing and daughter who was present at the bedside     Medical Decision Making     60 MINUTES SPENT BY ME on the date of service doing chart review, history, exam, documentation & further activities per the note.      Disposition Plan   PT/OT and Social work/CC consultation has been requested to plan safe disposition plan , patient does not want to consider rehab but open to the idea of C, PT is recommending outpatient PT  Patient currently lives at home alone, daughter present in the area and does help patient.     Expected Discharge Date: 11/19/2023      Destination: home       Entered: Jeanna Platt MD 11/18/2023, 8:21 AM       Jeanna Platt MD, MD, FACP  Text Page (7am - " 6pm)    ----------------------------------------------------------------------------------------------------------------------      Interval History     Patient was seen and examined , daughter also present at the bedside   According to patient , his GRAHAM is better , he is denying any chest pain , follows up with pulmo in an outpatient setting   He thinks that he can't take UreNa , requesting a tablet if possible  We also discussed about changing IV solumedrol to oral prednisone   Encouraged patient to increase his mobilization and if patient is able to tolerate oral prednisone then he might be able to go tomorrow       -Data reviewed today: I reviewed all new labs and imaging results over the last 24 hours.    I personally reviewed echocardiogram results and chest x-ray along with CT chest results.  CT showing patchy opacities in the left upper and left lower lobes concerning for pneumonia.      Physical Exam   Temp: 97.8  F (36.6  C) Temp src: Oral BP: (!) 177/95 Pulse: 82   Resp: 16 SpO2: 94 % O2 Device: None (Room air)    Vitals:    11/16/23 0933 11/17/23 0630 11/18/23 0440   Weight: 44.2 kg (97 lb 7.1 oz) 44 kg (97 lb 1.6 oz) 44.3 kg (97 lb 9.6 oz)     Vital Signs with Ranges  Temp:  [97.8  F (36.6  C)-98.2  F (36.8  C)] 97.8  F (36.6  C)  Pulse:  [74-96] 82  Resp:  [16-19] 16  BP: (129-177)/(71-96) 177/95  SpO2:  [92 %-100 %] 94 %  I/O last 3 completed shifts:  In: 1630 [P.O.:750; I.V.:880]  Out: 1125 [Urine:1125]    GENERAL: Alert , awake and oriented. NAD. Conversational, appropriate.   HEENT: Normocephalic. EOMI. No icterus or injection. Nares normal.   LUNGS: Clear to auscultation. No dyspnea at rest.   HEART: Regular rate. Extremities perfused.   ABDOMEN: Soft, nontender, and nondistended. Positive bowel sounds.   EXTREMITIES: No LE edema noted.   NEUROLOGIC: Moves extremities x4 on command. No acute focal neurologic abnormalities noted.     Medications        amLODIPine  10 mg Oral Daily    atorvastatin   40 mg Oral Daily    azithromycin  500 mg Intravenous Q24H    cefTRIAXone  2 g Intravenous Q24H    diphenhydrAMINE (BENADRYL) 50 mg, acetaminophen (TYLENOL) 1,000 mg alternative for Tylenol PM   Oral At Bedtime    guaiFENesin  1,200 mg Oral BID    ipratropium - albuterol 0.5 mg/2.5 mg/3 mL  3 mL Nebulization 4x daily    losartan  100 mg Oral Daily    pantoprazole  40 mg Oral QAM AC    predniSONE  40 mg Oral Daily    senna-docusate  2 tablet Oral At Bedtime    sodium chloride (PF)  3 mL Intracatheter Q8H    Urea  30 g Oral BID       Data   Recent Labs   Lab 23  0545 23  0619 23  0942   WBC 6.7 7.7 10.4   HGB 11.7* 11.3* 14.7   MCV 98 95 99    231 210   INR  --   --  0.99   * 130* 130*   POTASSIUM 4.2 4.2 4.6   CHLORIDE 98 97* 91*   CO2 24 23 25   BUN 15.6 12.5 10.1   CR 0.35* 0.44* 0.58*   ANIONGAP 9 10 14   MOSHE 8.9 8.4* 9.5   * 117* 96   ALBUMIN  --   --  4.0   PROTTOTAL  --   --  7.2   BILITOTAL  --   --  0.4   ALKPHOS  --   --  121   ALT  --   --  22   AST  --   --  27       Imaging:   Recent Results (from the past 24 hour(s))   Echocardiogram Limited   Result Value    LVEF  55-60%    Narrative    [id number]  [id number]  [id number]  [id number]^VERO^MADHU^ROEL     [facility]  Echocardiography Laboratory  [address] [place] [address]     Name: MATI DUNN  MRN: [id number]  : 1955  Study Date: 2023 07:57 AM  Age: 68 yrs  Gender: Male  Patient Location: [facility]  Reason For Study: SOB  Ordering Physician: Madhu Vásquez  Referring Physician: Madhu Vásquez  Performed By: Herminio Calderon     BSA: 1.6 m2  Height: 73 in  Weight: 97 lb  HR: 83  ______________________________________________________________________________  Procedure  Limited Echo Adult.  ______________________________________________________________________________  Interpretation Summary     The left ventricle is normal in size.  Left ventricular systolic function  is normal. The visual ejection fraction is  55-60%.  Normal left ventricular wall motion  The right ventricle is normal in structure, function and size.  No hemodynamically significant valvular abnormalities on 2D or color flow  imaging. There is no comparison study available.  ______________________________________________________________________________  Left Ventricle  The left ventricle is normal in size. There is normal left ventricular wall  thickness. Left ventricular systolic function is normal. The visual ejection  fraction is 55-60%. Normal left ventricular wall motion.     Right Ventricle  The right ventricle is normal in structure, function and size.     Atria  Normal left atrial size. Right atrial size is normal. There is no atrial shunt  seen.     Mitral Valve  The mitral valve is normal in structure and function. There is trace mitral  regurgitation.     Tricuspid Valve  The tricuspid valve is normal in structure and function. There is trace  tricuspid regurgitation. Right ventricular systolic pressure could not be  approximated due to inadequate tricuspid regurgitation. IVC diameter <2.1 cm  collapsing >50% with sniff suggests a normal RA pressure of 3 mmHg.     Aortic Valve  The aortic valve is normal in structure and function. No aortic regurgitation  is present. No aortic stenosis is present.     Pulmonic Valve  The pulmonic valve is not well seen, but is grossly normal. There is trace  pulmonic valvular regurgitation.     Vessels  Normal size aorta. The inferior vena cava was normal in size with preserved  respiratory variability.     Pericardium  There is no pericardial effusion.     Rhythm  Sinus rhythm was noted.  ______________________________________________________________________________  MMode/2D Measurements & Calculations     IVSd: 0.72 cm  LVIDd: 4.4 cm  LVIDs: 3.3 cm  LVPWd: 0.64 cm  FS: 26.0 %  LV mass(C)d: 87.8 grams  LV mass(C)dI: 55.5 grams/m2  Ao root diam: 3.6 cm  asc Aorta  Diam: 1.2 cm  LVOT diam: 2.0 cm  LVOT area: 3.1 cm2  Ao root diam index Ht(cm/m): 1.9  Ao root diam index BSA (cm/m2): 2.3  Asc Ao diam index BSA (cm/m2): 0.76  Asc Ao diam index Ht(cm/m): 0.65  RWT: 0.29     Doppler Measurements & Calculations  PA V2 max: 99.5 cm/sec  PA max P.0 mmHg  PA acc time: 0.09 sec     ______________________________________________________________________________  Report approved by: Mohan Donahue 2023 10:15 AM

## 2023-11-18 NOTE — PLAN OF CARE
Pleasant gentlemen. Pt aox4, SBA, RA and full code. Tele SR. Pt denies chest pain and SOB. Pt scoring a 1 on CIWA's protocol. Treating CAP with azithromycin and rocephin. Pt receiving duo nebs and IV steroids. Transition to po prednisone in the next 24hrs.  Pt refused Mucinex and Urea packets.   Plan: Continue to monitor.

## 2023-11-19 ENCOUNTER — APPOINTMENT (OUTPATIENT)
Dept: GENERAL RADIOLOGY | Facility: CLINIC | Age: 68
DRG: 193 | End: 2023-11-19
Attending: NURSE PRACTITIONER
Payer: COMMERCIAL

## 2023-11-19 ENCOUNTER — APPOINTMENT (OUTPATIENT)
Dept: CT IMAGING | Facility: CLINIC | Age: 68
DRG: 193 | End: 2023-11-19
Attending: NURSE PRACTITIONER
Payer: COMMERCIAL

## 2023-11-19 LAB
ANION GAP SERPL CALCULATED.3IONS-SCNC: 8 MMOL/L (ref 7–15)
BUN SERPL-MCNC: 14 MG/DL (ref 8–23)
CALCIUM SERPL-MCNC: 9 MG/DL (ref 8.8–10.2)
CHLORIDE SERPL-SCNC: 98 MMOL/L (ref 98–107)
CREAT SERPL-MCNC: 0.35 MG/DL (ref 0.67–1.17)
DEPRECATED HCO3 PLAS-SCNC: 28 MMOL/L (ref 22–29)
EGFRCR SERPLBLD CKD-EPI 2021: >90 ML/MIN/1.73M2
ERYTHROCYTE [DISTWIDTH] IN BLOOD BY AUTOMATED COUNT: 13.9 % (ref 10–15)
GLUCOSE BLDC GLUCOMTR-MCNC: 78 MG/DL (ref 70–99)
GLUCOSE SERPL-MCNC: 85 MG/DL (ref 70–99)
HCT VFR BLD AUTO: 36.8 % (ref 40–53)
HGB BLD-MCNC: 12.4 G/DL (ref 13.3–17.7)
MAGNESIUM SERPL-MCNC: 1.9 MG/DL (ref 1.7–2.3)
MCH RBC QN AUTO: 33 PG (ref 26.5–33)
MCHC RBC AUTO-ENTMCNC: 33.7 G/DL (ref 31.5–36.5)
MCV RBC AUTO: 98 FL (ref 78–100)
PHOSPHATE SERPL-MCNC: 2.6 MG/DL (ref 2.5–4.5)
PLATELET # BLD AUTO: 248 10E3/UL (ref 150–450)
POTASSIUM SERPL-SCNC: 3.5 MMOL/L (ref 3.4–5.3)
RBC # BLD AUTO: 3.76 10E6/UL (ref 4.4–5.9)
SODIUM SERPL-SCNC: 134 MMOL/L (ref 135–145)
TROPONIN T SERPL HS-MCNC: 14 NG/L
TROPONIN T SERPL HS-MCNC: 16 NG/L
WBC # BLD AUTO: 8.9 10E3/UL (ref 4–11)

## 2023-11-19 PROCEDURE — 71045 X-RAY EXAM CHEST 1 VIEW: CPT

## 2023-11-19 PROCEDURE — 36415 COLL VENOUS BLD VENIPUNCTURE: CPT | Performed by: NURSE PRACTITIONER

## 2023-11-19 PROCEDURE — 250N000011 HC RX IP 250 OP 636: Mod: JZ | Performed by: NURSE PRACTITIONER

## 2023-11-19 PROCEDURE — 80048 BASIC METABOLIC PNL TOTAL CA: CPT | Performed by: INTERNAL MEDICINE

## 2023-11-19 PROCEDURE — 93010 ELECTROCARDIOGRAM REPORT: CPT | Performed by: INTERNAL MEDICINE

## 2023-11-19 PROCEDURE — 71250 CT THORAX DX C-: CPT

## 2023-11-19 PROCEDURE — 250N000013 HC RX MED GY IP 250 OP 250 PS 637: Performed by: NURSE PRACTITIONER

## 2023-11-19 PROCEDURE — 94640 AIRWAY INHALATION TREATMENT: CPT | Mod: 76

## 2023-11-19 PROCEDURE — 83735 ASSAY OF MAGNESIUM: CPT | Performed by: INTERNAL MEDICINE

## 2023-11-19 PROCEDURE — 999N000157 HC STATISTIC RCP TIME EA 10 MIN

## 2023-11-19 PROCEDURE — 250N000013 HC RX MED GY IP 250 OP 250 PS 637: Performed by: PHYSICIAN ASSISTANT

## 2023-11-19 PROCEDURE — 250N000011 HC RX IP 250 OP 636: Performed by: INTERNAL MEDICINE

## 2023-11-19 PROCEDURE — 250N000009 HC RX 250: Performed by: PHYSICIAN ASSISTANT

## 2023-11-19 PROCEDURE — 99233 SBSQ HOSP IP/OBS HIGH 50: CPT | Performed by: INTERNAL MEDICINE

## 2023-11-19 PROCEDURE — 94640 AIRWAY INHALATION TREATMENT: CPT

## 2023-11-19 PROCEDURE — 93005 ELECTROCARDIOGRAM TRACING: CPT

## 2023-11-19 PROCEDURE — 250N000013 HC RX MED GY IP 250 OP 250 PS 637: Performed by: INTERNAL MEDICINE

## 2023-11-19 PROCEDURE — 250N000013 HC RX MED GY IP 250 OP 250 PS 637

## 2023-11-19 PROCEDURE — 210N000002 HC R&B HEART CARE

## 2023-11-19 PROCEDURE — 84484 ASSAY OF TROPONIN QUANT: CPT | Performed by: NURSE PRACTITIONER

## 2023-11-19 PROCEDURE — 99207 PR APP CREDIT; MD BILLING SHARED VISIT: CPT | Performed by: NURSE PRACTITIONER

## 2023-11-19 PROCEDURE — 250N000011 HC RX IP 250 OP 636: Mod: JZ | Performed by: PHYSICIAN ASSISTANT

## 2023-11-19 PROCEDURE — 250N000012 HC RX MED GY IP 250 OP 636 PS 637: Performed by: INTERNAL MEDICINE

## 2023-11-19 PROCEDURE — 36415 COLL VENOUS BLD VENIPUNCTURE: CPT | Performed by: INTERNAL MEDICINE

## 2023-11-19 PROCEDURE — 84100 ASSAY OF PHOSPHORUS: CPT | Performed by: INTERNAL MEDICINE

## 2023-11-19 PROCEDURE — 85027 COMPLETE CBC AUTOMATED: CPT | Performed by: NURSE PRACTITIONER

## 2023-11-19 RX ORDER — LIDOCAINE 4 G/G
1 PATCH TOPICAL
Status: DISCONTINUED | OUTPATIENT
Start: 2023-11-19 | End: 2023-11-21 | Stop reason: HOSPADM

## 2023-11-19 RX ORDER — HYDRALAZINE HYDROCHLORIDE 10 MG/1
10 TABLET, FILM COATED ORAL 3 TIMES DAILY
Status: DISCONTINUED | OUTPATIENT
Start: 2023-11-19 | End: 2023-11-20

## 2023-11-19 RX ORDER — ACETAMINOPHEN 325 MG/1
975 TABLET ORAL EVERY 8 HOURS
Status: DISCONTINUED | OUTPATIENT
Start: 2023-11-19 | End: 2023-11-21 | Stop reason: HOSPADM

## 2023-11-19 RX ORDER — NITROGLYCERIN 0.4 MG/1
0.4 TABLET SUBLINGUAL EVERY 5 MIN PRN
Status: DISCONTINUED | OUTPATIENT
Start: 2023-11-19 | End: 2023-11-19

## 2023-11-19 RX ORDER — DIPHENHYDRAMINE HCL 25 MG
25-50 CAPSULE ORAL EVERY 6 HOURS PRN
Status: DISCONTINUED | OUTPATIENT
Start: 2023-11-19 | End: 2023-11-21 | Stop reason: HOSPADM

## 2023-11-19 RX ORDER — PREDNISONE 10 MG/1
TABLET ORAL
Qty: 30 TABLET | Refills: 0 | Status: SHIPPED | OUTPATIENT
Start: 2023-11-19 | End: 2023-11-21

## 2023-11-19 RX ORDER — TRAMADOL HYDROCHLORIDE 50 MG/1
50 TABLET ORAL EVERY 6 HOURS PRN
Status: DISCONTINUED | OUTPATIENT
Start: 2023-11-19 | End: 2023-11-21 | Stop reason: HOSPADM

## 2023-11-19 RX ORDER — SODIUM CHLORIDE 1 G/1
1 TABLET ORAL
Qty: 30 TABLET | Refills: 0 | Status: SHIPPED | OUTPATIENT
Start: 2023-11-19 | End: 2023-11-21

## 2023-11-19 RX ORDER — ACETAMINOPHEN 650 MG/1
975 SUPPOSITORY RECTAL EVERY 8 HOURS
Status: DISCONTINUED | OUTPATIENT
Start: 2023-11-19 | End: 2023-11-21 | Stop reason: HOSPADM

## 2023-11-19 RX ORDER — NITROGLYCERIN 0.4 MG/1
TABLET SUBLINGUAL
Status: COMPLETED
Start: 2023-11-19 | End: 2023-11-19

## 2023-11-19 RX ORDER — KETOROLAC TROMETHAMINE 15 MG/ML
15 INJECTION, SOLUTION INTRAMUSCULAR; INTRAVENOUS ONCE
Status: COMPLETED | OUTPATIENT
Start: 2023-11-19 | End: 2023-11-19

## 2023-11-19 RX ORDER — PANTOPRAZOLE SODIUM 40 MG/1
40 TABLET, DELAYED RELEASE ORAL
Qty: 14 TABLET | Refills: 0 | Status: SHIPPED | OUTPATIENT
Start: 2023-11-19 | End: 2023-11-21

## 2023-11-19 RX ORDER — AMLODIPINE BESYLATE 10 MG/1
10 TABLET ORAL DAILY
Qty: 30 TABLET | Refills: 0 | Status: SHIPPED | OUTPATIENT
Start: 2023-11-19 | End: 2023-11-21

## 2023-11-19 RX ORDER — HYDRALAZINE HYDROCHLORIDE 10 MG/1
10 TABLET, FILM COATED ORAL 3 TIMES DAILY
Qty: 90 TABLET | Refills: 0 | Status: SHIPPED | OUTPATIENT
Start: 2023-11-19 | End: 2023-11-20

## 2023-11-19 RX ORDER — AZITHROMYCIN 500 MG/1
500 INJECTION, POWDER, LYOPHILIZED, FOR SOLUTION INTRAVENOUS EVERY 24 HOURS
Status: DISCONTINUED | OUTPATIENT
Start: 2023-11-19 | End: 2023-11-20

## 2023-11-19 RX ADMIN — CEFTRIAXONE SODIUM 2 G: 2 INJECTION, POWDER, FOR SOLUTION INTRAMUSCULAR; INTRAVENOUS at 11:25

## 2023-11-19 RX ADMIN — LIDOCAINE 1 PATCH: 4 PATCH TOPICAL at 10:33

## 2023-11-19 RX ADMIN — LOSARTAN POTASSIUM 100 MG: 100 TABLET, FILM COATED ORAL at 08:54

## 2023-11-19 RX ADMIN — DIPHENHYDRAMINE HYDROCHLORIDE 50 MG: 25 CAPSULE ORAL at 22:06

## 2023-11-19 RX ADMIN — IPRATROPIUM BROMIDE AND ALBUTEROL SULFATE 3 ML: .5; 3 SOLUTION RESPIRATORY (INHALATION) at 07:59

## 2023-11-19 RX ADMIN — POLYETHYLENE GLYCOL 3350 17 G: 17 POWDER, FOR SOLUTION ORAL at 08:51

## 2023-11-19 RX ADMIN — AZITHROMYCIN MONOHYDRATE 500 MG: 500 INJECTION, POWDER, LYOPHILIZED, FOR SOLUTION INTRAVENOUS at 12:20

## 2023-11-19 RX ADMIN — PANTOPRAZOLE SODIUM 40 MG: 40 TABLET, DELAYED RELEASE ORAL at 08:52

## 2023-11-19 RX ADMIN — IPRATROPIUM BROMIDE AND ALBUTEROL SULFATE 3 ML: .5; 3 SOLUTION RESPIRATORY (INHALATION) at 16:08

## 2023-11-19 RX ADMIN — KETOROLAC TROMETHAMINE 15 MG: 15 INJECTION, SOLUTION INTRAMUSCULAR; INTRAVENOUS at 10:10

## 2023-11-19 RX ADMIN — HYDRALAZINE HYDROCHLORIDE 10 MG: 10 TABLET ORAL at 21:13

## 2023-11-19 RX ADMIN — AMLODIPINE BESYLATE 10 MG: 10 TABLET ORAL at 08:55

## 2023-11-19 RX ADMIN — DOCUSATE SODIUM 50 MG AND SENNOSIDES 8.6 MG 2 TABLET: 8.6; 5 TABLET, FILM COATED ORAL at 21:13

## 2023-11-19 RX ADMIN — SODIUM CHLORIDE TAB 1 GM 1 G: 1 TAB at 19:48

## 2023-11-19 RX ADMIN — SODIUM CHLORIDE TAB 1 GM 1 G: 1 TAB at 08:52

## 2023-11-19 RX ADMIN — IPRATROPIUM BROMIDE AND ALBUTEROL SULFATE 3 ML: .5; 3 SOLUTION RESPIRATORY (INHALATION) at 11:21

## 2023-11-19 RX ADMIN — NITROGLYCERIN 0.4 MG: 0.4 TABLET SUBLINGUAL at 09:40

## 2023-11-19 RX ADMIN — ACETAMINOPHEN 975 MG: 325 TABLET, FILM COATED ORAL at 12:05

## 2023-11-19 RX ADMIN — HYDRALAZINE HYDROCHLORIDE 10 MG: 10 TABLET ORAL at 15:41

## 2023-11-19 RX ADMIN — ATORVASTATIN CALCIUM 40 MG: 40 TABLET, FILM COATED ORAL at 08:53

## 2023-11-19 RX ADMIN — ACETAMINOPHEN 975 MG: 325 TABLET, FILM COATED ORAL at 19:48

## 2023-11-19 RX ADMIN — HYDRALAZINE HYDROCHLORIDE 10 MG: 10 TABLET ORAL at 08:54

## 2023-11-19 RX ADMIN — PREDNISONE 40 MG: 20 TABLET ORAL at 08:54

## 2023-11-19 ASSESSMENT — ACTIVITIES OF DAILY LIVING (ADL)
ADLS_ACUITY_SCORE: 24
ADLS_ACUITY_SCORE: 22
ADLS_ACUITY_SCORE: 24
ADLS_ACUITY_SCORE: 22
ADLS_ACUITY_SCORE: 24
ADLS_ACUITY_SCORE: 24
ADLS_ACUITY_SCORE: 22
ADLS_ACUITY_SCORE: 24
ADLS_ACUITY_SCORE: 22
ADLS_ACUITY_SCORE: 24
ADLS_ACUITY_SCORE: 22
ADLS_ACUITY_SCORE: 24

## 2023-11-19 NOTE — CODE/RAPID RESPONSE
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  Course was complicated by type II non-STEMI.  Echo was essentially normal, he ruled out for pulmonary embolism on the day of admission, CT revealed left upper lobe and left lower lobe infiltrates.  Patient has been clinically improving and was pending discharge.    RRT activated for chest pain on the a.m. of 11/19/2023.  Patient recently had gotten up to use the bathroom.  Some time had passed thereafter when he had sudden onset of left-sided chest pain 10 / 10 in severity, constant, sharp, pleuritic nature.  He describes it is totally different than prior chest pain.  He feels somewhat presyncopal, no nausea but is dyspneic.  He is not able to speak in full sentences.  There is no diaphoresis and the pain is worse with coughing.  SPO2 is 96% on room air, heart rate 92, respiratory 18, /99, repeat 191/101.  There is no bronchospasm or subcutaneous emphysema on exam, heart sounds are distant.  He is uncomfortable appearing but not an extremis.    I personally reviewed the radiographs of the chest which revealed a very long lungs, in excess of 10 rib spaces visible, difficult to confirm whether or not lung markings extend all the way to the apices.  There is no deep sulcus sign or obvious pneumothorax by my interpretation    Impression  Chest pain  Differential diagnosis:  Pneumothorax is leading dx w/ severe emphysematous COPDPE, considered PE but ruled out earlier in stay, ACS  (+risk factors w/ tobacco, HTN, HLD) but recently had ECHO ordered, normal LV and systolic function of 55 to 60%, normal left ventricular wall motion.     INTERVENTIONS:  -stat EKG--> normal sinus rhythm, small Q waves in lead II, T wave inversion in aVL no other contiguous ischemic changes  -add on trop  -troponin now and cbc  -ntg x1 brought pain down to 8/10 while concurrently significantly decreasing his blood pressure down to a abhilash   -noncontrast ct chest to definitively rule out pneumothorax  -toradol 15mg IV x1  -lidocaine patch  -Offered acetaminophen which she declined.  - Patient also declined opioid analgesia citing constipation  - Glucose 978 mg/dL  - Continue telemetry      Last 24H PRN:     nitroGLYcerin (NITROSTAT) sublingual tablet 0.4 mg, 0.4 mg at 11/19/23 0940    oxyCODONE (ROXICODONE) tablet 5 mg, 5 mg at 11/18/23 1940    polyethylene glycol (MIRALAX) Packet 17 g, 17 g at 11/19/23 0851    sodium chloride (PF) 0.9% PF flush 3 mL, 3 mL at 11/18/23 1236    Working diagnosis: Need to rule out pneumothorax.  Will trend troponins to rule out ACS.  If neither of these are proven it may be pleuritic pain from his pneumonia    At the end of the RRT all diagnostics have been completed and reviewed, CT interpretation is pending.  Lab draw is imminent    disposition to current level of care    Discussed with and defer further cares to hospitalist attending physician Dr. Platt who rounded on patient during the RRT     Addendum 1115am  Ct results reviewed and I will share w/ pt  1.  Foci of consolidation and/or volume loss throughout the apical posterior segment left upper lobe and to a lesser extent the inferior lingula and posterior basilar left lower lobe are similar to 11/16/2023 and likely represent resolving pneumonia.  2.  The 14 x 5 mm nodular opacity in the medial left upper lobe apex targeted for radiation therapy is unchanged since 10/11/2023 but significantly smaller compared with  radiation treatment planning CT   3.  No pneumothorax.  4.  Lungs are hyperinflated with advanced emphysematous change.  5.  Stable lung nodules.    Additional analgesia  -scheduled apap 975mg q8h  -tramadol 50mg po q6h prn  -lidocaine patch as above    Code Status: Full Code    Allergies   Allergies   Allergen Reactions    Chantix [Varenicline] Other (See Comments)     Patient reports has tried this in past and had lightheadedness and nose bleeds.    Morphine Hcl Itching     On contact    Wellbutrin [Bupropion Hydrobromide]      syncope       Physical Exam   Vital Signs with Ranges:  Temp:  [97.5  F (36.4  C)-98.2  F (36.8  C)] 97.5  F (36.4  C)  Pulse:  [] 99  Resp:  [16-26] 26  BP: (108-191)/() 139/89  SpO2:  [94 %-100 %] 95 %  I/O last 3 completed shifts:  In: 1080 [P.O.:1080]  Out: 300 [Urine:300]    Constitutional: vs as above and/or per EMR  General:  adult pt lying in bed uncomfortable appearing but not in severe acute distress   GCS:   Motor 6=Obeys commands   Verbal 5=Oriented   Eye Opening 4=Spontaneous   Total: 15     Neuro: +follows commands wiggle toes and show 2 fingers bilat, face symmetric, tongue midline, speech fluent  Eyes pupils equal round 3mm briskly reactive bilat, sclera nonicteric, noninjected, conjunctiva pink,  Head, ENT & mouth: NC/AT,  mouth moist oral mucosa  Neck: supple, prominent Sternocleidomastoid muscles  CV S1S2, very distant heart sounds  resp: CTAB upper and lower lobes wheezing but again also distant lung sounds  gi:normoactive bowel sounds, soft, nontender, nondisteded  Ext: no edema or mottling  Skin: no rashes on exposed skin  Lymph: defer  Musculoskeletal no bony joint deformities, muscle wasting diffusely      Data     EKG:  Interpreted by ALY Espino CNP  Time reviewed: 0934  Symptoms at time of EKG: Left-sided chest pain  Rhythm: normal sinus   Rate: Normal  Axis: Normal  Ectopy: none  Conduction: normal  ST Segments/ T Waves: Isolated T wave  inversion in aVL  Q Waves: Isolated to lead II  Comparison to prior: Unchanged from 7/16/2023    Clinical Impression: Normal sinus rhythm with isolated changes but overall unchanged from 11/16/2023    IMAGING: (X-ray/CT/MRI)   Recent Results (from the past 24 hour(s))   XR Chest Port 1 View    Narrative    EXAM: XR CHEST PORT 1 VIEW  LOCATION: [facility]  DATE: 11/19/2023    INDICATION: chest pain, eval for pntx  COMPARISON: Chest CT on 11/16/2023      Impression    IMPRESSION: Emphysema. Improved patchy opacities in the left upper lobe since 11/16/2023 suggestive of improving pneumonia. Stable biapical pleural scarring. No pneumothorax or pleural effusion. Normal heart size. Few stable calcified granuloma.       CBC with Diff:  Recent Labs   Lab Test 11/19/23  1034 11/17/23  0619 11/16/23  0942   WBC 8.9   < > 10.4   HGB 12.4*   < > 14.7   MCV 98   < > 99      < > 210   INR  --   --  0.99    < > = values in this interval not displayed.        Comprehensive Metabolic Panel:  Recent Labs   Lab 11/19/23  0935 11/19/23  0623 11/16/23  1157 11/16/23  0942   NA  --  134*   < > 130*   POTASSIUM  --  3.5   < > 4.6   CHLORIDE  --  98   < > 91*   CO2  --  28   < > 25   ANIONGAP  --  8   < > 14   GLC 78 85   < > 96   BUN  --  14.0   < > 10.1   CR  --  0.35*   < > 0.58*   GFRESTIMATED  --  >90   < > >90   MOSHE  --  9.0   < > 9.5   MAG  --  1.9   < >  --    PHOS  --  2.6   < >  --    PROTTOTAL  --   --   --  7.2   ALBUMIN  --   --   --  4.0   BILITOTAL  --   --   --  0.4   ALKPHOS  --   --   --  121   AST  --   --   --  27   ALT  --   --   --  22    < > = values in this interval not displayed.     Medical Decision Making               Time Spent on this Encounter   I spent 60 minutes of critical care time on the unit/floor managing the care of Ángel MCCAIN Vincenzodanielle. Upon evaluation, this patient had a high probability of imminent or life-threatening deterioration due to evaluation of chest pain,  which required my direct attention, intervention, and personal management including review of EKG, chest x-ray, decision to pursue CT imaging, provision of antianginals 100% of my time was spent at the bedside counseling the patient and/or coordinating care regarding services listed in this note.    ALY Espino [facility]  Hospitalist Service  [facility]  Securely message with [employer] (more info)  Text page via [employer] Paging/Directory

## 2023-11-19 NOTE — PROGRESS NOTES
[facility]    HOSPITALIST PROGRESS NOTE :   --------------------------------------------------    Date of Admission:  11/16/2023    Cumulative Summary:   Ángel Pham is a 68 year old male admitted on 11/16/2023.     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       EMS was called due to worsening shortness of breath occurring over the previous 3 days prior to admission. Initially, patient was placed on BiPAP by EMSon arrival to ER , later removed and patient has tolerated being on room air.     Work-up in the ED has included an EKG that showed sinus tachycardia with left axis deviation. CMP revealed sodium of 130, chloride of 91, creatinine of 0.58 with a GFR greater than 90 otherwise within normal limits. D-dimer was elevated at 0.94. proBNP was within normal limits at 530.  Procalcitonin was mildly elevated at 0.22.  High-sensitivity troponin T was mildly elevated at 23.  Lactic acid level was within normal limits at 1.7.  VBG showed a pH of 7.35, PCO2 of 52, PO2 of 14, O2 saturation of 15 and bicarbonate of 29.  Respiratory viral PCR panel was all negative.  Repeat troponin T with improvement to 18.     One-view portable chest x-ray showed emphysematous change without acute airspace consolidation and noted resolved airspace opacities at the left lung base since the prior exam as well as some scarring at the bilateral apices again noted appearing stable. Chest CT/PE study with contrast was negative for PE but revealed new patchy opacities in the left upper lobe and left  lower lobe (likely pneumonia with recommendation for follow-up chest CT in approximately 3 months to ensure resolution), few irregular nodular opacities in the right lower lobe are stable, stable mediastinal lymphadenopathy and normal variant duplicated SVC with the left SVC draining into the coronary sinus.     Patient received 2 DuoNeb treatments and a 500 ml IV fluid bolus with normal saline.  IV ceftriaxone 2 g and 500 mg of oral azithromycin have been ordered.  Patient received 125 mg of solumedrol via EMS. Patient was admitted for further evaluation and management,. Patient received IV solumedrol for first 48 hours and is switched to oral prednisone on 11/18/23         Assessment & Plan     Left lung community acquired pneumonia  COPD exacerbation    -- Patient has been admitted for further evaluation and management  -- Patient was seen and examined, daughter also present at the bedside , all questions answered   -- IV Azithromycin 500 mg/d.    -- IV ceftriaxone 2 g/d while hospitalized.    -- Off IV fluids since 11/17  -- Supplemental oxygen as needed to keep oxygen saturation more than 90%, currently maintaining oxygenation on room air.  -- Vital signs every 4 hours.    -- Hold PTA inhaler (Trelegy).      -- Scheduled DuoNebs every 4 hours and PRN.    -- RCAT consult requested.    -- Mucinex 1200 mg BID.    -- Continue prednisone 40 mg p.o. daily  -- Encourage use of IS/Flutter valve.    -- Of note, patient has been following up with outpatient pulmonary     Chest pain: Seems to be more pleuritic in nature.  RRT was called this morning due to patient developing severe acute chest pain especially with short of breath.  Patient was also evaluated at bedside along with [facility] house team, highly appreciate their help.    -- Agree with proceeding with a EKG which showed normal sinus rhythm  --Nitroglycerin x1 was given which improved his blood pressure which seemed significantly high this  morning.  Noncontrast CT chest was done to definitely rule out pneumothorax considering his underlying history of severe COPD.  Patient was also given Toradol 15 mg IV x1.  He was also given lidocaine patch.  CT scan results were reviewed, which showed foci of consolidation in the left upper lobe similar to representing resolving pneumonia there was no pneumothorax  Scheduled APAP 975 mg every 8 hours  As patient has allergy to morphine, tramadol 50 mg every 6 hours was also added.  Considering patient chest pain event we will hold discharge today and will plan for tentative discharge tomorrow morning.  Highly appreciate house officer help in the evaluation of this nice patient.     Troponin elevation, most likely type II NSTEMI from COPD Exacerbation    -- Trended troponin, does not have ACS pattern, repeat in normal range   -- Monitor on telemetry.    -- ECHO ordered, normal LV and systolic function of 55 to 60%, normal left ventricular wall motion.  -- EKG on admission showed sinus tachy and left Axis deviation   -- No further inpatient evaluation is needed      Tobacco Use D/O   Patient typically smokes up to 1.5 packs/day.  In the past week he has not been smoking much at all.      -- Counseled regarding smoking cessation that should also continue with PCP.    -- Nicoderm patch offered and patient declined.  -- Patient told me that he will continue to work on it , has been smoking since age of 15 and thinks that he will think about cutting down      Essential HTN: Initially high , per patient runs high at home also     -- Continue PTA losartan 100 mg/d.  Hold parameters in place.    -- Increased amlodipine to 10 mg p.o. daily on 11/17  -- Vitals every 4 hours, BP starting to get better this morning   -- no further adjustments for medications today   -- Will add hydralazine 10 mg p.o. 3 times daily 11/19  -- will order Hydralazine for systolic BP > 170      HLP  -- Continue  PTA atorvastatin 40 mg/d.      "  Chronic Pain  -- Continue PTA PRN oxycodone 5 mg every 3 hours.    -- PRN APAP available.       Hyponatremia, chronic  *Baseline sodium between 128-132 in the past year.  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 twice daily on 11/17, patient does not want to drink that and is requesting an aletrnative   -- After discussion with patient and daughter , will start on Salt tablet BID   -- Check BMP tomorrow   --Patient does not need further work-up for hyponatremia as it has been worked up in the past and at this time most likely secondary to combination of malnutrition and history of small cell carcinoma     Alcohol use D/O  *Patient typically consumes 4-6 beers per day. He reported that he has not had much or any alcohol in the past several days.      -- Continue to monitor on CIWA   -- Follow up with PCP.       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 Ann and Dr. Dreyer of [facility].      Anemia:  -- will check iron profile with ferritin   -- Check Vitamin B 12 and Folate level   -- No signs for acute bleeding   -- will order FOBT      Severe protein calorie malnutrition  Cachexia  -- most likely sec to Previous Malignancies and COPD  -- Regular diet.       Insomnia  -- Continue PTA APAP-PM (Benadryl-APAP) at bedtime.       Migraines  -- Continue PTA PRN Fioricet.                 -Patient reported that he ran out of this medication recently, will ask patient if he would like the refill       Clinically Significant Risk Factors                  # Hypertension: Noted on problem list        # Cachexia: Estimated body mass index is 12.85 kg/m  as calculated from the following:    Height as of this encounter: 1.854 m (6' 1\").    Weight as of this encounter: 44.2 kg (97 lb 6.4 oz)., PRESENT ON ADMISSION  # Severe " Malnutrition: based on nutrition assessment, PRESENT ON ADMISSION   # Financial/Environmental Concerns: none         Diet: Regular Diet Adult  Diet    Gannon Catheter: Not present  DVT Prophylaxis: Pneumatic Compression Devices  Code Status: Full Code    The patient's care was discussed with the Patient, bedside nursing and daughter who was present at the bedside     Medical Decision Making     50 MINUTES SPENT BY ME on the date of service doing chart review, history, exam, documentation & further activities per the note.      Disposition Plan   PT/OT and Social work/CC consultation has been requested to plan safe disposition plan , patient does not want to consider rehab but open to the idea of [facility], PT is recommending outpatient PT  Patient currently lives at home alone, daughter present in the area and does help patient.     Expected Discharge Date: 11/20/2023      Destination: home       Entered: Jeanna Platt MD 11/19/2023, 5:46 PM       Jeanna Platt MD, MD, [facility]  Text Page (7am - 6pm)    ----------------------------------------------------------------------------------------------------------------------      Interval History     Patient was seen and examined this morning, RRT was also called.  Patient is complaining of pleuritic chest pain which is sharp, patient seems tearful and anxious regarding changing his chest pain is status, patient is otherwise denying any chest pressure, no diaphoresis, denying any palpitations.  Patient thinks that he might have a similar pain many years ago with his pneumonia.  We discussed about doing CT scan of the chest to rule out tension pneumothorax as patient is at increased risk due to his underlying COPD.  Patient showed understanding I also discussed with patient that probably his discharge will be held today as I would like to monitor his CAT scan results and monitor him clinically for next 24 hours to make sure patient is feeling better.        -Data reviewed today: I  reviewed all new labs and imaging results over the last 24 hours.    I personally reviewed echocardiogram results and chest x-ray along with CT chest results.  CT showing patchy opacities in the left upper and left lower lobes concerning for pneumonia.      Physical Exam   Temp: 97.7  F (36.5  C) Temp src: Oral BP: (!) (P) 143/77 Pulse: 75   Resp: (P) 18 SpO2: 99 % O2 Device: None (Room air)    Vitals:    11/17/23 0630 11/18/23 0440 11/19/23 0600   Weight: 44 kg (97 lb 1.6 oz) 44.3 kg (97 lb 9.6 oz) 44.2 kg (97 lb 6.4 oz)     Vital Signs with Ranges  Temp:  [97.5  F (36.4  C)-98.2  F (36.8  C)] 97.7  F (36.5  C)  Pulse:  [] 75  Resp:  [18-26] (P) 18  BP: (108-191)/() (P) 143/77  SpO2:  [94 %-99 %] 99 %  I/O last 3 completed shifts:  In: 240 [P.O.:240]  Out: 300 [Urine:300]    GENERAL: Alert , awake and oriented. NAD. Conversational, appropriate.   HEENT: Normocephalic. EOMI. No icterus or injection. Nares normal.   LUNGS: Clear to auscultation. No dyspnea at rest.   HEART: Regular rate. Extremities perfused.   ABDOMEN: Soft, nontender, and nondistended. Positive bowel sounds.   EXTREMITIES: No LE edema noted.   NEUROLOGIC: Moves extremities x4 on command. No acute focal neurologic abnormalities noted.     Medications        acetaminophen  975 mg Oral Q8H    Or    acetaminophen  975 mg Rectal Q8H    amLODIPine  10 mg Oral Daily    atorvastatin  40 mg Oral Daily    azithromycin  500 mg Intravenous Q24H    cefTRIAXone  2 g Intravenous Q24H    diphenhydrAMINE (BENADRYL) 50 mg, acetaminophen (TYLENOL) 1,000 mg alternative for Tylenol PM   Oral At Bedtime    guaiFENesin  1,200 mg Oral BID    hydrALAZINE  10 mg Oral TID    ipratropium - albuterol 0.5 mg/2.5 mg/3 mL  3 mL Nebulization 4x daily    lidocaine  1 patch Transdermal Q24H    losartan  100 mg Oral Daily    pantoprazole  40 mg Oral QAM AC    predniSONE  40 mg Oral Daily    senna-docusate  2 tablet Oral At Bedtime    sodium chloride (PF)  3 mL  Intracatheter Q8H    sodium chloride  1 g Oral BID w/meals       Data   Recent Labs   Lab 11/19/23  1034 11/19/23  0935 11/19/23  0623 11/18/23  0545 11/17/23  0619 11/16/23  0942   WBC 8.9  --   --  6.7 7.7 10.4   HGB 12.4*  --   --  11.7* 11.3* 14.7   MCV 98  --   --  98 95 99     --   --  267 231 210   INR  --   --   --   --   --  0.99   NA  --   --  134* 131* 130* 130*   POTASSIUM  --   --  3.5 4.2 4.2 4.6   CHLORIDE  --   --  98 98 97* 91*   CO2  --   --  28 24 23 25   BUN  --   --  14.0 15.6 12.5 10.1   CR  --   --  0.35* 0.35* 0.44* 0.58*   ANIONGAP  --   --  8 9 10 14   MOSHE  --   --  9.0 8.9 8.4* 9.5   GLC  --  78 85 125* 117* 96   ALBUMIN  --   --   --   --   --  4.0   PROTTOTAL  --   --   --   --   --  7.2   BILITOTAL  --   --   --   --   --  0.4   ALKPHOS  --   --   --   --   --  121   ALT  --   --   --   --   --  22   AST  --   --   --   --   --  27       Imaging:   Recent Results (from the past 24 hour(s))   XR Chest Port 1 View    Narrative    EXAM: XR CHEST PORT 1 VIEW  LOCATION: [facility]  DATE: 11/19/2023    INDICATION: chest pain, eval for pntx  COMPARISON: Chest CT on 11/16/2023      Impression    IMPRESSION: Emphysema. Improved patchy opacities in the left upper lobe since 11/16/2023 suggestive of improving pneumonia. Stable biapical pleural scarring. No pneumothorax or pleural effusion. Normal heart size. Few stable calcified granuloma.   CT Chest w/o Contrast    Narrative    EXAM: CT CHEST WITHOUT CONTRAST  LOCATION: [facility]  DATE: 11/19/2023    INDICATION: Chest pain. Evaluate for pneumothorax, chest x-ray difficult to interpret, emphysematous COPD,  COMPARISON: 11/19/2023 CXR. 11/16/2023 CTA chest. CTs chest 10/11/2023 and 05/17/2023.  TECHNIQUE: CT chest without IV contrast. Multiplanar reformats were obtained. Dose reduction techniques were used.  CONTRAST: None.    FINDINGS:   LUNGS AND PLEURA: Lungs are hyperinflated with  advanced centrilobular emphysema. Biapical scarring and possible postop change from prior bleb resection. Foci of consolidation and/or volume loss throughout the apical posterior segment left upper lobe and   to a lesser extent the inferior lingula and posterior basilar left lower lobe,  similar to 11/16/2023 and likely represent resolving pneumonia.    The 14 x 5 mm nodular opacity in the medial left upper lobe apex targeted for radiation therapy is unchanged since 10/11/2023 but significantly smaller compared with radiation treatment planning CT (image 36, series 4). Additional lung nodules are   stable. For example, a 5 mm right upper lobe nodule (image 132 series 4), and 8 mm right lower lobe nodule (image 171) and an irregularly marginated 12 mm right lower lobe nodule (image 159) are unchanged.    No pneumothorax. No pleural effusion.    MEDIASTINUM/AXILLAE: No lymphadenopathy. Extensive calcified atheromatous plaque throughout the normal caliber thoracic aorta. Heart size normal with no pericardial effusion. Benign calcified mediastinal lymph nodes.    CORONARY ARTERY CALCIFICATION: Mild.    UPPER ABDOMEN: Unremarkable.    MUSCULOSKELETAL: Bones appear demineralized.      Impression    IMPRESSION:   1.  Foci of consolidation and/or volume loss throughout the apical posterior segment left upper lobe and to a lesser extent the inferior lingula and posterior basilar left lower lobe are similar to 11/16/2023 and likely represent resolving pneumonia.  2.  The 14 x 5 mm nodular opacity in the medial left upper lobe apex targeted for radiation therapy is unchanged since 10/11/2023 but significantly smaller compared with radiation treatment planning CT   3.  No pneumothorax.  4.  Lungs are hyperinflated with advanced emphysematous change.  5.  Stable lung nodules.

## 2023-11-19 NOTE — PROVIDER NOTIFICATION
MD Notification    Notified Person: MD    Notified Person Name: tiki    Notification Date/Time: 11/19/2023 0922    Notification Interaction: vocera    Purpose of Notification: pt having CP. (could be muscular) but I ordered a 12 lead. Also popped into the cards office and let tati aceves know     Orders Received:    Comments:

## 2023-11-19 NOTE — PROGRESS NOTES
Pt is here with pneumonia, A&Ox4. On daily IV Rocephin and Zithromax for pneumonia. BP  in 130's and 160's. Prn hydralazine available for BP >170, not needed this shift. Complain of migraine pain, prn ESGIC given x1 per pt request. On CIWA-A protocol, scoring 2. Up with SBA, refusing to use a walker and gait belt. Appeared steady on his feet. On RA, SOB noted with activities, on scheduled nebulizer. Bed alarm in use for safety. IV x1 SL. Tele: .

## 2023-11-19 NOTE — PLAN OF CARE
Pleasant gentlemen. Pt aox4, SBA, RA and full code. Tele SR. Pt denies chest pain and SOB. Pt endoreses lung pain and treated with oxycodone. Treating pt pneumonia with azithromycin and rocephin. PO prednisone started on days.   Plan: Possible discharge tomorrow with [facility].

## 2023-11-19 NOTE — PROGRESS NOTES
Pt is here with Pneumonia, continues to be on IV antibiotic, on RA with saturation >96%, SOB with activities, Rapid called this morning due to left chest pain. Per pt, his chest pain is resolved. Rates his pain 0/10. No migraine pain reported this shift. Complains of constipation. Prn miralax given. Had small hard stool. Offered 2nd dose of miralax, but pt declined, stated he rather take senna at bedtime. On regular diet, only ate 25% of his lunch. Up with SBA. Refuses to use a walker or gait belt. Bed alarm on for safety.       BP was elevated this morning, and 10 mg PO hydralazine added.

## 2023-11-19 NOTE — PROGRESS NOTES
~around 0910, pt started to complain of Left sided chest pain, rated 8/10, and went up to10/10, pain is worse with cough per pt. BP was 185/99, oxygen 97% on RA, RR of 26. Dyspneic. 12 lead was done. RRT called. Nitro x1, Toradolx1 and lidocaine patch placed on left chest. The lowest BP after Nirto 108/84. Chest x-ray and CT of chest done (see result).     MOMO ESTRADA RN on 11/19/2023 at 11:51 AM

## 2023-11-20 ENCOUNTER — APPOINTMENT (OUTPATIENT)
Dept: PHYSICAL THERAPY | Facility: CLINIC | Age: 68
DRG: 193 | End: 2023-11-20
Payer: COMMERCIAL

## 2023-11-20 LAB
IRON BINDING CAPACITY (ROCHE): 226 UG/DL (ref 240–430)
IRON SATN MFR SERPL: 8 % (ref 15–46)
IRON SERPL-MCNC: 17 UG/DL (ref 61–157)
MAGNESIUM SERPL-MCNC: 1.9 MG/DL (ref 1.7–2.3)
PHOSPHATE SERPL-MCNC: 2.9 MG/DL (ref 2.5–4.5)
POTASSIUM SERPL-SCNC: 3.5 MMOL/L (ref 3.4–5.3)
SODIUM SERPL-SCNC: 129 MMOL/L (ref 135–145)
VIT B12 SERPL-MCNC: 524 PG/ML (ref 232–1245)

## 2023-11-20 PROCEDURE — 93010 ELECTROCARDIOGRAM REPORT: CPT | Performed by: INTERNAL MEDICINE

## 2023-11-20 PROCEDURE — 97530 THERAPEUTIC ACTIVITIES: CPT | Mod: GP

## 2023-11-20 PROCEDURE — 36415 COLL VENOUS BLD VENIPUNCTURE: CPT | Performed by: INTERNAL MEDICINE

## 2023-11-20 PROCEDURE — 94640 AIRWAY INHALATION TREATMENT: CPT

## 2023-11-20 PROCEDURE — 94640 AIRWAY INHALATION TREATMENT: CPT | Mod: 76

## 2023-11-20 PROCEDURE — 84100 ASSAY OF PHOSPHORUS: CPT | Performed by: INTERNAL MEDICINE

## 2023-11-20 PROCEDURE — 99207 PR NO CHARGE LOS: CPT

## 2023-11-20 PROCEDURE — 250N000013 HC RX MED GY IP 250 OP 250 PS 637

## 2023-11-20 PROCEDURE — 82607 VITAMIN B-12: CPT | Performed by: INTERNAL MEDICINE

## 2023-11-20 PROCEDURE — 999N000157 HC STATISTIC RCP TIME EA 10 MIN

## 2023-11-20 PROCEDURE — 84132 ASSAY OF SERUM POTASSIUM: CPT | Performed by: INTERNAL MEDICINE

## 2023-11-20 PROCEDURE — 250N000012 HC RX MED GY IP 250 OP 636 PS 637: Performed by: INTERNAL MEDICINE

## 2023-11-20 PROCEDURE — 250N000013 HC RX MED GY IP 250 OP 250 PS 637: Performed by: INTERNAL MEDICINE

## 2023-11-20 PROCEDURE — 99233 SBSQ HOSP IP/OBS HIGH 50: CPT | Performed by: INTERNAL MEDICINE

## 2023-11-20 PROCEDURE — 93005 ELECTROCARDIOGRAM TRACING: CPT

## 2023-11-20 PROCEDURE — 84295 ASSAY OF SERUM SODIUM: CPT | Performed by: INTERNAL MEDICINE

## 2023-11-20 PROCEDURE — 250N000011 HC RX IP 250 OP 636: Mod: JZ | Performed by: PHYSICIAN ASSISTANT

## 2023-11-20 PROCEDURE — 250N000013 HC RX MED GY IP 250 OP 250 PS 637: Performed by: PHYSICIAN ASSISTANT

## 2023-11-20 PROCEDURE — 83735 ASSAY OF MAGNESIUM: CPT | Performed by: INTERNAL MEDICINE

## 2023-11-20 PROCEDURE — 97116 GAIT TRAINING THERAPY: CPT | Mod: GP

## 2023-11-20 PROCEDURE — 250N000011 HC RX IP 250 OP 636: Performed by: INTERNAL MEDICINE

## 2023-11-20 PROCEDURE — 83550 IRON BINDING TEST: CPT | Performed by: INTERNAL MEDICINE

## 2023-11-20 PROCEDURE — 210N000002 HC R&B HEART CARE

## 2023-11-20 PROCEDURE — 250N000009 HC RX 250: Performed by: PHYSICIAN ASSISTANT

## 2023-11-20 PROCEDURE — 250N000013 HC RX MED GY IP 250 OP 250 PS 637: Performed by: NURSE PRACTITIONER

## 2023-11-20 RX ORDER — LIDOCAINE 4 G/G
1 PATCH TOPICAL EVERY 24 HOURS
Qty: 7 PATCH | Refills: 0 | Status: SHIPPED | OUTPATIENT
Start: 2023-11-20

## 2023-11-20 RX ORDER — TRAMADOL HYDROCHLORIDE 50 MG/1
50 TABLET ORAL EVERY 6 HOURS PRN
Qty: 15 TABLET | Refills: 0 | Status: SHIPPED | OUTPATIENT
Start: 2023-11-20 | End: 2023-11-21

## 2023-11-20 RX ORDER — HYDRALAZINE HYDROCHLORIDE 25 MG/1
25 TABLET, FILM COATED ORAL 3 TIMES DAILY
Status: DISCONTINUED | OUTPATIENT
Start: 2023-11-20 | End: 2023-11-21 | Stop reason: HOSPADM

## 2023-11-20 RX ORDER — HYDRALAZINE HYDROCHLORIDE 25 MG/1
25 TABLET, FILM COATED ORAL 3 TIMES DAILY
Qty: 90 TABLET | Refills: 0 | Status: SHIPPED | OUTPATIENT
Start: 2023-11-20 | End: 2023-11-21

## 2023-11-20 RX ORDER — MIRTAZAPINE 7.5 MG/1
7.5 TABLET, FILM COATED ORAL AT BEDTIME
Status: DISCONTINUED | OUTPATIENT
Start: 2023-11-20 | End: 2023-11-21 | Stop reason: HOSPADM

## 2023-11-20 RX ORDER — NAPROXEN 500 MG/1
500 TABLET ORAL 2 TIMES DAILY WITH MEALS
Status: DISCONTINUED | OUTPATIENT
Start: 2023-11-20 | End: 2023-11-21 | Stop reason: HOSPADM

## 2023-11-20 RX ORDER — AZITHROMYCIN 500 MG/1
500 INJECTION, POWDER, LYOPHILIZED, FOR SOLUTION INTRAVENOUS EVERY 24 HOURS
Status: COMPLETED | OUTPATIENT
Start: 2023-11-20 | End: 2023-11-20

## 2023-11-20 RX ADMIN — HYDRALAZINE HYDROCHLORIDE 25 MG: 25 TABLET, FILM COATED ORAL at 22:34

## 2023-11-20 RX ADMIN — NAPROXEN 500 MG: 500 TABLET ORAL at 13:17

## 2023-11-20 RX ADMIN — MIRTAZAPINE 7.5 MG: 7.5 TABLET, FILM COATED ORAL at 22:32

## 2023-11-20 RX ADMIN — SODIUM CHLORIDE TAB 1 GM 1 G: 1 TAB at 20:02

## 2023-11-20 RX ADMIN — TRAMADOL HYDROCHLORIDE 50 MG: 50 TABLET, COATED ORAL at 04:47

## 2023-11-20 RX ADMIN — POLYETHYLENE GLYCOL 3350 17 G: 17 POWDER, FOR SOLUTION ORAL at 17:11

## 2023-11-20 RX ADMIN — PANTOPRAZOLE SODIUM 40 MG: 40 TABLET, DELAYED RELEASE ORAL at 10:22

## 2023-11-20 RX ADMIN — IPRATROPIUM BROMIDE AND ALBUTEROL SULFATE 3 ML: .5; 3 SOLUTION RESPIRATORY (INHALATION) at 12:02

## 2023-11-20 RX ADMIN — DOCUSATE SODIUM 50 MG AND SENNOSIDES 8.6 MG 2 TABLET: 8.6; 5 TABLET, FILM COATED ORAL at 22:31

## 2023-11-20 RX ADMIN — CEFTRIAXONE SODIUM 2 G: 2 INJECTION, POWDER, FOR SOLUTION INTRAMUSCULAR; INTRAVENOUS at 11:55

## 2023-11-20 RX ADMIN — GUAIFENESIN 1200 MG: 600 TABLET, EXTENDED RELEASE ORAL at 10:22

## 2023-11-20 RX ADMIN — PREDNISONE 40 MG: 20 TABLET ORAL at 10:22

## 2023-11-20 RX ADMIN — IPRATROPIUM BROMIDE AND ALBUTEROL SULFATE 3 ML: .5; 3 SOLUTION RESPIRATORY (INHALATION) at 20:11

## 2023-11-20 RX ADMIN — GUAIFENESIN 1200 MG: 600 TABLET, EXTENDED RELEASE ORAL at 20:02

## 2023-11-20 RX ADMIN — AMLODIPINE BESYLATE 10 MG: 10 TABLET ORAL at 10:22

## 2023-11-20 RX ADMIN — AZITHROMYCIN MONOHYDRATE 500 MG: 500 INJECTION, POWDER, LYOPHILIZED, FOR SOLUTION INTRAVENOUS at 13:17

## 2023-11-20 RX ADMIN — LIDOCAINE 1 PATCH: 4 PATCH TOPICAL at 10:31

## 2023-11-20 RX ADMIN — HYDRALAZINE HYDROCHLORIDE 25 MG: 25 TABLET, FILM COATED ORAL at 17:12

## 2023-11-20 RX ADMIN — TRAMADOL HYDROCHLORIDE 50 MG: 50 TABLET, COATED ORAL at 22:34

## 2023-11-20 RX ADMIN — SODIUM CHLORIDE TAB 1 GM 1 G: 1 TAB at 10:22

## 2023-11-20 RX ADMIN — IPRATROPIUM BROMIDE AND ALBUTEROL SULFATE 3 ML: .5; 3 SOLUTION RESPIRATORY (INHALATION) at 16:23

## 2023-11-20 RX ADMIN — ACETAMINOPHEN 975 MG: 325 TABLET, FILM COATED ORAL at 11:55

## 2023-11-20 RX ADMIN — ATORVASTATIN CALCIUM 40 MG: 40 TABLET, FILM COATED ORAL at 10:22

## 2023-11-20 RX ADMIN — NAPROXEN 500 MG: 500 TABLET ORAL at 17:15

## 2023-11-20 RX ADMIN — HYDRALAZINE HYDROCHLORIDE 25 MG: 25 TABLET, FILM COATED ORAL at 10:22

## 2023-11-20 RX ADMIN — ACETAMINOPHEN 975 MG: 325 TABLET, FILM COATED ORAL at 20:00

## 2023-11-20 RX ADMIN — ACETAMINOPHEN: 500 TABLET, FILM COATED ORAL at 22:32

## 2023-11-20 RX ADMIN — LOSARTAN POTASSIUM 100 MG: 100 TABLET, FILM COATED ORAL at 10:22

## 2023-11-20 ASSESSMENT — ACTIVITIES OF DAILY LIVING (ADL)
ADLS_ACUITY_SCORE: 24

## 2023-11-20 NOTE — PLAN OF CARE
Pleasant gentlemen. Pt aox4, SBA, RA and full code. Tele SR. Pt denies chest pain and SOB. Endorses abdominal pain and treated with scheduled tylenol. Abx azithromycin and rocephin for pneumonia. Continuing PO prednisone.   At 430 am pt complained of 10/10 left side upper abdominal / lower left chest pain. EKG completed. PRN Tramdol given. On pain reassessment pt stated he was still having 10/10 pain. Paged cross coverage and they said to page house JASWINDER. Ivet Cortes assessed pt, pt stated he was having 4/10 pain and no changes to care plan at this time.     Plan: Possible discharge home with [facility].

## 2023-11-20 NOTE — PROGRESS NOTES
[facility]    HOSPITALIST PROGRESS NOTE :   --------------------------------------------------    Date of Admission:  11/16/2023    Cumulative Summary:   Ángel Pham is a 68 year old male admitted on 11/16/2023.     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       EMS was called due to worsening shortness of breath occurring over the previous 3 days prior to admission. Initially, patient was placed on BiPAP by EMSon arrival to ER , later removed and patient has tolerated being on room air.     Work-up in the ED has included an EKG that showed sinus tachycardia with left axis deviation. CMP revealed sodium of 130, chloride of 91, creatinine of 0.58 with a GFR greater than 90 otherwise within normal limits. D-dimer was elevated at 0.94. proBNP was within normal limits at 530.  Procalcitonin was mildly elevated at 0.22.  High-sensitivity troponin T was mildly elevated at 23.  Lactic acid level was within normal limits at 1.7.  VBG showed a pH of 7.35, PCO2 of 52, PO2 of 14, O2 saturation of 15 and bicarbonate of 29.  Respiratory viral PCR panel was all negative.  Repeat troponin T with improvement to 18.     One-view portable chest x-ray showed emphysematous change without acute airspace consolidation and noted resolved airspace opacities at the left lung base since the prior exam as well as some scarring at the bilateral apices again noted appearing stable. Chest CT/PE study with contrast was negative for PE but revealed new patchy opacities in the left upper lobe and left  lower lobe (likely pneumonia with recommendation for follow-up chest CT in approximately 3 months to ensure resolution), few irregular nodular opacities in the right lower lobe are stable, stable mediastinal lymphadenopathy and normal variant duplicated SVC with the left SVC draining into the coronary sinus.     Patient received 2 DuoNeb treatments and was started on IV antibiotics  ( IV ceftriaxone 2 g and 500 mg of oral azithromycin) . Patient received 125 mg of solumedrol via EMS. Patient was admitted for further evaluation and management,. Patient received IV solumedrol for first 48 hours and is switched to oral prednisone on 11/18/23.  Patient hospitalization was prolonged due to development of severe pleuritic chest pain for which RRT was called, patient underwent noncontrast CT to rule out tension pneumothorax, currently patient has been responding well to NSAIDs.  Patient also seems to be anxious when he starts to hyperventilate although maintaining saturation, oxygen provides comfort, patient has been getting more weaker although initially was thought to be able to get discharged home, PT will be reevaluating patient if he needs to go to TCU.    Patient was also noticed to be frequently hypertensive during the hospitalization, per patient his blood pressure always runs high, his amlodipine has been increased to 10 mg and he has also been a started on hydralazine during the hospitalization.        Assessment & Plan     Left lung community acquired pneumonia  COPD exacerbation    -- Continue to monitor patient closely.  --Patient was seen and examined, daughter present at bedside, detailed discussion with patient, daughter and bedside nursing.  --Continue IV azithromycin 500 mg/day, last dose tomorrow.  --Continue IV ceftriaxone 2 g while hospitalized, patient will need oral antibiotics on discharge.  --Patient has been off IV fluids since 11/17.  --Patient has been maintaining his oxygen saturations but does  require supplemental oxygen intermittently then starts to hyperventilate.  --Continue to monitor vital signs every 4 hours.  --Holding PTA inhaler, trelegy, patient is receiving scheduled DuoNebs.  --Continue inhalers on discharge.  --Patient has been receiving Mucinex 1200 mg twice daily.  --Continue 40 mg p.o. daily.  --Will recommend discharging patient on 40 mg p.o. daily for 3 days, then 30 mg p.o. daily for 3 days then 20 mg p.o. daily for 3 days and then 10 mg p.o. daily for 3 days.  --Continue incentive spirometer and flutter valve use.  --Patient has been following with outpatient pulmonary.  --Detailed discussion with patient and daughter this morning, patient did have another episode of chest pain, feels anxious, hyperventilating using oxygen, does not think that he is ready for discharge.  --We discussed about starting patient on naproxen, Remeron and also evaluating him to be discharged to rehab, patient and daughter were in agreement with the plan.  Plan of care was also discussed with bedside nursing and care coordinator.    Pleuritic chest pain: Patient develops left-sided sharp pleuritic chest pain with deep inhalation.  RRT was called on 11/19/2023 due to acute chest pain.    -- Highly appreciate house officer help.  --EKG showed normal sinus rhythm.  --Patient was also significantly hypertensive at the time of RRT, received nitroglycerin.  --Noncontrast CT chest was done to rule out pneumothorax considering underlying COPD  -- CT results were negative for pneumothorax, showed consolidation in left lung similar to resolving pneumonia which was noticed on admission chest x-ray.  --House officer again evaluated patient this morning due to the similar chest pain, patient responds well to administration of IV Toradol.  -- Start patient on naproxen 500 mg p.o. twice daily.  --Has already been on Protonix 40 mg every morning  -- Patient will probably benefit from getting discharged on naproxen for 1 week  at the time of discharge.  -- No concern for cardiac etiology, see below    Troponin elevation, most likely type II NSTEMI from COPD Exacerbation    -- Trended troponin, does not have ACS pattern, repeat in normal range   -- Monitor on telemetry.    -- ECHO ordered, normal LV and systolic function of 55 to 60%, normal left ventricular wall motion.  -- EKG on admission showed sinus tachy and left Axis deviation   -- No further inpatient evaluation is needed      Tobacco Use D/O   Patient typically smokes up to 1.5 packs/day.  In the past week he has not been smoking much at all.      -- Counseled regarding smoking cessation that should also continue with PCP.    -- Nicoderm patch offered and patient declined.  -- Patient told me that he will continue to work on it , has been smoking since age of 15 and thinks that he will think about cutting down      Essential HTN: Initially high , per patient runs high at home also     -- Continue PTA losartan 100 mg/d.  Hold parameters in place.    -- Increased amlodipine to 10 mg p.o. daily on 11/17, will discharge on increased dose.  -- Started patient on hydralazine 10 mg p.o. 3 times daily 11/19  -- Discussed with patient and daughter, will increase hydralazine to 25 mg p.o. 3 times daily on 11/20  -- Will recommend no further increase in blood pressure medications during this hospitalization  -- Will recommend discharging patient on amlodipine 10 mg, losartan 100 mg p.o. daily and hydralazine 25 mg p.o. 3 times daily  -- Further adjustments per PCP in 4 weeks  -- Continue hydralazine as needed for systolic blood pressure more than 170     HLP  -- Continue  PTA atorvastatin 40 mg/d.       Chronic Pain  -- Continue PTA PRN oxycodone 5 mg every 3 hours.    -- PRN APAP available.       Hyponatremia, chronic  *Baseline sodium between 128-132 in the past year.  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  -- Added Urena 30 mg p.o. twice daily on 11/17, patient does not want to drink that due to taste  -- After discussion with patient and daughter , started patient on salt tablet 11/18  -- BMP reviewed from this morning, sodium is down to 129 although it was improved to 134 yesterday  -- We will continue on salt tablet twice daily and recheck sodium tomorrow  --Patient did have better response from Urena although he was unable to consume it because of taste  --Patient does not need further work-up for hyponatremia as it has been worked up in the past and at this time most likely secondary to combination of malnutrition and history of small cell carcinoma     Alcohol use D/O  *Patient typically consumes 4-6 beers per day. He reported that he has not had much or any alcohol in the past several days.      -- Continue to monitor on CIWA   -- Follow up with PCP.       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 Ann and Dr. Dreyer of [facility].    -- Continue outpatient follow-up.    Anemia:  -- will check iron profile with ferritin   -- Check Vitamin B 12 and Folate level   -- No signs for acute bleeding   -- will order FOBT      Severe protein calorie malnutrition  Cachexia  -- most likely sec to Previous Malignancies and COPD  -- Regular diet.    --Evaluated by RD on 11/17, commended regular diet, patient declined offer for protein supplements     Insomnia  Generalized anxiety: Especially associated with hyperventilation and COPD  -- Continue PTA APAP-PM (Benadryl-APAP) at bedtime.  -- After discussion with patient and daughter, will start patient on Remeron 7.5 mg at bedtime, hoping that in addition to mood destabilization, patient also seen some benefits of helping him with insomnia and increasing his appetite.  First dose to be given tonight 11/20/2023, patient will need a prescription  "on discharge.    Migraines  -- Continue PTA PRN Fioricet.                 -Patient reported that he ran out of this medication recently, will ask patient if he would like the refill       Clinically Significant Risk Factors         # Hyponatremia: Lowest Na = 129 mmol/L in last 2 days, will monitor as appropriate          # Hypertension: Noted on problem list        # Cachexia: Estimated body mass index is 13.55 kg/m  as calculated from the following:    Height as of this encounter: 1.854 m (6' 1\").    Weight as of this encounter: 46.6 kg (102 lb 11.8 oz)., PRESENT ON ADMISSION  # Severe Malnutrition: based on nutrition assessment, PRESENT ON ADMISSION   # Financial/Environmental Concerns: none         Diet: Regular Diet Adult  Diet    Gannon Catheter: Not present  DVT Prophylaxis: Pneumatic Compression Devices  Code Status: Full Code    The patient's care was discussed with the Patient, bedside nursing and daughter who was present at the bedside   Plan of care was also discussed with  and care coordinator    Medical Decision Making     50 MINUTES SPENT BY ME on the date of service doing chart review, history, exam, documentation & further activities per the note.      Disposition Plan   Patient was evaluated by physical therapy initially and was seemed safe to be discharged home with outpatient physical therapy.  In the past few days, patient seems to be getting more weaker although his respiratory status is improving, patient is also having episodes of chest pain, requiring intermittent oxygen for comfort, will ask PT to reevaluate patient, patient will probably benefit from going to rehab for a few days before returning home as he lives alone.  Patient does have supportive daughter who helps him with grocery and other house chores.    One of my hospitalist colleague will assume the care from tomorrow morning, patient and daughter are aware.       Expected Discharge Date: 11/21/2023      Destination: " home       Entered: Jeanna Platt MD 11/20/2023, 12:40 PM       Jeanna Platt MD, MD, FACP  Text Page (7am - 6pm)    ----------------------------------------------------------------------------------------------------------------------      Interval History     Patient was seen and examined this morning, daughter also present at bedside.  Patient again noticed an episode of pleuritic chest pain sharp, this chest pain makes patient anxious, he is wearing facemask right now for comfort although maintaining his oxygenation.  Also patient is very eager to leave, he does not think that he is ready for discharge.  Patient also thinks that he is getting more weaker, we discussed about getting him evaluated by PT and consideration for rehab, patient seems more agreeable as he does not think that he is doing good to go back to home alone.  We also discussed about starting him on naproxen for anti-inflammatory to help his pleuritic chest discomfort.  Patient is also on prednisone.  We also discussed about current starting him on Remeron to help with anxiety, appetite and insomnia.  Patient was in agreement with the plan.    -Data reviewed today: I reviewed all new labs and imaging results over the last 24 hours.    I personally reviewed echocardiogram results and chest x-ray along with CT chest results.  CT showing patchy opacities in the left upper and left lower lobes concerning for pneumonia.      Physical Exam   Temp: 98.2  F (36.8  C) Temp src: Oral BP: (!) 156/84 Pulse: 86   Resp: 20 SpO2: 93 % O2 Device: None (Room air) Oxygen Delivery: 2 LPM  Vitals:    11/18/23 0440 11/19/23 0600 11/20/23 0618   Weight: 44.3 kg (97 lb 9.6 oz) 44.2 kg (97 lb 6.4 oz) 46.6 kg (102 lb 11.8 oz)     Vital Signs with Ranges  Temp:  [97.7  F (36.5  C)-98.2  F (36.8  C)] 98.2  F (36.8  C)  Pulse:  [75-96] 86  Resp:  [18-20] 20  BP: (127-191)/() 156/84  SpO2:  [93 %-100 %] 93 %  I/O last 3 completed shifts:  In: 830 [P.O.:480;  I.V.:350]  Out: 200 [Urine:200]    GENERAL: Alert , awake and oriented. NAD. Conversational, appropriate.   HEENT: Normocephalic. EOMI. No icterus or injection. Nares normal.   LUNGS: Clear to auscultation. No dyspnea at rest.   HEART: Regular rate. Extremities perfused.   ABDOMEN: Soft, nontender, and nondistended. Positive bowel sounds.   EXTREMITIES: No LE edema noted.   NEUROLOGIC: Moves extremities x4 on command. No acute focal neurologic abnormalities noted.     Medications        acetaminophen  975 mg Oral Q8H    Or    acetaminophen  975 mg Rectal Q8H    amLODIPine  10 mg Oral Daily    atorvastatin  40 mg Oral Daily    azithromycin  500 mg Intravenous Q24H    cefTRIAXone  2 g Intravenous Q24H    diphenhydrAMINE (BENADRYL) 50 mg, acetaminophen (TYLENOL) 1,000 mg alternative for Tylenol PM   Oral At Bedtime    guaiFENesin  1,200 mg Oral BID    hydrALAZINE  25 mg Oral TID    ipratropium - albuterol 0.5 mg/2.5 mg/3 mL  3 mL Nebulization 4x daily    lidocaine  1 patch Transdermal Q24H    losartan  100 mg Oral Daily    mirtazapine  7.5 mg Oral At Bedtime    naproxen  500 mg Oral BID w/meals    pantoprazole  40 mg Oral QAM AC    predniSONE  40 mg Oral Daily    senna-docusate  2 tablet Oral At Bedtime    sodium chloride (PF)  3 mL Intracatheter Q8H    sodium chloride  1 g Oral BID w/meals       Data   Recent Labs   Lab 11/20/23  0624 11/19/23  1034 11/19/23  0935 11/19/23  0623 11/18/23  0545 11/17/23  0619 11/16/23  0942   WBC  --  8.9  --   --  6.7 7.7 10.4   HGB  --  12.4*  --   --  11.7* 11.3* 14.7   MCV  --  98  --   --  98 95 99   PLT  --  248  --   --  267 231 210   INR  --   --   --   --   --   --  0.99   *  --   --  134* 131* 130* 130*   POTASSIUM 3.5  --   --  3.5 4.2 4.2 4.6   CHLORIDE  --   --   --  98 98 97* 91*   CO2  --   --   --  28 24 23 25   BUN  --   --   --  14.0 15.6 12.5 10.1   CR  --   --   --  0.35* 0.35* 0.44* 0.58*   ANIONGAP  --   --   --  8 9 10 14   MOSHE  --   --   --  9.0 8.9 8.4*  9.5   GLC  --   --  78 85 125* 117* 96   ALBUMIN  --   --   --   --   --   --  4.0   PROTTOTAL  --   --   --   --   --   --  7.2   BILITOTAL  --   --   --   --   --   --  0.4   ALKPHOS  --   --   --   --   --   --  121   ALT  --   --   --   --   --   --  22   AST  --   --   --   --   --   --  27       Imaging:   No results found for this or any previous visit (from the past 24 hour(s)).       details…

## 2023-11-20 NOTE — PROGRESS NOTES
Care Management Follow Up    Length of Stay (days): 4    Expected Discharge Date: 11/21/2023     Concerns to be Addressed: no discharge needs identified     Patient plan of care discussed at interdisciplinary rounds: Yes    Anticipated Discharge Disposition: Home     Anticipated Discharge Services: None  Anticipated Discharge DME: None    Patient/family educated on Medicare website which has current facility and service quality ratings: no  Education Provided on the Discharge Plan:    Patient/Family in Agreement with the Plan: yes    Referrals Placed by CM/SW:    Private pay costs discussed:  not yet    Additional Information:  Hospitalist notified writer that patient may need TCU at discharge and he is agreeable.  Writer provided patient and daughter Aura with TCU list (for [facility] patients) and requested 3 choices.  Patient instructed on importance of participating with therapy and therapy will re-visit patient this afternoon to assess discharge needs.    Shweta Amaya RN Care Coordinator  [facility]  [phone]

## 2023-11-20 NOTE — PROGRESS NOTES
MD Notification    Notified Person: MD    Notified Person Name: Toneygabby Balderas MD    Notification Date/Time: 11/20/23 at 539 am    Notification Interaction: vocera messaging     Purpose of Notification: Lower left cp/ Left upper abdominal pain 10/10.     Orders Received: MD requested that House JASWINDER be paged to assess pt.     Comments: Ivet HER was paged

## 2023-11-20 NOTE — PROGRESS NOTES
Care Management Follow Up    Length of Stay (days): 4    Expected Discharge Date: 11/21/2023     Concerns to be Addressed: no discharge needs identified     Patient plan of care discussed at interdisciplinary rounds: Yes    Anticipated Discharge Disposition: Home     Anticipated Discharge Services: None  Anticipated Discharge DME: None    Patient/family educated on Medicare website which has current facility and service quality ratings: no  Education Provided on the Discharge Plan:    Patient/Family in Agreement with the Plan: yes    Referrals Placed by CM/SW:    Private pay costs discussed: Not applicable    Additional Information:  Writer received choices from patient/daughter for TCU. Choices are [facility], [facility], [facility]. Writer will watch for therapy notes and then send out referrals. Patient has [facility] insurance and will require prior auth prior to discharge if TCU is recommended.     Addendum 1440: TCU recommendation from therapy. Referrals sent via DOD to [facility], [facility] and [facility]    BETTY Kinney, LGPIERRE   Social Work   [facility]

## 2023-11-20 NOTE — PROGRESS NOTES
Brief house JASWINDER cross cover note    I was asked by nursing staff to evaluate Mr. Pham for left anterior lateral chest wall pain.    On my arrival the patient is sleeping with head of bed at 90 degrees in no acute distress.  Vital signs stable heart rate sinus in the 80s.  Opens eyes to voice.  Patient describes sharp left lower lobe chest wall pain with inspiration.  He tells me this is the same pain he experienced yesterday morning.  Please refer to Julito's RRT evaluation from 11/19.  CT chest shows foci of consolidation and/or volume loss throughout the apical posterior segment of the left upper lobe and to a lesser extent the inferior lingula and posterior bibasilar left lower lobe likely representing pneumonia.    Noted the patient reported 10 out of 10 pain around 5 AM and was medicated with oral tramadol.  His pain has improved and he tells me it has resolved prior to me waking him up.  Currently rating his pain 4 out of 10 now that he has been awoken.  He is refusing oral Tylenol.  Will defer further IV Toradol as it seems the tramadol has taken effect.    Ivet Cortes NP  [facility]  Securely message with the Vocera Web Console (learn more here)  Text page via [employer] Paging/Directory    No charge note.

## 2023-11-21 ENCOUNTER — APPOINTMENT (OUTPATIENT)
Dept: PHYSICAL THERAPY | Facility: CLINIC | Age: 68
DRG: 193 | End: 2023-11-21
Payer: COMMERCIAL

## 2023-11-21 VITALS
SYSTOLIC BLOOD PRESSURE: 126 MMHG | BODY MASS INDEX: 13.04 KG/M2 | RESPIRATION RATE: 18 BRPM | TEMPERATURE: 97.7 F | HEIGHT: 73 IN | WEIGHT: 98.4 LBS | OXYGEN SATURATION: 96 % | DIASTOLIC BLOOD PRESSURE: 66 MMHG | HEART RATE: 77 BPM

## 2023-11-21 LAB
ANION GAP SERPL CALCULATED.3IONS-SCNC: 8 MMOL/L (ref 7–15)
ATRIAL RATE - MUSE: 87 BPM
ATRIAL RATE - MUSE: 88 BPM
BUN SERPL-MCNC: 11 MG/DL (ref 8–23)
CALCIUM SERPL-MCNC: 8.7 MG/DL (ref 8.8–10.2)
CHLORIDE SERPL-SCNC: 95 MMOL/L (ref 98–107)
CREAT SERPL-MCNC: 0.43 MG/DL (ref 0.67–1.17)
DEPRECATED HCO3 PLAS-SCNC: 29 MMOL/L (ref 22–29)
DIASTOLIC BLOOD PRESSURE - MUSE: NORMAL MMHG
DIASTOLIC BLOOD PRESSURE - MUSE: NORMAL MMHG
EGFRCR SERPLBLD CKD-EPI 2021: >90 ML/MIN/1.73M2
FOLATE SERPL-MCNC: 6.5 NG/ML (ref 4.6–34.8)
GLUCOSE SERPL-MCNC: 83 MG/DL (ref 70–99)
INTERPRETATION ECG - MUSE: NORMAL
INTERPRETATION ECG - MUSE: NORMAL
MAGNESIUM SERPL-MCNC: 2.1 MG/DL (ref 1.7–2.3)
P AXIS - MUSE: 44 DEGREES
P AXIS - MUSE: 86 DEGREES
POTASSIUM SERPL-SCNC: 3.5 MMOL/L (ref 3.4–5.3)
PR INTERVAL - MUSE: 140 MS
PR INTERVAL - MUSE: 160 MS
QRS DURATION - MUSE: 88 MS
QRS DURATION - MUSE: 90 MS
QT - MUSE: 352 MS
QT - MUSE: 352 MS
QTC - MUSE: 423 MS
QTC - MUSE: 425 MS
R AXIS - MUSE: 63 DEGREES
R AXIS - MUSE: 77 DEGREES
SODIUM SERPL-SCNC: 132 MMOL/L (ref 135–145)
SYSTOLIC BLOOD PRESSURE - MUSE: NORMAL MMHG
SYSTOLIC BLOOD PRESSURE - MUSE: NORMAL MMHG
T AXIS - MUSE: 87 DEGREES
T AXIS - MUSE: 87 DEGREES
VENTRICULAR RATE- MUSE: 87 BPM
VENTRICULAR RATE- MUSE: 88 BPM

## 2023-11-21 PROCEDURE — 250N000009 HC RX 250: Performed by: PHYSICIAN ASSISTANT

## 2023-11-21 PROCEDURE — 80048 BASIC METABOLIC PNL TOTAL CA: CPT | Performed by: INTERNAL MEDICINE

## 2023-11-21 PROCEDURE — 250N000012 HC RX MED GY IP 250 OP 636 PS 637: Performed by: INTERNAL MEDICINE

## 2023-11-21 PROCEDURE — 94640 AIRWAY INHALATION TREATMENT: CPT | Mod: 76

## 2023-11-21 PROCEDURE — 99239 HOSP IP/OBS DSCHRG MGMT >30: CPT | Performed by: HOSPITALIST

## 2023-11-21 PROCEDURE — 97116 GAIT TRAINING THERAPY: CPT | Mod: GP

## 2023-11-21 PROCEDURE — 250N000013 HC RX MED GY IP 250 OP 250 PS 637: Performed by: INTERNAL MEDICINE

## 2023-11-21 PROCEDURE — 999N000157 HC STATISTIC RCP TIME EA 10 MIN

## 2023-11-21 PROCEDURE — 82746 ASSAY OF FOLIC ACID SERUM: CPT | Performed by: INTERNAL MEDICINE

## 2023-11-21 PROCEDURE — 36415 COLL VENOUS BLD VENIPUNCTURE: CPT | Performed by: INTERNAL MEDICINE

## 2023-11-21 PROCEDURE — 250N000013 HC RX MED GY IP 250 OP 250 PS 637: Performed by: NURSE PRACTITIONER

## 2023-11-21 PROCEDURE — 250N000011 HC RX IP 250 OP 636: Mod: JZ | Performed by: PHYSICIAN ASSISTANT

## 2023-11-21 PROCEDURE — 250N000013 HC RX MED GY IP 250 OP 250 PS 637: Performed by: PHYSICIAN ASSISTANT

## 2023-11-21 PROCEDURE — 83735 ASSAY OF MAGNESIUM: CPT | Performed by: HOSPITALIST

## 2023-11-21 PROCEDURE — 94640 AIRWAY INHALATION TREATMENT: CPT

## 2023-11-21 RX ORDER — PANTOPRAZOLE SODIUM 40 MG/1
40 TABLET, DELAYED RELEASE ORAL
Qty: 30 TABLET | Refills: 3 | Status: SHIPPED | OUTPATIENT
Start: 2023-11-21 | End: 2024-02-19

## 2023-11-21 RX ORDER — MIRTAZAPINE 7.5 MG/1
7.5 TABLET, FILM COATED ORAL AT BEDTIME
DISCHARGE
Start: 2023-11-21 | End: 2023-11-21

## 2023-11-21 RX ORDER — TRAMADOL HYDROCHLORIDE 50 MG/1
50 TABLET ORAL EVERY 6 HOURS PRN
Qty: 15 TABLET | Refills: 0 | Status: SHIPPED | OUTPATIENT
Start: 2023-11-21 | End: 2024-02-19

## 2023-11-21 RX ORDER — SODIUM CHLORIDE 1 G/1
1 TABLET ORAL
DISCHARGE
Start: 2023-11-21 | End: 2023-11-21

## 2023-11-21 RX ORDER — HYDRALAZINE HYDROCHLORIDE 25 MG/1
25 TABLET, FILM COATED ORAL 3 TIMES DAILY
Qty: 60 TABLET | Refills: 3 | Status: SHIPPED | OUTPATIENT
Start: 2023-11-21 | End: 2024-01-15

## 2023-11-21 RX ORDER — SODIUM CHLORIDE 1 G/1
1 TABLET ORAL
Qty: 30 TABLET | Refills: 1 | Status: SHIPPED | OUTPATIENT
Start: 2023-11-21 | End: 2024-02-19

## 2023-11-21 RX ORDER — TRAMADOL HYDROCHLORIDE 50 MG/1
50 TABLET ORAL EVERY 6 HOURS PRN
Qty: 10 TABLET | Refills: 0 | Status: SHIPPED | OUTPATIENT
Start: 2023-11-21 | End: 2023-11-21

## 2023-11-21 RX ORDER — AMLODIPINE BESYLATE 10 MG/1
10 TABLET ORAL DAILY
Qty: 60 TABLET | Refills: 3 | Status: SHIPPED | OUTPATIENT
Start: 2023-11-21 | End: 2024-02-19

## 2023-11-21 RX ORDER — PANTOPRAZOLE SODIUM 40 MG/1
40 TABLET, DELAYED RELEASE ORAL
DISCHARGE
Start: 2023-11-21 | End: 2023-11-21

## 2023-11-21 RX ORDER — PREDNISONE 10 MG/1
TABLET ORAL
DISCHARGE
Start: 2023-11-21 | End: 2023-11-21

## 2023-11-21 RX ORDER — PREDNISONE 10 MG/1
TABLET ORAL
Qty: 30 TABLET | Refills: 0 | DISCHARGE
Start: 2023-11-21 | End: 2024-01-15

## 2023-11-21 RX ORDER — OXYCODONE HYDROCHLORIDE 5 MG/1
TABLET ORAL
Qty: 10 TABLET | Refills: 0 | Status: SHIPPED | OUTPATIENT
Start: 2023-11-21 | End: 2023-11-21

## 2023-11-21 RX ORDER — NAPROXEN 500 MG/1
500 TABLET ORAL 2 TIMES DAILY WITH MEALS
DISCHARGE
Start: 2023-11-21 | End: 2023-11-21

## 2023-11-21 RX ORDER — MIRTAZAPINE 7.5 MG/1
7.5 TABLET, FILM COATED ORAL AT BEDTIME
Qty: 60 TABLET | Refills: 3 | Status: SHIPPED | OUTPATIENT
Start: 2023-11-21 | End: 2024-02-19

## 2023-11-21 RX ORDER — NAPROXEN 500 MG/1
500 TABLET ORAL 2 TIMES DAILY WITH MEALS
Qty: 12 TABLET | Refills: 0 | Status: SHIPPED | OUTPATIENT
Start: 2023-11-21 | End: 2023-11-27

## 2023-11-21 RX ORDER — HYDRALAZINE HYDROCHLORIDE 25 MG/1
25 TABLET, FILM COATED ORAL 3 TIMES DAILY
DISCHARGE
Start: 2023-11-21 | End: 2023-11-21

## 2023-11-21 RX ORDER — OXYCODONE HYDROCHLORIDE 5 MG/1
TABLET ORAL
COMMUNITY
Start: 2023-11-21

## 2023-11-21 RX ADMIN — ACETAMINOPHEN 975 MG: 325 TABLET, FILM COATED ORAL at 02:38

## 2023-11-21 RX ADMIN — HYDRALAZINE HYDROCHLORIDE 25 MG: 25 TABLET, FILM COATED ORAL at 09:47

## 2023-11-21 RX ADMIN — LOSARTAN POTASSIUM 100 MG: 100 TABLET, FILM COATED ORAL at 09:48

## 2023-11-21 RX ADMIN — IPRATROPIUM BROMIDE AND ALBUTEROL SULFATE 3 ML: .5; 3 SOLUTION RESPIRATORY (INHALATION) at 11:37

## 2023-11-21 RX ADMIN — CEFTRIAXONE SODIUM 2 G: 2 INJECTION, POWDER, FOR SOLUTION INTRAMUSCULAR; INTRAVENOUS at 10:07

## 2023-11-21 RX ADMIN — ATORVASTATIN CALCIUM 40 MG: 40 TABLET, FILM COATED ORAL at 09:48

## 2023-11-21 RX ADMIN — PREDNISONE 40 MG: 20 TABLET ORAL at 09:47

## 2023-11-21 RX ADMIN — NAPROXEN 500 MG: 500 TABLET ORAL at 09:48

## 2023-11-21 RX ADMIN — SODIUM CHLORIDE TAB 1 GM 1 G: 1 TAB at 09:47

## 2023-11-21 RX ADMIN — AMLODIPINE BESYLATE 10 MG: 10 TABLET ORAL at 09:48

## 2023-11-21 RX ADMIN — GUAIFENESIN 1200 MG: 600 TABLET, EXTENDED RELEASE ORAL at 09:47

## 2023-11-21 RX ADMIN — IPRATROPIUM BROMIDE AND ALBUTEROL SULFATE 3 ML: .5; 3 SOLUTION RESPIRATORY (INHALATION) at 07:50

## 2023-11-21 RX ADMIN — PANTOPRAZOLE SODIUM 40 MG: 40 TABLET, DELAYED RELEASE ORAL at 10:03

## 2023-11-21 RX ADMIN — LIDOCAINE 1 PATCH: 4 PATCH TOPICAL at 09:48

## 2023-11-21 ASSESSMENT — ACTIVITIES OF DAILY LIVING (ADL)
ADLS_ACUITY_SCORE: 24

## 2023-11-21 NOTE — PROGRESS NOTES
Care Management Discharge Note    Discharge Date: 11/21/2023       Discharge Disposition: Home Care    Discharge Services: None    Discharge DME: None    Discharge Transportation: family or friend will provide    Private pay costs discussed: Not applicable    Does the patient's insurance plan have a 3 day qualifying hospital stay waiver?  No    PAS Confirmation Code: N/A  Patient/family educated on Medicare website which has current facility and service quality ratings: no    Education Provided on the Discharge Plan:    Persons Notified of Discharge Plans: Patient, daughter, nurse, homecare  Patient/Family in Agreement with the Plan: yes    Handoff Referral Completed: No    Additional Information:  Accepted at [facility] for Rn/PT/OT. Discharge orders received and faxed via [employer] to agency. Patient will discharge home via [facility].       Spoke with patient and Aura tom at bedside. Writer updated them on accepting agency and they are in agreement. Patient asked that Aura be the point of contact for scheduling appointments. Writer called [facility] and spoke with Vandana. Writer provided patient's daughter's name and number for contact.     Writer called Ginny with [facility] and updated that patient no longer needs a bed.     Lisa Ohara, MSW, LGSW   Social Work   [facility]

## 2023-11-21 NOTE — PROGRESS NOTES
Pt denied pain. vitals stable. IV removed w/o s/s of infection. Reviewed/gave d/c instructions including med's, and F/u's. Pt verbalized understanding. Leaving unit with all belongings in wheelchair. New RX filled and given to pt. transport provided by pts daughter.

## 2023-11-21 NOTE — PLAN OF CARE
Summary:    Patient Name: Ángel MCCAIN [facility]       Room#: 0246/0246-02     Admit Date: 11/16/2023  Primary Diagnosis: Community acquired pneumonia of left lung, unspecified part of lung  COPD with acute exacerbation (H)  Chest pain on breathing  VSS except HTN on RA  Drains & Devices:  PIV SL  Rhythm:  NSR  Activity Level: SBA  Anticipated D/C Date: Pending TCU Placement  Notes:  Course crackles expiratory  CIWA discontinued  Previous RRTs for 10/10 chest pain. Managed with Tramadol and Patch

## 2023-11-21 NOTE — PLAN OF CARE
Physical Therapy Discharge Summary    Reason for therapy discharge:    Discharged to home with home therapy.    Progress towards therapy goal(s). See goals on Care Plan in [facility] electronic health record for goal details.  Goals partially met.  Barriers to achieving goals:   discharge from facility.    Therapy recommendation(s):    Continued therapy is recommended.  Rationale/Recommendations:  To further improve independence and mobility.

## 2023-11-21 NOTE — DISCHARGE SUMMARY
[facility]    Discharge Summary  Hospitalist    Date of Admission:  11/16/2023  Date of Discharge:  11/21/2023    Discharge Diagnoses      COPD with acute exacerbation (H)  Community acquired pneumonia of left lung, unspecified part of lung  HTN, goal below 140/90  Chest pain on breathing    History of Present Illness   Ángel Pham is an 68 year old male who presented with copd with pneumonia     Hospital Course   Ángel Pham was admitted on 11/16/2023.  The following problems were addressed during his hospitalization:    Cumulative Summary:   Ángel Pham is a 68 year old male admitted on 11/16/2023.     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       EMS was called due to worsening shortness of breath occurring over the previous 3 days prior to admission. Initially, patient was placed on BiPAP by EMSon arrival to ER , later removed and patient has tolerated being on room air.     Work-up in the ED has included an EKG that showed sinus tachycardia with left axis deviation. CMP revealed sodium of 130, chloride of 91, creatinine of 0.58 with a GFR greater than 90 otherwise within normal limits. D-dimer was elevated at 0.94. proBNP was within normal limits at 530.  Procalcitonin was mildly elevated at 0.22.  High-sensitivity troponin T was mildly elevated at 23.  Lactic acid level was within normal limits at 1.7.  VBG showed a pH of 7.35, PCO2 of 52, PO2 of 14, O2 saturation of 15 and bicarbonate of 29.  Respiratory viral PCR panel was all negative.  Repeat  troponin T with improvement to 18.     One-view portable chest x-ray showed emphysematous change without acute airspace consolidation and noted resolved airspace opacities at the left lung base since the prior exam as well as some scarring at the bilateral apices again noted appearing stable. Chest CT/PE study with contrast was negative for PE but revealed new patchy opacities in the left upper lobe and left lower lobe (likely pneumonia with recommendation for follow-up chest CT in approximately 3 months to ensure resolution), few irregular nodular opacities in the right lower lobe are stable, stable mediastinal lymphadenopathy and normal variant duplicated SVC with the left SVC draining into the coronary sinus.     Patient received 2 DuoNeb treatments and was started on IV antibiotics  ( IV ceftriaxone 2 g and 500 mg of oral azithromycin) . Patient received 125 mg of solumedrol via EMS. Patient was admitted for further evaluation and management,. Patient received IV solumedrol for first 48 hours and is switched to oral prednisone on 11/18/23.  Patient hospitalization was prolonged due to development of severe pleuritic chest pain for which RRT was called, patient underwent noncontrast CT to rule out tension pneumothorax, currently patient has been responding well to NSAIDs.  Patient also seems to be anxious when he starts to hyperventilate although maintaining saturation, oxygen provides comfort, patient has been getting more weaker although initially was thought to be able to get discharged home, PT will be reevaluating patient if he needs to go to TCU.     Patient was also noticed to be frequently hypertensive during the hospitalization, per patient his blood pressure always runs high, his amlodipine has been increased to 10 mg and he has also been a started on hydralazine during the hospitalization.              Assessment & Plan  Left lung community acquired pneumonia  COPD exacerbation     -- Continue to  monitor patient closely.  --Patient was seen and examined, daughter present at bedside, detailed discussion with patient, daughter and bedside nursing.  --Patient treated with IV ceftriaxone and azithromycin.  Finished 5-day course of azithromycin on 6-day course of ceftriaxone prior to discharge.  --Patient has been off IV fluids since 11/17.  --Patient has been maintaining his oxygen saturations but does require supplemental oxygen intermittently then starts to hyperventilate.  --Continue to monitor vital signs every 4 hours.  --Holding PTA inhaler, trelegy, patient is receiving scheduled DuoNebs.  --Continue inhalers on discharge.  --Patient has been receiving Mucinex 1200 mg twice daily.  --Continue 40 mg p.o. daily.  --Will recommend discharging patient on 40 mg p.o. daily for 3 days, then 30 mg p.o. daily for 3 days then 20 mg p.o. daily for 3 days and then 10 mg p.o. daily for 3 days.  --Continue incentive spirometer and flutter valve use.  --Patient has been following with outpatient pulmonary.  -7-day course of naproxen for pleuritic chest pain.  Oral PPI ordered.  -Patient was eventually discharged home with home care PT, OT and RN.  Did discuss rehab patient and daughter wanted to go home.       Pleuritic chest pain: Patient develops left-sided sharp pleuritic chest pain with deep inhalation.  RRT was called on 11/19/2023 due to acute chest pain.     -- Highly appreciate house officer help.  --EKG showed normal sinus rhythm.  --Patient was also significantly hypertensive at the time of RRT, received nitroglycerin.  --Noncontrast CT chest was done to rule out pneumothorax considering underlying COPD  -- CT results were negative for pneumothorax, showed consolidation in left lung similar to resolving pneumonia which was noticed on admission chest x-ray.  --House officer again evaluated patient this morning due to the similar chest pain, patient responds well to administration of IV Toradol.  -- Start  patient on naproxen 500 mg p.o. twice daily.  --Has already been on Protonix 40 mg every morning  -- Patient will probably benefit from getting discharged on naproxen for 1 week at the time of discharge.  -- No concern for cardiac etiology, see below     Troponin elevation, most likely type II NSTEMI from COPD Exacerbation     -- Trended troponin, does not have ACS pattern, repeat in normal range   -- Monitor on telemetry.    -- ECHO ordered, normal LV and systolic function of 55 to 60%, normal left ventricular wall motion.  -- EKG on admission showed sinus tachy and left Axis deviation   -- No further inpatient evaluation is needed      Tobacco Use D/O   Patient typically smokes up to 1.5 packs/day.  In the past week he has not been smoking much at all.      -- Counseled regarding smoking cessation that should also continue with PCP.    -- Nicoderm patch offered and patient declined.  -- Patient told me that he will continue to work on it , has been smoking since age of 15 and thinks that he will think about cutting down      Essential HTN: Initially high , per patient runs high at home also      -- Continue PTA losartan 100 mg/d.  Hold parameters in place.    -- Increased amlodipine to 10 mg p.o. daily on 11/17, will discharge on increased dose.  -- Started patient on hydralazine 10 mg p.o. 3 times daily 11/19  -- Discussed with patient and daughter, will increase hydralazine to 25 mg p.o. 3 times daily on 11/20  -- Will recommend no further increase in blood pressure medications during this hospitalization  -- discharging patient on amlodipine 10 mg, losartan 100 mg p.o. daily and hydralazine 25 mg p.o. 3 times daily  -- Further adjustments per PCP in 4 weeks  -- Continue hydralazine as needed for systolic blood pressure more than 170     HLP  -- Continue  PTA atorvastatin 40 mg/d.       Chronic Pain  -- Continue PTA PRN oxycodone 5 mg every 3 hours.    -- PRN APAP available.       Hyponatremia,  chronic  *Baseline sodium between 128-132 in the past year.  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  -- Added Urena 30 mg p.o. twice daily on 11/17, patient does not want to drink that due to taste  -- After discussion with patient and daughter , started patient on salt tablet 11/18  -- BMP reviewed from this morning, sodium is down to 129 although it was improved to 134 yesterday  -- We will continue on salt tablet twice daily and recheck sodium tomorrow  --Patient did have better response from Urena although he was unable to consume it because of taste  --Patient does not need further work-up for hyponatremia as it has been worked up in the past and at this time most likely secondary to combination of malnutrition and history of small cell carcinoma     Alcohol use D/O  *Patient typically consumes 4-6 beers per day. He reported that he has not had much or any alcohol in the past several days.       -- Continue to monitor on CIWA   -- Follow up with PCP.       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 Ann and Dr. Dreyer of [facility].    -- Continue outpatient follow-up.     Anemia:  -- will check iron profile with ferritin   -- Check Vitamin B 12 and Folate level   -- No signs for acute bleeding   -- will order FOBT      Severe protein calorie malnutrition  Cachexia  -- most likely sec to Previous Malignancies and COPD  -- Regular diet.    --Evaluated by RD on 11/17, commended regular diet, patient declined offer for protein supplements     Insomnia  Generalized anxiety: Especially associated with hyperventilation and COPD  -- Continue PTA APAP-PM (Benadryl-APAP) at bedtime.  -- After discussion with patient and daughter, will start patient on Remeron 7.5 mg at bedtime, hoping that in addition to mood  "destabilization, patient also seen some benefits of helping him with insomnia and increasing his appetite.  First dose to be given tonight 11/20/2023, patient will need a prescription on discharge.     Migraines  -- Continue PTA PRN Fioricet.                 -Patient reported that he ran out of this medication recently, will ask patient if he would like the refill                 Clinically Significant Risk Factors         # Hyponatremia: Lowest Na = 129 mmol/L in last 2 days, will monitor as appropriate          # Hypertension: Noted on problem list        # Cachexia: Estimated body mass index is 12.98 kg/m  as calculated from the following:    Height as of this encounter: 1.854 m (6' 1\").    Weight as of this encounter: 44.6 kg (98 lb 6.4 oz).   # Severe Malnutrition: based on nutrition assessment    # Financial/Environmental Concerns: none          Micaela Andre MD, MD    Pending Results   These results will be followed up by pcp  Unresulted Labs Ordered in the Past 30 Days of this Admission       Date and Time Order Name Status Description    11/21/2023  8:27 AM Magnesium In process     11/21/2023 12:01 AM Folate In process           Code Status   Full Code       Primary Care Physician   Maxim Goodman    Physical Exam   Temp: 97.7  F (36.5  C) Temp src: Oral BP: (!) 172/102 Pulse: 77   Resp: 18 SpO2: 96 % O2 Device: None (Room air)    Vitals:    11/19/23 0600 11/20/23 0618 11/21/23 0635   Weight: 44.2 kg (97 lb 6.4 oz) 46.6 kg (102 lb 11.8 oz) 44.6 kg (98 lb 6.4 oz)     Vital Signs with Ranges  Temp:  [97.7  F (36.5  C)-98.2  F (36.8  C)] 97.7  F (36.5  C)  Pulse:  [75-90] 77  Resp:  [18-20] 18  BP: (108-172)/() 172/102  SpO2:  [93 %-97 %] 96 %  I/O last 3 completed shifts:  In: -   Out: 700 [Urine:700]    Physical Exam  Constitutional:       Appearance: He is ill-appearing.      Comments: Thin and frail    Cardiovascular:      Rate and Rhythm: Normal rate and regular rhythm.      Pulses: Normal " pulses.      Heart sounds: Normal heart sounds.   Pulmonary:      Effort: Pulmonary effort is normal. No respiratory distress.      Breath sounds: Normal breath sounds.      Comments: Reduced breath sounds   Abdominal:      General: Abdomen is flat. Bowel sounds are normal. There is no distension.      Tenderness: There is no abdominal tenderness. There is no guarding.   Skin:     General: Skin is warm and dry.   Neurological:      General: No focal deficit present.           Discharge Disposition   Discharged to home  Condition at discharge: Stable    Consultations This Hospital Stay   PHYSICAL THERAPY ADULT IP CONSULT  OCCUPATIONAL THERAPY ADULT IP CONSULT  CARE MANAGEMENT / SOCIAL WORK IP CONSULT  PHYSICAL THERAPY ADULT IP CONSULT  OCCUPATIONAL THERAPY ADULT IP CONSULT    Time Spent on this Encounter   IMicaela MD, personally saw the patient today and spent greater than 30 minutes discharging this patient.    Discharge Orders      Basic metabolic panel     Reason for your hospital stay    You were admitted to the hospital secondary to COPD exacerbation and Pneumonia , you were also noticed to have uncontrolled blood pressure during the stay and were evaluated for your chronic hyponatremia     Activity    Your activity upon discharge: activity as tolerated and no driving for today     Discharge Instructions    You were admitted to the hospital secondary to COPD exacerbation , you were also found to have left-sided pneumonia.  You were treated with IV antibiotics and you are discharged on oral p.o. Augmentin.  Please finish the course of antibiotics after the discharge.  You will also be finishing course of tapering course of prednisone 40 mg p.o. daily for 3 days then 30 mg p.o. daily for 3 days then 20 mg p.o. daily for 3 days and then 10 mg p.o. daily for 3 days.  You are also recommended to continue to use your albuterol neb 6 times a day as it has been recommended.  Please continue to take your  Trelegy inhaler 1 puff daily as you were doing before hospitalization.  Please keep your follow-up appointment with pulmonary.  During the hospitalization you were also noticed to have uncontrolled high blood pressure.  During your stay your amlodipine has been increased from 5 mg to 10 mg.  You are also started on a new medication hydralazine 10 mg to be taken 3 times a day.  Your primary care physician might increase the dose of hydralazine from 10 mg to 25 mg on your follow-up visit.  It is important that you continue to monitor your blood pressure closely after the discharge which would be helpful for your PCP to adjust your antihypertensive medications further.  Your losartan dose remains the same as it was before the hospitalization.  You were also noticed to have chronic lower sodium secondary to SIADH.  You have been a started on salt tablet your sodium has improved to 134 from 130.  Please continue to take 1 salt tablet daily with supper, you are advised to get your electrolytes checked in 1 week which has been ordered.  Please follow-up with your primary care physician to follow-up results of your BMP.  You are highly recommended to stop smoking.     General info for SNF    Length of Stay Estimate: Short Term Care: Estimated # of Days <30  Condition at Discharge: Stable  Level of care:skilled   Rehabilitation Potential: Good  Admission H&P remains valid and up-to-date: Yes  Recent Chemotherapy: N/A  Use Nursing Home Standing Orders: Yes     Mantoux instructions    Give two-step Mantoux (PPD) Per Facility Policy Yes     Intake and output    Every shift     Follow Up and recommended labs and tests    Follow up with long term physician.  The following labs/tests are recommended: cbc, bmp .     Activity - Up ad liz     Full Code     Physical Therapy Adult Consult    Evaluate and treat as clinically indicated.    Reason:  weakness     Occupational Therapy Adult Consult    Evaluate and treat as clinically  indicated.    Reason:  weakness     Fall precautions     Diet    Follow this diet upon discharge: Orders Placed This Encounter      Regular Diet Adult       Diet    Follow this diet upon discharge: Orders Placed This Encounter      Regular Diet Adult      Diet     Discharge Medications   Current Discharge Medication List        START taking these medications    Details   amoxicillin-clavulanate (AUGMENTIN) 875-125 MG tablet Take 1 tablet by mouth 2 times daily for 5 days  Qty: 10 tablet, Refills: 0    Associated Diagnoses: COPD with acute exacerbation (H); Community acquired pneumonia of left lung, unspecified part of lung; HTN, goal below 140/90      hydrALAZINE (APRESOLINE) 25 MG tablet Take 1 tablet (25 mg) by mouth 3 times daily    Associated Diagnoses: HTN, goal below 140/90      Lidocaine (LIDOCARE) 4 % Patch Place 1 patch onto the skin every 24 hours To prevent lidocaine toxicity, patient should be patch free for 12 hrs daily.  Qty: 7 patch, Refills: 0    Associated Diagnoses: Chest pain on breathing      mirtazapine (REMERON) 7.5 MG tablet Take 1 tablet (7.5 mg) by mouth at bedtime    Associated Diagnoses: COPD with acute exacerbation (H); Community acquired pneumonia of left lung, unspecified part of lung      naproxen (NAPROSYN) 500 MG tablet Take 1 tablet (500 mg) by mouth 2 times daily (with meals) for 6 days    Associated Diagnoses: COPD with acute exacerbation (H); Community acquired pneumonia of left lung, unspecified part of lung      pantoprazole (PROTONIX) 40 MG EC tablet Take 1 tablet (40 mg) by mouth every morning (before breakfast)    Associated Diagnoses: COPD with acute exacerbation (H); Community acquired pneumonia of left lung, unspecified part of lung; HTN, goal below 140/90      predniSONE (DELTASONE) 10 MG tablet 4 tabs daily for 3 days, then 3 tabs daily for 3 days, then 2 tabs daily for 3 days, then 1 tab daily for 3 days, then stop    Associated Diagnoses: COPD with acute exacerbation  (H); Community acquired pneumonia of left lung, unspecified part of lung; HTN, goal below 140/90      sodium chloride 1 GM tablet Take 1 tablet (1 g) by mouth daily (with dinner)    Associated Diagnoses: COPD with acute exacerbation (H); Community acquired pneumonia of left lung, unspecified part of lung; HTN, goal below 140/90      traMADol (ULTRAM) 50 MG tablet Take 1 tablet (50 mg) by mouth every 6 hours as needed for severe pain  Qty: 10 tablet, Refills: 0    Associated Diagnoses: Chest pain on breathing           CONTINUE these medications which have CHANGED    Details   amLODIPine (NORVASC) 10 MG tablet Take 1 tablet (10 mg) by mouth daily  Qty: 30 tablet, Refills: 0    Comments: Future refills by PCP Dr. Maxim Goodman with phone number [phone].  Associated Diagnoses: COPD with acute exacerbation (H); Community acquired pneumonia of left lung, unspecified part of lung; HTN, goal below 140/90      oxyCODONE (ROXICODONE) 5 MG tablet TAKE 1 TABLET BY MOUTH EVERY 3 TO 4 HOURS AS NEEDED FOR PAIN  Qty: 10 tablet, Refills: 0    Associated Diagnoses: COPD with acute exacerbation (H); Community acquired pneumonia of left lung, unspecified part of lung           CONTINUE these medications which have NOT CHANGED    Details   albuterol (2.5 MG/3ML) 0.083% neb solution Take 2.5 mg by nebulization 6 times daily  Refills: 11      atorvastatin (LIPITOR) 40 MG tablet Take 1 tablet (40 mg) by mouth daily  Qty: 100 tablet, Refills: 2    Associated Diagnoses: Peripheral vascular disease (H24)      diphenhydrAMINE-APAP, sleep,  MG TABS Take 2 tablets by mouth at bedtime      lidocaine (LMX4) 4 % external cream Apply topically 2 times daily as needed for mild pain or other (restless legs)      senna-docusate (SENOKOT-S/PERICOLACE) 8.6-50 MG tablet Take 2-3 tablets by mouth at bedtime      TRELEGY ELLIPTA 200-62.5-25 MCG/ACT oral inhaler INHALE 1 PUFF BY INHALATION ROUTE EVERY DAY AT THE SAME TIME EACH DAY       albuterol (PROAIR HFA/PROVENTIL HFA/VENTOLIN HFA) 108 (90 Base) MCG/ACT inhaler Inhale 2 puffs into the lungs as needed      butalbital-acetaminophen-caffeine (FIORICET/ESGIC) -40 MG per tablet TAKE 1 TABLET BY MOUTH EVERY 4-6 HOURS AS NEEDED  Refills: 0      losartan (COZAAR) 100 MG tablet Take 1 tablet (100 mg) by mouth daily  Qty: 100 tablet, Refills: 2    Associated Diagnoses: HTN, goal below 140/90           Allergies   Allergies   Allergen Reactions    Chantix [Varenicline] Other (See Comments)     Patient reports has tried this in past and had lightheadedness and nose bleeds.    Morphine Hcl Itching     On contact    Wellbutrin [Bupropion Hydrobromide]      syncope     Data   Recent Labs   Lab Test 11/19/23  1034 11/18/23  0545 11/17/23  0619 11/16/23  0942   WBC 8.9 6.7 7.7 10.4   HGB 12.4* 11.7* 11.3* 14.7   MCV 98 98 95 99    267 231 210   INR  --   --   --  0.99      Recent Labs   Lab Test 11/21/23  0633 11/20/23  0624 11/19/23  0935 11/19/23  0623 11/18/23  0545   * 129*  --  134* 131*   POTASSIUM 3.5 3.5  --  3.5 4.2   CHLORIDE 95*  --   --  98 98   CO2 29  --   --  28 24   BUN 11.0  --   --  14.0 15.6   CR 0.43*  --   --  0.35* 0.35*   ANIONGAP 8  --   --  8 9   MOSHE 8.7*  --   --  9.0 8.9   GLC 83  --  78 85 125*         Results for orders placed or performed during the hospital encounter of 11/16/23   XR Chest Port 1 View    Narrative    CHEST ONE VIEW PORTABLE    11/16/2023 9:50 AM     HISTORY: respiratory failure, emphysema.    COMPARISON: Chest x-ray 4/20/2023.      Impression    IMPRESSION: Emphysematous changes. No acute airspace consolidation.  Resolved airspace opacities at the left lung base since the prior  exam. Some scarring at the bilateral apices again noted appearing  stable. Stable cardiac silhouette.    MAGNUS RIGGS MD         SYSTEM ID:  [id number]   CT Chest Pulmonary Embolism w Contrast    Narrative    CT CHEST PULMONARY EMBOLISM W CONTRAST 11/16/2023 11:50  AM    CLINICAL HISTORY: sob, emphysema, elevated DDimer, hx lung nodule and  throat cancer  TECHNIQUE: CT angiogram chest during arterial phase injection IV  contrast. 2D and 3D MIP reconstructions were performed by the CT  technologist. Dose reduction techniques were used.     CONTRAST: 49 mL [id number]    COMPARISON: 10/11/2023, PET CT on 2/6/2023    FINDINGS:  ANGIOGRAM CHEST: Pulmonary arteries are normal caliber and negative  for pulmonary emboli. Thoracic aorta is negative for dissection. No CT  evidence of right heart strain.    LUNGS AND PLEURA: New scattered patchy opacities in the left upper  lobe and left lower lobe, likely due to pneumonia. Advanced  centrilobular emphysema. Stable biapical pleural scarring with  calcifications. A few stable pulmonary nodules. For example, a 5 mm  nodule along the right minor fissure (series 9, image 137) and a  posterior right lower lobe 8 mm linear nodular opacity (9/173), and a  triangular nodular opacity in the posteromedial right lower lobe  measuring 12 mm (9/157) are stable. No significant pleural effusion.  No pneumothorax. Layering secretions within the distal trachea.    MEDIASTINUM/AXILLAE: Stable mediastinal lymphadenopathy. For example,  a 19 mm aortopulmonic window lymph node is stable (series 7, image  101) and adjacent mediastinal lymphadenopathy is also stable. Few  calcified mediastinal and hilar lymph nodes, likely the sequela of  prior granulomatous disease. Normal variant duplicated superior vena  cava but the left SVC draining into the coronary sinus. No thoracic  aortic aneurysm. Stable mild coronary artery calcifications. Trace  pericardial effusion is stable.    UPPER ABDOMEN: No significant findings.    MUSCULOSKELETAL: No suspicious lesions in the bones.      Impression    IMPRESSION:  1.  No pulmonary emboli.  2.  New patchy opacities in the left upper lobe and left lower lobe,  likely pneumonia. Recommend a follow-up chest CT in 3 months to  ensure  resolution.  3.  Few irregular nodular opacities in the right lower lobe are  stable.  4.  Stable mediastinal lymphadenopathy.  5.  Normal variant duplicated SVC with the left SVC draining into the  coronary sinus.    JESUS MARCUS MD         SYSTEM ID:  [id number]   XR Chest Port 1 View    Narrative    EXAM: XR CHEST PORT 1 VIEW  LOCATION: [facility]  DATE: 11/19/2023    INDICATION: chest pain, eval for pntx  COMPARISON: Chest CT on 11/16/2023      Impression    IMPRESSION: Emphysema. Improved patchy opacities in the left upper lobe since 11/16/2023 suggestive of improving pneumonia. Stable biapical pleural scarring. No pneumothorax or pleural effusion. Normal heart size. Few stable calcified granuloma.   CT Chest w/o Contrast    Narrative    EXAM: CT CHEST WITHOUT CONTRAST  LOCATION: [facility]  DATE: 11/19/2023    INDICATION: Chest pain. Evaluate for pneumothorax, chest x-ray difficult to interpret, emphysematous COPD,  COMPARISON: 11/19/2023 CXR. 11/16/2023 CTA chest. CTs chest 10/11/2023 and 05/17/2023.  TECHNIQUE: CT chest without IV contrast. Multiplanar reformats were obtained. Dose reduction techniques were used.  CONTRAST: None.    FINDINGS:   LUNGS AND PLEURA: Lungs are hyperinflated with advanced centrilobular emphysema. Biapical scarring and possible postop change from prior bleb resection. Foci of consolidation and/or volume loss throughout the apical posterior segment left upper lobe and   to a lesser extent the inferior lingula and posterior basilar left lower lobe,  similar to 11/16/2023 and likely represent resolving pneumonia.    The 14 x 5 mm nodular opacity in the medial left upper lobe apex targeted for radiation therapy is unchanged since 10/11/2023 but significantly smaller compared with radiation treatment planning CT (image 36, series 4). Additional lung nodules are   stable. For example, a 5 mm right upper lobe nodule (image 132  series 4), and 8 mm right lower lobe nodule (image 171) and an irregularly marginated 12 mm right lower lobe nodule (image 159) are unchanged.    No pneumothorax. No pleural effusion.    MEDIASTINUM/AXILLAE: No lymphadenopathy. Extensive calcified atheromatous plaque throughout the normal caliber thoracic aorta. Heart size normal with no pericardial effusion. Benign calcified mediastinal lymph nodes.    CORONARY ARTERY CALCIFICATION: Mild.    UPPER ABDOMEN: Unremarkable.    MUSCULOSKELETAL: Bones appear demineralized.      Impression    IMPRESSION:   1.  Foci of consolidation and/or volume loss throughout the apical posterior segment left upper lobe and to a lesser extent the inferior lingula and posterior basilar left lower lobe are similar to 2023 and likely represent resolving pneumonia.  2.  The 14 x 5 mm nodular opacity in the medial left upper lobe apex targeted for radiation therapy is unchanged since 10/11/2023 but significantly smaller compared with radiation treatment planning CT   3.  No pneumothorax.  4.  Lungs are hyperinflated with advanced emphysematous change.  5.  Stable lung nodules.     Echocardiogram Limited     Value    LVEF  55-60%    Narrative    [id number]  [id number]  [id number]^VERO^MADHU^ROEL     [facility]  Echocardiography Laboratory  [address] [place] [address]     Name: MATI DUNN  MRN: [id number]  : 1955  Study Date: 2023 07:57 AM  Age: 68 yrs  Gender: Male  Patient Location: [facility]  Reason For Study: SOB  Ordering Physician: Madhu Vásquez  Referring Physician: Madhu Vásquez  Performed By: Herminio Calderon     BSA: 1.6 m2  Height: 73 in  Weight: 97 lb  HR: 83  ______________________________________________________________________________  Procedure  Limited Echo Adult.  ______________________________________________________________________________  Interpretation Summary     The left ventricle is normal in  size.  Left ventricular systolic function is normal. The visual ejection fraction is  55-60%.  Normal left ventricular wall motion  The right ventricle is normal in structure, function and size.  No hemodynamically significant valvular abnormalities on 2D or color flow  imaging. There is no comparison study available.  ______________________________________________________________________________  Left Ventricle  The left ventricle is normal in size. There is normal left ventricular wall  thickness. Left ventricular systolic function is normal. The visual ejection  fraction is 55-60%. Normal left ventricular wall motion.     Right Ventricle  The right ventricle is normal in structure, function and size.     Atria  Normal left atrial size. Right atrial size is normal. There is no atrial shunt  seen.     Mitral Valve  The mitral valve is normal in structure and function. There is trace mitral  regurgitation.     Tricuspid Valve  The tricuspid valve is normal in structure and function. There is trace  tricuspid regurgitation. Right ventricular systolic pressure could not be  approximated due to inadequate tricuspid regurgitation. IVC diameter <2.1 cm  collapsing >50% with sniff suggests a normal RA pressure of 3 mmHg.     Aortic Valve  The aortic valve is normal in structure and function. No aortic regurgitation  is present. No aortic stenosis is present.     Pulmonic Valve  The pulmonic valve is not well seen, but is grossly normal. There is trace  pulmonic valvular regurgitation.     Vessels  Normal size aorta. The inferior vena cava was normal in size with preserved  respiratory variability.     Pericardium  There is no pericardial effusion.     Rhythm  Sinus rhythm was noted.  ______________________________________________________________________________  MMode/2D Measurements & Calculations     IVSd: 0.72 cm  LVIDd: 4.4 cm  LVIDs: 3.3 cm  LVPWd: 0.64 cm  FS: 26.0 %  LV mass(C)d: 87.8 grams  LV mass(C)dI: 55.5  grams/m2  Ao root diam: 3.6 cm  asc Aorta Diam: 1.2 cm  LVOT diam: 2.0 cm  LVOT area: 3.1 cm2  Ao root diam index Ht(cm/m): 1.9  Ao root diam index BSA (cm/m2): 2.3  Asc Ao diam index BSA (cm/m2): 0.76  Asc Ao diam index Ht(cm/m): 0.65  RWT: 0.29     Doppler Measurements & Calculations  PA V2 max: 99.5 cm/sec  PA max P.0 mmHg  PA acc time: 0.09 sec     ______________________________________________________________________________  Report approved by: Mohan Donahue 2023 10:15 AM

## 2023-11-21 NOTE — PROGRESS NOTES
Care Management Follow Up    Length of Stay (days): 5    Expected Discharge Date: 11/21/2023     Concerns to be Addressed: TCU at discharge  Patient plan of care discussed at interdisciplinary rounds: Yes    Anticipated Discharge Disposition: TCU at [facility]    Anticipated Discharge Services: None  Anticipated Discharge DME: None    Patient/family educated on Medicare website which has current facility and service quality ratings: no  Education Provided on the Discharge Plan:  yes  Patient/Family in Agreement with the Plan: yes    Referrals Placed by CM/SW:    Private pay costs discussed: Not applicable    Additional Information:  Writer received a call from Ginny with [facility]. They can accept patient today into a shared room at the [facility]. Ginny stated that they would need to hold any cancer treatment and he would not be able to attend any visits at [facility] due to facility getting a bill. Ginny stated that [facility] is waiving prior auth and can accept patient without needing an auth. Ginny stated that they could take patient no later then 1730.     Writer updated patient at bedside regarding accepting facility and shared room available. Patient is in agreement and asked that family transport him. Patient stated that writer can call his daughter, Enedina to discuss transport. Writer paged MD to confirm patient is medically ready.       Pas completed and placed on chart.    PAS-RR    D: Per DHS regulation, PIERRE completed and submitted PAS-RR to [place] [employer] Direct Connect via the Senior LinkAge Line.  PAS-RR confirmation # is : [id number]     I: SW spoke with patient and they are aware a PAS-RR has been submitted.  PIERRE reviewed with patient that they may be contacted for a follow up appointment within 10 days of hospital discharge if their SNF stay is < 30 days.  Contact information for [facility] LinkAge Line was also provided.    A: patient verbalized understanding.    P:  Further questions may be directed to Senior LinkAge Line at #[phone], option #4 for PAS- staff.     Addendum 0915: Spoke with patient's daughter, Aura. Aura reports she spoke with her dad and they both feel that patient would be successful with a discharge home with homecare. Aura shares that she lives close and is over at patient's house everyday to assist with grocery shopping and household tasks. Writer explained homecare requires patient to be homebound status and explained frequency of visits. Aura is in agreement. Writer sent a message to Dr. Andre updating that patient and daughter want patient to return home with homecare. We have orders for PT/OT/RN with an accepting agency of [facility].     BETTY Kinney, [facility]   Social Work   [facility]

## 2023-11-25 PROCEDURE — G0180 MD CERTIFICATION HHA PATIENT: HCPCS | Performed by: FAMILY MEDICINE

## 2023-11-29 ENCOUNTER — OFFICE VISIT (OUTPATIENT)
Dept: FAMILY MEDICINE | Facility: CLINIC | Age: 68
End: 2023-11-29

## 2023-11-29 VITALS
BODY MASS INDEX: 12.53 KG/M2 | OXYGEN SATURATION: 99 % | DIASTOLIC BLOOD PRESSURE: 64 MMHG | SYSTOLIC BLOOD PRESSURE: 105 MMHG | HEART RATE: 90 BPM | WEIGHT: 95 LBS

## 2023-11-29 DIAGNOSIS — E87.1 HYPONATREMIA: ICD-10-CM

## 2023-11-29 DIAGNOSIS — D64.9 ANEMIA, UNSPECIFIED TYPE: ICD-10-CM

## 2023-11-29 DIAGNOSIS — E43 SEVERE PROTEIN-CALORIE MALNUTRITION (H): ICD-10-CM

## 2023-11-29 DIAGNOSIS — I10 HTN, GOAL BELOW 140/90: ICD-10-CM

## 2023-11-29 DIAGNOSIS — Z23 NEED FOR COVID-19 VACCINE: ICD-10-CM

## 2023-11-29 DIAGNOSIS — J44.1 COPD WITH ACUTE EXACERBATION (H): ICD-10-CM

## 2023-11-29 DIAGNOSIS — J18.9 COMMUNITY ACQUIRED PNEUMONIA OF LEFT LUNG, UNSPECIFIED PART OF LUNG: Primary | ICD-10-CM

## 2023-11-29 LAB
ANION GAP SERPL CALCULATED.3IONS-SCNC: 11 MMOL/L (ref 7–15)
BUN SERPL-MCNC: 13.9 MG/DL (ref 8–23)
CALCIUM SERPL-MCNC: 8.6 MG/DL (ref 8.8–10.2)
CHLORIDE SERPL-SCNC: 95 MMOL/L (ref 98–107)
CREAT SERPL-MCNC: 0.48 MG/DL (ref 0.67–1.17)
DEPRECATED HCO3 PLAS-SCNC: 24 MMOL/L (ref 22–29)
EGFRCR SERPLBLD CKD-EPI 2021: >90 ML/MIN/1.73M2
GLUCOSE SERPL-MCNC: 104 MG/DL (ref 70–99)
POTASSIUM SERPL-SCNC: 4.2 MMOL/L (ref 3.4–5.3)
SODIUM SERPL-SCNC: 130 MMOL/L (ref 135–145)

## 2023-11-29 PROCEDURE — 99495 TRANSJ CARE MGMT MOD F2F 14D: CPT | Mod: 25 | Performed by: FAMILY MEDICINE

## 2023-11-29 PROCEDURE — 91322 SARSCOV2 VAC 50 MCG/0.5ML IM: CPT | Performed by: FAMILY MEDICINE

## 2023-11-29 PROCEDURE — 90480 ADMN SARSCOV2 VAC 1/ONLY CMP: CPT | Mod: 59 | Performed by: FAMILY MEDICINE

## 2023-11-29 PROCEDURE — 36415 COLL VENOUS BLD VENIPUNCTURE: CPT | Performed by: FAMILY MEDICINE

## 2023-11-29 PROCEDURE — 82728 ASSAY OF FERRITIN: CPT | Mod: ORL | Performed by: FAMILY MEDICINE

## 2023-11-29 PROCEDURE — 80048 BASIC METABOLIC PNL TOTAL CA: CPT | Mod: ORL | Performed by: FAMILY MEDICINE

## 2023-11-29 NOTE — PATIENT INSTRUCTIONS
Reduce hydralazine to twice a day for a week and monitor BP.  If BP does not increase much could go to one per day for a week and then ultimately stop it altogether if BP stays normal (<140/90)

## 2023-11-29 NOTE — PROGRESS NOTES
Hospital Follow-up Visit:  Hospital/Nursing Home/IP Rehab Facility: [facility]  Date of Admission: 11/16/23  Date of Discharge: 11/21/23  Reason(s) for Admission:   COPD with acute exacerbation (H)  Community acquired pneumonia of left lung, unspecified part of lung  HTN, goal below 140/90  Chest pain on breathing    Was your hospitalization related to COVID-19? No   Problems taking medications regularly:  None  Medication changes since discharge: None  Problems adhering to non-medication therapy:  None    Summary of hospitalization:  [facility] discharge summary reviewed  Diagnostic Tests/Treatments reviewed.  Follow up needed: none  Other Healthcare Providers Involved in Patient s Care:         None  Update since discharge: improved.         Plan of care communicated with patient and family       Subjective     Ángel is a 68 year old patient who presents to clinic for hospital follow up with his son.  He was admitted with pneumonia and COPD exacerbation in setting of underlying COPD and nicotine dependence.  He was treated with antibiotics and steroids and has improved.  He is done with abx and nearly done with prednisone.  He has also reduced his smoking significantly by about 75% per report.      His hyponatremia was monitored and was started on salt tabs.      His BP was elevated while hospitalized.  His amlodipine was increased to 10 mg and hydralazine was added.  He is continued on losartan.    Anemia: stable.  B12 and folate normal    Insomnia and anxiety: was started on mirtazapine.    Review of Systems   Constitutional, HEENT, cardiovascular, pulmonary, GI, , musculoskeletal, neuro, skin, endocrine and psych systems are negative, except as otherwise noted.      Objective    /64   Pulse 90   Wt 43.1 kg (95 lb)   SpO2 99%   BMI 12.53 kg/m      General: Frail appearing, NAD  HEENT: Clear  Heart: RRR, no murmur  Chest: distant breath sounds.  No wheezing  or crackles.  No increased WOB  Skin: Clear  Psych: normal mood and affect        No results found for this or any previous visit (from the past 24 hour(s)).    Community acquired pneumonia of left lung, unspecified part of lung  Clinically improved off abx    Anemia, unspecified type  Check ferritin despite borderline macrocytosis  - Ferritin; Future  - VENOUS COLLECTION  - Ferritin    Hyponatremia  Recheck, cont sodium replacement  - VENOUS COLLECTION  - Basic metabolic panel; Future  - Basic metabolic panel    COPD with acute exacerbation (H)  Improved, complete pred taper    Severe protein-calorie malnutrition (H24)  Cont efforts to increase caloric intake    HTN, goal below 140/90  Low today.  Recommend slow taper off hydralazine if able.  Will keep me posted.      Need for COVID-19 vaccine  - COVID-19 12+ (2023-24) (MODERNA)  - [place] ADMIN COVID VACCINE, IM    Follow up in 2-3 months

## 2023-11-30 LAB — FERRITIN SERPL-MCNC: 498 NG/ML (ref 31–409)

## 2024-01-15 ENCOUNTER — OFFICE VISIT (OUTPATIENT)
Dept: FAMILY MEDICINE | Facility: CLINIC | Age: 69
End: 2024-01-15

## 2024-01-15 VITALS
DIASTOLIC BLOOD PRESSURE: 88 MMHG | OXYGEN SATURATION: 99 % | HEIGHT: 69 IN | HEART RATE: 79 BPM | WEIGHT: 95 LBS | SYSTOLIC BLOOD PRESSURE: 161 MMHG | BODY MASS INDEX: 14.07 KG/M2

## 2024-01-15 DIAGNOSIS — Z85.819 HISTORY OF THROAT CANCER: ICD-10-CM

## 2024-01-15 DIAGNOSIS — E87.1 HYPONATREMIA: ICD-10-CM

## 2024-01-15 DIAGNOSIS — R10.31 GROIN PAIN, CHRONIC, RIGHT: ICD-10-CM

## 2024-01-15 DIAGNOSIS — I70.0 AORTIC CALCIFICATION (H): ICD-10-CM

## 2024-01-15 DIAGNOSIS — C34.12 MALIGNANT NEOPLASM OF UPPER LOBE, LEFT BRONCHUS OR LUNG (H): Primary | ICD-10-CM

## 2024-01-15 DIAGNOSIS — G89.29 GROIN PAIN, CHRONIC, RIGHT: ICD-10-CM

## 2024-01-15 DIAGNOSIS — F11.20 UNCOMPLICATED OPIOID DEPENDENCE (H): ICD-10-CM

## 2024-01-15 DIAGNOSIS — J43.9 PULMONARY EMPHYSEMA, UNSPECIFIED EMPHYSEMA TYPE (H): ICD-10-CM

## 2024-01-15 DIAGNOSIS — E43 SEVERE PROTEIN-CALORIE MALNUTRITION (H): ICD-10-CM

## 2024-01-15 PROBLEM — J44.1 COPD WITH ACUTE EXACERBATION (H): Status: RESOLVED | Noted: 2023-11-16 | Resolved: 2024-01-15

## 2024-01-15 PROBLEM — J18.9 COMMUNITY ACQUIRED PNEUMONIA OF LEFT LUNG, UNSPECIFIED PART OF LUNG: Status: RESOLVED | Noted: 2023-11-16 | Resolved: 2024-01-15

## 2024-01-15 LAB
ANION GAP SERPL CALCULATED.3IONS-SCNC: 8 MMOL/L (ref 7–15)
BUN SERPL-MCNC: 13.9 MG/DL (ref 8–23)
CALCIUM SERPL-MCNC: 9.2 MG/DL (ref 8.8–10.2)
CHLORIDE SERPL-SCNC: 98 MMOL/L (ref 98–107)
CREAT SERPL-MCNC: 0.49 MG/DL (ref 0.67–1.17)
DEPRECATED HCO3 PLAS-SCNC: 30 MMOL/L (ref 22–29)
EGFRCR SERPLBLD CKD-EPI 2021: >90 ML/MIN/1.73M2
GLUCOSE SERPL-MCNC: 83 MG/DL (ref 70–99)
POTASSIUM SERPL-SCNC: 4.1 MMOL/L (ref 3.4–5.3)
SODIUM SERPL-SCNC: 136 MMOL/L (ref 135–145)

## 2024-01-15 PROCEDURE — 73502 X-RAY EXAM HIP UNI 2-3 VIEWS: CPT | Mod: RT | Performed by: FAMILY MEDICINE

## 2024-01-15 PROCEDURE — 80048 BASIC METABOLIC PNL TOTAL CA: CPT | Mod: ORL | Performed by: FAMILY MEDICINE

## 2024-01-15 PROCEDURE — 36415 COLL VENOUS BLD VENIPUNCTURE: CPT | Performed by: FAMILY MEDICINE

## 2024-01-15 PROCEDURE — 99214 OFFICE O/P EST MOD 30 MIN: CPT | Performed by: FAMILY MEDICINE

## 2024-01-15 NOTE — PROGRESS NOTES
"  Latasha Neal is a 68 year old patient who presents to clinic for evaluation.  Intermittent right groin pain persists.  PT did not help.  No definite exacerbating or alleviating factors.  No pain today.  Not significantly debilitating.    Other chronic issues:    AISHWARYA mass: presumed cancer.  Not surgical candidate.  Treated with radiation.  Follows with Dr Breen and Dr Ann.  Next scan due in March/April    Protein calorie malnutrition: severe.  Intake improved with mirtazapine. Drinks protein shakes. BMI 14.     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  Has completed radiation treatment.  No evidence of recurrent disease.  Follows with Dr Lopes and now Dr Dreyer at [facility].     COPD: marked emphysema on CT.  follows with Dr Mireles.  On Trelegy.  Breathing is stable with exception of increased \"hyperventilation\" and SOB when lying flat lately.  Denies new exertional symptoms.  No LE edema.  No history of CHF.     Nicotine dependence: continues to smoke.  precontemplative     EtOH Dependence: 5-6 beers per day.  Severe hyponatremia causing hospitalization in July 2022.  Uncertain if related.  Not interested in reducing use.     Chronic postoperative pain and opiate dependence: takes oxycodone 3x/day.       HTN: on amlodipine and losartan.  No cp, headache.  Home readings at goal     Atherosclerosis of aorta: noted on imaging.  On atorvastatin.  He stopped aspirin.    Review of Systems   Constitutional, HEENT, cardiovascular, pulmonary, GI, , musculoskeletal, neuro, skin, endocrine and psych systems are negative, except as otherwise noted.      Objective    BP (!) 161/88   Pulse 79   Ht 1.753 m (5' 9\")   Wt 43.1 kg (95 lb)   SpO2 99%   BMI 14.03 kg/m      General: Well appearing, NAD  Psych: normal mood and affect        Xray - await formal read  No results found for this or any previous visit (from the past 24 hour(s)).    Malignant neoplasm of upper lobe, left bronchus or lung " (H)  S/p radiation.  Follow up CT scheduled.  Cont follow up with Dr Breen and Seth    Severe protein-calorie malnutrition (H24)  Weight stable.  Cont increased intake.  COPD likely causing increased caloric burning    Uncomplicated opioid dependence (H)  Stable, no escalating use.  Cont to monitor    Aortic calcification (H24)  Asymptomatic, on statin    History of throat cancer  In remission, follows with Dr Dreyer    Pulmonary emphysema, unspecified emphysema type (H)  Stable on Trelegy, cont pulm follow up  Declines smoking cessation    Hyponatremia  Recheck, uncertain etiology  - Basic metabolic panel; Future  - VENOUS COLLECTION  - Basic metabolic panel    Groin pain, chronic, right  Uncertain cause.  Radicular sx vs OA vs other.  Await xray  - XR Hip Right 2-3 Views; Future  - XR Hip Right 2-3 Views    Follow up in 2-3 months, sooner if needed

## 2024-02-19 ENCOUNTER — OFFICE VISIT (OUTPATIENT)
Dept: FAMILY MEDICINE | Facility: CLINIC | Age: 69
End: 2024-02-19

## 2024-02-19 VITALS
OXYGEN SATURATION: 97 % | WEIGHT: 97.4 LBS | HEART RATE: 83 BPM | TEMPERATURE: 98 F | DIASTOLIC BLOOD PRESSURE: 77 MMHG | SYSTOLIC BLOOD PRESSURE: 144 MMHG | BODY MASS INDEX: 14.38 KG/M2

## 2024-02-19 DIAGNOSIS — J43.9 PULMONARY EMPHYSEMA, UNSPECIFIED EMPHYSEMA TYPE (H): ICD-10-CM

## 2024-02-19 DIAGNOSIS — F41.9 ANXIETY: ICD-10-CM

## 2024-02-19 DIAGNOSIS — I10 HTN, GOAL BELOW 140/90: ICD-10-CM

## 2024-02-19 DIAGNOSIS — I73.9 PERIPHERAL VASCULAR DISEASE (H): ICD-10-CM

## 2024-02-19 DIAGNOSIS — R09.81 NASAL CONGESTION: Primary | ICD-10-CM

## 2024-02-19 LAB
INFLUENZA A (RMG): NEGATIVE
INFLUENZA B (RMG): NEGATIVE
SARS ANTIGEN (RMG): NEGATIVE

## 2024-02-19 PROCEDURE — 99214 OFFICE O/P EST MOD 30 MIN: CPT | Performed by: FAMILY MEDICINE

## 2024-02-19 PROCEDURE — 87428 SARSCOV & INF VIR A&B AG IA: CPT | Mod: QW | Performed by: FAMILY MEDICINE

## 2024-02-19 RX ORDER — LOSARTAN POTASSIUM 100 MG/1
100 TABLET ORAL DAILY
Qty: 90 TABLET | Refills: 3 | Status: SHIPPED | OUTPATIENT
Start: 2024-02-19

## 2024-02-19 RX ORDER — AMLODIPINE BESYLATE 10 MG/1
10 TABLET ORAL DAILY
Qty: 90 TABLET | Refills: 3 | Status: SHIPPED | OUTPATIENT
Start: 2024-02-19

## 2024-02-19 RX ORDER — MIRTAZAPINE 7.5 MG/1
7.5 TABLET, FILM COATED ORAL AT BEDTIME
Qty: 90 TABLET | Refills: 3 | Status: SHIPPED | OUTPATIENT
Start: 2024-02-19 | End: 2024-09-18

## 2024-02-19 RX ORDER — ATORVASTATIN CALCIUM 40 MG/1
40 TABLET, FILM COATED ORAL DAILY
Qty: 90 TABLET | Refills: 3 | Status: SHIPPED | OUTPATIENT
Start: 2024-02-19

## 2024-02-19 NOTE — PROGRESS NOTES
Subjective     Ángel is a 68 year old patient who presents to clinic for evaluation.  He has severe COPD on Trelegy.  Had nasal and chest congestion last week and felt more fatigued.  He has felt improved the past couple days.  No fever, chills, chest pain, SOB (beyond baseline).  Using trelegy and albuterol.          Review of Systems   Constitutional, HEENT, cardiovascular, pulmonary, GI, , musculoskeletal, neuro, skin, endocrine and psych systems are negative, except as otherwise noted.      Objective    BP (!) 144/77   Pulse 83   Temp 98  F (36.7  C) (Temporal)   Wt 44.2 kg (97 lb 6.4 oz)   SpO2 97%   BMI 14.38 kg/m      General: Well appearing, NAD  Chest: distant breath sounds but no wheezing or crackles  Skin: Clear  Psych: normal mood and affect        Results for orders placed or performed in visit on 02/19/24 (from the past 24 hour(s))   Influenza + SARS Antigen ([facility])   Result Value Ref Range    INFLUENZA A ([facility]) Negative Negative    INFLUENZA B ([facility]) Negative Negative    SARS ANTIGEN ([facility]) Negative Negative       Pulmonary emphysema, unspecified emphysema type (H)  No definite current exacerbation  Will monitor closely    Nasal congestion  No definite exacerbation, seems improved  Close follow up if worsens  - Influenza + SARS Antigen ([facility])    HTN, goal below 140/90  Slightly above goal  Recheck at follow up  - amLODIPine (NORVASC) 10 MG tablet; Take 1 tablet (10 mg) by mouth daily  - losartan (COZAAR) 100 MG tablet; Take 1 tablet (100 mg) by mouth daily    Peripheral vascular disease (H24)  Asymptomatic, cont statin  - atorvastatin (LIPITOR) 40 MG tablet; Take 1 tablet (40 mg) by mouth daily

## 2024-03-06 ENCOUNTER — OFFICE VISIT (OUTPATIENT)
Dept: FAMILY MEDICINE | Facility: CLINIC | Age: 69
End: 2024-03-06

## 2024-03-06 VITALS
DIASTOLIC BLOOD PRESSURE: 91 MMHG | BODY MASS INDEX: 14.48 KG/M2 | HEART RATE: 89 BPM | WEIGHT: 97.8 LBS | HEIGHT: 69 IN | OXYGEN SATURATION: 98 % | SYSTOLIC BLOOD PRESSURE: 159 MMHG

## 2024-03-06 DIAGNOSIS — E43 SEVERE PROTEIN-CALORIE MALNUTRITION (H): ICD-10-CM

## 2024-03-06 DIAGNOSIS — I73.9 PAD (PERIPHERAL ARTERY DISEASE) (H): ICD-10-CM

## 2024-03-06 DIAGNOSIS — I10 HTN, GOAL BELOW 140/90: ICD-10-CM

## 2024-03-06 DIAGNOSIS — C34.12 MALIGNANT NEOPLASM OF UPPER LOBE, LEFT BRONCHUS OR LUNG (H): ICD-10-CM

## 2024-03-06 DIAGNOSIS — F10.21 ALCOHOL DEPENDENCE IN REMISSION (H): ICD-10-CM

## 2024-03-06 DIAGNOSIS — E22.2 SIADH (SYNDROME OF INAPPROPRIATE ADH PRODUCTION) (H): ICD-10-CM

## 2024-03-06 DIAGNOSIS — I70.0 AORTIC CALCIFICATION (H): ICD-10-CM

## 2024-03-06 DIAGNOSIS — Z00.00 ENCOUNTER FOR MEDICARE ANNUAL WELLNESS EXAM: Primary | ICD-10-CM

## 2024-03-06 DIAGNOSIS — F11.20 UNCOMPLICATED OPIOID DEPENDENCE (H): ICD-10-CM

## 2024-03-06 DIAGNOSIS — R64 CACHEXIA (H): ICD-10-CM

## 2024-03-06 DIAGNOSIS — J43.9 PULMONARY EMPHYSEMA, UNSPECIFIED EMPHYSEMA TYPE (H): ICD-10-CM

## 2024-03-06 PROBLEM — F10.10 MILD ALCOHOL USE DISORDER: Status: RESOLVED | Noted: 2021-10-06 | Resolved: 2024-03-06

## 2024-03-06 PROBLEM — F10.20 UNCOMPLICATED ALCOHOL DEPENDENCE (H): Status: RESOLVED | Noted: 2023-02-13 | Resolved: 2024-03-06

## 2024-03-06 PROCEDURE — G0439 PPPS, SUBSEQ VISIT: HCPCS | Performed by: FAMILY MEDICINE

## 2024-03-06 PROCEDURE — 99214 OFFICE O/P EST MOD 30 MIN: CPT | Mod: 25 | Performed by: FAMILY MEDICINE

## 2024-03-06 RX ORDER — PREDNISONE 10 MG/1
TABLET ORAL
Qty: 30 TABLET | Refills: 0 | Status: SHIPPED | OUTPATIENT
Start: 2024-03-06 | End: 2024-03-17

## 2024-03-06 SDOH — HEALTH STABILITY: PHYSICAL HEALTH: ON AVERAGE, HOW MANY DAYS PER WEEK DO YOU ENGAGE IN MODERATE TO STRENUOUS EXERCISE (LIKE A BRISK WALK)?: 0 DAYS

## 2024-03-06 SDOH — HEALTH STABILITY: PHYSICAL HEALTH: ON AVERAGE, HOW MANY MINUTES DO YOU ENGAGE IN EXERCISE AT THIS LEVEL?: 0 MIN

## 2024-03-06 ASSESSMENT — SOCIAL DETERMINANTS OF HEALTH (SDOH): HOW OFTEN DO YOU GET TOGETHER WITH FRIENDS OR RELATIVES?: THREE TIMES A WEEK

## 2024-03-06 NOTE — PATIENT INSTRUCTIONS
Preventive Care Advice   This is general advice given by our system to help you stay healthy. However, your care team may have specific advice just for you. Please talk to your care team about your preventive care needs.  Nutrition  Eat 5 or more servings of fruits and vegetables each day.  Try wheat bread, brown rice and whole grain pasta (instead of white bread, rice, and pasta).  Get enough calcium and vitamin D. Check the label on foods and aim for 100% of the RDA (recommended daily allowance).  Lifestyle  Exercise at least 150 minutes each week   (30 minutes a day, 5 days a week).  Do muscle strengthening activities 2 days a week. These help control your weight and prevent disease.  No smoking.  Wear sunscreen to prevent skin cancer.  Have a dental exam and cleaning every 6 months.  Yearly exams  See your health care team every year to talk about:  Any changes in your health.  Any medicines your care team has prescribed.  Preventive care, family planning, and ways to prevent chronic diseases.  Shots (vaccines)   HPV shots (up to age 26), if you've never had them before.  Hepatitis B shots (up to age 59), if you've never had them before.  COVID-19 shot: Get this shot when it's due.  Flu shot: Get a flu shot every year.  Tetanus shot: Get a tetanus shot every 10 years.  Pneumococcal, hepatitis A, and RSV shots: Ask your care team if you need these based on your risk.  Shingles shot (for age 50 and up).  General health tests  Diabetes screening:  Starting at age 35, Get screened for diabetes at least every 3 years.  If you are younger than age 35, ask your care team if you should be screened for diabetes.  Cholesterol test: At age 39, start having a cholesterol test every 5 years, or more often if advised.  Bone density scan (DEXA): At age 50, ask your care team if you should have this scan for osteoporosis (brittle bones).  Hepatitis C: Get tested at least once in your life.  STIs (sexually transmitted  infections)  Before age 24: Ask your care team if you should be screened for STIs.  After age 24: Get screened for STIs if you're at risk. You are at risk for STIs (including HIV) if:  You are sexually active with more than one person.  You don't use condoms every time.  You or a partner was diagnosed with a sexually transmitted infection.  If you are at risk for HIV, ask about PrEP medicine to prevent HIV.  Get tested for HIV at least once in your life, whether you are at risk for HIV or not.  Cancer screening tests  Cervical cancer screening: If you have a cervix, begin getting regular cervical cancer screening tests at age 21. Most people who have regular screenings with normal results can stop after age 65. Talk about this with your provider.  Breast cancer scan (mammogram): If you've ever had breasts, begin having regular mammograms starting at age 40. This is a scan to check for breast cancer.  Colon cancer screening: It is important to start screening for colon cancer at age 45.  Have a colonoscopy test every 10 years (or more often if you're at risk) Or, ask your provider about stool tests like a FIT test every year or Cologuard test every 3 years.  To learn more about your testing options, visit: https://www."[employer]/373188.pdf.  For help making a decision, visit: https://bit.ly/ff39006.  Prostate cancer screening test: If you have a prostate and are age 55 to 69, ask your provider if you would benefit from a yearly prostate cancer screening test.  Lung cancer screening: If you are a current or former smoker age 50 to 80, ask your care team if ongoing lung cancer screenings are right for you.  For informational purposes only. Not to replace the advice of your health care provider. Copyright   2023 [employer]. All rights reserved. Clinically reviewed by the [facility] Transitions Program. [employer] 033387 - REV 01/24.

## 2024-03-06 NOTE — PROGRESS NOTES
Preventive Care Visit  [facility]  Maxim Goodman MD, Family Medicine  Mar 6, 2024    Assessment & Plan     Encounter for Medicare annual wellness exam  - discussed preventative guidelines, healthy diet, exercise and weight management    Pulmonary emphysema, unspecified emphysema type (H)  Breathing remains not at baseline per patient.  No severe exacerbation.  Would like prednisone taper.  Aware of potential harms and benefits.    Cont Trelegy  Smoking cessation again discussed  MTM consult to consider Roflumilast and/or Azithromycin or other augmentation agents  - Med Therapy Management Referral  - predniSONE (DELTASONE) 10 MG tablet; Take 4 tablets (40 mg) by mouth daily for 3 days, THEN 3 tablets (30 mg) daily for 3 days, THEN 2 tablets (20 mg) daily for 3 days, THEN 1 tablet (10 mg) daily for 3 days.    Malignant neoplasm of upper lobe, left bronchus or lung (H)  Cont surveillance and follow up with Dr Ann    Severe protein-calorie malnutrition (H24)  Weight stable.  Cont mirtazapine.  Encourage increase caloric intake.  Likely secondary to increased caloric burning from COPD    Aortic calcification (H24)  Dense calcification on US.  Cont statin.  Not currently on aspirin     Uncomplicated opioid dependence (H)  Stable, no increased use    PAD (peripheral artery disease) (H24)  Dense calcification of abd aorta and iliac arteries.  Cont statin  Increase walking  Declines further work up at this time.  No distal extremity symptoms    Alcohol dependence in remission (H)  No alcohol in 1-2 months    SIADH (syndrome of inappropriate ADH production) (H24)  Sodium improved last visit    HTN, goal below 140/90  Above goal today but asymptomatic and feels he took his meds late    Cachexia (H24)  Severely low weight in setting of likely catabolic state from severe COPD and malignancy  See above for efforts to improve weight    Patient has been advised of split billing requirements and indicates  understanding: Yes          Nicotine/Tobacco Cessation  He reports that he has been smoking cigarettes. He has a 43 pack-year smoking history. He has never used smokeless tobacco.  Nicotine/Tobacco Cessation Plan  Information offered: Patient not interested at this time      Counseling  Appropriate preventive services were discussed with this patient, including applicable screening as appropriate for fall prevention, nutrition, physical activity, Tobacco-use cessation, weight loss and cognition.  Checklist reviewing preventive services available has been given to the patient.  Reviewed patient's diet, addressing concerns and/or questions.   The patient was instructed to see the dentist every 6 months.   He is at risk for psychosocial distress and has been provided with information to reduce risk.   Updated plan of care.  Patient reported difficulty with activities of daily living were addressed today.I have reviewed Opioid Use Disorder and Substance Use Disorder risk factors and made any needed referrals.       See Patient Instructions    No follow-ups on file.    Latasha Neal is a 68 year old, presenting for the following:  Wellness Visit (Not fasting. No concerns, just some questions) and Hearing Screening (Not completed known loss)          Health Care Directive  Patient does not have a Health Care Directive or Living Will: Discussed advance care planning with patient; information given to patient to review.    HPI    AISHWARYA mass: presumed cancer.  Not surgical candidate.  Treated with radiation.  Follows with Dr Breen and Dr Ann.  Next scan due in March/April     Protein calorie malnutrition: severe.  Intake improved with mirtazapine. Drinks protein shakes. BMI 14.     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  Has completed radiation treatment.  No evidence of recurrent disease.  Follows with Dr Lopes and now Dr Dreyer at [facility].     COPD: marked emphysema on CT.  follows with   Aline.  On Trelegy.  Breathing is fairly stable but feels more congested and not at baseline last couple months.  Denies new exertional symptoms.  No LE edema.  No history of CHF.     Nicotine dependence: continues to smoke about 1 PPD.  precontemplative     EtOH Dependence: now in remission     Chronic postoperative pain and opiate dependence: takes oxycodone 3x/day.       HTN: on amlodipine and losartan.  No cp, headache.  Home readings at goal     Atherosclerosis of aorta and iliac arteries: dense calcifications noted on imaging.  On atorvastatin.  He stopped aspirin.              3/6/2024   General Health   How would you rate your overall physical health? (!) FAIR   Feel stress (tense, anxious, or unable to sleep) Only a little   (!) STRESS CONCERN      Hearing Screening:  Not completed today due to known hearing loss.         3/6/2024   Nutrition   Diet: Regular (no restrictions)         3/6/2024   Exercise   Days per week of moderate/strenous exercise 0 days   Average minutes spent exercising at this level 0 min   (!) EXERCISE CONCERN      3/6/2024   Social Factors   Frequency of gathering with friends or relatives Three times a week   Worry food won't last until get money to buy more No   Food not last or not have enough money for food? No   Do you have housing?  Yes   Are you worried about losing your housing? No   Lack of transportation? No   Unable to get utilities (heat,electricity)? No         3/6/2024   Fall Risk   Fallen 2 or more times in the past year? No   Trouble with walking or balance? Yes           3/6/2024   Activities of Daily Living- Home Safety   Needs help with the following daily activites Transportation    Shopping   Safety concerns in the home None of the above         3/6/2024   Dental   Dentist two times every year? (!) NO         3/6/2024   Hearing Screening   Hearing concerns? None of the above         3/6/2024   Driving Risk Screening   Patient/family members have concerns about  "driving No         3/6/2024   General Alertness/Fatigue Screening   Have you been more tired than usual lately? No         3/6/2024   Urinary Incontinence Screening   Bothered by leaking urine in past 6 months No         3/6/2024   TB Screening   Were you born outside of [place]?  No           Today's PHQ-2 Score:       1/15/2024     1:29 PM   PHQ-2 ( 1999 [employer])   Q1: Little interest or pleasure in doing things 0   Q2: Feeling down, depressed or hopeless 0   PHQ-2 Score 0         3/6/2024   Substance Use   Alcohol more than 3/day or more than 7/wk No   Do you have a current opioid prescription? (!) YES   How severe/bad is pain from 1 to 10? 6/10   Do you use any other substances recreationally? No          No data to display              Low Risk (0-3)  Moderate Risk (4-7)  High Risk (>8)  Social History     Tobacco Use    Smoking status: Every Day     Packs/day: 1.00     Years: 43.00     Additional pack years: 0.00     Total pack years: 43.00     Types: Cigarettes    Smokeless tobacco: Never   Substance Use Topics    Alcohol use: Yes     Alcohol/week: 15.0 standard drinks of alcohol     Types: 15 Cans of beer per week     Comment: beer    Drug use: Never       Last PSA: No results found for: \"PSA\"  ASCVD Risk   The ASCVD Risk score (Sandy ROJAS, et al., 2019) failed to calculate for the following reasons:    The valid HDL cholesterol range is 20 to 100 mg/dL            Reviewed and updated as needed this visit by Provider                      Current providers sharing in care for this patient include:  Patient Care Team:  Maxim Goodman MD as PCP - General (Family Practice)  Maxim Goodman MD as Assigned PCP    The following health maintenance items are reviewed in Epic and correct as of today:  Health Maintenance   Topic Date Due    SPIROMETRY  Never done    HEPATITIS A IMMUNIZATION (1 of 2 - Risk 2-dose series) Never done    ZOSTER IMMUNIZATION (1 of 2) Never done    RSV VACCINE (Pregnancy & " "60+) (1 - 1-dose 60+ series) Never done    LIPID  10/06/2022    DTAP/TDAP/TD IMMUNIZATION (2 - Td or Tdap) 06/26/2023    NICOTINE/TOBACCO CESSATION COUNSELING Q 1 YR  02/13/2024    COLORECTAL CANCER SCREENING  01/06/2025    MEDICARE ANNUAL WELLNESS VISIT  03/06/2025    FALL RISK ASSESSMENT  03/06/2025    GLUCOSE  01/15/2027    ADVANCE CARE PLANNING  04/06/2028    COPD ACTION PLAN  Completed    PHQ-2 (once per calendar year)  Completed    INFLUENZA VACCINE  Completed    Pneumococcal Vaccine: 65+ Years  Completed    AORTIC ANEURYSM SCREENING (SYSTEM ASSIGNED)  Completed    COVID-19 Vaccine  Completed    HEPATITIS C SCREENING  Addressed    IPV IMMUNIZATION  Aged Out    HPV IMMUNIZATION  Aged Out    MENINGITIS IMMUNIZATION  Aged Out    RSV MONOCLONAL ANTIBODY  Aged Out    LUNG CANCER SCREENING  Discontinued         Review of Systems  Constitutional, HEENT, cardiovascular, pulmonary, GI, , musculoskeletal, neuro, skin, endocrine and psych systems are negative, except as otherwise noted.     Objective    Exam  BP (!) 159/91   Pulse 89   Ht 1.753 m (5' 9\")   Wt 44.4 kg (97 lb 12.8 oz)   SpO2 98%   BMI 14.44 kg/m     Estimated body mass index is 14.44 kg/m  as calculated from the following:    Height as of this encounter: 1.753 m (5' 9\").    Weight as of this encounter: 44.4 kg (97 lb 12.8 oz).    Physical Exam  GENERAL: no distress, frail, and cachectic  EYES: Eyes grossly normal to inspection, PERRL and conjunctivae and sclerae normal  HENT: ear canals and TM's normal, nose and mouth without ulcers or lesions  NECK: no adenopathy, no asymmetry, masses, or scars  RESP: decreased breath sounds throughout  CV: regular rates and rhythm, normal S1 S2, no S3 or S4, no murmur, click or rub, decreased pulses, and no peripheral edema  ABDOMEN: soft, nontender, no hepatosplenomegaly, no masses and bowel sounds normal  MS: no gross musculoskeletal defects noted, no edema  SKIN: no suspicious lesions or rashes  NEURO: Normal " strength and tone, mentation intact and speech normal  PSYCH: mentation appears normal, affect normal/bright        3/6/2024   Mini Cog   Clock Draw Score 2 Normal   3 Item Recall 2 objects recalled   Mini Cog Total Score 4            Signed Electronically by: Maxim Goodman MD

## 2024-03-28 ENCOUNTER — VIRTUAL VISIT (OUTPATIENT)
Dept: PHARMACY | Facility: PHYSICIAN GROUP | Age: 69
End: 2024-03-28
Attending: FAMILY MEDICINE
Payer: COMMERCIAL

## 2024-03-28 DIAGNOSIS — G89.28 OTHER CHRONIC POSTPROCEDURAL PAIN: ICD-10-CM

## 2024-03-28 DIAGNOSIS — G89.29 CHRONIC NONINTRACTABLE HEADACHE, UNSPECIFIED HEADACHE TYPE: ICD-10-CM

## 2024-03-28 DIAGNOSIS — E43 SEVERE PROTEIN-CALORIE MALNUTRITION (H): ICD-10-CM

## 2024-03-28 DIAGNOSIS — J43.9 PULMONARY EMPHYSEMA, UNSPECIFIED EMPHYSEMA TYPE (H): Primary | ICD-10-CM

## 2024-03-28 DIAGNOSIS — I10 HTN, GOAL BELOW 140/90: ICD-10-CM

## 2024-03-28 DIAGNOSIS — E78.5 DYSLIPIDEMIA: ICD-10-CM

## 2024-03-28 DIAGNOSIS — Z87.891 PERSONAL HISTORY OF TOBACCO USE, PRESENTING HAZARDS TO HEALTH: ICD-10-CM

## 2024-03-28 DIAGNOSIS — C34.12 MALIGNANT NEOPLASM OF UPPER LOBE, LEFT BRONCHUS OR LUNG (H): ICD-10-CM

## 2024-03-28 DIAGNOSIS — R51.9 CHRONIC NONINTRACTABLE HEADACHE, UNSPECIFIED HEADACHE TYPE: ICD-10-CM

## 2024-03-28 PROCEDURE — 99207 PR NO CHARGE LOS: CPT | Mod: 93 | Performed by: PHARMACIST

## 2024-03-28 NOTE — PROGRESS NOTES
Medication Therapy Management (MTM) Encounter    ASSESSMENT:                            Medication Adherence/Access: No issues identified    COPD/lung mass/tobacco use:   Given eosinophils <100mg/dl, pneumonia history, could consider decreasing steroid component of Trelegy to reduce ICS impact on pneumonia, although this is difficult since requiring frequent use of albuterol daily, so suggest holding off on this today. Additional therapy options such as roflumilast could be considered, however main risk is with potential for weight loss and GI side effects, with guidelines suggesting avoid initiation in patient already underweight and using caution in patients with depression. Alternative therapy with azithromycin could be considered in efforts to reduce exacerbation, however post-hoc data suggest less efficacy in patients that smoke and given he is a current smoker and not interested in quitting this is less likely to offer benefit for him. Overall most significant intervention needed is smoking cessation, however not willing or interested. Additional considerations would be to use guaifenesin to possibly aid as mucolytic and switching albuterol to ipratropium/albuterol nebulized medication to see if further anti-muscarinic therapy offers benefit in nebulizer form.    Hypertension:   Patient is not meeting blood pressure goal of < 140/90mmHg and not monitoring, recommend initiation of home monitoring to evaluate if further dose adjustment is needed. If still running high, consider addition of hydrochlorothiazide.     Hyperlipidemia:   Stable.    Insomnia/mood/Malnutrition:   May consider dose escalation to see if further improvement in mood and appetite.     Pain:   Stable.    Migraines:  Recommend discussion with neurology team regarding more proactive management vs reactive.     PLAN:                            1. Recommend trial of ipratropium/albuterol nebulizer 4 times daily in place of albuterol nebs.     2.  Trial guaifenesin 600mg 2 tablets twice daily    3. Please let us know if we can help support you in smoking cessation efforts.     4. Set up RSV vaccine at local pharmacy    5. Can you start to check home blood pressure once per day a few days per week. Goal is <140/90mmHg.     Follow-up: Return in about 4 weeks (around 4/25/2024) for Phone call with MTM.    SUBJECTIVE/OBJECTIVE:                          Ángel Pham is a 68 year old male called for an initial visit. He was referred to me from Dr. Goodman.      Reason for visit: medication review for complex medical patient. Patient and son Tomy on the phone for this visit.     Allergies/ADRs: Reviewed in chart  Past Medical History: Reviewed in chart  Tobacco: He reports that he has been smoking cigarettes. He has a 43 pack-year smoking history. He has never used smokeless tobacco.Nicotine/Tobacco Cessation Plan  Information offered: Patient not interested at this time  Alcohol: in remission    Medication Adherence/Access: no issues reported, reluctant to share concerns.    COPD/Lung mass/tobacco use:   Albuterol 5 nebs daily  ICS/LAMA/LABA - Trelegy Ellipta 200/62.5/25 mcg - one puff once daily  Albuterol HFA widely varied- 3 to 4 puffs per day doesn't feel it helps.  Every morning a lot of mucus, but breathing device from hospital is helpful.   [facility]- April follow up. No longer going to see Dr. Mireles   Finished up steroid taper- didn't feel this was helpful   1 pack per day- not ready to quit.   Patient reports the following symptoms: increased shortness of breath upon exertion , recent respiratory infection, and recent hospitalization.  Blood eosinophils > 300 cells/ L?: No   Left upper lobe mass presumed cancer -Follows with Dr Breen and Dr Ann   Hx Supraglottis cancer Dr Dreyer at [facility].     Hypertension:   Amlodipine 10mg daily  Losartan 100mg daily  Patient reports no current medication side effects.  Patient does not self-monitor blood  pressure.      Hyperlipidemia:   atorvastatin 40mg daily  Patient reports no current medication side effects.  Needs update labs.     Insomnia/mood/Malnutrition:   mirtazapine 7.5 mg at bedtime, states was started in the hospital for sleep.   States sleep is not always a problem.   Patient reports tooth issue has caused bad sleep a few nights.   Breathing not pain keeping him up some nights.   Wife passed away in 2019.   Negative affect during appointment.   Notes from chart appear mirtazapine was likely more for protein-calorie malnutrition.  BMI-14    Pain:   Oxycodone 5mg daily- taking 4 to 5 per day.   Throat/lung pain from cancer.    Senna/docusate 2-3 per day for constipation.  Denies issues or concerns on this today.      Migraines:  Butalbital/acetaminophen/Caffeine - needing this daily.   Has been battled for many years.   Has follow up next Wednesday - Dr. Norbert Gould   Does not want to consider any other therapies.     Today's Vitals: There were no vitals taken for this visit.  ----------------    I spent 32 minutes with this patient today. All changes were made via collaborative practice agreement with Maxim Goodman MD. A copy of the visit note was provided to the patient's provider(s).    A summary of these recommendations was sent via [employer].    Nidia Steward, Pharm.D, [facility]  Medication Therapy Management Pharmacist  [phone]      Telemedicine Visit Details  Type of service:  Telephone visit  Start Time: 1:33 PM  End Time:  2:05 PM     Medication Therapy Recommendations  HTN, goal below 140/90    Current Medication: amLODIPine (NORVASC) 10 MG tablet   Rationale: Medication requires monitoring - Needs additional monitoring - Effectiveness   Recommendation: Self-Monitoring - home BP moniotring   Status: Patient Agreed - Adherence/Education         Pulmonary emphysema, unspecified emphysema type (H)    Current Medication: TRELEGY ELLIPTA 200-62.5-25 MCG/ACT oral inhaler   Rationale: Synergistic  therapy - Needs additional medication therapy - Indication   Recommendation: Start Medication - guaiFENesin 1200 MG Tb12   Status: Accepted per Provider          Current Medication: albuterol (2.5 MG/3ML) 0.083% neb solution (Discontinued)   Rationale: More effective medication available - Ineffective medication - Effectiveness   Recommendation: Change Medication - ipratropium - albuterol 0.5 mg/2.5 mg/3 mL 0.5-2.5 (3) MG/3ML neb solution - 4 times daily   Status: Accepted per Provider          Rationale: Preventive therapy - Needs additional medication therapy - Indication   Recommendation: Start Medication - respiratory syncytial virus vaccine, bivalent 120 MCG/0.5ML injection   Status: Patient Agreed - Adherence/Education

## 2024-03-28 NOTE — PATIENT INSTRUCTIONS
"Recommendations from today's MTM visit:                                                       1. Recommend trial of ipratropium/albuterol nebulizer 4 times daily in place of albuterol nebs.     2. Trial guaifenesin 600mg 2 tablets twice daily    3. Please let us know if we can help support you in smoking cessation efforts.     4. Set up RSV vaccine at local pharmacy    5. Can you start to check home blood pressure once per day a few days per week. Goal is <140/90mmHg.     Follow-up: Return in about 4 weeks (around 4/25/2024) for Phone call with MTM.    It was great speaking with you today.  I value your experience and would be very thankful for your time in providing feedback in our clinic survey. In the next few days, you may receive an email or text message from [employer] with a link to a survey related to your  clinical pharmacist.\"     My Clinical Pharmacist's contact information:                                                      Please feel free to contact me with any questions or concerns you have.      Nidia Steward, Pharm.D, [facility]  Medication Therapy Management Pharmacist  [phone]      "

## 2024-03-28 NOTE — Clinical Note
Flyi- made changes and notified patient and daughter of plan. They see pulm and neuro early part of April for follow up as well.

## 2024-03-28 NOTE — Clinical Note
"Recommended To-Do List      Prepared on: 03/28/2024       You can get the best results from your medications by completing the items on this \"To-Do List.\"      Bring your To-Do List when you go to your doctor. And, share it with your family or caregivers.    My To-Do List:  What we talked about: What I should do:   A new medication that may be of benefit to you    Start taking guaiFENesin          What we talked about: What I should do:                     "

## 2024-03-28 NOTE — LETTER
"Recommended To-Do List      Prepared on: 03/28/2024       You can get the best results from your medications by completing the items on this \"To-Do List.\"      Bring your To-Do List when you go to your doctor. And, share it with your family or caregivers.    My To-Do List:  What we talked about: What I should do:   A new medication that may be of benefit to you    Start taking guaiFENesin          What we talked about: What I should do:   A medication that is not working    Change the medication you are taking from albuterol (PROVENTIL) to ipratropium - albuterol 0.5 mg/2.5 mg/3 mL (DUONEB)          What we talked about: What I should do:                     "

## 2024-03-28 NOTE — Clinical Note
Can you take a look at my assessment and thoughts on my recommendation? I told the patient I would send my plan when I connected with you.   Nidia Steward, Pharm.D, [facility] Medication Therapy Management Pharmacist [phone]

## 2024-03-28 NOTE — LETTER
March 28, 2024  Ángel Loyadanielle  [address] [facility] [address]    Dear Mr. Pham, [facility]     Thank you for talking with me on Mar 28, 2024 about your health and medications. As a follow-up to our conversation, I have included two documents:      Your Recommended To-Do List has steps you should take to get the best results from your medications.  Your Medication List will help you keep track of your medications and how to take them.    If you want to talk about these documents, please call Nidia Steward RPH at phone: [phone], Monday-Friday 8-4:30pm.    I look forward to working with you and your doctors to make sure your medications work well for you.    Sincerely,  Nidia Steward RPH  [facility] Pharmacist, [facility]

## 2024-03-28 NOTE — LETTER
_  Medication List        Prepared on: 03/28/2024     Bring your Medication List when you go to the doctor, hospital, or   emergency room. And, share it with your family or caregivers.     Note any changes to how you take your medications.  Cross out medications when you no longer use them.    Medication How I take it Why I use it Prescriber   albuterol (2.5 MG/3ML) 0.083% neb solution Take 2.5 mg by nebulization 6 times daily  Shortness of breath Patient Reported   albuterol (PROAIR HFA/PROVENTIL HFA/VENTOLIN HFA) 108 (90 Base) MCG/ACT inhaler Inhale 2 puffs into the lungs as needed Shortness of breath Patient Reported   amLODIPine (NORVASC) 10 MG tablet Take 1 tablet (10 mg) by mouth daily Blood pressure Maxim Goodman MD   amoxicillin-clavulanate (AUGMENTIN) 875-125 MG tablet Take 1 tablet by mouth 2 times daily Tooth Patient Reported   atorvastatin (LIPITOR) 40 MG tablet Take 1 tablet (40 mg) by mouth daily Peripheral Vascular Disease (H) Maxim Goodman MD   butalbital-acetaminophen-caffeine (FIORICET/ESGIC) -40 MG per tablet TAKE 1 TABLET BY MOUTH EVERY 4-6 HOURS AS NEEDED Headache Patient Reported   Lidocaine (LIDOCARE) 4 % Patch Place 1 patch onto the skin every 24 hours To prevent lidocaine toxicity, patient should be patch free for 12 hrs daily. Pain Jeanna Platt MD   losartan (COZAAR) 100 MG tablet Take 1 tablet (100 mg) by mouth daily Blood pressure Maxim Goodman MD   mirtazapine (REMERON) 7.5 MG tablet Take 1 tablet (7.5 mg) by mouth at bedtime Anxiety Maxim Goodman MD   oxyCODONE (ROXICODONE) 5 MG tablet TAKE 1 TABLET BY MOUTH EVERY 3 TO 4 HOURS AS NEEDED FOR PAIN Pain Micaela Andre MD   senna-docusate (SENOKOT-S/PERICOLACE) 8.6-50 MG tablet Take 2-3 tablets by mouth at bedtime Constipation Patient Reported   TRELEGY ELLIPTA 200-62.5-25 MCG/ACT oral inhaler INHALE 1 PUFF BY INHALATION ROUTE EVERY DAY AT THE SAME TIME EACH DAY COPD Patient Reported          Add new  medications, over-the-counter drugs, herbals, vitamins, or  minerals in the blank rows below.    Medication How I take it Why I use it Prescriber                                      Allergies:      chantix [varenicline]; morphine hcl; wellbutrin [bupropion hydrobromide]        Side effects I have had:               Other Information:              My notes and questions:

## 2024-03-29 RX ORDER — IPRATROPIUM BROMIDE AND ALBUTEROL SULFATE 2.5; .5 MG/3ML; MG/3ML
1 SOLUTION RESPIRATORY (INHALATION) 4 TIMES DAILY
Qty: 360 ML | Refills: 5 | Status: SHIPPED | OUTPATIENT
Start: 2024-03-29

## 2024-03-29 RX ORDER — GUAIFENESIN 600 MG/1
1200 TABLET, EXTENDED RELEASE ORAL 2 TIMES DAILY
Qty: 120 TABLET | Refills: 1 | Status: SHIPPED | OUTPATIENT
Start: 2024-03-29

## 2024-04-03 ENCOUNTER — TRANSFERRED RECORDS (OUTPATIENT)
Dept: FAMILY MEDICINE | Facility: CLINIC | Age: 69
End: 2024-04-03

## 2024-04-05 NOTE — PLAN OF CARE
Neuro- A&Ox4  Most Recent Vitals- Temp: 97.7  F (36.5  C) Temp src: Oral BP: 108/65 Pulse: 75   Resp: 18 SpO2: 97 % O2 Device: None (Room air)   Tele/Cardiac- SR  Resp- LS coarse on RA  Activity- SBA  Pain- to left chest, pleuritic  Drips- none  Drains/Tubes- PIV  Skin- scattered bruising  GI/-  urinal to void  Aggression Color- Green  COVID status- Negative  Plan- to TCU placement pending  Misc-     Ivonne Jimenez, RN Goal Outcome Evaluation:                         Spinal

## 2024-04-15 ENCOUNTER — HOSPITAL ENCOUNTER (OUTPATIENT)
Dept: CT IMAGING | Facility: CLINIC | Age: 69
Discharge: HOME | End: 2024-04-15
Attending: THORACIC SURGERY (CARDIOTHORACIC VASCULAR SURGERY) | Admitting: THORACIC SURGERY (CARDIOTHORACIC VASCULAR SURGERY)
Payer: COMMERCIAL

## 2024-04-15 ENCOUNTER — TRANSFERRED RECORDS (OUTPATIENT)
Dept: FAMILY MEDICINE | Facility: CLINIC | Age: 69
End: 2024-04-15

## 2024-04-15 DIAGNOSIS — R91.1 SOLITARY LUNG NODULE: ICD-10-CM

## 2024-04-15 PROCEDURE — 71250 CT THORAX DX C-: CPT

## 2024-06-19 ENCOUNTER — OFFICE VISIT (OUTPATIENT)
Dept: FAMILY MEDICINE | Facility: CLINIC | Age: 69
End: 2024-06-19

## 2024-06-19 VITALS
BODY MASS INDEX: 13.97 KG/M2 | HEART RATE: 82 BPM | OXYGEN SATURATION: 99 % | WEIGHT: 94.6 LBS | DIASTOLIC BLOOD PRESSURE: 76 MMHG | SYSTOLIC BLOOD PRESSURE: 132 MMHG

## 2024-06-19 DIAGNOSIS — E43 SEVERE PROTEIN-CALORIE MALNUTRITION (H): ICD-10-CM

## 2024-06-19 DIAGNOSIS — C34.12 MALIGNANT NEOPLASM OF UPPER LOBE, LEFT BRONCHUS OR LUNG (H): ICD-10-CM

## 2024-06-19 DIAGNOSIS — J43.9 PULMONARY EMPHYSEMA, UNSPECIFIED EMPHYSEMA TYPE (H): ICD-10-CM

## 2024-06-19 DIAGNOSIS — R91.1 LEFT UPPER LOBE PULMONARY NODULE: Primary | ICD-10-CM

## 2024-06-19 DIAGNOSIS — R64 CACHEXIA (H): ICD-10-CM

## 2024-06-19 PROCEDURE — G2211 COMPLEX E/M VISIT ADD ON: HCPCS | Performed by: FAMILY MEDICINE

## 2024-06-19 PROCEDURE — 99214 OFFICE O/P EST MOD 30 MIN: CPT | Performed by: FAMILY MEDICINE

## 2024-06-19 RX ORDER — ALBUTEROL SULFATE 0.83 MG/ML
SOLUTION RESPIRATORY (INHALATION)
COMMUNITY
Start: 2024-04-12 | End: 2024-09-10

## 2024-06-19 NOTE — PROGRESS NOTES
Latasha Neal is a 68 year old patient who presents to clinic for follow up.    AISHWARYA mass: presumed cancer.  Not surgical candidate.  Treated with radiation.  Follows with Dr Breen and Dr Ann.  Most recent scan is stable     Protein calorie malnutrition: severe.  Intake improved with mirtazapine. Does not like protein shakes.  Reports he eats adequate protein. BMI 14.     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  Has completed radiation treatment.  No evidence of recurrent disease.  Follows with Dr Lopes and now Dr Dreyer at [facility].     COPD: marked emphysema on CT.  follows with Dr Mireles.  On Trelegy.  Breathing is fairly stable.  Referred to [facility] and advised trial of duonebs.  He is unsure if he switched.  Denies new exertional symptoms.  No LE edema.  No history of CHF.     Nicotine dependence: continues to smoke about 1 PPD.  precontemplative     EtOH Dependence: now in remission     Chronic postoperative pain and opiate dependence: takes oxycodone 3x/day.       HTN: on amlodipine and losartan.  No cp, headache.  Home readings at goal     Atherosclerosis of aorta and iliac arteries: dense calcifications noted on imaging.  On atorvastatin.  He stopped aspirin.    Review of Systems   Constitutional, HEENT, cardiovascular, pulmonary, GI, , musculoskeletal, neuro, skin, endocrine and psych systems are negative, except as otherwise noted.      Objective    /76   Pulse 82   Wt 42.9 kg (94 lb 9.6 oz)   SpO2 99%   BMI 13.97 kg/m      General: Well appearing, NAD  Heart: RRR, no murmur  Chest: distant breath sounds.  No crackles  Skin: Clear  Psych: normal mood and affect        No results found for this or any previous visit (from the past 24 hour(s)).    Left upper lobe pulmonary nodule  Stable on imaging, cont follow up with Dr Ann    Pulmonary emphysema, unspecified emphysema type (H)  Symptoms fairly stable  Cont inhalers, will call back to make sure he is on Duonebs      Severe protein-calorie malnutrition (H24)  Long discussion.  Weight down but reports good intake.  Will continue to monitor closely  Declines nutrition referral  Recheck in 3 months    Cachexia (H24)  See above    Malignant neoplasm of upper lobe, left bronchus or lung (H)  See above            Answers submitted by the patient for this visit:  General Questionnaire (Submitted on 6/12/2024)  Chief Complaint: Chronic problems general questions HPI Form  What is the reason for your visit today? : Medication recheck  How many servings of fruits and vegetables do you eat daily?: 2-3  On average, how many sweetened beverages do you drink each day (Examples: soda, juice, sweet tea, etc.  Do NOT count diet or artificially sweetened beverages)?: 0  How many minutes a day do you exercise enough to make your heart beat faster?: 9 or less  How many days a week do you exercise enough to make your heart beat faster?: 3 or less  How many days per week do you miss taking your medication?: 0

## 2024-06-19 NOTE — PROGRESS NOTES
Answers submitted by the patient for this visit:  General Questionnaire (Submitted on 6/12/2024)  Chief Complaint: Chronic problems general questions HPI Form  What is the reason for your visit today? : Medication recheck  How many servings of fruits and vegetables do you eat daily?: 2-3  On average, how many sweetened beverages do you drink each day (Examples: soda, juice, sweet tea, etc.  Do NOT count diet or artificially sweetened beverages)?: 0  How many minutes a day do you exercise enough to make your heart beat faster?: 9 or less  How many days a week do you exercise enough to make your heart beat faster?: 3 or less  How many days per week do you miss taking your medication?: 0

## 2024-08-29 ENCOUNTER — APPOINTMENT (OUTPATIENT)
Dept: GENERAL RADIOLOGY | Facility: CLINIC | Age: 69
End: 2024-08-29
Attending: EMERGENCY MEDICINE
Payer: COMMERCIAL

## 2024-08-29 ENCOUNTER — APPOINTMENT (OUTPATIENT)
Dept: ULTRASOUND IMAGING | Facility: CLINIC | Age: 69
End: 2024-08-29
Attending: EMERGENCY MEDICINE
Payer: COMMERCIAL

## 2024-08-29 ENCOUNTER — HOSPITAL ENCOUNTER (EMERGENCY)
Facility: CLINIC | Age: 69
Discharge: HOME | End: 2024-08-29
Attending: EMERGENCY MEDICINE | Admitting: EMERGENCY MEDICINE
Payer: COMMERCIAL

## 2024-08-29 VITALS
TEMPERATURE: 97.6 F | SYSTOLIC BLOOD PRESSURE: 154 MMHG | DIASTOLIC BLOOD PRESSURE: 79 MMHG | HEART RATE: 66 BPM | RESPIRATION RATE: 14 BRPM | OXYGEN SATURATION: 98 %

## 2024-08-29 DIAGNOSIS — R10.31 RIGHT GROIN PAIN: ICD-10-CM

## 2024-08-29 LAB
ANION GAP SERPL CALCULATED.3IONS-SCNC: 9 MMOL/L (ref 7–15)
BASOPHILS # BLD AUTO: 0.1 10E3/UL (ref 0–0.2)
BASOPHILS NFR BLD AUTO: 2 %
BUN SERPL-MCNC: 10.9 MG/DL (ref 8–23)
CALCIUM SERPL-MCNC: 9.3 MG/DL (ref 8.8–10.4)
CHLORIDE SERPL-SCNC: 99 MMOL/L (ref 98–107)
CREAT SERPL-MCNC: 0.49 MG/DL (ref 0.67–1.17)
EGFRCR SERPLBLD CKD-EPI 2021: >90 ML/MIN/1.73M2
EOSINOPHIL # BLD AUTO: 0.1 10E3/UL (ref 0–0.7)
EOSINOPHIL NFR BLD AUTO: 3 %
ERYTHROCYTE [DISTWIDTH] IN BLOOD BY AUTOMATED COUNT: 14.2 % (ref 10–15)
GLUCOSE SERPL-MCNC: 92 MG/DL (ref 70–99)
HCO3 SERPL-SCNC: 27 MMOL/L (ref 22–29)
HCT VFR BLD AUTO: 40.6 % (ref 40–53)
HGB BLD-MCNC: 13.6 G/DL (ref 13.3–17.7)
HOLD SPECIMEN: NORMAL
HOLD SPECIMEN: NORMAL
IMM GRANULOCYTES # BLD: 0 10E3/UL
IMM GRANULOCYTES NFR BLD: 0 %
LYMPHOCYTES # BLD AUTO: 0.8 10E3/UL (ref 0.8–5.3)
LYMPHOCYTES NFR BLD AUTO: 20 %
MCH RBC QN AUTO: 32 PG (ref 26.5–33)
MCHC RBC AUTO-ENTMCNC: 33.5 G/DL (ref 31.5–36.5)
MCV RBC AUTO: 96 FL (ref 78–100)
MONOCYTES # BLD AUTO: 0.5 10E3/UL (ref 0–1.3)
MONOCYTES NFR BLD AUTO: 12 %
NEUTROPHILS # BLD AUTO: 2.5 10E3/UL (ref 1.6–8.3)
NEUTROPHILS NFR BLD AUTO: 63 %
NRBC # BLD AUTO: 0 10E3/UL
NRBC BLD AUTO-RTO: 0 /100
PLATELET # BLD AUTO: 228 10E3/UL (ref 150–450)
POTASSIUM SERPL-SCNC: 4.9 MMOL/L (ref 3.4–5.3)
RBC # BLD AUTO: 4.25 10E6/UL (ref 4.4–5.9)
SODIUM SERPL-SCNC: 135 MMOL/L (ref 135–145)
WBC # BLD AUTO: 3.9 10E3/UL (ref 4–11)

## 2024-08-29 PROCEDURE — 36415 COLL VENOUS BLD VENIPUNCTURE: CPT | Performed by: EMERGENCY MEDICINE

## 2024-08-29 PROCEDURE — 96360 HYDRATION IV INFUSION INIT: CPT

## 2024-08-29 PROCEDURE — 73502 X-RAY EXAM HIP UNI 2-3 VIEWS: CPT

## 2024-08-29 PROCEDURE — 99284 EMERGENCY DEPT VISIT MOD MDM: CPT | Mod: 25

## 2024-08-29 PROCEDURE — 258N000003 HC RX IP 258 OP 636: Performed by: EMERGENCY MEDICINE

## 2024-08-29 PROCEDURE — 93971 EXTREMITY STUDY: CPT | Mod: RT

## 2024-08-29 PROCEDURE — 96361 HYDRATE IV INFUSION ADD-ON: CPT

## 2024-08-29 PROCEDURE — 85025 COMPLETE CBC W/AUTO DIFF WBC: CPT | Performed by: EMERGENCY MEDICINE

## 2024-08-29 PROCEDURE — 80048 BASIC METABOLIC PNL TOTAL CA: CPT | Performed by: EMERGENCY MEDICINE

## 2024-08-29 RX ADMIN — SODIUM CHLORIDE 1000 ML: 9 INJECTION, SOLUTION INTRAVENOUS at 09:30

## 2024-08-29 ASSESSMENT — ACTIVITIES OF DAILY LIVING (ADL)
ADLS_ACUITY_SCORE: 38

## 2024-08-29 NOTE — ED TRIAGE NOTES
BIBA from home where he lives independently. Pt is currently getting treatment for throat and lung cancer, and COPD. Today at 6am the pt was sitting in a chair and had sudden right groin pain with intermittent severity. Pt has been able to bear weight on the right leg. EMS gave total 100mcg fentanyl. Denies any injury.

## 2024-08-29 NOTE — ED NOTES
Bed: ED13  Expected date:   Expected time:   Means of arrival:   Comments:  A 522 69 M cancer pt leg pain fent given eta 0938

## 2024-08-29 NOTE — ED PROVIDER NOTES
Emergency Department Note      History of Present Illness     Chief Complaint   Groin Pain    HPI   Ángel Pham is a 69 year old male with a history of throat and lung cancer, hypertension, COPD, and cachexia who presents for evaluation of groin pain. The patient reports that this morning he experienced sudden onset of right groin and pelvic pain. States that he has had similar pain before, but not to this severity. He is still able to eat and drink fluids.Adds that he manages his chronic pain with oxycodone at home. Notes that he has never undergone treatment with chemotherapy but is no longer undergoing radiation treatment. He denies recent falls or trauma. He denies known metastasis of his cancer to the bone. He denies abdominal pain, rash, penile pain, scrotum pain, and any changes to urine or stool output.     Independent Historian   None    Review of External Notes   N/A    Past Medical History     Medical History and Problem List   Alcohol abuse  Carcinoma of the supraglottis  COPD  Pneumonia  Hypertension  Hyperlipidemia  Migraine  Pneumothorax  Aortic calcification  Pulmonary emphysema   Peripheral artery disease   SIADH  Cachexia     Medications   Amlodipine  Atorvastatin  Guaifenesin  Duo neb  Losartan  Mirtazapine  Albuterol  Oxycodone  Senna  Trelegy     Surgical History   Bronchoscopy   Sinus surgery   Umbilical hernia repair  Pneumothorax, right     Physical Exam     Patient Vitals for the past 24 hrs:   BP Temp Temp src Pulse Resp SpO2   08/29/24 1200 (!) 154/79 -- -- 66 -- 98 %   08/29/24 1101 (!) 158/78 -- -- 67 -- 98 %   08/29/24 1054 -- -- -- -- -- 99 %   08/29/24 1031 (!) 162/84 -- -- 69 -- 98 %   08/29/24 0928 (!) 180/98 -- -- -- -- 97 %   08/29/24 0919 (!) 185/101 97.6  F (36.4  C) Oral 75 14 96 %     Physical Exam  General: Alert and cooperative with exam. Patient in mild distress. Normal mentation. Severely cachectic.   Head:  Scalp is NC/AT  Eyes:  No scleral icterus,  PERRL  ENT:  The external nose and ears are normal. The oropharynx is normal and without erythema; mucus membranes are dry. Uvula midline, no evidence of deep space infection.  Neck:  Normal range of motion without rigidity.  CV:  Regular rate and rhythm  Resp:  Breath sounds are clear bilaterally    Non-labored, no retractions or accessory muscle use  GI:  Abdomen is soft, no distension. Mild tenderness to the right groin without overlying skin changes. No peritoneal signs  :  Normal   MS:  No lower extremity edema   Skin:  Warm and dry, No rash or lesions noted.  Neuro: Oriented x 3. No gross motor deficits.    Diagnostics     Lab Results   Labs Ordered and Resulted from Time of ED Arrival to Time of ED Departure   BASIC METABOLIC PANEL - Abnormal       Result Value    Sodium 135      Potassium 4.9      Chloride 99      Carbon Dioxide (CO2) 27      Anion Gap 9      Urea Nitrogen 10.9      Creatinine 0.49 (*)     GFR Estimate >90      Calcium 9.3      Glucose 92     CBC WITH PLATELETS AND DIFFERENTIAL - Abnormal    WBC Count 3.9 (*)     RBC Count 4.25 (*)     Hemoglobin 13.6      Hematocrit 40.6      MCV 96      MCH 32.0      MCHC 33.5      RDW 14.2      Platelet Count 228      % Neutrophils 63      % Lymphocytes 20      % Monocytes 12      % Eosinophils 3      % Basophils 2      % Immature Granulocytes 0      NRBCs per 100 WBC 0      Absolute Neutrophils 2.5      Absolute Lymphocytes 0.8      Absolute Monocytes 0.5      Absolute Eosinophils 0.1      Absolute Basophils 0.1      Absolute Immature Granulocytes 0.0      Absolute NRBCs 0.0         Imaging   US Lower Extremity Venous Duplex Right   Final Result   CONCLUSION:  1.  Right leg veins are negative for DVT.   XR Pelvis w Hip Right 1 View   Final Result   IMPRESSION:   No acute fracture or subluxation. There is mild bilateral hip   osteoarthritis. No definite lytic lesion. Lower lumbar degenerative   disc disease.      SETH COX MD            SYSTEM  ID:  [id number]        Independent Interpretation   None    ED Course      Medications Administered   Medications   sodium chloride 0.9% BOLUS 1,000 mL (1,000 mLs Intravenous $New Bag 8/29/24 0961)       Procedures   Procedures     Discussion of Management   None    ED Course   ED Course as of 08/29/24 1219   Thu Aug 29, 2024   0992 I obtained history and examined the patient as noted above.    1203 I rechecked and updated the patient.        Additional Documentation  None    Medical Decision Making / Diagnosis     CMS Diagnoses: None    MIPS       None    MDM   Ángel Pham is a 69 year old male with history of throat and lung cancer presents with sudden onset right groin/right medial thigh pain.  Patient's medical history and records reviewed.  On evaluation patient is severely cachectic in appearance though there is no concerning overlying skin changes and pain resolved shortly after ED arrival.  Ultrasound is negative for DVT.  X-ray shows no evidence of lytic lesions or other pathology.  Patient denies urinary symptoms; low suspicion for kidney stone.  Labs as above without significant acute findings.  Extremity is neurovascularly intact and on reassessment patient remains asymptomatic.  No emergent cause for patient's pain was determined; potential muscle cramp.  Supportive care and return precautions were discussed.  Discharged home.    Disposition   The patient was discharged.     Diagnosis     ICD-10-CM    1. Right groin pain  R10.31            Discharge Medications   New Prescriptions    No medications on file     Scribe Disclosure:  Sabiha DENNY, am serving as a scribe at 9:32 AM on 8/29/2024 to document services personally performed by Joni Bean DO based on my observations and the provider's statements to me.        Joni Bean DO  08/29/24 [id number]

## 2024-09-10 DIAGNOSIS — J43.9 PULMONARY EMPHYSEMA, UNSPECIFIED EMPHYSEMA TYPE (H): ICD-10-CM

## 2024-09-10 NOTE — TELEPHONE ENCOUNTER
Daughter, Aura calling requesting albuterol nebulizer solution refill. Is out and having difficulty getting med from pulm office.   Per [facility] Outside Med Dispense History:  Albuterol neb and inhaler fills:        Trelegy fill hx:      Last related visit in our office:   6/19/24 with Dr. Goodman   3/28/24 virtual with Nidia Steward PharmD - she recommended change duoneb QID in place of albuterol nebs. Doesn't look like patient has filled these. Still using albuterol nebs.     Called Aura -- reports they had appt with Dr. Mireles for July but was cancelled due to [facility] merge with [facility]. Now is scheduled to see [facility] 11/23/24. States they hadn't switched to the Duoneb as Nidia suggested because they were going to discuss with Dr. Mireles in July. Aura is interested in switching to this now if possible.   Nurse confirmed with pharmacy that the 3/29/24 Duoneb rx is still on file. They will fill it now for  today.   Josephine Sarkar RN

## 2024-09-18 ENCOUNTER — OFFICE VISIT (OUTPATIENT)
Dept: FAMILY MEDICINE | Facility: CLINIC | Age: 69
End: 2024-09-18

## 2024-09-18 VITALS
WEIGHT: 91.4 LBS | BODY MASS INDEX: 13.5 KG/M2 | OXYGEN SATURATION: 98 % | DIASTOLIC BLOOD PRESSURE: 80 MMHG | HEART RATE: 80 BPM | SYSTOLIC BLOOD PRESSURE: 136 MMHG

## 2024-09-18 DIAGNOSIS — J43.9 PULMONARY EMPHYSEMA, UNSPECIFIED EMPHYSEMA TYPE (H): ICD-10-CM

## 2024-09-18 DIAGNOSIS — G89.29 GROIN PAIN, CHRONIC, RIGHT: Primary | ICD-10-CM

## 2024-09-18 DIAGNOSIS — F41.9 ANXIETY: ICD-10-CM

## 2024-09-18 DIAGNOSIS — R10.31 GROIN PAIN, CHRONIC, RIGHT: Primary | ICD-10-CM

## 2024-09-18 PROCEDURE — 91320 SARSCV2 VAC 30MCG TRS-SUC IM: CPT | Performed by: FAMILY MEDICINE

## 2024-09-18 PROCEDURE — 99214 OFFICE O/P EST MOD 30 MIN: CPT | Mod: 25 | Performed by: FAMILY MEDICINE

## 2024-09-18 PROCEDURE — G0008 ADMIN INFLUENZA VIRUS VAC: HCPCS | Performed by: FAMILY MEDICINE

## 2024-09-18 PROCEDURE — 90653 IIV ADJUVANT VACCINE IM: CPT | Performed by: FAMILY MEDICINE

## 2024-09-18 PROCEDURE — 90480 ADMN SARSCOV2 VAC 1/ONLY CMP: CPT | Performed by: FAMILY MEDICINE

## 2024-09-18 RX ORDER — MIRTAZAPINE 15 MG/1
15 TABLET, FILM COATED ORAL AT BEDTIME
COMMUNITY
Start: 2024-09-18

## 2024-09-18 NOTE — PROGRESS NOTES
Answers submitted by the patient for this visit:  COPD (Chronic Obstructive Pulmonary Disease) Visit Questionnaire (Submitted on 9/17/2024)  Chief Complaint: Chronic problems general questions HPI Form  Current status of COPD symptom:: much worse  Status of fatigue and dyspnea with ambulation:: more than usual  Status of dyspnea:: more than usual  Increase or change in cough or sputum:: sometimes  Have you noticed a change in your sputum/phlegm?: Yes  Have you experienced a recent fever?: No  Baseline ambulation without stopping to rest:: the length of 3-5 rooms  Number of flights of stairs without resting:: None  Have you had any Emergency Room, Urgent Care visits or a Hospital admission because of your COPD since your last office visit?: No  General Questionnaire (Submitted on 9/17/2024)  Chief Complaint: Chronic problems general questions HPI Form  What is the reason for your visit today? : Follow up from previous visits and to discuss leg oain and COPD problems.  How many servings of fruits and vegetables do you eat daily?: 2-3  On average, how many sweetened beverages do you drink each day (Examples: soda, juice, sweet tea, etc.  Do NOT count diet or artificially sweetened beverages)?: 1  How many minutes a day do you exercise enough to make your heart beat faster?: 9 or less  How many days a week do you exercise enough to make your heart beat faster?: 3 or less  How many days per week do you miss taking your medication?: 0      Latasha Neal is a 69 year old patient who presents to clinic for follow up with his daughter.    Seen in ER recently for groin pain.  Ultrasound and xray unremarkable.  Still bothering him.    AISHWARYA mass: presumed cancer.  Not surgical candidate.  Treated with radiation.  Follows with Dr Breen and Dr Ann.  Most recent scan is stable     Protein calorie malnutrition: severe.  Intake improved with mirtazapine. Does not like protein shakes.  Reports he eats adequate protein.  BMI 13.5.     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  Has completed radiation treatment.  No evidence of recurrent disease.  Follows with Dr Dreyer at [facility].     COPD: marked emphysema on CT.  Previously followed with Dr Mireles but no longer able to see him.  On Trelegy.  Breathing is fairly stable.  Duonebs helping some.  Denies new exertional symptoms.  No LE edema.  No history of CHF.     Nicotine dependence: continues to smoke about 1 PPD.  precontemplative     EtOH Dependence: now in remission     Chronic postoperative pain and opiate dependence: takes oxycodone 3x/day.       HTN: on amlodipine and losartan.  No cp, headache.  Home readings at goal     Atherosclerosis of aorta and iliac arteries: dense calcifications noted on imaging.  On atorvastatin.  He stopped aspirin.    Review of Systems   Constitutional, HEENT, cardiovascular, pulmonary, GI, , musculoskeletal, neuro, skin, endocrine and psych systems are negative, except as otherwise noted.      Objective    BP (!) 140/81   Pulse 80   Wt 41.5 kg (91 lb 6.4 oz)   SpO2 98%   BMI 13.50 kg/m      General: frail, cachectic appearing, NAD  Lungs: distant breath sounds  Psych: normal mood and affect        No results found for this or any previous visit (from the past 24 hour(s)).    Groin pain, chronic, right  Uncertain cause  Recommend orthopedic evalation  - Orthopedic  Referral; Future    Pulmonary emphysema, unspecified emphysema type (H)  Relatively stable with gradual progression/worsening  Needs new pulmonologist, referral placed  Cont all current treatments  - Adult Pulmonary Medicine  Referral; Future    Anxiety  Increase mirtazapine to see if improves anxiety and weight  - mirtazapine (REMERON) 15 MG tablet; Take 1 tablet (15 mg) by mouth at bedtime.    Follow up in 3 months, sooner if needed.

## 2024-10-08 ENCOUNTER — MYC REFILL (OUTPATIENT)
Dept: PHARMACY | Facility: PHYSICIAN GROUP | Age: 69
End: 2024-10-08
Payer: COMMERCIAL

## 2024-10-08 DIAGNOSIS — J43.9 PULMONARY EMPHYSEMA, UNSPECIFIED EMPHYSEMA TYPE (H): ICD-10-CM

## 2024-10-08 RX ORDER — GUAIFENESIN 600 MG/1
1200 TABLET, EXTENDED RELEASE ORAL 2 TIMES DAILY
Qty: 120 TABLET | Refills: 1 | Status: SHIPPED | OUTPATIENT
Start: 2024-10-08 | End: 2024-11-12

## 2024-10-08 RX ORDER — IPRATROPIUM BROMIDE AND ALBUTEROL SULFATE 2.5; .5 MG/3ML; MG/3ML
1 SOLUTION RESPIRATORY (INHALATION) 4 TIMES DAILY
Qty: 360 ML | Refills: 2 | Status: SHIPPED | OUTPATIENT
Start: 2024-10-08 | End: 2024-10-25

## 2024-10-08 NOTE — TELEPHONE ENCOUNTER
Med: guaifenesin    LOV (related): 9/18/24      Due for F/U around: 12/18/24    Next Appt: 10/24/24 with MTM

## 2024-10-08 NOTE — TELEPHONE ENCOUNTER
Med: Iprat-Albut nebs    LOV (related): 9/18/24      Due for F/U around: 12/18/24    Next Appt: 10/11/24 with MTM

## 2024-10-21 ENCOUNTER — ANCILLARY PROCEDURE (OUTPATIENT)
Dept: CT IMAGING | Facility: CLINIC | Age: 69
End: 2024-10-21
Attending: THORACIC SURGERY (CARDIOTHORACIC VASCULAR SURGERY)
Payer: COMMERCIAL

## 2024-10-21 DIAGNOSIS — R91.1 SOLITARY PULMONARY NODULE: ICD-10-CM

## 2024-10-21 PROCEDURE — 71250 CT THORAX DX C-: CPT

## 2024-10-22 ENCOUNTER — TELEPHONE (OUTPATIENT)
Dept: PULMONOLOGY | Facility: CLINIC | Age: 69
End: 2024-10-22
Payer: COMMERCIAL

## 2024-10-22 ENCOUNTER — MYC REFILL (OUTPATIENT)
Dept: FAMILY MEDICINE | Facility: CLINIC | Age: 69
End: 2024-10-22

## 2024-10-22 DIAGNOSIS — J43.9 PULMONARY EMPHYSEMA, UNSPECIFIED EMPHYSEMA TYPE (H): Primary | ICD-10-CM

## 2024-10-22 DIAGNOSIS — F41.9 ANXIETY: ICD-10-CM

## 2024-10-22 RX ORDER — MIRTAZAPINE 15 MG/1
15 TABLET, FILM COATED ORAL AT BEDTIME
Qty: 60 TABLET | Refills: 0 | Status: SHIPPED | OUTPATIENT
Start: 2024-10-22

## 2024-10-22 NOTE — TELEPHONE ENCOUNTER
Med: Mirtazapine    LOV (related): 9/18/24      Due for F/U around: 12/18/24    Next Appt: Not Scheduled             No data to display                     No data to display

## 2024-10-22 NOTE — TELEPHONE ENCOUNTER
[facility] Call Center    Phone Message    May a detailed message be left on voicemail: yes     Reason for Call: Appointment Intake    Referring Provider Name: Dr. Maxim Goodman  Diagnosis and/or Symptoms: COPD    Pt is scheduled to establish care 10/25/24 with Dr. Cortes, has appt for PFT's scheduled 10/24/24-needs orders placed for PFT's (pended).     Action Taken: Other: Pulm    Travel Screening: Not Applicable

## 2024-10-24 ENCOUNTER — HOSPITAL ENCOUNTER (OUTPATIENT)
Dept: RESPIRATORY THERAPY | Facility: CLINIC | Age: 69
Discharge: HOME | End: 2024-10-24
Attending: STUDENT IN AN ORGANIZED HEALTH CARE EDUCATION/TRAINING PROGRAM | Admitting: STUDENT IN AN ORGANIZED HEALTH CARE EDUCATION/TRAINING PROGRAM
Payer: COMMERCIAL

## 2024-10-24 DIAGNOSIS — J43.9 PULMONARY EMPHYSEMA, UNSPECIFIED EMPHYSEMA TYPE (H): ICD-10-CM

## 2024-10-24 PROCEDURE — 271N000002 HC RX 271

## 2024-10-24 PROCEDURE — 94729 DIFFUSING CAPACITY: CPT

## 2024-10-24 PROCEDURE — 94060 EVALUATION OF WHEEZING: CPT

## 2024-10-24 RX ORDER — INHALER, ASSIST DEVICES
1 SPACER (EA) MISCELLANEOUS ONCE
Status: COMPLETED | OUTPATIENT
Start: 2024-10-24 | End: 2024-10-24

## 2024-10-24 RX ADMIN — Medication 1 EACH: at 14:46

## 2024-10-25 ENCOUNTER — OFFICE VISIT (OUTPATIENT)
Dept: PULMONOLOGY | Facility: CLINIC | Age: 69
End: 2024-10-25
Payer: COMMERCIAL

## 2024-10-25 VITALS
WEIGHT: 93.8 LBS | BODY MASS INDEX: 13.85 KG/M2 | HEART RATE: 73 BPM | OXYGEN SATURATION: 98 % | SYSTOLIC BLOOD PRESSURE: 115 MMHG | DIASTOLIC BLOOD PRESSURE: 74 MMHG

## 2024-10-25 DIAGNOSIS — J43.9 PULMONARY EMPHYSEMA, UNSPECIFIED EMPHYSEMA TYPE (H): Primary | ICD-10-CM

## 2024-10-25 LAB
DLCOUNC-%PRED-PRE: 21 %
DLCOUNC-PRE: 5.27 ML/MIN/MMHG
DLCOUNC-PRED: 25.05 ML/MIN/MMHG
ERV-%PRED-PRE: 15 %
ERV-PRE: 0.22 L
ERV-PRED: 1.4 L
EXPTIME-PRE: 5.5 SEC
FEF2575-%PRED-POST: 20 %
FEF2575-%PRED-PRE: 17 %
FEF2575-POST: 0.48 L/SEC
FEF2575-PRE: 0.4 L/SEC
FEF2575-PRED: 2.32 L/SEC
FEFMAX-%PRED-PRE: 22 %
FEFMAX-PRE: 1.88 L/SEC
FEFMAX-PRED: 8.19 L/SEC
FEV1-%PRED-PRE: 25 %
FEV1-PRE: 0.75 L
FEV1FEV6-PRE: 44 %
FEV1FEV6-PRED: 78 %
FEV1FVC-PRE: 47 %
FEV1FVC-PRED: 77 %
FEV1SVC-PRE: 55 %
FEV1SVC-PRED: 71 %
FIFMAX-PRE: 2.09 L/SEC
FVC-%PRED-PRE: 42 %
FVC-PRE: 1.59 L
FVC-PRED: 3.77 L
IC-%PRED-PRE: 40 %
IC-PRE: 1.14 L
IC-PRED: 2.8 L
VA-%PRED-PRE: 66 %
VA-PRE: 4.06 L
VC-%PRED-PRE: 33 %
VC-PRE: 1.37 L
VC-PRED: 4.09 L

## 2024-10-25 PROCEDURE — 99204 OFFICE O/P NEW MOD 45 MIN: CPT | Performed by: STUDENT IN AN ORGANIZED HEALTH CARE EDUCATION/TRAINING PROGRAM

## 2024-10-25 RX ORDER — IPRATROPIUM BROMIDE AND ALBUTEROL SULFATE 2.5; .5 MG/3ML; MG/3ML
1 SOLUTION RESPIRATORY (INHALATION) 4 TIMES DAILY
Qty: 360 ML | Refills: 2 | Status: SHIPPED | OUTPATIENT
Start: 2024-10-25 | End: 2024-11-12

## 2024-10-25 RX ORDER — ALBUTEROL SULFATE 90 UG/1
2 INHALANT RESPIRATORY (INHALATION) EVERY 4 HOURS PRN
Qty: 18 G | Refills: 11 | Status: SHIPPED | OUTPATIENT
Start: 2024-10-25

## 2024-10-25 RX ORDER — PREDNISONE 20 MG/1
TABLET ORAL
Qty: 19 TABLET | Refills: 0 | Status: SHIPPED | OUTPATIENT
Start: 2024-10-25 | End: 2024-11-08

## 2024-10-25 RX ORDER — FLUTICASONE FUROATE, UMECLIDINIUM BROMIDE AND VILANTEROL TRIFENATATE 200; 62.5; 25 UG/1; UG/1; UG/1
1 POWDER RESPIRATORY (INHALATION) DAILY
Qty: 1 EACH | Refills: 5 | Status: SHIPPED | OUTPATIENT
Start: 2024-10-25

## 2024-10-25 ASSESSMENT — PAIN SCALES - GENERAL: PAINLEVEL_OUTOF10: SEVERE PAIN (6)

## 2024-10-25 NOTE — PROGRESS NOTES
Pulmonary Clinic Note    Date of Service: 10/25/2024     Chief Complaint   Patient presents with    COPD       A/P:  69M HTN, HLD, squamous cell Ca of supraglottis s/p RT being seen for COPD. PFTs w/ severe obstruction and severe diffusion limitation. CT imaging w/ severe emphysema and R lung base spiculated nodule, he follows w/ thoracic surgery and they are managing repeat imaging for follow up. We discussed the importance of smoking cessation and he is not interested. With increased congestion recently will treat w/ prednisone taper.     - prednisone taper: 40mg x 5d, 30mg x 3d, 20mg x 3d, 10mg x 3d  - continue OPN-NAXD-HUSC (Trelegy) daily, rinse mouth out after use  - continue prn albuterol and duonebs  - pulmonary rehab referral  - consider chronic azithromycin vs roflumilast in the future  - OK to substitute medications based on formulary     History:  69M HTN, HLD, squamous cell Ca of supraglottis s/p RT being seen for COPD. He is prescribed [id number] (Trelegy), prn albuterol, and prn duonebs. He has seen Dr. Ann of thoracic surgery, plan for repeat CT in 6mo. Feeling at baseline. GRAHAM w/ moderate activity. No SOB at rest. No orthopnea or PND. Has been coughing more, productive of green sputum. No recent hemoptysis. Occasional nocturnal cough. Cough worse when lying on the L. No chest pain or tightness. Does hear wheezing, mostly at night. Usign Trelegy. Using albuterol several times per day, unsure how much it is helpful. Using duonebs 4x/day, they are helpful. He was hospitalized 11/2023 for COPD exacerbation.     Smoking: current, >100 pack year history   Bird exposure: no             Animal exposure: no        Inhalation exposure: silica                            10 point review of systems negative, aside from that mentioned in HPI.    /74   Pulse 73   Wt 42.5 kg (93 lb 12.8 oz)   SpO2 98%   BMI 13.85 kg/m    Gen: chronic ill-appearing  HEENT: Mallampati III  Card: RRR  Pulm: diffusely  diminished   Abd: soft  MSK: no edema, no acute joint abnormality   Skin: no obvious rash  Psych: normal affect  Neuro: alert and oriented     Labs:  Personally reviewed  Abs eos (8/2024) - 100     Imaging/Studies: Personally reviewed  PFTs (10/2024) - severe obstruction, no significant BD response, severe diffusion limitation    CT Chest (10/2024) - multiple new nodules and consolidations, largest being spiculated up to 2.9cm R lung base     Past Medical History:   Diagnosis Date    Abrasions of multiple sites 08/01/2022    Alcoholic intoxication with complication (H) 07/31/2022    Allergy history unknown     Carcinoma of supraglottis (H)     Community acquired pneumonia of left lung, unspecified part of lung 11/16/2023    COPD (chronic obstructive pulmonary disease) (H)     HTN, goal below 140/90     Hyperlipidaemia LDL goal < 100     Hypoglycemia 07/31/2022    Migraine     Mild alcohol use disorder 10/06/2021    Pneumothorax on left     Uncomplicated alcohol dependence (H) 02/13/2023     Past Surgical History:   Procedure Laterality Date    BRONCHOSCOPY      BRONCHOSCOPY RIGID OR FLEXIBLE W/TRANSENDOSCOPIC ENDOBRONCHIAL ULTRASOUND GUIDED N/A 1/18/2021    Procedure: BRONCHOSCOPY, WITH ENDOBRONCHIAL ULTRASOUND;  Surgeon: Yasir Combs MD;  Location:  OR    ENT SURGERY      sinus    HC HEMORROIDECTOMY, EXTERNAL, SINGLE COLUMN/GROUP      left    HERNIA REPAIR      umbilical and groin    LARYNGOSCOPY WITH BIOPSY(IES) Left 7/26/2019    Procedure: MICRODIRECT LARYNGOSCOPY WITH BIOPSY OF LEFT LARYNGEAL MASS;  Surgeon: Boo Mehta MD;  Location:  OR    THORACIC SURGERY Right 2005    pneumothorax     Family History   Problem Relation Age of Onset    Breast Cancer Mother     Brain Cancer Father 55        brain ca     Social History     Socioeconomic History    Marital status: Single     Spouse name: Not on file    Number of children: Not on file    Years of education: Not on file    Highest education level:  Not on file   Occupational History    Not on file   Tobacco Use    Smoking status: Every Day     Current packs/day: 1.00     Average packs/day: 1 pack/day for 43.0 years (43.0 ttl pk-yrs)     Types: Cigarettes    Smokeless tobacco: Never   Substance and Sexual Activity    Alcohol use: Yes     Alcohol/week: 15.0 standard drinks of alcohol     Types: 15 Cans of beer per week     Comment: beer    Drug use: Never    Sexual activity: Not Currently     Partners: Female     Comment: wife  2019   Other Topics Concern    Parent/sibling w/ CABG, MI or angioplasty before 65F 55M? Not Asked   Social History Narrative    Living: lives alone, 3 kids    Work: Retired from [employer]    Exercise: minimal    EtOH: 1 case beer per week    Tobacco: daily    Hobbies:          Social Drivers of Health     Financial Resource Strain: Low Risk  (2024)    Financial Resource Strain     Within the past 12 months, have you or your family members you live with been unable to get utilities (heat, electricity) when it was really needed?: No   Food Insecurity: Low Risk  (2024)    Food Insecurity     Within the past 12 months, did you worry that your food would run out before you got money to buy more?: No     Within the past 12 months, did the food you bought just not last and you didn t have money to get more?: No   Transportation Needs: Low Risk  (2024)    Transportation Needs     Within the past 12 months, has lack of transportation kept you from medical appointments, getting your medicines, non-medical meetings or appointments, work, or from getting things that you need?: No   Physical Activity: Inactive (3/6/2024)    Exercise Vital Sign     Days of Exercise per Week: 0 days     Minutes of Exercise per Session: 0 min   Stress: No Stress Concern Present (3/6/2024)    [employer] - Occupational Stress Questionnaire     Feeling of Stress : Only a little   Social Connections: Unknown (3/6/2024)     Social Connection and Isolation Panel [NHANES]     Frequency of Communication with Friends and Family: Not on file     Frequency of Social Gatherings with Friends and Family: Three times a week     Attends Gnosticist Services: Not on file     Active Member of Clubs or Organizations: Not on file     Attends Club or Organization Meetings: Not on file     Marital Status: Not on file   Interpersonal Safety: Not on file   Housing Stability: Low Risk  (6/12/2024)    Housing Stability     Do you have housing? : Yes     Are you worried about losing your housing?: No       50 minutes spent reviewing chart, reviewing test results, talking with and examining patient, formulating plan, and documentation on the day of the encounter.    Rico Cortes MD  Pulmonary and Critical Care Medicine  [facility]     I am prescribing a home nebulizer for duoneb medication to treat COPD diagnosis.

## 2024-10-25 NOTE — PATIENT INSTRUCTIONS
Continue Trelegy daily, rinse your mouth out after use. Continue as needed albuterol and duonebs. I have prescribed you a prednisone taper to help with your chest congestion. I have referred you to pulmonary rehab.

## 2024-10-25 NOTE — LETTER
10/25/2024      Ángel Pham  [address]  [facility] [address]      Dear Colleague,    Thank you for referring your patient, Ángel Pham, to the [facility] [place]. Please see a copy of my visit note below.    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 PFTs w/ severe obstruction and severe diffusion limitation. CT imaging w/ severe emphysema and R lung base spiculated nodule, he follows w/ thoracic surgery and they are managing repeat imaging for follow up. We discussed the importance of smoking cessation and he is not interested. With increased congestion recently will treat w/ prednisone taper.     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 He is prescribed [id number] (Trelegy), prn albuterol, and prn duonebs. He has seen Dr. Ann of thoracic surgery, plan for repeat CT in 6mo. Feeling at baseline. GRAHAM w/ moderate activity. No SOB at rest. No orthopnea or PND. Has been coughing more, productive of green sputum. No recent hemoptysis. Occasional nocturnal cough. Cough worse when lying on the L. No chest pain or tightness. Does hear wheezing, mostly at night. Usign Trelegy. Using albuterol several times per day, unsure how much it is helpful. Using duonebs 4x/day, they are helpful. He was hospitalized 11/2023 for COPD exacerbation.     Smoking: current, >100 pack year history   Bird exposure: no             Animal exposure: no        Inhalation exposure: silica                             10 point review of systems negative, aside from that mentioned in HPI.    /74   Pulse 73   Wt 42.5 kg (93 lb 12.8 oz)   SpO2 98%   BMI 13.85 kg/m    Gen: chronic ill-appearing  HEENT: Mallampati III  Card: RRR  Pulm: diffusely diminished   Abd: soft  MSK: no edema, no acute joint abnormality   Skin: no obvious rash  Psych: normal affect  Neuro: alert and oriented     Labs:  Personally reviewed  Abs eos (8/2024) - 100     Imaging/Studies: Personally reviewed  PFTs (10/2024) - severe obstruction, no significant BD response, severe diffusion limitation    CT Chest (10/2024) - multiple new nodules and consolidations, largest being spiculated up to 2.9cm R lung base     Past Medical History:   Diagnosis Date     Abrasions of multiple sites 08/01/2022     Alcoholic intoxication with complication (H) 07/31/2022     Allergy history unknown      Carcinoma of supraglottis (H)      Community acquired pneumonia of left lung, unspecified part of lung 11/16/2023     COPD (chronic obstructive pulmonary disease) (H)      HTN, goal below 140/90      Hyperlipidaemia LDL goal < 100      Hypoglycemia 07/31/2022     Migraine      Mild alcohol use disorder 10/06/2021     Pneumothorax on left      Uncomplicated alcohol dependence (H) 02/13/2023     Past Surgical History:   Procedure Laterality Date     BRONCHOSCOPY       BRONCHOSCOPY RIGID OR FLEXIBLE W/TRANSENDOSCOPIC ENDOBRONCHIAL ULTRASOUND GUIDED N/A 1/18/2021    Procedure: BRONCHOSCOPY, WITH ENDOBRONCHIAL ULTRASOUND;  Surgeon: Yasir Combs MD;  Location:  OR     ENT SURGERY      sinus     HC HEMORROIDECTOMY, EXTERNAL, SINGLE COLUMN/GROUP      left     HERNIA REPAIR      umbilical and groin     LARYNGOSCOPY WITH BIOPSY(IES) Left 7/26/2019    Procedure: MICRODIRECT LARYNGOSCOPY WITH BIOPSY OF LEFT LARYNGEAL MASS;  Surgeon: Boo Mehta MD;  Location:  OR     THORACIC SURGERY Right 2005    pneumothorax     Family History   Problem Relation Age  of Onset     Breast Cancer Mother      Brain Cancer Father 55        brain ca     Social History     Socioeconomic History     Marital status: Single     Spouse name: Not on file     Number of children: Not on file     Years of education: Not on file     Highest education level: Not on file   Occupational History     Not on file   Tobacco Use     Smoking status: Every Day     Current packs/day: 1.00     Average packs/day: 1 pack/day for 43.0 years (43.0 ttl pk-yrs)     Types: Cigarettes     Smokeless tobacco: Never   Substance and Sexual Activity     Alcohol use: Yes     Alcohol/week: 15.0 standard drinks of alcohol     Types: 15 Cans of beer per week     Comment: beer     Drug use: Never     Sexual activity: Not Currently     Partners: Female     Comment: wife  2019   Other Topics Concern     Parent/sibling w/ CABG, MI or angioplasty before 65F 55M? Not Asked   Social History Narrative    Living: lives alone, 3 kids    Work: Retired from [employer]    Exercise: minimal    EtOH: 1 case beer per week    Tobacco: daily    Hobbies:          Social Drivers of Health     Financial Resource Strain: Low Risk  (2024)    Financial Resource Strain      Within the past 12 months, have you or your family members you live with been unable to get utilities (heat, electricity) when it was really needed?: No   Food Insecurity: Low Risk  (2024)    Food Insecurity      Within the past 12 months, did you worry that your food would run out before you got money to buy more?: No      Within the past 12 months, did the food you bought just not last and you didn t have money to get more?: No   Transportation Needs: Low Risk  (2024)    Transportation Needs      Within the past 12 months, has lack of transportation kept you from medical appointments, getting your medicines, non-medical meetings or appointments, work, or from getting things that you need?: No   Physical Activity: Inactive (3/6/2024)    Exercise Vital Sign       Days of Exercise per Week: 0 days      Minutes of Exercise per Session: 0 min   Stress: No Stress Concern Present (3/6/2024)    [employer] - Occupational Stress Questionnaire      Feeling of Stress : Only a little   Social Connections: Unknown (3/6/2024)    Social Connection and Isolation Panel [NHANES]      Frequency of Communication with Friends and Family: Not on file      Frequency of Social Gatherings with Friends and Family: Three times a week      Attends Spiritism Services: Not on file      Active Member of Clubs or Organizations: Not on file      Attends Club or Organization Meetings: Not on file      Marital Status: Not on file   Interpersonal Safety: Not on file   Housing Stability: Low Risk  (6/12/2024)    Housing Stability      Do you have housing? : Yes      Are you worried about losing your housing?: No       50 minutes spent reviewing chart, reviewing test results, talking with and examining patient, formulating plan, and documentation on the day of the encounter.    Rico Cortes MD  Pulmonary and Critical Care Medicine  [facility]       Again, thank you for allowing me to participate in the care of your patient.        Sincerely,        Rico Cortes MD

## 2024-10-31 DIAGNOSIS — I73.9 PERIPHERAL VASCULAR DISEASE (H): ICD-10-CM

## 2024-10-31 DIAGNOSIS — I10 HTN, GOAL BELOW 140/90: ICD-10-CM

## 2024-11-01 NOTE — CONFIDENTIAL NOTE
"Med: AMLODIPINE, LOSARTAN, ATORVASTATIN    LOV (related): 3/6/24 - CPX  LAST MTM: 3/28/24    Last Lab: 8/29/24 (BMP). 10/6/21 (LIPID)      Due for F/U around: 12/2024 FOR MED CHECK    Next Appt: 11/7/24 (MTM)        BP Readings from Last 3 Encounters:   10/25/24 115/74   09/18/24 136/80   08/29/24 (!) 154/79       Last Comprehensive Metabolic Panel:  Lab Results   Component Value Date     08/29/2024    POTASSIUM 4.9 08/29/2024    CHLORIDE 99 08/29/2024    CO2 27 08/29/2024    ANIONGAP 9 08/29/2024    GLC 92 08/29/2024    BUN 10.9 08/29/2024    CR 0.49 (L) 08/29/2024    GFRESTIMATED >90 08/29/2024    MOSHE 9.3 08/29/2024       Lab Results   Component Value Date    CHOL 174 10/06/2021    CHOL 182 04/20/2018     Lab Results   Component Value Date     10/06/2021     04/20/2018     Lab Results   Component Value Date    LDL 52 10/06/2021    LDL 62 04/20/2018     Lab Results   Component Value Date    TRIG 48 10/06/2021    TRIG 48 04/20/2018     No results found for: \"CHOLHDLRATIO\"      "

## 2024-11-04 ENCOUNTER — MYC MEDICAL ADVICE (OUTPATIENT)
Dept: PULMONOLOGY | Facility: CLINIC | Age: 69
End: 2024-11-04
Payer: COMMERCIAL

## 2024-11-04 DIAGNOSIS — J43.9 PULMONARY EMPHYSEMA, UNSPECIFIED EMPHYSEMA TYPE (H): Primary | ICD-10-CM

## 2024-11-05 RX ORDER — LOSARTAN POTASSIUM 100 MG/1
100 TABLET ORAL DAILY
Qty: 100 TABLET | Refills: 3 | Status: SHIPPED | OUTPATIENT
Start: 2024-11-05

## 2024-11-05 RX ORDER — AMLODIPINE BESYLATE 10 MG/1
10 TABLET ORAL DAILY
Qty: 100 TABLET | Refills: 3 | Status: SHIPPED | OUTPATIENT
Start: 2024-11-05

## 2024-11-05 RX ORDER — ATORVASTATIN CALCIUM 40 MG/1
40 TABLET, FILM COATED ORAL DAILY
Qty: 100 TABLET | Refills: 3 | Status: SHIPPED | OUTPATIENT
Start: 2024-11-05

## 2024-11-05 NOTE — TELEPHONE ENCOUNTER
Nebulizer order faxed to [facility]. Updated patient via [facility].     Elieser Mccallum RN

## 2024-11-11 NOTE — PATIENT INSTRUCTIONS
"Recommendations from today's MTM visit:                                                       Pulmonary rehab scheduling - [phone].     Update prescription for Duoneb for 5 x per day.     Discuss with Dr.Dreyer on if there is any role for olanzapine or medical cannabis to support your weight and appetite.       Follow-up: Return in about 6 months (around 5/12/2025) for MTM visit in clinic.    It was great speaking with you today.  I value your experience and would be very thankful for your time in providing feedback in our clinic survey. In the next few days, you may receive an email or text message from [employer] with a link to a survey related to your  clinical pharmacist.\"     My Clinical Pharmacist's contact information:                                                      Please feel free to contact me with any questions or concerns you have.      Nidia Steward, Pharm.D, [facility]  Medication Therapy Management Pharmacist  [phone]      "

## 2024-11-11 NOTE — PROGRESS NOTES
Medication Therapy Management (MTM) Encounter    ASSESSMENT:                            Medication Adherence/Access: See below for considerations.    Hypertension   Patient is meeting blood pressure goal of < 140/90mmHg.    COPD /Lung mass/tobacco use  Set up pulmonary rehab.   From prior assessment- Additional therapy options such as roflumilast could be considered, however main risk is with potential for weight loss and GI side effects, with guidelines suggesting avoid initiation in patient already underweight and using caution in patients with depression. Alternative therapy with azithromycin could be considered in efforts to reduce exacerbation, however post-hoc data suggest less efficacy in patients that smoke and given he is a current smoker and not interested in quitting this is less likely to offer benefit for him. Overall most significant intervention needed is smoking cessation, however not willing or interested.   Updated prescription for the Duonebs to allow for up to 5 x per day use to match prior instructions and avoid filling issues due to mis-match sig.     Insomnia/mood/Malnutrition   Declines nutrition consult. Alternative option to consider for malnutrition/weight gain could be olanzapine 2.5mg daily, however would prefer this to be discussed with oncology team for their consideration. Additional option to discuss could include medical cannabis options in the form of pills  or oil.     PLAN:                            Pulmonary rehab scheduling - [phone].     Update prescription for Duoneb for 5 x per day.     Discuss with Dr.Dreyer on if there is any role for olanzapine or medical cannabis to support your weight and appetite.     Follow-up: Return in about 6 months (around 5/12/2025) for MTM visit in clinic.    SUBJECTIVE/OBJECTIVE:                          Ángel Pham is a 69 year old male seen for a follow-up visit.       Reason for visit: medication review.    Allergies/ADRs:  Reviewed in chart  Past Medical History: Reviewed in chart  Tobacco: He reports that he has been smoking cigarettes. He has a 43 pack-year smoking history. He has never used smokeless tobacco.Nicotine/Tobacco Cessation Plan  Information offered: Patient not interested at this time  Alcohol: not currently using    Medication Adherence/Access: no issues reported.    Hypertension   Losartan 100mg daily  Amlodipine 10mg daily  Patient reports no current medication side effects  Patient does not self-monitor blood pressure.       COPD /Lung mass/tobacco use  Duonebs 5 times per day- needs to update prescription to get adequate supply/  ICS/LAMA/LABA - Trelegy Ellipta 200/62.5/25 mcg - one puff once daily  Albuterol HFA widely varied- 3 to 4 puffs per day doesn't feel it helps.  Completed another prednisone taper 10/25-11/8  No longer on Guaifenesin got him too dry which caused more issues, so not doing this now.    Smoking 1 pack per day- not ready to quit.   Patient reports the following symptoms: increased shortness of breath upon exertion , recent respiratory infection, and recent hospitalization.  Blood eosinophils > 300 cells/ L?: No   Left upper lobe mass presumed cancer -Follows with Dr Breen and Dr Ann   Hx Supraglottis cancer Dr Dreyer at [facility].       Referred for pulmonary rehab waiting to schedule  Patient reports no current medication side effects.    Patient reports the following symptoms: chest tightness and difficulty with exhalation.     Insomnia/mood/Malnutrition   mirtazapine 15 mg at bedtime, increased to try to aid in mood and weight gain.  States was started in the hospital for sleep.   States sleep is not always a problem.   Breathing not pain keeping him up some nights.   Wife passed away in 2019.   Flat affect during appointment.   Notes from chart appear mirtazapine was likely more for protein-calorie malnutrition.  BMI-13.88, weight has hit plateau.  Not willing to see nutritionist.      Today's Vitals: /72   Pulse 80   Wt 94 lb (42.6 kg)   SpO2 96%   BMI 13.88 kg/m    ----------------    I spent 30 minutes with this patient today. All changes were made via collaborative practice agreement with Maxim Goodman MD. A copy of the visit note was provided to the patient's provider(s).    A summary of these recommendations was given to the patient.    Nidia Steward, Pharm.D, [facility]  Medication Therapy Management Pharmacist  [phone]     Medication Therapy Recommendations  Pulmonary emphysema, unspecified emphysema type (H)   1 Current Medication: ipratropium - albuterol 0.5 mg/2.5 mg/3 mL (DUONEB) 0.5-2.5 (3) MG/3ML neb solution   Current Medication Sig: Take 1 vial (3 mLs) by nebulization 5 times daily.   Rationale: Medication product not available - Adherence - Adherence   Recommendation: Change Medication - update orders for filling at pharmacy   Status: Accepted per CPA   Identified Date: 11/12/2024 Completed Date: 11/12/2024

## 2024-11-12 ENCOUNTER — MYC MEDICAL ADVICE (OUTPATIENT)
Dept: PULMONOLOGY | Facility: CLINIC | Age: 69
End: 2024-11-12
Payer: COMMERCIAL

## 2024-11-12 ENCOUNTER — OFFICE VISIT (OUTPATIENT)
Dept: PHARMACY | Facility: PHYSICIAN GROUP | Age: 69
End: 2024-11-12
Payer: COMMERCIAL

## 2024-11-12 VITALS
HEART RATE: 80 BPM | OXYGEN SATURATION: 96 % | BODY MASS INDEX: 13.88 KG/M2 | WEIGHT: 94 LBS | DIASTOLIC BLOOD PRESSURE: 72 MMHG | SYSTOLIC BLOOD PRESSURE: 127 MMHG

## 2024-11-12 DIAGNOSIS — E43 SEVERE PROTEIN-CALORIE MALNUTRITION (H): Primary | ICD-10-CM

## 2024-11-12 DIAGNOSIS — C34.12 MALIGNANT NEOPLASM OF UPPER LOBE, LEFT BRONCHUS OR LUNG (H): ICD-10-CM

## 2024-11-12 DIAGNOSIS — J43.9 PULMONARY EMPHYSEMA, UNSPECIFIED EMPHYSEMA TYPE (H): ICD-10-CM

## 2024-11-12 DIAGNOSIS — I10 HTN, GOAL BELOW 140/90: ICD-10-CM

## 2024-11-12 DIAGNOSIS — J43.9 PULMONARY EMPHYSEMA, UNSPECIFIED EMPHYSEMA TYPE (H): Primary | ICD-10-CM

## 2024-11-12 PROCEDURE — 99207 PR NO CHARGE LOS: CPT | Performed by: PHARMACIST

## 2024-11-12 RX ORDER — AZITHROMYCIN 250 MG/1
TABLET, FILM COATED ORAL
Qty: 6 TABLET | Refills: 0 | Status: SHIPPED | OUTPATIENT
Start: 2024-11-12 | End: 2024-11-17

## 2024-11-12 RX ORDER — IPRATROPIUM BROMIDE AND ALBUTEROL SULFATE 2.5; .5 MG/3ML; MG/3ML
1 SOLUTION RESPIRATORY (INHALATION)
Qty: 450 ML | Refills: 2 | Status: SHIPPED | OUTPATIENT
Start: 2024-11-12

## 2024-11-12 NOTE — Clinical Note
FYI_ declined nutrition,willing to discuss olanzapine/cannabis with oncology at next visit. Starting pulm rehab coming up

## 2024-11-12 NOTE — TELEPHONE ENCOUNTER
Pt's daughter Enedina returning call, understand no CC on file. Provider writer with pt's phone number PH: [phone].

## 2024-11-12 NOTE — TELEPHONE ENCOUNTER
Patient's last office visit was 10/25/24 with Dr Cortes for Pulmonary Emphysema. Patient's current regimen includes Trelegy, albuterol and Duonebs. Patient recently completed 14 day prednisone taper and is requesting Zpak due to ongoing congestion/sputum.    Spoke with patient to gather information about symptoms. Patient c/o cough, productive cough with yellow, green, thick, and moderate amounts of sputum, GRAHAM, and chills. Patient denies SOB at rest, wheezing, fatigue, fever, night sweats, and chest tightness or chest pain . Patient is able to obtain home oxygen level, which is 96% on RA. Patient is Compliant with prescribed respiratory medications and is taking OTC medications, which include: None.     Patient is asking for Zpak. RN will forward message to MD to review and advise.    RN advised patient to seek care in the Emergency Department should the symptoms become emergent, to which patient agrees.    Elieser Mccallum RN

## 2024-11-12 NOTE — TELEPHONE ENCOUNTER
LVM for patient to call back to discuss symptoms. No consent to communicate on file to speak with daughter.    Elieser Mccallum RN

## 2024-11-19 ENCOUNTER — APPOINTMENT (OUTPATIENT)
Dept: GENERAL RADIOLOGY | Facility: CLINIC | Age: 69
DRG: 073 | End: 2024-11-19
Attending: EMERGENCY MEDICINE
Payer: COMMERCIAL

## 2024-11-19 ENCOUNTER — HOSPITAL ENCOUNTER (INPATIENT)
Facility: CLINIC | Age: 69
DRG: 073 | End: 2024-11-19
Attending: EMERGENCY MEDICINE | Admitting: HOSPITALIST
Payer: COMMERCIAL

## 2024-11-19 DIAGNOSIS — E55.9 VITAMIN D DEFICIENCY: ICD-10-CM

## 2024-11-19 DIAGNOSIS — R19.09 RIGHT GROIN MASS: Primary | ICD-10-CM

## 2024-11-19 DIAGNOSIS — J44.1 COPD EXACERBATION (H): ICD-10-CM

## 2024-11-19 DIAGNOSIS — J18.9 COMMUNITY ACQUIRED PNEUMONIA OF LEFT LUNG, UNSPECIFIED PART OF LUNG: ICD-10-CM

## 2024-11-19 DIAGNOSIS — J44.1 COPD WITH ACUTE EXACERBATION (H): ICD-10-CM

## 2024-11-19 DIAGNOSIS — B37.81 ESOPHAGEAL CANDIDIASIS (H): ICD-10-CM

## 2024-11-19 DIAGNOSIS — R33.9 URINARY RETENTION WITH INCOMPLETE BLADDER EMPTYING: ICD-10-CM

## 2024-11-19 DIAGNOSIS — K22.2 ESOPHAGEAL STRICTURE: ICD-10-CM

## 2024-11-19 DIAGNOSIS — R10.31 INGUINAL PAIN, RIGHT: ICD-10-CM

## 2024-11-19 DIAGNOSIS — K40.90 RIGHT INGUINAL HERNIA: ICD-10-CM

## 2024-11-19 LAB
ALBUMIN SERPL BCG-MCNC: 3.8 G/DL (ref 3.5–5.2)
ALP SERPL-CCNC: 128 U/L (ref 40–150)
ALT SERPL W P-5'-P-CCNC: 18 U/L (ref 0–70)
ANION GAP SERPL CALCULATED.3IONS-SCNC: 12 MMOL/L (ref 7–15)
AST SERPL W P-5'-P-CCNC: 26 U/L (ref 0–45)
BASE EXCESS BLDV CALC-SCNC: 5 MMOL/L (ref -3–3)
BASOPHILS # BLD AUTO: 0.1 10E3/UL (ref 0–0.2)
BASOPHILS NFR BLD AUTO: 1 %
BILIRUB DIRECT SERPL-MCNC: <0.2 MG/DL (ref 0–0.3)
BILIRUB SERPL-MCNC: 0.2 MG/DL
BUN SERPL-MCNC: 10.8 MG/DL (ref 8–23)
CALCIUM SERPL-MCNC: 9.1 MG/DL (ref 8.8–10.4)
CHLORIDE SERPL-SCNC: 95 MMOL/L (ref 98–107)
CREAT SERPL-MCNC: 0.65 MG/DL (ref 0.67–1.17)
EGFRCR SERPLBLD CKD-EPI 2021: >90 ML/MIN/1.73M2
EOSINOPHIL # BLD AUTO: 0.2 10E3/UL (ref 0–0.7)
EOSINOPHIL NFR BLD AUTO: 4 %
ERYTHROCYTE [DISTWIDTH] IN BLOOD BY AUTOMATED COUNT: 14.6 % (ref 10–15)
GLUCOSE SERPL-MCNC: 97 MG/DL (ref 70–99)
HCO3 BLDV-SCNC: 30 MMOL/L (ref 21–28)
HCO3 SERPL-SCNC: 27 MMOL/L (ref 22–29)
HCT VFR BLD AUTO: 36.1 % (ref 40–53)
HGB BLD-MCNC: 12.2 G/DL (ref 13.3–17.7)
HOLD SPECIMEN: NORMAL
IMM GRANULOCYTES # BLD: 0 10E3/UL
IMM GRANULOCYTES NFR BLD: 0 %
LACTATE BLD-SCNC: 1.8 MMOL/L
LYMPHOCYTES # BLD AUTO: 1.1 10E3/UL (ref 0.8–5.3)
LYMPHOCYTES NFR BLD AUTO: 21 %
MCH RBC QN AUTO: 30.9 PG (ref 26.5–33)
MCHC RBC AUTO-ENTMCNC: 33.8 G/DL (ref 31.5–36.5)
MCV RBC AUTO: 91 FL (ref 78–100)
MONOCYTES # BLD AUTO: 0.6 10E3/UL (ref 0–1.3)
MONOCYTES NFR BLD AUTO: 11 %
NEUTROPHILS # BLD AUTO: 3.4 10E3/UL (ref 1.6–8.3)
NEUTROPHILS NFR BLD AUTO: 64 %
NRBC # BLD AUTO: 0 10E3/UL
NRBC BLD AUTO-RTO: 0 /100
PCO2 BLDV: 45 MM HG (ref 40–50)
PH BLDV: 7.43 [PH] (ref 7.32–7.43)
PLATELET # BLD AUTO: 219 10E3/UL (ref 150–450)
PO2 BLDV: 36 MM HG (ref 25–47)
POTASSIUM SERPL-SCNC: 4.2 MMOL/L (ref 3.4–5.3)
PROT SERPL-MCNC: 6.4 G/DL (ref 6.4–8.3)
RBC # BLD AUTO: 3.95 10E6/UL (ref 4.4–5.9)
SAO2 % BLDV: 70 % (ref 70–75)
SODIUM SERPL-SCNC: 134 MMOL/L (ref 135–145)
WBC # BLD AUTO: 5.4 10E3/UL (ref 4–11)

## 2024-11-19 PROCEDURE — 80048 BASIC METABOLIC PNL TOTAL CA: CPT | Performed by: EMERGENCY MEDICINE

## 2024-11-19 PROCEDURE — 96375 TX/PRO/DX INJ NEW DRUG ADDON: CPT

## 2024-11-19 PROCEDURE — 99223 1ST HOSP IP/OBS HIGH 75: CPT | Performed by: INTERNAL MEDICINE

## 2024-11-19 PROCEDURE — 96374 THER/PROPH/DIAG INJ IV PUSH: CPT

## 2024-11-19 PROCEDURE — 82248 BILIRUBIN DIRECT: CPT | Performed by: INTERNAL MEDICINE

## 2024-11-19 PROCEDURE — 94640 AIRWAY INHALATION TREATMENT: CPT

## 2024-11-19 PROCEDURE — 250N000009 HC RX 250: Performed by: EMERGENCY MEDICINE

## 2024-11-19 PROCEDURE — 83605 ASSAY OF LACTIC ACID: CPT

## 2024-11-19 PROCEDURE — 36415 COLL VENOUS BLD VENIPUNCTURE: CPT | Performed by: EMERGENCY MEDICINE

## 2024-11-19 PROCEDURE — 71046 X-RAY EXAM CHEST 2 VIEWS: CPT

## 2024-11-19 PROCEDURE — 85025 COMPLETE CBC W/AUTO DIFF WBC: CPT | Performed by: EMERGENCY MEDICINE

## 2024-11-19 PROCEDURE — 87637 SARSCOV2&INF A&B&RSV AMP PRB: CPT | Performed by: EMERGENCY MEDICINE

## 2024-11-19 PROCEDURE — 250N000011 HC RX IP 250 OP 636: Performed by: EMERGENCY MEDICINE

## 2024-11-19 PROCEDURE — 82040 ASSAY OF SERUM ALBUMIN: CPT | Performed by: INTERNAL MEDICINE

## 2024-11-19 PROCEDURE — 84153 ASSAY OF PSA TOTAL: CPT | Performed by: INTERNAL MEDICINE

## 2024-11-19 PROCEDURE — G0378 HOSPITAL OBSERVATION PER HR: HCPCS

## 2024-11-19 PROCEDURE — 82803 BLOOD GASES ANY COMBINATION: CPT

## 2024-11-19 PROCEDURE — 96376 TX/PRO/DX INJ SAME DRUG ADON: CPT

## 2024-11-19 PROCEDURE — 99285 EMERGENCY DEPT VISIT HI MDM: CPT | Mod: 25

## 2024-11-19 PROCEDURE — 80053 COMPREHEN METABOLIC PANEL: CPT | Performed by: EMERGENCY MEDICINE

## 2024-11-19 RX ORDER — METHYLPREDNISOLONE SODIUM SUCCINATE 125 MG/2ML
125 INJECTION INTRAMUSCULAR; INTRAVENOUS ONCE
Status: COMPLETED | OUTPATIENT
Start: 2024-11-19 | End: 2024-11-19

## 2024-11-19 RX ORDER — HYDROMORPHONE HYDROCHLORIDE 1 MG/ML
0.5 INJECTION, SOLUTION INTRAMUSCULAR; INTRAVENOUS; SUBCUTANEOUS ONCE
Status: COMPLETED | OUTPATIENT
Start: 2024-11-19 | End: 2024-11-19

## 2024-11-19 RX ORDER — IPRATROPIUM BROMIDE AND ALBUTEROL SULFATE 2.5; .5 MG/3ML; MG/3ML
3 SOLUTION RESPIRATORY (INHALATION) ONCE
Status: COMPLETED | OUTPATIENT
Start: 2024-11-19 | End: 2024-11-19

## 2024-11-19 RX ADMIN — IPRATROPIUM BROMIDE AND ALBUTEROL SULFATE 3 ML: .5; 3 SOLUTION RESPIRATORY (INHALATION) at 21:26

## 2024-11-19 RX ADMIN — METHYLPREDNISOLONE SODIUM SUCCINATE 125 MG: 125 INJECTION, POWDER, FOR SOLUTION INTRAMUSCULAR; INTRAVENOUS at 21:27

## 2024-11-19 RX ADMIN — HYDROMORPHONE HYDROCHLORIDE 0.5 MG: 1 INJECTION, SOLUTION INTRAMUSCULAR; INTRAVENOUS; SUBCUTANEOUS at 19:56

## 2024-11-19 RX ADMIN — HYDROMORPHONE HYDROCHLORIDE 0.5 MG: 1 INJECTION, SOLUTION INTRAMUSCULAR; INTRAVENOUS; SUBCUTANEOUS at 21:27

## 2024-11-19 ASSESSMENT — ACTIVITIES OF DAILY LIVING (ADL)
ADLS_ACUITY_SCORE: 0

## 2024-11-20 ENCOUNTER — APPOINTMENT (OUTPATIENT)
Dept: CT IMAGING | Facility: CLINIC | Age: 69
DRG: 073 | End: 2024-11-20
Attending: INTERNAL MEDICINE
Payer: COMMERCIAL

## 2024-11-20 ENCOUNTER — APPOINTMENT (OUTPATIENT)
Dept: ULTRASOUND IMAGING | Facility: CLINIC | Age: 69
DRG: 073 | End: 2024-11-20
Attending: SURGERY
Payer: COMMERCIAL

## 2024-11-20 ENCOUNTER — APPOINTMENT (OUTPATIENT)
Dept: SPEECH THERAPY | Facility: CLINIC | Age: 69
DRG: 073 | End: 2024-11-20
Attending: INTERNAL MEDICINE
Payer: COMMERCIAL

## 2024-11-20 PROBLEM — F17.219 CIGARETTE NICOTINE DEPENDENCE WITH NICOTINE-INDUCED DISORDER: Status: ACTIVE | Noted: 2024-11-20

## 2024-11-20 PROBLEM — R05.2 SUBACUTE COUGH: Status: ACTIVE | Noted: 2024-11-20

## 2024-11-20 PROBLEM — J44.9 COPD, SEVERE (H): Status: ACTIVE | Noted: 2024-11-20

## 2024-11-20 PROBLEM — J44.1 COPD EXACERBATION (H): Status: ACTIVE | Noted: 2024-11-20

## 2024-11-20 PROBLEM — R59.0 INGUINAL LYMPHADENOPATHY: Status: ACTIVE | Noted: 2024-11-20

## 2024-11-20 PROBLEM — K46.9 ABDOMINAL HERNIA WITHOUT OBSTRUCTION AND WITHOUT GANGRENE: Status: ACTIVE | Noted: 2024-11-20

## 2024-11-20 PROBLEM — R13.10 DYSPHAGIA: Status: ACTIVE | Noted: 2024-11-20

## 2024-11-20 PROBLEM — R10.31 INGUINAL PAIN, RIGHT: Status: ACTIVE | Noted: 2024-11-20

## 2024-11-20 PROBLEM — M79.2 NEUROPATHIC PAIN: Status: ACTIVE | Noted: 2024-11-20

## 2024-11-20 LAB
FLUAV RNA SPEC QL NAA+PROBE: NEGATIVE
FLUBV RNA RESP QL NAA+PROBE: NEGATIVE
PSA SERPL DL<=0.01 NG/ML-MCNC: 0.71 NG/ML (ref 0–4.5)
RSV RNA SPEC NAA+PROBE: NEGATIVE
SARS-COV-2 RNA RESP QL NAA+PROBE: NEGATIVE

## 2024-11-20 PROCEDURE — G0378 HOSPITAL OBSERVATION PER HR: HCPCS

## 2024-11-20 PROCEDURE — 82607 VITAMIN B-12: CPT | Performed by: INTERNAL MEDICINE

## 2024-11-20 PROCEDURE — 82746 ASSAY OF FOLIC ACID SERUM: CPT | Performed by: INTERNAL MEDICINE

## 2024-11-20 PROCEDURE — 250N000009 HC RX 250: Performed by: INTERNAL MEDICINE

## 2024-11-20 PROCEDURE — 99233 SBSQ HOSP IP/OBS HIGH 50: CPT | Performed by: PHYSICIAN ASSISTANT

## 2024-11-20 PROCEDURE — 250N000013 HC RX MED GY IP 250 OP 250 PS 637: Performed by: PHYSICIAN ASSISTANT

## 2024-11-20 PROCEDURE — 92526 ORAL FUNCTION THERAPY: CPT | Mod: GN | Performed by: SPEECH-LANGUAGE PATHOLOGIST

## 2024-11-20 PROCEDURE — 250N000013 HC RX MED GY IP 250 OP 250 PS 637: Performed by: INTERNAL MEDICINE

## 2024-11-20 PROCEDURE — 87205 SMEAR GRAM STAIN: CPT | Performed by: INTERNAL MEDICINE

## 2024-11-20 PROCEDURE — 87070 CULTURE OTHR SPECIMN AEROBIC: CPT | Performed by: INTERNAL MEDICINE

## 2024-11-20 PROCEDURE — 99222 1ST HOSP IP/OBS MODERATE 55: CPT | Mod: 57 | Performed by: PHYSICIAN ASSISTANT

## 2024-11-20 PROCEDURE — 92610 EVALUATE SWALLOWING FUNCTION: CPT | Mod: GN | Performed by: SPEECH-LANGUAGE PATHOLOGIST

## 2024-11-20 PROCEDURE — 99207 PR APP CREDIT; MD BILLING SHARED VISIT: CPT | Performed by: PHYSICIAN ASSISTANT

## 2024-11-20 PROCEDURE — 74177 CT ABD & PELVIS W/CONTRAST: CPT

## 2024-11-20 PROCEDURE — 82306 VITAMIN D 25 HYDROXY: CPT | Performed by: INTERNAL MEDICINE

## 2024-11-20 PROCEDURE — 36415 COLL VENOUS BLD VENIPUNCTURE: CPT | Performed by: INTERNAL MEDICINE

## 2024-11-20 PROCEDURE — 94640 AIRWAY INHALATION TREATMENT: CPT

## 2024-11-20 PROCEDURE — 120N000001 HC R&B MED SURG/OB

## 2024-11-20 PROCEDURE — 999N000157 HC STATISTIC RCP TIME EA 10 MIN

## 2024-11-20 PROCEDURE — 76882 US LMTD JT/FCL EVL NVASC XTR: CPT | Mod: RT

## 2024-11-20 PROCEDURE — 94640 AIRWAY INHALATION TREATMENT: CPT | Mod: 76

## 2024-11-20 PROCEDURE — 250N000011 HC RX IP 250 OP 636: Performed by: INTERNAL MEDICINE

## 2024-11-20 RX ORDER — FLUTICASONE FUROATE AND VILANTEROL 200; 25 UG/1; UG/1
1 POWDER RESPIRATORY (INHALATION) DAILY
Status: DISCONTINUED | OUTPATIENT
Start: 2024-11-20 | End: 2024-11-22 | Stop reason: HOSPADM

## 2024-11-20 RX ORDER — BENZONATATE 100 MG/1
100 CAPSULE ORAL 3 TIMES DAILY PRN
Status: DISCONTINUED | OUTPATIENT
Start: 2024-11-20 | End: 2024-11-22 | Stop reason: HOSPADM

## 2024-11-20 RX ORDER — IOPAMIDOL 755 MG/ML
48 INJECTION, SOLUTION INTRAVASCULAR ONCE
Status: COMPLETED | OUTPATIENT
Start: 2024-11-20 | End: 2024-11-20

## 2024-11-20 RX ORDER — ONDANSETRON 2 MG/ML
4 INJECTION INTRAMUSCULAR; INTRAVENOUS EVERY 6 HOURS PRN
Status: DISCONTINUED | OUTPATIENT
Start: 2024-11-20 | End: 2024-11-22 | Stop reason: HOSPADM

## 2024-11-20 RX ORDER — AMOXICILLIN 250 MG
3 CAPSULE ORAL DAILY
Status: DISCONTINUED | OUTPATIENT
Start: 2024-11-20 | End: 2024-11-22 | Stop reason: HOSPADM

## 2024-11-20 RX ORDER — CEFAZOLIN SODIUM 2 G/100ML
2 INJECTION, SOLUTION INTRAVENOUS
Status: CANCELLED | OUTPATIENT
Start: 2024-11-20

## 2024-11-20 RX ORDER — ALBUTEROL SULFATE 0.83 MG/ML
2.5 SOLUTION RESPIRATORY (INHALATION)
Status: DISCONTINUED | OUTPATIENT
Start: 2024-11-20 | End: 2024-11-22 | Stop reason: HOSPADM

## 2024-11-20 RX ORDER — MIRTAZAPINE 15 MG/1
15 TABLET, FILM COATED ORAL AT BEDTIME
Status: DISCONTINUED | OUTPATIENT
Start: 2024-11-20 | End: 2024-11-22 | Stop reason: HOSPADM

## 2024-11-20 RX ORDER — POLYETHYLENE GLYCOL 3350 17 G
2 POWDER IN PACKET (EA) ORAL
Status: DISCONTINUED | OUTPATIENT
Start: 2024-11-20 | End: 2024-11-22 | Stop reason: HOSPADM

## 2024-11-20 RX ORDER — TAMSULOSIN HYDROCHLORIDE 0.4 MG/1
0.4 CAPSULE ORAL DAILY
Status: DISCONTINUED | OUTPATIENT
Start: 2024-11-20 | End: 2024-11-22 | Stop reason: HOSPADM

## 2024-11-20 RX ORDER — ONDANSETRON 4 MG/1
4 TABLET, ORALLY DISINTEGRATING ORAL EVERY 6 HOURS PRN
Status: DISCONTINUED | OUTPATIENT
Start: 2024-11-20 | End: 2024-11-22 | Stop reason: HOSPADM

## 2024-11-20 RX ORDER — PANTOPRAZOLE SODIUM 40 MG/1
40 TABLET, DELAYED RELEASE ORAL
Status: DISCONTINUED | OUTPATIENT
Start: 2024-11-20 | End: 2024-11-22 | Stop reason: HOSPADM

## 2024-11-20 RX ORDER — BISACODYL 10 MG
10 SUPPOSITORY, RECTAL RECTAL DAILY PRN
Status: DISCONTINUED | OUTPATIENT
Start: 2024-11-20 | End: 2024-11-22 | Stop reason: HOSPADM

## 2024-11-20 RX ORDER — LIDOCAINE 40 MG/G
CREAM TOPICAL
Status: DISCONTINUED | OUTPATIENT
Start: 2024-11-20 | End: 2024-11-22 | Stop reason: HOSPADM

## 2024-11-20 RX ORDER — BUTALBITAL, ACETAMINOPHEN AND CAFFEINE 50; 325; 40 MG/1; MG/1; MG/1
1 TABLET ORAL EVERY 6 HOURS PRN
Status: DISCONTINUED | OUTPATIENT
Start: 2024-11-20 | End: 2024-11-22 | Stop reason: HOSPADM

## 2024-11-20 RX ORDER — ACETAMINOPHEN 650 MG/1
650 SUPPOSITORY RECTAL EVERY 4 HOURS PRN
Status: DISCONTINUED | OUTPATIENT
Start: 2024-11-20 | End: 2024-11-22 | Stop reason: HOSPADM

## 2024-11-20 RX ORDER — OXYCODONE HYDROCHLORIDE 5 MG/1
5 TABLET ORAL
Status: DISCONTINUED | OUTPATIENT
Start: 2024-11-20 | End: 2024-11-20 | Stop reason: ALTCHOICE

## 2024-11-20 RX ORDER — HYDROMORPHONE HCL IN WATER/PF 6 MG/30 ML
0.2 PATIENT CONTROLLED ANALGESIA SYRINGE INTRAVENOUS
Status: DISCONTINUED | OUTPATIENT
Start: 2024-11-20 | End: 2024-11-20

## 2024-11-20 RX ORDER — GABAPENTIN 100 MG/1
100 CAPSULE ORAL DAILY
Status: DISCONTINUED | OUTPATIENT
Start: 2024-11-20 | End: 2024-11-20

## 2024-11-20 RX ORDER — NALOXONE HYDROCHLORIDE 0.4 MG/ML
0.4 INJECTION, SOLUTION INTRAMUSCULAR; INTRAVENOUS; SUBCUTANEOUS
Status: DISCONTINUED | OUTPATIENT
Start: 2024-11-20 | End: 2024-11-22 | Stop reason: HOSPADM

## 2024-11-20 RX ORDER — NICOTINE 21 MG/24HR
1 PATCH, TRANSDERMAL 24 HOURS TRANSDERMAL DAILY
Status: DISCONTINUED | OUTPATIENT
Start: 2024-11-20 | End: 2024-11-22 | Stop reason: HOSPADM

## 2024-11-20 RX ORDER — POLYETHYLENE GLYCOL 3350 17 G/17G
17 POWDER, FOR SOLUTION ORAL 2 TIMES DAILY PRN
Status: DISCONTINUED | OUTPATIENT
Start: 2024-11-20 | End: 2024-11-22 | Stop reason: HOSPADM

## 2024-11-20 RX ORDER — AMLODIPINE BESYLATE 10 MG/1
10 TABLET ORAL DAILY
Status: DISCONTINUED | OUTPATIENT
Start: 2024-11-20 | End: 2024-11-22 | Stop reason: HOSPADM

## 2024-11-20 RX ORDER — AMOXICILLIN 250 MG
2 CAPSULE ORAL 2 TIMES DAILY PRN
Status: DISCONTINUED | OUTPATIENT
Start: 2024-11-20 | End: 2024-11-22 | Stop reason: HOSPADM

## 2024-11-20 RX ORDER — ACETAMINOPHEN 325 MG/1
650 TABLET ORAL EVERY 4 HOURS PRN
Status: DISCONTINUED | OUTPATIENT
Start: 2024-11-20 | End: 2024-11-22 | Stop reason: HOSPADM

## 2024-11-20 RX ORDER — ATORVASTATIN CALCIUM 40 MG/1
40 TABLET, FILM COATED ORAL DAILY
Status: DISCONTINUED | OUTPATIENT
Start: 2024-11-20 | End: 2024-11-22 | Stop reason: HOSPADM

## 2024-11-20 RX ORDER — NALOXONE HYDROCHLORIDE 0.4 MG/ML
0.2 INJECTION, SOLUTION INTRAMUSCULAR; INTRAVENOUS; SUBCUTANEOUS
Status: DISCONTINUED | OUTPATIENT
Start: 2024-11-20 | End: 2024-11-22 | Stop reason: HOSPADM

## 2024-11-20 RX ORDER — IPRATROPIUM BROMIDE AND ALBUTEROL SULFATE 2.5; .5 MG/3ML; MG/3ML
1 SOLUTION RESPIRATORY (INHALATION)
Status: DISCONTINUED | OUTPATIENT
Start: 2024-11-20 | End: 2024-11-22 | Stop reason: HOSPADM

## 2024-11-20 RX ORDER — CALCIUM CARBONATE 500 MG/1
500 TABLET, CHEWABLE ORAL 3 TIMES DAILY PRN
Status: DISCONTINUED | OUTPATIENT
Start: 2024-11-20 | End: 2024-11-22 | Stop reason: HOSPADM

## 2024-11-20 RX ORDER — PROCHLORPERAZINE MALEATE 5 MG/1
5 TABLET ORAL EVERY 6 HOURS PRN
Status: DISCONTINUED | OUTPATIENT
Start: 2024-11-20 | End: 2024-11-22 | Stop reason: HOSPADM

## 2024-11-20 RX ORDER — LOSARTAN POTASSIUM 100 MG/1
100 TABLET ORAL DAILY
Status: DISCONTINUED | OUTPATIENT
Start: 2024-11-20 | End: 2024-11-22 | Stop reason: HOSPADM

## 2024-11-20 RX ORDER — GABAPENTIN 100 MG/1
100 CAPSULE ORAL 3 TIMES DAILY
Status: DISCONTINUED | OUTPATIENT
Start: 2024-11-20 | End: 2024-11-22 | Stop reason: HOSPADM

## 2024-11-20 RX ORDER — OXYCODONE HYDROCHLORIDE 5 MG/1
5 TABLET ORAL 4 TIMES DAILY
Status: DISCONTINUED | OUTPATIENT
Start: 2024-11-20 | End: 2024-11-22 | Stop reason: HOSPADM

## 2024-11-20 RX ORDER — LIDOCAINE 4 G/G
3 PATCH TOPICAL
Status: DISCONTINUED | OUTPATIENT
Start: 2024-11-21 | End: 2024-11-21

## 2024-11-20 RX ORDER — ACETYLCYSTEINE 200 MG/ML
2 SOLUTION ORAL; RESPIRATORY (INHALATION) 3 TIMES DAILY
Status: DISCONTINUED | OUTPATIENT
Start: 2024-11-20 | End: 2024-11-22 | Stop reason: HOSPADM

## 2024-11-20 RX ORDER — CEFAZOLIN SODIUM 2 G/100ML
2 INJECTION, SOLUTION INTRAVENOUS SEE ADMIN INSTRUCTIONS
Status: CANCELLED | OUTPATIENT
Start: 2024-11-20

## 2024-11-20 RX ORDER — HYDROMORPHONE HCL IN WATER/PF 6 MG/30 ML
0.2 PATIENT CONTROLLED ANALGESIA SYRINGE INTRAVENOUS EVERY 4 HOURS PRN
Status: DISCONTINUED | OUTPATIENT
Start: 2024-11-20 | End: 2024-11-22 | Stop reason: HOSPADM

## 2024-11-20 RX ORDER — GUAIFENESIN 600 MG/1
600 TABLET, EXTENDED RELEASE ORAL 2 TIMES DAILY
Status: DISCONTINUED | OUTPATIENT
Start: 2024-11-20 | End: 2024-11-22 | Stop reason: HOSPADM

## 2024-11-20 RX ORDER — AMOXICILLIN 250 MG
1 CAPSULE ORAL 2 TIMES DAILY PRN
Status: DISCONTINUED | OUTPATIENT
Start: 2024-11-20 | End: 2024-11-22 | Stop reason: HOSPADM

## 2024-11-20 RX ORDER — HYDROMORPHONE HCL IN WATER/PF 6 MG/30 ML
0.4 PATIENT CONTROLLED ANALGESIA SYRINGE INTRAVENOUS
Status: DISCONTINUED | OUTPATIENT
Start: 2024-11-20 | End: 2024-11-20 | Stop reason: ALTCHOICE

## 2024-11-20 RX ADMIN — OXYCODONE HYDROCHLORIDE 5 MG: 5 TABLET ORAL at 20:33

## 2024-11-20 RX ADMIN — SENNOSIDES AND DOCUSATE SODIUM 3 TABLET: 50; 8.6 TABLET ORAL at 14:12

## 2024-11-20 RX ADMIN — IPRATROPIUM BROMIDE AND ALBUTEROL SULFATE 3 ML: .5; 3 SOLUTION RESPIRATORY (INHALATION) at 11:36

## 2024-11-20 RX ADMIN — IOPAMIDOL 48 ML: 755 INJECTION, SOLUTION INTRAVENOUS at 01:23

## 2024-11-20 RX ADMIN — GABAPENTIN 100 MG: 100 CAPSULE ORAL at 07:54

## 2024-11-20 RX ADMIN — ATORVASTATIN CALCIUM 40 MG: 40 TABLET, FILM COATED ORAL at 07:54

## 2024-11-20 RX ADMIN — AMLODIPINE BESYLATE 10 MG: 10 TABLET ORAL at 07:54

## 2024-11-20 RX ADMIN — TAMSULOSIN HYDROCHLORIDE 0.4 MG: 0.4 CAPSULE ORAL at 07:54

## 2024-11-20 RX ADMIN — LOSARTAN POTASSIUM 100 MG: 100 TABLET, FILM COATED ORAL at 07:54

## 2024-11-20 RX ADMIN — FLUTICASONE FUROATE AND VILANTEROL TRIFENATATE 1 PUFF: 200; 25 POWDER RESPIRATORY (INHALATION) at 07:54

## 2024-11-20 RX ADMIN — IPRATROPIUM BROMIDE AND ALBUTEROL SULFATE 3 ML: .5; 3 SOLUTION RESPIRATORY (INHALATION) at 08:34

## 2024-11-20 RX ADMIN — MIRTAZAPINE 15 MG: 15 TABLET, FILM COATED ORAL at 20:33

## 2024-11-20 RX ADMIN — SODIUM CHLORIDE 60 ML: 9 INJECTION, SOLUTION INTRAVENOUS at 01:24

## 2024-11-20 RX ADMIN — MELATONIN TAB 3 MG 3 MG: 3 TAB at 20:35

## 2024-11-20 RX ADMIN — OXYCODONE HYDROCHLORIDE 5 MG: 5 TABLET ORAL at 06:38

## 2024-11-20 RX ADMIN — GABAPENTIN 100 MG: 100 CAPSULE ORAL at 20:33

## 2024-11-20 RX ADMIN — IPRATROPIUM BROMIDE AND ALBUTEROL SULFATE 3 ML: .5; 3 SOLUTION RESPIRATORY (INHALATION) at 17:46

## 2024-11-20 RX ADMIN — ACETYLCYSTEINE 2 ML: 200 SOLUTION ORAL; RESPIRATORY (INHALATION) at 15:26

## 2024-11-20 RX ADMIN — ACETYLCYSTEINE 2 ML: 200 SOLUTION ORAL; RESPIRATORY (INHALATION) at 08:35

## 2024-11-20 RX ADMIN — GUAIFENESIN 600 MG: 600 TABLET, EXTENDED RELEASE ORAL at 07:54

## 2024-11-20 RX ADMIN — CALCIUM CARBONATE (ANTACID) CHEW TAB 500 MG 500 MG: 500 CHEW TAB at 17:54

## 2024-11-20 RX ADMIN — IPRATROPIUM BROMIDE AND ALBUTEROL SULFATE 3 ML: .5; 3 SOLUTION RESPIRATORY (INHALATION) at 15:27

## 2024-11-20 RX ADMIN — UMECLIDINIUM 1 PUFF: 62.5 AEROSOL, POWDER ORAL at 07:54

## 2024-11-20 RX ADMIN — HYDROMORPHONE HYDROCHLORIDE 1 MG: 2 TABLET ORAL at 17:27

## 2024-11-20 RX ADMIN — OXYCODONE HYDROCHLORIDE 5 MG: 5 TABLET ORAL at 14:13

## 2024-11-20 RX ADMIN — GUAIFENESIN 600 MG: 600 TABLET, EXTENDED RELEASE ORAL at 20:35

## 2024-11-20 ASSESSMENT — ACTIVITIES OF DAILY LIVING (ADL)
ADLS_ACUITY_SCORE: 0
DEPENDENT_IADLS:: INDEPENDENT
ADLS_ACUITY_SCORE: 0

## 2024-11-20 NOTE — PROGRESS NOTES
Observation goals  PRIOR TO DISCHARGE        Comments:   -diagnostic tests and consults completed and resulted- NOT MET  -vital signs normal or at patient baseline- NOT MET, HTN  -tolerating oral intake to maintain hydration- in progress  -adequate pain control on oral analgesics- MET  -returns to baseline functional status- NOT MET  -safe disposition plan has been identified- NOT MET  Nurse to notify provider when observation goals have been met and patient is ready for discharge.

## 2024-11-20 NOTE — PROGRESS NOTES
Observation goals  PRIOR TO DISCHARGE          -diagnostic tests and consults completed and resulted- NOT MET  -vital signs normal or at patient baseline- NOT MET, HTN  -tolerating oral intake to maintain hydration- NOT MET  -adequate pain control on oral analgesics- MET  -returns to baseline functional status- NOT MET  -safe disposition plan has been identified- NOT MET    Nurse to notify provider when observation goals have been met and patient is ready for discharge.

## 2024-11-20 NOTE — CONSULTS
Care Management Initial Consult    General Information  Assessment completed with: Ángel Santo  Type of CM/SW Visit: Initial Assessment    Primary Care Provider verified and updated as needed: Yes   Readmission within the last 30 days: no previous admission in last 30 days      Reason for Consult: discharge planning  Advance Care Planning: Advance Care Planning Reviewed: no concerns identified          Communication Assessment  Patient's communication style: spoken language (English or Bilingual)    Hearing Difficulty or Deaf: no        Cognitive  Cognitive/Neuro/Behavioral: .WDL except, mood/behavior           Mood/Behavior: labile          Living Environment:   People in home: alone     Current living Arrangements: house      Able to return to prior arrangements: yes       Family/Social Support:  Care provided by: self, child(efren)  Provides care for: no one  Marital Status: Single  Support system:            Description of Support System:           Current Resources:   Patient receiving home care services: No        Community Resources: Housekeeping/Chore Agency  Equipment currently used at home: none  Supplies currently used at home: Nebulizer tubing (and machine)    Employment/Financial:  Employment Status: retired        Financial Concerns:             Does the patient's insurance plan have a 3 day qualifying hospital stay waiver?  Yes     Which insurance plan 3 day waiver is available? Alternative insurance waiver    Will the waiver be used for post-acute placement? No    Lifestyle & Psychosocial Needs:  Social Drivers of Health     Food Insecurity: Low Risk  (6/12/2024)    Food Insecurity     Within the past 12 months, did you worry that your food would run out before you got money to buy more?: No     Within the past 12 months, did the food you bought just not last and you didn t have money to get more?: No   Depression: Not at risk (10/24/2024)    PHQ-2     PHQ-2 Score: Incomplete   Housing Stability:  Low Risk  (6/12/2024)    Housing Stability     Do you have housing? : Yes     Are you worried about losing your housing?: No   Tobacco Use: High Risk (11/12/2024)    Patient History     Smoking Tobacco Use: Every Day     Smokeless Tobacco Use: Never     Passive Exposure: Not on file   Financial Resource Strain: Low Risk  (6/12/2024)    Financial Resource Strain     Within the past 12 months, have you or your family members you live with been unable to get utilities (heat, electricity) when it was really needed?: No   Alcohol Use: Unknown (7/25/2019)    AUDIT-C     Frequency of Alcohol Consumption: 4 or more times a week     Average Number of Drinks: 1 or 2     Frequency of Binge Drinking: Not on file   Transportation Needs: Low Risk  (6/12/2024)    Transportation Needs     Within the past 12 months, has lack of transportation kept you from medical appointments, getting your medicines, non-medical meetings or appointments, work, or from getting things that you need?: No   Physical Activity: Inactive (3/6/2024)    Exercise Vital Sign     Days of Exercise per Week: 0 days     Minutes of Exercise per Session: 0 min   Interpersonal Safety: Not on file   Stress: No Stress Concern Present (3/6/2024)    [employer] - Occupational Stress Questionnaire     Feeling of Stress : Only a little   Social Connections: Unknown (3/6/2024)    Social Connection and Isolation Panel [NHANES]     Frequency of Communication with Friends and Family: Not on file     Frequency of Social Gatherings with Friends and Family: Three times a week     Attends Sabianism Services: Not on file     Active Member of Clubs or Organizations: Not on file     Attends Club or Organization Meetings: Not on file     Marital Status: Not on file   Health Literacy: Not on file       Functional Status:  Prior to admission patient needed assistance:   Dependent ADLs:: Independent  Dependent IADLs:: Independent       Mental Health  "Status:  Mental Health Status: No Current Concerns       Chemical Dependency Status:                Values/Beliefs:  Spiritual, Cultural Beliefs, Buddhist Practices, Values that affect care: no               Discussed  Partnership in Safe Discharge Planning  document with patient/family: Yes: verbally with patient    Additional Information:  Writer met with patient in room. Patient did not really want to speak to writer as he was trying to get comfortable in a reclining chair. He tells writer he just wants to see an MD and get answers for his groin pain. Well writer was asking about home supplies, patient told writer, \"I am not here for any of that.\" Patient did not appear to want to speak to writer any longer.  Per Chart review, Patient lives alone with help form his daughter, Enedina. He has had home care in the past. HE did tell writer, \"They wanted me to go to a Nursing home and I went home and did just fine.\"    Next Steps: follow for any discharge needs.    Renetta Dorsey RN      "

## 2024-11-20 NOTE — PLAN OF CARE
Goal Outcome Evaluation:      Plan of Care Reviewed With: patient    Overall Patient Progress: no change    PRIMARY Concern: Cough with COPD exacerbation, R sided groin pain.  SAFETY RISK Concerns (fall risk, behaviors, etc.): Fall Risk      Aggression Tool Color: Green/Yellow  Isolation/Type: None  Tests/Procedures for NEXT shift: Sputum sample needed, PVRs  Consults? (Pending/following, signed-off?) SLP and SW consulted  Where is patient from? (Home, TCU, etc.): Home, lives alone.  Other Important info for NEXT shift: Scheduled Nebs, Frequent productive cough, sputum sample needed. LS diminished.  Anticipated DC date & active delays: TBD  _____________________________________________________________________________  SUMMARY NOTE:              Orientation/Cognitive: A&Ox4, irritable at times.  Observation Goals (Met/ Not Met): Not Met  Mobility Level/Assist Equipment: SBA  Antibiotics & Plan (IV/po, length of tx left): N/A  Pain Management: Denies pain at rest, pain 7-8/10 in R groin with coughing. Declined PRNs  Tele/VS/O2: BP elevated, all other VSS, on RA  ABNL Lab/BG: Hgb 12.2; CT abd/pelvis shows b/l hydronephrosis.  Diet: Regular  Bowel/Bladder: Continent, some retention. [id number]  Skin Concerns: Refused skin assessment  Drains/Devices: PIV S.L.  Patient Stated Goal for Today: Rest            Observation goals  PRIOR TO DISCHARGE        Comments:   -diagnostic tests and consults completed and resulted- NOT MET  -vital signs normal or at patient baseline- NOT MET, HTN  -tolerating oral intake to maintain hydration- in progress  -adequate pain control on oral analgesics- MET  -returns to baseline functional status- NOT MET  -safe disposition plan has been identified- NOT MET  Nurse to notify provider when observation goals have been met and patient is ready for discharge.

## 2024-11-20 NOTE — ED TRIAGE NOTES
[facility]  ED Arrival Note      Means of Arrival: EMS  Comes from: Home    Story:Pt brought in by EMS from home for cough, shortness of breath and R groin hernia pain. Pt told EMS he has history of COPD. Pt reported that he just lost his albuterol inhaler in the ambulance         EMS/PD Interventions: PIV  EMS Medications: N/A    Meets Stroke Criteria? No  Meets Trauma Criteria? No  Shock Index (HR/SBP): N/A      Directed to: Main ED  Belongings: Remain with patient

## 2024-11-20 NOTE — H&P
[facility]    History and Physical - Hospitalist Service       Date of Admission:  11/19/2024    Assessment & Plan      Ángel Pham is a 69 year old male admitted on 11/19/2024. He presents to the emergency department with complaints of right groin pain when coughing.  Found to have a lower abdominal hernia as well as palpable inguinal mass suspicious for calcified lymph node.  Admission was requested for treatment of cough and pain control.    Subacute cough: Follows closely with pulmonology, and was treated for a COPD exacerbation in the past weeks.  He was on a 15-day taper of steroids starting October 25, and completed a course of azithromycin 11/18/2024.  Reports thick green sputum.  No wheezing at this time.  Primary concern is that the sputum feels as though it gets stuck in his throat and he is unable to clear it.  No fevers, no chills.  Severe chronic obstructive pulmonary disease: Recent PFTs completed in October.  Follows closely with pulmonology.  Severe obstruction without much for bronchodilator response, severe diffusion limitations noted.  Not on home oxygen.  Continues to smoke.  -Received a dose of Solu-Medrol in the emergency department.  Does not seem consistent with acute COPD exacerbation without wheezing or change in his chronic shortness of breath, no hypoxia.  Not continuing additional steroid burst at this time  -Continue DuoNebs 5 times daily as per home regimen  -Mucomyst nebulizer treatments 3 times daily scheduled with DuoNebs  -Guaifenesin twice daily  -Continue Trelegy inhaler, pharmacy substitute for Breo Ellipta and Incruse Ellipta okay.  -Tessalon Perles if able to swallow without difficulty  -Added low-dose as needed oxycodone for air hunger, this may help with cough.  Patient is already taking 5 mg oxycodone approximately 5 times per day for throat pain.  Could consider increasing frequency with 2.5 mg doses to help with cough as well at  discharge.  -Discussed importance of complete tobacco cessation.  He does appear to have increase in mucus consistent with smoker's cough worse after he has been laying down overnight.    Dysphagia: Patient reports that over the past year or so he has had more issues with phlegm and pills getting stuck in his throat.  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  -Speech pathology consult, anticipate video swallow study with possible gastroenterology consultation  -Might benefit from splitting pills at home including lisinopril, amlodipine    Severe protein calorie malnutrition: Cachexia secondary to underlying COPD, possible contribution from potential active malignancy  -Ensure between meals  -Dysphagia consult as above  -Continue mirtazapine 15 mg at bedtime    Right groin pain: Suspect femoral nerve irritation from adjacent lymph node when coughing  Right inguinal lymph node: Patient with what I believe to be a calcified lymph node in his right groin measuring 2 cm x 1 cm.  Firm, nonpulsatile, largely mobile, but not rubbery.  This is the site of his pain when he coughs resulting in a shooting pain down his thigh which might be femoral nerve irritation from movement of adjacent lymph node when coughing (Describes anterior cutaneous femoral nerve distribution).  -CT abdomen pelvis.  Would want to differentiate findings of what appears to be a lower abdominal hernia superior to inguinal canal and 2 cm x 1 cm fullness concerning for lymph node, especially if the patient would be considered for procedural intervention given his underlying medical comorbidities.  -PSA ordered; history of enlarged prostate as well as recurrent pulmonary nodules concerning for malignancy  -Acetaminophen, low-dose oxycodone.  IV Dilaudid is available if needed.  -Initiating low-dose gabapentin for neuropathic description of pain  -Treatment of cough as above as this causes irritation and  associated discomfort    Abdominal hernia: Patient with soft bulging of his R lower abdomen superior to inguinal canal when coughing.  Does not have abdominal pain with this, but instead describes a shooting pain down his medial thigh.  -CT abdomen pelvis as above  -Could consider surgical consultation for evaluation pending CT findings.  This is not yet ordered    Tobacco use disorder: Continues to smoke 1 pack/day.  Tells me he has been trying to cut back on cigarettes by not smoking when others are at his home, attempting not to smoke as much during the day.  He has had minimal success thus far.  Discussed that his ongoing tobacco use is likely accelerating his death.  -Nicotine replacement therapy offered, he declines and tells me he does not need it while in the hospital  -Continue to encourage complete cessation    Current right lower lobe spiculated lung nodule, prior history of presumed primary lung malignancy left upper lobe: Right lower lobe nodular opacity concerning for possible neoplasm noted 10/21/2024 on CT chest, new from April imaging.  Follows with Dr. Ann of thoracic surgery.  History of left upper lobe lung nodule for which he completed stereotactic radiation therapy in July 2023 for what was presumed to be primary pulmonary malignancy.  Was not biopsied at that time (not amenable to surgery or bx)  -Continue with plan for 3-month follow-up.  -Addendum: Spiculated nodule appears slightly smaller than prior imaging as visualized on abdomen pelvis CT.  Improved size suggests against neoplasm, but should still have follow-up imaging.  As he recently completed azithromycin course, no further anti-infectives at this time.  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 Mehta of ENT.  Treated with radiation therapy in the fall 2019 and felt to be in remission.  -Speech  pathology consult as above for dysphagia with prior neck radiation    Chronic pain on chronic narcotic therapy:  -Continue prior to admission oxycodone 5 mg  -Have added 2.5 mg low-dose oxycodone for air hunger and mild pain; might be helpful to assess if this is beneficial for patient's cough and dyspnea.    Bilateral hydronephrosis: Likely related to bladder outlet obstruction.  -Initiate Flomax 0.4 mg daily  -Bladder management protocol in place  -Will need to monitor for retention, which may then necessitate intermittent straight catheterization or Gannon catheter placement.            Diet:  Regular diet as tolerated; aspiration precautions, speech pathology consultation  DVT Prophylaxis: Pneumatic Compression Devices  Gannon Catheter: Not present  Lines: None     Cardiac Monitoring: None  Code Status:  DNR/DNI.  Discussed with patient on admission.  Note that this is a change from previously documented CODE STATUS.  Initially desired resuscitation attempts, but did not want to be intubated.  Discussed process of cardiopulmonary resuscitation, which often includes intubation, and patient opted instead for DNR/DNI status.  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    Clinically Significant Risk Factors Present on Admission         # Hyponatremia: Lowest Na = 134 mmol/L in last 2 days, will monitor as appropriate  # Hypochloremia: Lowest Cl = 95 mmol/L in last 2 days, will monitor as appropriate          # Hypertension: Noted on problem list               # Financial/Environmental Concerns:           Disposition Plan     Medically Ready for Discharge: Anticipated Tomorrow           Sanya Dey MD  Hospitalist Service  [facility]  Securely message with [employer] (more info)  Text page via [facility] Paging/Directory     ______________________________________________________________________    Chief Complaint    Right groin and thigh pain with coughing    History is obtained from the patient, chart review, discussion with Dr. Ray in the emergency department, review of outside records including recent [place] [facility] follow-up with records from April 2024 available to me.    History of Present Illness   Ángel Pham is a 69 year old male who presented to the emergency department for evaluation of right groin and thigh pain with coughing episodes.  Has had similar issues with this in the past, was seen in the emergency department in August for the same symptoms.  At that time, was felt that his discomfort might be musculoskeletal.  X-ray without lytic lesion for fracture, right lower extremity ultrasound negative for DVT.  Followed with his primary care doctor in September, again no clear etiology for his groin pain was identified.    Patient has recently been followed by his oncology, thoracic surgery, and pulmonology teams.  On October 25 he was prescribed a 15-day prednisone taper for possible COPD exacerbation as he was complaining of green phlegm with coughing.  He has baseline dyspnea with minimal exertion, but is still ambulatory around his home.  Does receive some help with chores in the house, but does his own food preparation.  He still had sputum production and cough with completion of his steroids, and was subsequently prescribed a Z-Jerome by his pulmonary team.  He completed his Z-Jerome with last dose 11/18/2024.  He has not had fevers or chills.  Tells me that his chronic sputum production is green, but this might be increased slightly from his baseline.  Tells me that his sputum production typically waxes and wanes, worsens after he has been laying down.  Continues to smoke 1 pack/day, and this is likely contributing as well.  No change in dyspnea.    Not currently taking any mucolytics.  Does utilize DuoNebs 5 times per day which helps somewhat with sputum production.  His primary concern with  his mucus is that it feels that it sticks in his throat he is unable to clear this completely.  This makes him cough more, and in turn, exacerbates his groin pain.    On evaluation in the emergency department for his groin pain he has noted to have a lower abdominal hernia.  This appears to be above the level of the inguinal canal when he coughs.  Soft and nontender.  His site of pain is actually associated with a firm palpable mass measuring approximately 2 cm x 1 cm and suspicious for calcified inguinal lymph node.  He tells me that when he coughs, the site of pain radiates from this spot down his medial thigh.  He tells me it does not go down his entire leg.  No abdominal discomfort.    In the emergency department, it was thought perhaps his hernia was the cause of his pain, and general surgery was contacted.  Surgery felt that his cachexia and possible pulmonary malignancy, COPD would not necessarily be absolute contraindications to surgical intervention on a symptomatic inguinal hernia.    Observation admission was requested for symptomatic treatment of cough and pain control for his right groin.    Discussed with patient as well as ER provider that patient is currently completing treatment for a COPD exacerbation, and surgery, if indicated, would be better pursued as an elective process when medical comorbidities are better controlled as he would be high risk for pulmonary complication.  Has already had a history of pneumothorax associated with bullous emphysema, and would want him medically optimized prior to any nonemergent surgical intervention.          Past Medical History    Past Medical History:   Diagnosis Date    Abrasions of multiple sites 08/01/2022    Alcoholic intoxication with complication (H) 07/31/2022    Allergy history unknown     Carcinoma of supraglottis (H)     Community acquired pneumonia of left lung, unspecified part of lung 11/16/2023    COPD (chronic obstructive pulmonary disease) (H)      HTN, goal below 140/90     Hyperlipidaemia LDL goal < 100     Hypoglycemia 07/31/2022    Migraine     Mild alcohol use disorder 10/06/2021    Pneumothorax on left     Uncomplicated alcohol dependence (H) 02/13/2023       Past Surgical History   Past Surgical History:   Procedure Laterality Date    BRONCHOSCOPY      BRONCHOSCOPY RIGID OR FLEXIBLE W/TRANSENDOSCOPIC ENDOBRONCHIAL ULTRASOUND GUIDED N/A 1/18/2021    Procedure: BRONCHOSCOPY, WITH ENDOBRONCHIAL ULTRASOUND;  Surgeon: Yasir Combs MD;  Location:  OR    ENT SURGERY      sinus    HC HEMORROIDECTOMY, EXTERNAL, SINGLE COLUMN/GROUP      left    HERNIA REPAIR      umbilical and groin    LARYNGOSCOPY WITH BIOPSY(IES) Left 7/26/2019    Procedure: MICRODIRECT LARYNGOSCOPY WITH BIOPSY OF LEFT LARYNGEAL MASS;  Surgeon: Boo Mehta MD;  Location:  OR    THORACIC SURGERY Right 2005    pneumothorax       Prior to Admission Medications   Prior to Admission Medications   Prescriptions Last Dose Informant Patient Reported? Taking?   Fluticasone-Umeclidin-Vilanterol (TRELEGY ELLIPTA) 200-62.5-25 MCG/ACT oral inhaler 11/19/2024 Morning Other, Self No Yes   Sig: Inhale 1 puff into the lungs daily.   albuterol (PROAIR HFA/PROVENTIL HFA/VENTOLIN HFA) 108 (90 Base) MCG/ACT inhaler 11/19/2024 Evening Other, Self No Yes   Sig: Inhale 2 puffs into the lungs every 4 hours as needed for wheezing, shortness of breath or cough.   amLODIPine (NORVASC) 10 MG tablet 11/19/2024 Morning Other, Self No Yes   Sig: TAKE 1 TABLET BY MOUTH DAILY   atorvastatin (LIPITOR) 40 MG tablet 11/19/2024 Morning Other, Self No Yes   Sig: TAKE 1 TABLET BY MOUTH DAILY   butalbital-acetaminophen-caffeine (FIORICET/ESGIC) -40 MG per tablet 11/19/2024 Noon Self, Other Yes Yes   Sig: TAKE 1 TABLET BY MOUTH EVERY 4-6 HOURS AS NEEDED   ipratropium - albuterol 0.5 mg/2.5 mg/3 mL (DUONEB) 0.5-2.5 (3) MG/3ML neb solution 11/19/2024 Evening Other, Self No Yes   Sig: Take 1 vial (3 mLs) by  nebulization 5 times daily.   losartan (COZAAR) 100 MG tablet 11/19/2024 Morning Other, Self No Yes   Sig: TAKE 1 TABLET BY MOUTH DAILY   mirtazapine (REMERON) 15 MG tablet 11/18/2024 Bedtime Other, Self No Yes   Sig: Take 1 tablet (15 mg) by mouth at bedtime.   oxyCODONE (ROXICODONE) 5 MG tablet 11/19/2024 at  2:00 PM Other, Self Yes Yes   Sig: TAKE 1 TABLET BY MOUTH EVERY 3 TO 4 HOURS AS NEEDED FOR PAIN   senna-docusate (SENOKOT-S/PERICOLACE) 8.6-50 MG tablet 11/19/2024 at  2:00 PM Self, Other Yes Yes   Sig: Take 3 tablets by mouth daily. Early afternoon.      Facility-Administered Medications: None           Physical Exam   Vital Signs: Temp: 98  F (36.7  C) Temp src: Temporal BP: (!) 146/91 Pulse: 70   Resp: 11 SpO2: 100 %        General Appearance: Cachectic appearing 69-year-old male.  He appears significantly older than stated age.  Eyes: Sunken orbits.  No scleral icterus or injection  HEENT: Wasting of muscles of mastication  Respiratory: No wheezing, no coughing during our interaction.  No increased work of breathing while at rest.  Cardiovascular: Regular rate and rhythm.  No appreciable murmur  GI: Abdomen thin, soft, nontender to palpation.  Patient has a lower abdominal hernia above the inguinal canal which is soft and bulging when he coughs, nontender.  Has a nontender area of firmness approximately 2 cm x 1 cm in the inguinal canal suspicious for calcified lymph node.  This is the site of his discomfort when coughing that radiates down his leg.  Area of firmness is discrete, not particularly mobile, nonpulsatile  Lymph/Hematologic: No lower extremity edema  Musculoskeletal: Diffuse muscular wasting, severe in nature, with near complete subcutaneous fat loss.  Cachectic  Neurologic: Alert, conversant, appropriate conversation.  Mental status is grossly intact.  Is uncertain of his medication names, tells me that his daughter sets them up in a pillbox for him  Psychiatric: Pleasant, normal  affect    Medical Decision Making       80 MINUTES SPENT BY ME on the date of service doing chart review, history, exam, documentation & further activities per the note.      Data     I have personally reviewed the following data over the past 24 hrs:    5.4  \   12.2 (L)   / 219     134 (L) 95 (L) 10.8 /  97   4.2 27 0.65 (L) \     Procal: N/A CRP: N/A Lactic Acid: 1.8         Imaging results reviewed over the past 24 hrs:   Recent Results (from the past 24 hours)   Chest XR,  PA & LAT    Narrative    EXAM: XR CHEST 2 VIEWS  LOCATION: [facility]  DATE: 11/19/2024    INDICATION: dyspnea, cough wheezing  COMPARISON: The chest 10/21/2024.      Impression    IMPRESSION: Hyperexpansion of the lungs consistent with known emphysema. Chronic scarring in the upper lobes. Irregular nodular opacity in the right lower lobe concerning for possible neoplasm on prior chest CT is again demonstrated. Chest CT follow-up   is recommended. No new lung consolidation, pleural effusion or pneumothorax. Normal heart size.

## 2024-11-20 NOTE — CONSULTS
General Surgery Consultation    Ángel Pham MRN#: [id number]   Age: 69 year old YOB: 1955     Date of Admission:  11/19/2024  Reason for consult: Right groin hernia vs lymphadenopathy       Requesting provider: Micky Charles PA-C       Surgeon:        Tevin Gilmore MD        Chief Complaint:   Abdominal pain, right lower quadrant/groin     History is obtained from the patient and chart review         History of Present Illness:   General Surgery was asked to evaluate this patient at the request of Micky Charles PA-C for painful right groin mass/hernia.    This patient is a 69 year old  male with a significant past medical history (see below) who presents with right groin pain that hurts more with coughing.  This has been going on for about 6 months but has worsened over last 1 month due to his cough.  Reports that his cough causes the pain.  It feels neurologic in nature as it extends into his anterior thigh, and occasionally will cause his hip flexors to fire, causing a kick of his leg.  It does not appear to him to be growing in size but family states this is first time she has been hearing that he has a palpable nodule in this area (not voiced by PCP or other providers).    He has also had some weight loss and is currently 91 pounds.  He felt he was 95 earlier in the week and reports he has always been thin, topping out closer to 127.  He lost about 5-6 pounds after his last pulmonary surgery.  He has COPD and a subacute productive cough.  He continues to smoke.  He is being worked up by the Medicine team for this exacerbation of COPD, as well as a concerning lung nodule.  He has a personal history of throat cancer and has dysphagia; GI consult will be ordered by Hospitalist.  He has a long-term chronic opioid dependence for headaches and chronic pain.          Past Medical History:   Ángel Pham  has a past medical history of Abrasions of multiple sites (08/01/2022),  Alcoholic intoxication with complication (H) (07/31/2022), Allergy history unknown, Carcinoma of supraglottis (H), Cigarette nicotine dependence with nicotine-induced disorder (11/20/2024), Community acquired pneumonia of left lung, unspecified part of lung (11/16/2023), COPD (chronic obstructive pulmonary disease) (H), HTN, goal below 140/90, Hyperlipidaemia LDL goal < 100, Hypoglycemia (07/31/2022), Migraine, Mild alcohol use disorder (10/06/2021), Pneumothorax on left, and Uncomplicated alcohol dependence (H) (02/13/2023).          Past Surgical History:     Past Surgical History:   Procedure Laterality Date    BRONCHOSCOPY      BRONCHOSCOPY RIGID OR FLEXIBLE W/TRANSENDOSCOPIC ENDOBRONCHIAL ULTRASOUND GUIDED N/A 1/18/2021    Procedure: BRONCHOSCOPY, WITH ENDOBRONCHIAL ULTRASOUND;  Surgeon: Yasir Combs MD;  Location:  OR    ENT SURGERY      sinus    HC HEMORROIDECTOMY, EXTERNAL, SINGLE COLUMN/GROUP      left    HERNIA REPAIR      umbilical and groin    LARYNGOSCOPY WITH BIOPSY(IES) Left 7/26/2019    Procedure: MICRODIRECT LARYNGOSCOPY WITH BIOPSY OF LEFT LARYNGEAL MASS;  Surgeon: Boo Mehta MD;  Location:  OR    THORACIC SURGERY Right 2005    pneumothorax             Social History:     Social History     Tobacco Use    Smoking status: Every Day     Current packs/day: 1.00     Average packs/day: 1 pack/day for 43.0 years (43.0 ttl pk-yrs)     Types: Cigarettes    Smokeless tobacco: Never   Substance Use Topics    Alcohol use: Yes     Alcohol/week: 15.0 standard drinks of alcohol     Types: 15 Cans of beer per week     Comment: beer             Family History:   This patient has no significant family history          Allergies:     Allergies   Allergen Reactions    Chantix [Varenicline] Other (See Comments)     Patient reports has tried this in past and had lightheadedness and nose bleeds.    Morphine Hcl     Wellbutrin [Bupropion Hydrobromide]      syncope             Medications:     Prior to  Admission medications    Medication Sig Start Date End Date Taking? Authorizing Provider   albuterol (PROAIR HFA/PROVENTIL HFA/VENTOLIN HFA) 108 (90 Base) MCG/ACT inhaler Inhale 2 puffs into the lungs every 4 hours as needed for wheezing, shortness of breath or cough. 10/25/24  Yes Rico Cortes MD   amLODIPine (NORVASC) 10 MG tablet TAKE 1 TABLET BY MOUTH DAILY 11/5/24  Yes Maxim Goodman MD   atorvastatin (LIPITOR) 40 MG tablet TAKE 1 TABLET BY MOUTH DAILY 11/5/24  Yes Maxim Goodman MD   butalbital-acetaminophen-caffeine (FIORICET/ESGIC) -40 MG per tablet TAKE 1 TABLET BY MOUTH EVERY 4-6 HOURS AS NEEDED 4/6/18  Yes Reported, Patient   Fluticasone-Umeclidin-Vilanterol (TRELEGY ELLIPTA) 200-62.5-25 MCG/ACT oral inhaler Inhale 1 puff into the lungs daily. 10/25/24  Yes Rico Cortes MD   ipratropium - albuterol 0.5 mg/2.5 mg/3 mL (DUONEB) 0.5-2.5 (3) MG/3ML neb solution Take 1 vial (3 mLs) by nebulization 5 times daily. 11/12/24  Yes Maxim Goodman MD   losartan (COZAAR) 100 MG tablet TAKE 1 TABLET BY MOUTH DAILY 11/5/24  Yes Maxim Goodman MD   mirtazapine (REMERON) 15 MG tablet Take 1 tablet (15 mg) by mouth at bedtime. 10/22/24  Yes Maxim Goodman MD   oxyCODONE (ROXICODONE) 5 MG tablet TAKE 1 TABLET BY MOUTH EVERY 3 TO 4 HOURS AS NEEDED FOR PAIN 11/21/23  Yes Micaela Andre MD   senna-docusate (SENOKOT-S/PERICOLACE) 8.6-50 MG tablet Take 3 tablets by mouth daily. Early afternoon.   Yes Unknown, Entered By History             Review of Systems:   The Review of Systems is negative other than noted in the HPI          Physical Exam:   Blood pressure 103/70, pulse 83, temperature 97.7  F (36.5  C), temperature source Oral, resp. rate 16, weight 41.3 kg (91 lb), SpO2 96%.    General - This is a well developed, malnourished male in no apparent distress.  HEENT - Normocephalic. Atraumatic. Moist mucous membranes. Pupils equal.  No scleral  icterus.  Neck - Trachea midline  Lungs - No increased work of breathing at present.    Abdomen - Soft, nontender, nondistended with +bowel sounds.   Right groin: mild bulge of direct space on Rt when compared to the Lt.  Lateral to this, he has a pea shaped nodule that is mildly tender and elicits some discomfort down the leg when palpated.    Extremities - Moves all extremities. Warm without edema.  No breaks in skin or infection noted to LE.  Neurologic - Nonfocal.          Data:   Labs:  WBC -   WBC   Date Value Ref Range Status   04/09/2015 3.1 (L) 4.0 - 11.0 10e9/L Final     WBC Count   Date Value Ref Range Status   11/19/2024 5.4 4.0 - 11.0 10e3/uL Final     WBC x10/cmm   Date Value Ref Range Status   04/24/2023 5.9 3.8 - 11.0 x10/cmm Final     Hgb -   Hemoglobin   Date Value Ref Range Status   11/19/2024 12.2 (L) 13.3 - 17.7 g/dL Final   04/24/2023 13.6 13.4 - 17.5 g/dl Final       Liver Function Studies -   Recent Labs   Lab Test 11/19/24 1945   PROTTOTAL 6.4   ALBUMIN 3.8   BILITOTAL 0.2   ALKPHOS 128   AST 26   ALT 18       CT scan of the abdomen:   IMPRESSION:   1.  Mild bilateral hydronephrosis may be due to distention of the urinary bladder. No urolithiasis or other cause for ureteral obstruction visualized.  2.  Compared to 10/21/2024, mild decrease in size of the 2.7 cm irregular nodular opacity at the base of the right lower lobe which warrants chest CT follow-up in 3 months.  3.  Advanced emphysema incompletely imaged.  4.  Atherosclerosis.  As read by Radiology     Ultrasound of the abdomen: none    EKG:   Complete; See Chart         Assessment:     Right Groin Nodule         Plan:     Discussed with patient surgical interventions possible if he would like to proceed.  Could excise this presumed node as, though small, it appears to be compressing on his nerve and causing his most pain.    I don't think the groin hernia/weakness would be worth fixing.  This is a bigger operation and may not  benefit patient.    Discussed pt with Dr. Gilmore that would be doing the Rt groin mass excision if patient elects to proceed.  We will add to the OR schedule.  NPO after midnight.  Pt may need an EGD.  GI consult pending.  If so, it may be possible to do this in the same OR trip.    -NPO after midnight.  -Plan for excision tomorrow.  -Medicine following for other health concerns.      Total time spent reviewing chart/labs/imaging, evaluating and educating patient, documenting findings, and communicating and implementing plan of care:    58 min    Eric Levin PA-C, physician assistant for Dr. Tevin Gilmore.  [facility], [phone]  Pager [phone]

## 2024-11-20 NOTE — PHARMACY-ADMISSION MEDICATION HISTORY
Pharmacist Admission Medication History    Admission medication history is complete. The information provided in this note is only as accurate as the sources available at the time of the update.    Information Source(s): Patient and CareEverywhere/SureScripts via in-person    Pertinent Information:     Changes made to PTA medication list:  Added: None  Deleted: None  Changed: None    Allergies reviewed with patient and updates made in EHR: yes    Medication History Completed By: Claudette Tucker [facility] 11/19/2024 9:59 PM    PTA Med List   Medication Sig Last Dose/Taking    albuterol (PROAIR HFA/PROVENTIL HFA/VENTOLIN HFA) 108 (90 Base) MCG/ACT inhaler Inhale 2 puffs into the lungs every 4 hours as needed for wheezing, shortness of breath or cough. 11/19/2024 Evening    amLODIPine (NORVASC) 10 MG tablet TAKE 1 TABLET BY MOUTH DAILY 11/19/2024 Morning    atorvastatin (LIPITOR) 40 MG tablet TAKE 1 TABLET BY MOUTH DAILY 11/19/2024 Morning    butalbital-acetaminophen-caffeine (FIORICET/ESGIC) -40 MG per tablet TAKE 1 TABLET BY MOUTH EVERY 4-6 HOURS AS NEEDED 11/19/2024 Noon    Fluticasone-Umeclidin-Vilanterol (TRELEGY ELLIPTA) 200-62.5-25 MCG/ACT oral inhaler Inhale 1 puff into the lungs daily. 11/19/2024 Morning    ipratropium - albuterol 0.5 mg/2.5 mg/3 mL (DUONEB) 0.5-2.5 (3) MG/3ML neb solution Take 1 vial (3 mLs) by nebulization 5 times daily. 11/19/2024 Evening    losartan (COZAAR) 100 MG tablet TAKE 1 TABLET BY MOUTH DAILY 11/19/2024 Morning    mirtazapine (REMERON) 15 MG tablet Take 1 tablet (15 mg) by mouth at bedtime. 11/18/2024 Bedtime    oxyCODONE (ROXICODONE) 5 MG tablet TAKE 1 TABLET BY MOUTH EVERY 3 TO 4 HOURS AS NEEDED FOR PAIN 11/19/2024 at  2:00 PM    senna-docusate (SENOKOT-S/PERICOLACE) 8.6-50 MG tablet Take 3 tablets by mouth daily. Early afternoon. 11/19/2024 at  2:00 PM

## 2024-11-20 NOTE — PROGRESS NOTES
11/20/24 1257   Appointment Info   Signing Clinician's Name / Credentials (SLP) Ginny Nelson MS CCC SLP   General Information   Onset of Illness/Injury or Date of Surgery 11/19/24   Referring Physician Mariano Dey   Patient/Family Therapy Goal Statement (SLP) Patient reports difficulty swallow approximately 1 year after radiation.   Pertinent History of Current Problem Ángel Pham is a 69 year old male admitted on 11/19/2024. He presents to the emergency department with complaints of right groin pain when coughing. Found to have a lower abdominal hernia as well as palpable inguinal mass suspicious for calcified lymph node.  Admission was requested for treatment of cough and pain control.     Subacute cough: Follows closely with pulmonology, and was treated for a COPD exacerbation in the past weeks.  He was on a 15-day taper of steroids starting October 25, and completed a course of azithromycin 11/18/2024.  Reports thick green sputum.  No wheezing at this time.  Primary concern is that the sputum feels as though it gets stuck in his throat and he is unable to clear it.  No fevers, no chills.  Ángel Pham is a 69 year old male admitted on 11/19/2024. He presents to the emergency department with complaints of right groin pain when coughing.  Found to have a lower abdominal hernia as well as palpable inguinal mass suspicious for calcified lymph node.  Admission was requested for treatment of cough and pain control.     Subacute cough: Follows closely with pulmonology, and was treated for a COPD exacerbation in the past weeks.  He was on a 15-day taper of steroids starting October 25, and completed a course of azithromycin 11/18/2024.  Reports thick green sputum.  No wheezing at this time.  Primary concern is that the sputum feels as though it gets stuck in his throat and he is unable to clear it.  No fevers, no chills. Dysphagia: Patient reports that over the past year or so he has had more issues  with phlegm and pills getting stuck in his throat.  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  -Speech pathology consult, anticipate video swallow study with possible gastroenterology consultation  -Might benefit from splitting pills at home including lisinopril, amlodipine   General Observations Pleasant and cooperative.   Type of Evaluation   Type of Evaluation Swallow Evaluation   Oral Motor   Oral Musculature generally intact   Structural Abnormalities none present   Mucosal Quality dry   Dentition (Oral Motor)   Dentition (Oral Motor) adequate dentition;dental appliance/dentures   Dental Appliance/Denture (Oral Motor) lower  (implants)   Facial Symmetry (Oral Motor)   Facial Symmetry (Oral Motor) WNL   Lip Function (Oral Motor)   Lip Range of Motion (Oral Motor) WNL   Tongue Function (Oral Motor)   Tongue Strength (Oral Motor) WFL   Tongue Coordination/Speed (Oral Motor) slows down progressively   Tongue ROM (Oral Motor) elevation is impaired   Elevation, Tongue ROM Impairment (Oral Motor) bilateral;minimal impairment   Jaw Function (Oral Motor)   Jaw Function (Oral Motor) WNL   Cough/Swallow/Gag Reflex (Oral Motor)   Volitional Throat Clear/Cough (Oral Motor) impaired;reduced strength   Volitional Swallow (Oral Motor) mildly delayed   Vocal Quality/Secretion Management (Oral Motor)   Vocal Quality (Oral Motor) hoarse;breathy  (nasality)   General Swallowing Observations   Past History of Dysphagia No documented history found.   Respiratory Support room air   Current Diet/Method of Nutritional Intake (General Swallowing Observations, NIS) regular diet;thin liquids (level 0)   Swallowing Evaluation Clinical swallow evaluation   Clinical Swallow Evaluation   Feeding Assistance no assistance needed   Clinical Swallow Evaluation Textures Trialed thin liquids;pureed;solid foods   Clinical Swallow Eval: Thin Liquid Texture Trial   Mode of Presentation, Thin Liquids  cup;self-fed   Volume of Liquid or Food Presented 5 swallows of water   Oral Phase of Swallow premature pharyngeal entry   Pharyngeal Phase of Swallow impaired;reduction in laryngeal movement;repeated swallows;throat clearing   Diagnostic Statement Suspect penetration and silent aspiration can not be ruled out.   Clinical Swallow Evaluation: Puree Solid Texture Trial   Mode of Presentation, Puree spoon;self-fed   Volume of Puree Presented 2 oz of apple sauce   Oral Phase, Puree premature pharyngeal entry   Pharyngeal Phase, Puree impaired;reduction in laryngeal movement;repeated swallows   Diagnostic Statement Swallowed x2 without sx of aspiration suspect pharyngeal retention.   Clinical Swallow Evaluation: Solid Food Texture Trial   Mode of Presentation self-fed   Volume Presented 1 carmen cracker   Oral Phase premature pharyngeal entry   Pharyngeal Phase impaired;reduction in laryngeal movement;repeated swallows   Diagnostic Statement Mastication was sufficient for the solid needed extra swallows to clear and suspect possible re-swallowing.   Esophageal Phase of Swallow   Patient reports or presents with symptoms of esophageal dysphagia Yes   Swallowing Recommendations   Diet Consistency Recommendations regular diet;thin liquids (level 0)   Supervision Level for Intake distant supervision needed   Mode of Delivery Recommendations bolus size, small;food moistened;no straws;slow rate of intake   Postural Recommendations none   Swallowing Maneuver Recommendations alternate food and liquid intake;extra swallow;throat clear-swallow   Monitoring/Assistance Required (Eating/Swallowing) monitor for cough or change in vocal quality with intake   Recommended Feeding/Eating Techniques (Swallow Eval) maintain upright sitting position for eating;maintain upright posture during/after eating for 30 minutes   Medication Administration Recommendations, Swallowing (SLP) Crushed but declines and wants to take them whole despite  reported difficulty.   Instrumental Assessment Recommendations VFSS (videofluoroscopic swallowing study)   Comment, Swallowing Recommendations Will complete a video swallow per MD request and sx of dysphagia. Patient is not willing to modified diet.   General Therapy Interventions   Planned Therapy Interventions Dysphagia Treatment   Clinical Impression   Criteria for Skilled Therapeutic Interventions Met (SLP Eval) Yes, treatment indicated   SLP Diagnosis dysphagia   Risks & Benefits of therapy have been explained evaluation/treatment results reviewed;care plan/treatment goals reviewed;risks/benefits reviewed;current/potential barriers reviewed;participants voiced agreement with care plan;participants included;patient;daughter   Clinical Impression Comments Patient presents with moderate oral and pharyngeal dysphagia at bedside. Evaluation was somewhat limited due to his acceptance of foods and liquids. He stated that with solids he usually has to chew swallow and than re-swallow. Question if his epiglottis does not invert. Oral motor function was grossly intact. He demonstrated premature entry of thin liquids and multiple swallows with a throat clear after. Suspect penetration and silent aspiration can not be ruled out. Mastication of a solid was sufficient with oral clearance with 2 swallows suspect re-swallows and mild throat clear. Patient declined trials of thickened liquids and stated that he will not modify his diet. Patient is scheduled for surgery 11/21/24 so will schedule video swallow study for 11/22/24.   Recommend: 1, Continue on a regular diet as tolerated and thin liquids. Up in a chair for all meals, no straws, small bites/sips and alternate liquids/solids as needed.   SLP Total Evaluation Time   Eval: oral/pharyngeal swallow function, clinical swallow Minutes (70033) 17   SLP Goals   Therapy Frequency (SLP Eval) 3 times/week   SLP Predicted Duration/Target Date for Goal Attainment 11/27/24   SLP  Goals Swallow   SLP: Safely tolerate diet without signs/symptoms of aspiration Thin liquids;Regular diet   Interventions   Interventions Quick Adds Swallowing Dysfunction   Swallowing Intervention   Treatment of Swallowing Dysfunction &/or Oral Function for Feeding Minutes (23924) 10   Symptoms Noted During/After Treatment Pain increased   Treatment Detail/Skilled Intervention Patient and his daughter provided education on rationale for a video swallow study secondary to radiation treatment to his throat. Food selections and swallow strategies. Patient verbalized understanding and again stated he does not want his diet modified.   SLP Discharge Planning   SLP Plan Video on Friday 11/22/24   SLP Discharge Recommendation home with outpatient therapy services   SLP Rationale for DC Rec Patient's swallow function below baseline.   SLP Brief overview of current status  Patient with oral/pharyngeal dysphagia will complete a video swallow study on 11/22/24   SLP Time and Intention   Total Session Time (sum of timed and untimed services) 27

## 2024-11-20 NOTE — CONSULTS
"[facility]  Gastroenterology Consultation         Ángel Pham  [address] [facility] [id number]  69 year old male    Admission Date/Time: 11/19/2024  Primary Care Provider: Maxim Goodman  Referring / Attending Physician:  Micky Charles     We were asked to see the patient in consultation by Micky Charles for evaluation of dysphagia.      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    Patient states he has had ongoing/progressive problems with swallowing solids over the past year.  Notes that \"sometimes it is fine, sometimes it isn't.\"  Unsure how often this occurs.  Thinks will have problems with pills and liquids at times.  Denies prior EGD and is willing to undergo when able.  Has had worsening problems with \"lots of sputum\" which \"is going on now.\"    Has had weight loss and is 91 lbs.  He feels he is eating, but continues to lose weight.  Noted that his weight was closer to 127 lbs at max.      Currently consulted surgery for abd pain/groin pain.  Planning for Right groin mass excision tomorrow.        ROS: A comprehensive ten point review of systems was negative aside from those in mentioned in the HPI.      PAST MED HX:  I have reviewed this patient's medical history and updated it with pertinent information if needed.   Past Medical History:   Diagnosis Date    Abrasions of multiple sites 08/01/2022    Alcoholic intoxication with complication (H) 07/31/2022    Allergy history unknown     Carcinoma of " supraglottis (H)     Cigarette nicotine dependence with nicotine-induced disorder 11/20/2024    Community acquired pneumonia of left lung, unspecified part of lung 11/16/2023    COPD (chronic obstructive pulmonary disease) (H)     HTN, goal below 140/90     Hyperlipidaemia LDL goal < 100     Hypoglycemia 07/31/2022    Migraine     Mild alcohol use disorder 10/06/2021    Pneumothorax on left     Uncomplicated alcohol dependence (H) 02/13/2023       MEDICATIONS:   Prior to Admission Medications   Prescriptions Last Dose Informant Patient Reported? Taking?   Fluticasone-Umeclidin-Vilanterol (TRELEGY ELLIPTA) 200-62.5-25 MCG/ACT oral inhaler 11/19/2024 Morning Other, Self No Yes   Sig: Inhale 1 puff into the lungs daily.   albuterol (PROAIR HFA/PROVENTIL HFA/VENTOLIN HFA) 108 (90 Base) MCG/ACT inhaler 11/19/2024 Evening Other, Self No Yes   Sig: Inhale 2 puffs into the lungs every 4 hours as needed for wheezing, shortness of breath or cough.   amLODIPine (NORVASC) 10 MG tablet 11/19/2024 Morning Other, Self No Yes   Sig: TAKE 1 TABLET BY MOUTH DAILY   atorvastatin (LIPITOR) 40 MG tablet 11/19/2024 Morning Other, Self No Yes   Sig: TAKE 1 TABLET BY MOUTH DAILY   butalbital-acetaminophen-caffeine (FIORICET/ESGIC) -40 MG per tablet 11/19/2024 Noon Self, Other Yes Yes   Sig: TAKE 1 TABLET BY MOUTH EVERY 4-6 HOURS AS NEEDED   ipratropium - albuterol 0.5 mg/2.5 mg/3 mL (DUONEB) 0.5-2.5 (3) MG/3ML neb solution 11/19/2024 Evening Other, Self No Yes   Sig: Take 1 vial (3 mLs) by nebulization 5 times daily.   losartan (COZAAR) 100 MG tablet 11/19/2024 Morning Other, Self No Yes   Sig: TAKE 1 TABLET BY MOUTH DAILY   mirtazapine (REMERON) 15 MG tablet 11/18/2024 Bedtime Other, Self No Yes   Sig: Take 1 tablet (15 mg) by mouth at bedtime.   oxyCODONE (ROXICODONE) 5 MG tablet 11/19/2024 at  2:00 PM Other, Self Yes Yes   Sig: TAKE 1 TABLET BY MOUTH EVERY 3 TO 4 HOURS AS NEEDED FOR PAIN   senna-docusate (SENOKOT-S/PERICOLACE)  8.6-50 MG tablet 11/19/2024 at  2:00 PM Self, Other Yes Yes   Sig: Take 3 tablets by mouth daily. Early afternoon.      Facility-Administered Medications: None       ALLERGIES:   Allergies   Allergen Reactions    Chantix [Varenicline] Other (See Comments)     Patient reports has tried this in past and had lightheadedness and nose bleeds.    Morphine Hcl     Wellbutrin [Bupropion Hydrobromide]      syncope       SOCIAL HISTORY:  Social History     Tobacco Use    Smoking status: Every Day     Current packs/day: 1.00     Average packs/day: 1 pack/day for 43.0 years (43.0 ttl pk-yrs)     Types: Cigarettes    Smokeless tobacco: Never   Substance Use Topics    Alcohol use: Yes     Alcohol/week: 15.0 standard drinks of alcohol     Types: 15 Cans of beer per week     Comment: beer    Drug use: Never       FAMILY HISTORY:  Family History   Problem Relation Age of Onset    Breast Cancer Mother     Brain Cancer Father 55        brain ca       PHYSICAL EXAM:   Vital Signs with Ranges  Temp: 97.7  F (36.5  C) Temp src: Oral BP: (!) 148/79 Pulse: 78   Resp: 16 SpO2: 100 % O2 Device: None (Room air)    I/O last 3 completed shifts:  In: -   Out: 225 [Urine:225]  Gen:  lying in bed, cachetic  Head:  NCAT  Eyes:  anicteric  Mouth:  MMM  Neck:  supple  Heart:  RRR  Lungs:  crackles bilaterally; decreased air movement  Abd:  soft, NT, ND, +bs  Ext:  moves all extrem  Msk:  str 5/5 UE and LE  Skin:  no jaudice  Neuro:  no focal deficits      ADDITIONAL COMMENTS:   I reviewed the patient's new clinical lab test results.   Recent Labs   Lab Test 11/19/24 1945 08/29/24 0924 11/19/23  1034 11/17/23  0619 11/16/23  0942   WBC 5.4 3.9* 8.9   < > 10.4   HGB 12.2* 13.6 12.4*   < > 14.7   MCV 91 96 98   < > 99    228 248   < > 210   INR  --   --   --   --  0.99    < > = values in this interval not displayed.     Recent Labs   Lab Test 11/19/24 1945 08/29/24  0924 01/15/24  1400   POTASSIUM 4.2 4.9 4.1   CHLORIDE 95* 99 98   CO2 27 27  30*   BUN 10.8 10.9 13.9   ANIONGAP 12 9 8     Recent Labs   Lab Test 11/19/24  1945 11/16/23  0942 04/20/23  1030   ALBUMIN 3.8 4.0 4.1   BILITOTAL 0.2 0.4 0.4   ALT 18 22 25   AST 26 27 28       I reviewed the patient's new imaging results.        CONSULTATION ASSESSMENT AND PLAN:    Principal Problem:    Inguinal lymphadenopathy  Active Problems:    Uncomplicated opioid dependence (H)    Malignant neoplasm of upper lobe, left bronchus or lung (H)    History of throat cancer    Cachexia (H)    Right inguinal hernia    Acute exacerbation of chronic obstructive pulmonary disease (COPD) (H)    Abdominal hernia without obstruction and without gangrene    Subacute cough    Cigarette nicotine dependence with nicotine-induced disorder    Dysphagia    COPD exacerbation (H)    COPD, severe (H)    Neuropathic pain    Inguinal pain, right      Ángel Pham is a 69 year old male consulted for dysphagia/severe malnutrition, 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    Given ongoing dysphagia with history of squamous cell carcinoma of supraglottis s/p radiation 2019, will plan for EGD.  Unlikely to be able to coordinate EGD with OR timing, however will readdress in AM.    Recs:  NPO p MN  Pantoprazole 40 mg PO daily  EGD tomorrow vs Friday - will readdress in AM with timing for OR, etc  Video swallow per SLP on 11/22    Severe Malnutrition/cachexia  is likely multifactorial - dysphagia, history supraglottis CA s/p radation, COPD, presumed lung malignancy.  --normal albumin/total protein    Recs:  EGD, as above  Nutrition consult  Calorie counts  Provide supplements when able  Send Vit B12,  folate, Vit D      Sanchez Mcnamara MD  [facility]  Cell: [phone]  Office Phone: [phone]  Office Fax: [phone]

## 2024-11-20 NOTE — ED NOTES
[facility]  ED Nurse Handoff Report    ED Chief complaint: Cough and Shortness of Breath      ED Diagnosis:   Final diagnoses:   Right inguinal hernia   COPD exacerbation (H)       Code Status: Full Code    Allergies:   Allergies   Allergen Reactions    Chantix [Varenicline] Other (See Comments)     Patient reports has tried this in past and had lightheadedness and nose bleeds.    Morphine Hcl     Wellbutrin [Bupropion Hydrobromide]      syncope       Patient Story:  Pt is a 69 year old male who presents with chief complaint severe right groin pain.  Patient states that he has had this pain for some time and nobody has been able to diagnose what the cause is.  Today, with a coughing fit, pain became significantly worse prompting him to call 911.  He states he has been having cough for some time and has had steroids, antibiotics, with negative recent x-ray and CT scan.  He states he is still smoking and has no desire to stop.  He states he takes narcotics at home which have not been managing his pain.  He states his cancers are in remission right now.     Focused Assessment:  Pt is awake, alert and orientated X 4    Treatments and/or interventions provided: Pt tolerated well.     Patient's response to treatments and/or interventions:     To be done/followed up on inpatient unit:      Does this patient have any cognitive concerns?:  None     Activity level - Baseline/Home:  Independent  Activity Level - Current:   Independent    Patient's Preferred language: English   Needed?: No    Isolation: None  Infection: Not Applicable  Patient tested for COVID 19 prior to admission: NO  Bariatric?: No    Vital Signs:   Vitals:    11/19/24 1913 11/19/24 1944 11/19/24 2053 11/19/24 2219   BP: (!) 146/91      Pulse: 91 87 70    Resp:  21 11    Temp: 98  F (36.7  C)      TempSrc: Temporal      SpO2: 98% 98% 96% 100%       Cardiac Rhythm:     Was the PSS-3 completed:   Yes  What interventions are required  if any?               Family Comments: No family at bedside   OBS brochure/video discussed/provided to patient/family: No              Name of person given brochure if not patient:               Relationship to patient:     For the majority of the shift this patient's behavior was Green.   Behavioral interventions performed were .    ED NURSE PHONE NUMBER: *[phone]

## 2024-11-20 NOTE — ED PROVIDER NOTES
Emergency Department Note      History of Present Illness     Chief Complaint   Right groin pain.      HPI   Ángel Pham is a 69 year old male with past medical history of COPD, hypertension, throat cancer, lung cancer, and more who presents with chief complaint severe right groin pain.  Patient states that he has had this pain for some time and nobody has been able to diagnose what the cause is.  Today, with a coughing fit, pain became significantly worse prompting him to call 911.  He states he has been having cough for some time and has had steroids, antibiotics, with negative recent x-ray and CT scan.  He states he is still smoking and has no desire to stop.  He states he takes narcotics at home which have not been managing his pain.  He states his cancers are in remission right now.    Independent Historian   None    Review of External Notes   Reviewed ED visit 8/29/2024 as well as primary care follow-up visit on 9/18/2024.    Past Medical History     Medical History and Problem List   Past Medical History:   Diagnosis Date    Abrasions of multiple sites 08/01/2022    Alcoholic intoxication with complication (H) 07/31/2022    Allergy history unknown     Carcinoma of supraglottis (H)     Community acquired pneumonia of left lung, unspecified part of lung 11/16/2023    COPD (chronic obstructive pulmonary disease) (H)     HTN, goal below 140/90     Hyperlipidaemia LDL goal < 100     Hypoglycemia 07/31/2022    Migraine     Mild alcohol use disorder 10/06/2021    Pneumothorax on left     Uncomplicated alcohol dependence (H) 02/13/2023       Medications   albuterol (PROAIR HFA/PROVENTIL HFA/VENTOLIN HFA) 108 (90 Base) MCG/ACT inhaler  amLODIPine (NORVASC) 10 MG tablet  atorvastatin (LIPITOR) 40 MG tablet  butalbital-acetaminophen-caffeine (FIORICET/ESGIC) -40 MG per tablet  Fluticasone-Umeclidin-Vilanterol (TRELEGY ELLIPTA) 200-62.5-25 MCG/ACT oral inhaler  ipratropium - albuterol 0.5 mg/2.5 mg/3 mL  (DUONEB) 0.5-2.5 (3) MG/3ML neb solution  losartan (COZAAR) 100 MG tablet  mirtazapine (REMERON) 15 MG tablet  oxyCODONE (ROXICODONE) 5 MG tablet  senna-docusate (SENOKOT-S/PERICOLACE) 8.6-50 MG tablet        Surgical History   Past Surgical History:   Procedure Laterality Date    BRONCHOSCOPY      BRONCHOSCOPY RIGID OR FLEXIBLE W/TRANSENDOSCOPIC ENDOBRONCHIAL ULTRASOUND GUIDED N/A 1/18/2021    Procedure: BRONCHOSCOPY, WITH ENDOBRONCHIAL ULTRASOUND;  Surgeon: Yasir Combs MD;  Location:  OR    ENT SURGERY      sinus    HC HEMORROIDECTOMY, EXTERNAL, SINGLE COLUMN/GROUP      left    HERNIA REPAIR      umbilical and groin    LARYNGOSCOPY WITH BIOPSY(IES) Left 7/26/2019    Procedure: MICRODIRECT LARYNGOSCOPY WITH BIOPSY OF LEFT LARYNGEAL MASS;  Surgeon: Boo Mehta MD;  Location:  OR    THORACIC SURGERY Right 2005    pneumothorax       Physical Exam     Patient Vitals for the past 24 hrs:   BP Temp Temp src Pulse Resp SpO2   11/19/24 2053 -- -- -- 70 11 96 %   11/19/24 1944 -- -- -- 87 21 98 %   11/19/24 1913 (!) 146/91 98  F (36.7  C) Temporal 91 -- 98 %     Physical Exam  Nursing note and vitals reviewed.  Pt cachectic appearing.   HENT:   Mouth/Throat: dry mucous membranes.   Eyes: EOMI, nonicteric sclera  Cardiovascular: Normal rate, regular rhythm, no murmurs  Pulmonary/Chest: Frequent coughing, bilateral wheezing noted.  Abdominal: Soft.  Nondistended.  Patient with prominent bulging in the right inguinal crease every single time he coughs.  No persistent hernia palpated afterwards.  Musculoskeletal: Normal range of motion.   Neurological: Alert. Moves all extremities spontaneously.   Skin: Skin is warm and dry. No rash noted.         Diagnostics     Lab Results   Labs Ordered and Resulted from Time of ED Arrival to Time of ED Departure   BASIC METABOLIC PANEL - Abnormal       Result Value    Sodium 134 (*)     Potassium 4.2      Chloride 95 (*)     Carbon Dioxide (CO2) 27      Anion Gap 12       Urea Nitrogen 10.8      Creatinine 0.65 (*)     GFR Estimate >90      Calcium 9.1      Glucose 97     CBC WITH PLATELETS AND DIFFERENTIAL - Abnormal    WBC Count 5.4      RBC Count 3.95 (*)     Hemoglobin 12.2 (*)     Hematocrit 36.1 (*)     MCV 91      MCH 30.9      MCHC 33.8      RDW 14.6      Platelet Count 219      % Neutrophils 64      % Lymphocytes 21      % Monocytes 11      % Eosinophils 4      % Basophils 1      % Immature Granulocytes 0      NRBCs per 100 WBC 0      Absolute Neutrophils 3.4      Absolute Lymphocytes 1.1      Absolute Monocytes 0.6      Absolute Eosinophils 0.2      Absolute Basophils 0.1      Absolute Immature Granulocytes 0.0      Absolute NRBCs 0.0     ISTAT GASES LACTATE VENOUS POCT - Abnormal    Lactic Acid POCT 1.8      Bicarbonate Venous POCT 30 (*)     O2 Sat, Venous POCT 70      pCO2 Venous POCT 45      pH Venous POCT 7.43      pO2 Venous POCT 36      Base Excess/Deficit (+/-) POCT 5.0 (*)        Imaging   Chest XR,  PA & LAT   Final Result   IMPRESSION: Hyperexpansion of the lungs consistent with known emphysema. Chronic scarring in the upper lobes. Irregular nodular opacity in the right lower lobe concerning for possible neoplasm on prior chest CT is again demonstrated. Chest CT follow-up    is recommended. No new lung consolidation, pleural effusion or pneumothorax. Normal heart size.            Independent Interpretation   I independently reviewed the cxr.  No pneumothorax noted.  In comparison to last chest x-ray, no new infiltrate.  Lung hyperexpansion consistent with known history of COPD.    ED Course      Medications Administered   Medications   HYDROmorphone (PF) (DILAUDID) injection 0.5 mg (0.5 mg Intravenous $Given 11/1956)   HYDROmorphone (PF) (DILAUDID) injection 0.5 mg (0.5 mg Intravenous $Given 11/19/24 2127)   methylPREDNISolone Na Suc (solu-MEDROL) injection 125 mg (125 mg Intravenous $Given 11/19/24 2127)   ipratropium - albuterol 0.5 mg/2.5 mg/3 mL  (DUONEB) neb solution 3 mL (3 mLs Nebulization $Given 11/19/24 2126)         Discussion of Management   General surgery: Dr. Hensley.   Hospitalist: Dr. Dey.     ED Course   Arrives via EMS.   The patient's medical records were reviewed.  Nursing notes and vitals were reviewed.    I performed an exam of the patient as documented above. The patient is in agreement with my plan of care.       Additional Documentation  None    Medical Decision Making / Diagnosis     CMS Diagnoses: None    MIPS       None    MDM   Ángel Pham is a 69 year old male who presents with right groin pain.  On exam, patient is very frequently coughing and large amount of pain.  It becomes quickly apparent that he has an inguinal hernia.  There is large bulging with every cough/Valsalva.  No persistent hernia defect when not coughing.  Pain improved with IV Dilaudid as well as with nebulizers given it is helping patient not cough.  He takes chronic narcotics at home and states that this has not been controlling his pain.  I contacted on-call general surgery and spoke with Dr. Hensley who said patient would be a potential surgical candidate, but not necessarily urgently/emergently.  He stated if pt were admitted, decision to repair hernia would be dependent on consultation and operating room schedule tomorrow.  He did not think any abdominal imaging was indicated, stating it could be obtained after consultation if desired.  Regarding cough, patient has chronic cough related to COPD and lung cancer.  He has been treated with steroids and antibiotics over the last couple weeks without improvement.  He is again wheezing here.  IV steroids and DuoNeb given.  Chest x-ray is unchanged without evidence of pneumonia.  Admission indicated for treatment of COPD exacerbation and surgical consultation.  Patient in agreement with this plan.  All questions answered.  Discussed with hospitalist, Dr. Dey, who accepts patient for admission.    Disposition    The patient was admitted to the hospital.     Diagnosis     ICD-10-CM    1. Right inguinal hernia  K40.90       2. COPD exacerbation (H)  J44.1                 Venu Ray MD  11/19/24 2535

## 2024-11-20 NOTE — ED NOTES
Bed: ED29  Expected date:   Expected time:   Means of arrival:   Comments:  545  69 M sob/also painful raised bump near groin  1908

## 2024-11-20 NOTE — PROVIDER NOTIFICATION
MD Notification    Notified Person: PA    Notified Person Name: Melvin    Notification Date/Time: 11/20/24 at 1738    Notification Interaction: Vocera    Purpose of Notification: Pt requesting antacid for heartburn. Can we order? Please advise. Thanks!     Orders Received: Tums ordered    Comments:

## 2024-11-20 NOTE — PROGRESS NOTES
Observation goals  PRIOR TO DISCHARGE          -diagnostic tests and consults completed and resulted- NOT MET  -vital signs normal or at patient baseline- MET  -tolerating oral intake to maintain hydration- NOT MET  -adequate pain control on oral analgesics- MET  -returns to baseline functional status- NOT MET  -safe disposition plan has been identified- NOT MET    Nurse to notify provider when observation goals have been met and patient is ready for discharge.

## 2024-11-20 NOTE — PROGRESS NOTES
[facility]  Hospitalist Progress Note  Date Admitted: 11/19/2024  7:11 PM  Date of Service: 11/20/2024              Assessment and Plan:                                         ASSESSMENT  This is a 69 year old, male, w a PMH of history of left upper lung cancer, current right spiculated lung nodule concerning for neoplasm, oral cancer s/p radiation, chemo, dysphagia, continued tobacco addiction/smoking, severe COPD, chronic pain on chronic opiates, BPH, bilateral hydronephrosis, malnutrition who was admitted on 11/19/2024, complaining of painful bump near groin.    Principal Problem:    Inguinal lymphadenopathy  Active Problems:    Uncomplicated opioid dependence (H)    Malignant neoplasm of upper lobe, left bronchus or lung (H)    Cachexia (H)    Right inguinal hernia    Acute exacerbation of chronic obstructive pulmonary disease (COPD) (H)    Abdominal hernia without obstruction and without gangrene    Subacute cough    Cigarette nicotine dependence with nicotine-induced disorder      PLAN  Tender Right Inguinal lymphadenopathy x 6mo,   Possible early Right inguinal hernia without obstruction and without gangrene  C/C tender right inguinal node as well as a subacute cough.  Pnw tender bump in his right inguinal area.  x 6mo . Past PCP eval, worse past month migel irritated by coughing.  Suspected calcified lymph node in the right groin 2 cm x 1 cm.  Firm, Rubbery.  This is thought to be irritating femoral nerve due to location when coughing, with radiation to right thigh. (anterior cutaneous nerve distribution).  Also with signs of a soft bulge in the right lower abdomen consistent with an early inguinal hernia. CT ABD/pelvis, Lymph nodes reportedly normal, no hernia mention., shows slightly improved right lower lung nodule, see below.  Advanced emphysema, previously known, bilateral mild hydronephrosis with bladder distention lymph node.   Surgery consulted regarding hernia.  11/20- Pain  "in increased, migel w cough, node will be removed and bx by surgery 11/21, no hernia/repair neededAdmitted observation--- d/w  for review - note severe pain, worsening dysphagia, inpt procedures, agree w inpt status    Plan:   - Inpt status  -CT abdomen pelvis as above  -Surgery consulted regarding hernia  - Npo at 12a for R lymph node resection 11/21  - On opiates below, incr w PRN  - added  topical Lidoderm/lidocaine over tender right groin.    Thick sputum, difficult to expectorate  Subacute cough  Severe COPD w Severe diffusion restriction  R/o Acute exacerbation of severe chronic obstructive pulmonary disease (COPD) (H)  C/c sputum difficulty and pn w cough. Follows closely with pulmonology for severe COPD,  PFTs w/ severe obstruction and severe diffusion limitation , recent tx for COPD exacerbation, s/p Pred 40mg w 15d taper, finished azithromycin 2 days prior 11/18.  Sputum feels stuck in his throat.  CXR with chronic scarring, hyper expanded, previously known right lower lobe nodular opacity. CT abd (no chest incl) No consolidation or effusion or infiltrate.  Not septic with WBC WNL 5.4, low lactate 1.8.  SARS/flu/RSV negative.He was thought to have an acute exacerbation of his COPD.  Subacute cough could be related to this plus multiple other possible contributors including dysphagia, ARB use.    11/20- he again reiterates the thick sputum is a lessor concern, c/o mucinex PO not helping PTA, and inhaled\"makes it worse\". Will d.w pulm. No other acute resp sx. It is unclear if a acute exacerbation is present, and for now will stay off steroids/abx resumption.       Plan:    - o2- no needs now (none at home)- prn Spo<90%  - trial Mucomyst 3 times a day with DuoNebs- may stop if ineffective   - Mcinex PO ineffective PTA per pt  - sputum w gram stain-  prelim reviewed >25PMN  - Neg viral panel  - Resp panel - in process  -Steroids - s/p 14d taper pta, given Solu-Medrol in ED , not continued after admit, reeval " daily  - ABX- none in ED, just finished azithro 11/18, hold off for now, reeval daily  -PTA DuoNebs resumed past 5 times daily (5 times a day at home)  -PTA Trelegy Ellipta 200/62.5/25 continued with therapeutic substitution  -PTA albuterol MDI as needed for persistent wheezing  -Tessalon trialed on arrival if he can swallow safely  - consider if ARB could separately , contrib to cough?  Eval outpt  - consider pulm inpt if not improved re sputum sx  - Smoking cessation again encouraged.  See below.  - May consider increase opiates if air hunger  - F/u Fv Pulm w Dr Cortes for Pulmonary Emphysema   - outpt palliative consult given hx/dx    Severe Dysphagia, progressive  Patient reports that over the past year or so he has had more issues with phlegm and pills getting stuck in his throat.  history of squamous cell carcinoma of the supraglottis, prior radiation (37 sessions per pt) to the neck. Reitereates thick sputum is also big sx, can't cough up. Also has chronic post nasal drip per his report.   11/20 Unable to swallow food nor pill easily, multiple attempts, has to regurgitate at times. 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 Follows with [facility] with recent follow-up, Dr. Mehta of ENT.   11/20- speech briefly saw during my visit, recs video swallow    Plan:    - Speech pathology consult as above for dysphagia with prior neck radiation  - GI as above  - F/u outpt w  Dr Dreyer at [facility]     Severe malnutrition, cachexia, BMI 14, cachexia (H)  Previous and current Dx severe malnutrition, cachectic, weight 91 pounds, BMI 14, in the setting of above cancers, dysphagia, poor intake, severe COPD, hx malignancy.  Presentation consistent with continuing ongoing severe malnutrition. PTA Remeron x 1yr, likely could incr dose. per notes, declined outpt nutrition consult    Plan:   -PTA mirtazapine 15 at bedtime, consider 30mg at times of d/c  - See above regarding speech consult.    - Defer on dietary at present. He is willing to meet w nutrition outpt  - Continue home supplements of Ensure between meals  - Outpatient f/u w Pulm and onc team, ? Future zyprexa, cannabis products    R spiculated lung nodule concerning for malignancy  Hx Malignant neoplasm of upper lobe, left bronchus or lung (H), s/p radiation 2023  Right lower lobe nodular opacity concerning for possible neoplasm noted 10/21/2024 on CT chest, new from April imaging.  Follows with Dr. Ann of thoracic surgery.  History of left upper lobe lung nodule for which he completed stereotactic radiation therapy in July 2023 for what was presumed to be primary pulmonary malignancy.  Was not biopsied at that time (not amenable to surgery or bx)  - CT shows spiculated nodule appears slightly smaller (2.7 x 2 cm) than prior imaging as visualized on abdomen pelvis CT.  Improved size suggests against neoplasm, but should still have follow-up imaging.    -s/p recently completed azithromycin course, no further anti-infective's at this time.    - followup CT chest outpt    -Continue with plan for 3-month follow-up with Dr Breen and Dr Ann     Bilateral hydronephrosis, Suspected chronic urinary retention, s  Suspected BPH,  CT with bilateral hydronephrosis with distended bladder, thought secondary to bladder outlet obstruction. Pt however states due to sever GRAHAM when he walks to bathroom, he holds his urine as long as  possible, Suspect chronic urinary retention as major cause  - advised to stop holding urine so long, rather, schedule urination freq as pulm can gabriella  -Started Flomax trial at admit, continue  -Monitor for retention bladder scans every 4 hours  - he declines condom cath, bed urinal or bedside commode to lessen GRAHAM  -PSA ordered-0.71.WNL   -Follow-up with PCP     Insomnia, Anxiety hx (per pt),    Per outpt notes, Wife passed away in 2019.  Flat affect during appointment. Wants to smoke. States on Remeron x 1 yr, no recent dose change, denies much for depressive sx, no SI  -PTA Remeron 15HHS continued    Mild hyponatremia-History of SIADH (likely resp trigger)   on arrival, which is okay, noting last baseline was 135.  History of lung cancer as below  -Monitor    History of chronic throat pain. headaches   Hx chronic pain, uncomplicated opioid dependence (H)  Long-term chronic opioid dependence for chronic CA-related pain.  Follows w [facility]. PTA oxycodone 5 mg tabs, last fill 120 tabs with 15-day supply occurred 11/15. He is inconsistent only takes 4 tabs/d. States never tried gabapentin before (started on admit) . Note opiates have a risk of appetite suppression.Also note that he is chronically on Fioricet getting #40 tabs, filling approximately every 6-8 weeks days.  Certainly at risk of medication related headache/tolerance.     Plan:   -PTA oxycodone 5 mg-  previous max, 4 tabs /day. Will allow more here w incr pain  -PTA senna 3 tabs in the afternoon-for constipation continued  -PTA as needed Fioricet noted, PCP/pain clinic reeval  -Defer add on UDS given he has had meds since arrival    Cigarette nicotine severe dependence with nicotine-induced disorder  Long-term addiction, 43pk/yr. continues to smoke 1 pack a day despite the multiple severe diagnoses cancers she has had.  Not interested in stopping per multiple outpt notes. Was advised that his ongoing abuse is worsening his c/c of sputum  "and accelerate his death. States Fransico patches and gum \"didn't work\", but will to have a rx at time of d/c Past chantix trial caused nosebleed, declines.  11/20- he actually is contemplative re quitting and tried to decrease cigs, hears our concerns, willing to take rx on d/c , not here. Cont gentle encouragement highlighting quitting will help his c/c     Plan:   -Smoking cessation strongly encouraged, directly contributing/hastening his death  -Nicotine replacement rx encouraged, offered and he declines while he is here. Ok w at discharge  -Pt declines Chantix d/t past nosebleed side effect and Wellbutrin too risky with weight loss       Code Status:No CPR- Do NOT Intubate  NUTRITION Regular Diet Adult  Snacks/Supplements Adult: Ensure Enlive; Between Meals     DVT Prophylaxis: Low Risk/Ambulatory with no VTE prophylaxis indicated   Gannon Catheter: Not present  Lines: None     Cardiac Monitoring: None    NURSING COMMUNICATION:  Bedside rounding completed with nurse.   (if used):None  FAMILY COMMUNICATION: Discussed with daughter  in bedside discussion, she is main support (pt lives alone), gets meds. Adds hx, helpful.      DISCHARGE PLANNING:  Medically Ready for Discharge: Anticipated in 2-4 Days    Criteria for discharge from the hospitalist perspective is better pain control on leg, able to swallow , s/p speech eval, diet mod, surgery for lymph node, GI consult w possible EGD, stable pain. Improved swallowing/mucus sx.    Disposition Plan       Expected Discharge Date: 11/21/2024              Entered: Micky Charles PA-C 11/20/2024, 8:35 AM        Additional comments:   The patient's care was discussed with the Attending Physician,   , Bedside Nurse, Patient, Patient's Family, Surgery PA Consultant(s), and dgtr, bedside RN, speech therapy in room .  Pt was staffed and discussed in detail w Antonio Luna MD, The above assessment and plan is the product of our joint decision making.     Time - " I spent 60 minutes w greater than 50% spent in face to face counseling/education re dx, plan and also including coordination of care, time documenting.    Micky Charles PA-C, Hospitalist  [facility]  Securely message with the Vocera Web Console (learn more here)  Text page via [facility] Paging/Directory      Please note this documentation was partially completed using Dragon medical dictation software. It was briefly proofread, but may still have unintended incorrect words or phrases transcribed. Also this is primarily intended as a direct peer to peer communication with medical abbreviations and verbiage that may appear to be 'direct' as to succinctly relay medical info and opinions. If there are any questions on content or diagnosis, please contact me for clarification.    SUBJECTIVE         Chief Complaint:   INTERVAL EVENTS Reviewed-      Today's chief complaint- multiple- R inguinal pain. thick sputum, unable to expectorate, unable to clear mucus, dysphagia,     Met pt for first time, had > 30 min discussion , wide ranging re sx, and complex hx. Dgtr joins, helps    C/c pt reports we need to focus on the r inguinal node more. Sx node present > 6mo, w past PCP w/u. No past surg. The incr cough past month makes it hurt more. Now severe pain, hard to walk. Pain shoots into R toe when he coughs.  Wants tx. No other LA. Surg saw states could bx here, but no clear hernia now. Open to surg in am    Re   Re sputum- very thick, worsening over time, mucinex or other meds do not help  Re dysphagia- worsening w time, has to regurg what little food he eats, or pills.   Re COPD, SOB , subacute cough- minimizes any concern- denies worsening now  Re cigs- starts defensive re smoking but states actually trying to cut down a bit , 1ppd still, not opposed to quitting, just hard, and failed in past w patches or Chantix. We discussed it directly worsen his c/c sx.  Re bladder- hold urine s long as  possible to avoid severe GRAHAM when goes to bathroom. No clear nocturia sx,  Re throat CA- on stable oxy 5mg x yrs, 4/d, not more. Pain not well controlled, never had gabapentin  Re chest CA- advised R mass smaller after rad, will f/u w outpt Onc  Denies depressive sx, despite severe dz  Never had discussion re prognosis w pulm. ? hospice  Reiterates DNR/DNI    Social  Lives alone, dgtr visits drives/gets meds, food  Smokes- 1ppd- actually contemplative to quit, declines rx  Alcohol- used to drive, suspect heavily after retired, quit   Dtrugs- none,     Denies fever, chills, current CP, SOB, abdominal sx: including pain, nausea, vomiting, diarrhea, other new symptoms. Last BM ?.          Physical Exam:     Vitals:    24 2219 24 0141 24 0610 24 0725   BP:  (!) 169/84  (!) 140/87   BP Location:  Right arm  Right arm   Pulse:  80  92   Resp:  14  16   Temp:  97.5  F (36.4  C)  97.7  F (36.5  C)   TempSrc:  Oral  Oral   SpO2: 100% 96%  94%   Weight:   41.3 kg (91 lb)      BP (!) 140/87 (BP Location: Right arm)   Pulse 92   Temp 97.7  F (36.5  C) (Oral)   Resp 16   Wt 41.3 kg (91 lb)   SpO2 94%   BMI 13.44 kg/m     Temp (24hrs), Av.7  F (36.5  C), Min:97.5  F (36.4  C), Max:98  F (36.7  C)      Intake/Output Summary (Last 24 hours) at 2024 0835  Last data filed at 2024 0730  Gross per 24 hour   Intake --   Output 225 ml   Net -225 ml     Patient Vitals for the past 120 hrs:   Weight   24 0610 41.3 kg (91 lb)       LINES: PIV, noFoley,    GENERAL APPEARANCE: Alert, cachectic, sitting in chair , in no acute distress.  HEENT: normocephalic, normal external exam.  NECK:  No JVD  CARDIOVASCULAR: Regular rate and rhythm, s1, s2  no murmur appreciated.    RESPIRATORY: No o2, Normal work of breathing. Very diminished globally, but clear bilaterally without wheezes, fine rales or coarse rhonchi/crackles. Getting mucinex neb during visit, no change in mucus per  "pt  GASTROINTESTINAL: very thin habitus, non-distended. Bowel sounds normoactive.  Soft, non-tender,  No masses/organomegaly  : examined w surg PA- almost no subq fat , all anatomy clearly visible, mounding R inguinal lig, tiny marble of R inguinal mode, adjacent to femoral nerve and both fem art and vein (visible/palpable),   NEUROLOGIC: nonfocal, UE movements appear grossly intact.  EXTREMITIES: not walked, MAEW, No peripheral edema noted.  SKIN: No rashes or lesions.  PSYCHIATRIC: Alert and oriented t. Mood - denies depressive sx, of SI, Affect flat, able to tell me hx well, seems frustrated by health, friendly,     Meds- reviewed         Data:     I reviewed most recent (and historical) labs, and personally reviewed imaging      CBC with Diff:  Recent Labs   Lab Test 11/19/24 1945 08/29/24 0924 11/19/23  1034   WBC 5.4 3.9* 8.9   HGB 12.2* 13.6 12.4*   MCV 91 96 98    228 248        BMP:  Recent Labs   Lab Test 11/19/24 1945 08/29/24  0924 11/29/23  1505 11/21/23  0633   * 135   < > 132*   POTASSIUM 4.2 4.9   < > 3.5   CHLORIDE 95* 99   < > 95*   CO2 27 27   < > 29   ANIONGAP 12 9   < > 8   GLC 97 92   < > 83   BUN 10.8 10.9   < > 11.0   CR 0.65* 0.49*   < > 0.43*   GFRESTIMATED >90 >90   < > >90   MOSHE 9.1 9.3   < > 8.7*   MAG  --   --   --  2.1    < > = values in this interval not displayed.        Recent Labs   Lab 11/19/24 1945   GLC 97       Recent Labs   Lab Test 11/16/23  0942   INR 0.99        Lactic Acid:    Lab Results   Component Value Date    LACT 1.8 11/19/2024    LACT 1.7 11/16/2023         VBG/Venous Blood Gas  Recent Labs   Lab 11/19/24 1943   PHV 7.43   PCO2V 45   PO2V 36   HCO3V 30*   CARLIE 5.0*     ABG:  -No lab results found in last 7 days.    Micro results:  Blood culture:  No results found for this or any previous visit.      No components found for: \"URINE\"          IMAGING:   IMAGING, During Current Admission:    CT Abdomen Pelvis w Contrast: 11/20/2024  EXAM: CT ABDOMEN " PELVIS W CONTRAST LOCATION: [facility] DATE: 11/20/2024 INDICATION: R inguinal lymph node, concern for inguinal hernia (but more superior lateral than canal), hx malignancy here w  R groin pain COMPARISON: Noncontrast CT of the chest 10/21/2024. CT of the chest, abdomen, and pelvis 3/31/2022. TECHNIQUE: CT scan of the abdomen and pelvis was performed following injection of IV contrast. Multiplanar reformats were obtained. Dose reduction techniques were used. CONTRAST: 48 mL Isovue 370 FINDINGS: LOWER CHEST: An irregular nodular opacity at the base of the right lower lobe is slightly smaller compared to 10/21/2024, now measuring 2.7 x 2.0 cm, previously 2.9 x 2.8 cm (images 1-10 of axial series 4). Advanced emphysema incompletely imaged. HEPATOBILIARY: Normal. PANCREAS: Normal. SPLEEN: Normal. ADRENAL GLANDS: Normal. KIDNEYS/BLADDER: Mild bilateral hydronephrosis may be due to distention of the urinary bladder. No urolithiasis or other cause for ureteral obstruction visualized. Kidneys and bladder otherwise normal. BOWEL: No obstruction or inflammation. No evidence for appendicitis. LYMPH NODES: Normal. VASCULATURE: Advanced aortoiliac atherosclerosis. No aneurysm. PELVIC ORGANS: Normal. MUSCULOSKELETAL: No aminata hernia visualized. Degenerative changes of the lumbar spine.   IMPRESSION: 1.  Mild bilateral hydronephrosis may be due to distention of the urinary bladder. No urolithiasis or other cause for ureteral obstruction visualized. 2.  Compared to 10/21/2024, mild decrease in size of the 2.7 cm irregular nodular opacity at the base of the right lower lobe which warrants chest CT follow-up in 3 months. 3.  Advanced emphysema incompletely imaged. 4.  Atherosclerosis.    Chest XR,  PA & LAT: 11/19/2024  EXAM: XR CHEST 2 VIEWS LOCATION: [facility] DATE: 11/19/2024 INDICATION: dyspnea, cough wheezing COMPARISON: The chest 10/21/2024.   IMPRESSION: Hyperexpansion of  the lungs consistent with known emphysema. Chronic scarring in the upper lobes. Irregular nodular opacity in the right lower lobe concerning for possible neoplasm on prior chest CT is again demonstrated. Chest CT follow-up is recommended. No new lung consolidation, pleural effusion or pneumothorax. Normal heart size.      IMAGING, prior to current admit  CT Chest w/o Contrast: 10/21/2024  EXAM: CT CHEST W/O CONTRAST LOCATION: [facility] DATE: 10/21/2024 INDICATION:  Solitary pulmonary nodule COMPARISON: CT chest performed on 4/15/2024 TECHNIQUE: CT chest without IV contrast. Multiplanar reformats were obtained. Dose reduction techniques were used. CONTRAST: None. FINDINGS: LUNGS AND PLEURA: Severe emphysematous changes are present. Multiple new pulmonary nodules are present in both lungs. For example, a 1.8 x 1.6 cm nodule in the left lung apex was not seen previously (series 4, image 62). Stable biapical pleural-parenchymal scarring in the lungs. Several other new nodules are also seen including a spiculated conglomerate nodule measuring up to 2.9 x 2.8 cm in the right lower lobe (series 4, image 269). Subpleural consolidation in the right middle lobe was not seen previously measuring up to 2.8 cm (series 4, image 245). Stable appearance of bilateral calcified granulomas. MEDIASTINUM/AXILLAE: Multiple calcified mediastinal lymph nodes are present, likely compatible with prior granulomatous disease. Moderate to severe vascular calcifications are seen within the thoracic aorta. Severe stenosis along the origin of the left subclavian vein also appears unchanged. CORONARY ARTERY CALCIFICATION: Mild. UPPER ABDOMEN: Moderate to severe vascular calcifications are seen within the abdominal aorta. MUSCULOSKELETAL: No suspicious osseous lesions are seen.   IMPRESSION: 1.  Multiple new pulmonary nodules and consolidations seen in the lungs. One of the largest such nodules appears irregular with  spiculated margins measuring up to 2.9 cm in the right lung base. Neoplastic origin is in the differential diagnosis. Subpleural consolidation in the right middle lobe may suggest underlying pneumonia. Recommend 3 month CT chest follow-up exam. REFERENCE: Guidelines for Management of Incidental Pulmonary Nodules Detected on CT Images: From the Fleischner Society 2017. Guidelines apply to incidental nodules in patients who are 35 years or older. Guidelines do not apply to lung cancer screening, patients with immunosuppression, or patients with known primary cancer. MULTIPLE NODULES Nodule size <6 mm Low-risk patients: No follow-up needed. High-risk patients: Optional follow-up at 12 months. Nodule size 6 mm or larger Low-risk patients: Follow-up CT at 3-6 months, then consider CT at 18-24 months. High-risk patients: Follow-up CT at 3-6 months, then at 18-24 months if no change. -Use most suspicious nodule as guide to management.             Allergy:     Allergies   Allergen Reactions    Chantix [Varenicline] Other (See Comments)     Patient reports has tried this in past and had lightheadedness and nose bleeds.    Morphine Hcl     Wellbutrin [Bupropion Hydrobromide]      syncope     Medical Decision Making       Please see A&P for additional details of medical decision making.  MANAGEMENT DISCUSSED with the following over the past 24 hours:     NOTE(S)/MEDICAL RECORDS REVIEWED over the past 24 hours:        High complexity  Surgery expected 11.21

## 2024-11-21 ENCOUNTER — ANESTHESIA (OUTPATIENT)
Dept: SURGERY | Facility: CLINIC | Age: 69
DRG: 073 | End: 2024-11-21
Payer: COMMERCIAL

## 2024-11-21 ENCOUNTER — ANESTHESIA EVENT (OUTPATIENT)
Dept: SURGERY | Facility: CLINIC | Age: 69
DRG: 073 | End: 2024-11-21
Payer: COMMERCIAL

## 2024-11-21 VITALS
RESPIRATION RATE: 10 BRPM | HEART RATE: 74 BPM | HEIGHT: 69 IN | WEIGHT: 91 LBS | TEMPERATURE: 98.4 F | OXYGEN SATURATION: 92 % | BODY MASS INDEX: 13.48 KG/M2 | DIASTOLIC BLOOD PRESSURE: 64 MMHG | SYSTOLIC BLOOD PRESSURE: 119 MMHG

## 2024-11-21 LAB
BACTERIA SPT CULT: NORMAL
FOLATE SERPL-MCNC: 6 NG/ML (ref 4.6–34.8)
GLUCOSE BLDC GLUCOMTR-MCNC: 104 MG/DL (ref 70–99)
GRAM STAIN RESULT: NORMAL
UPPER GI ENDOSCOPY: NORMAL
VIT B12 SERPL-MCNC: 463 PG/ML (ref 232–1245)
VIT D+METAB SERPL-MCNC: 16 NG/ML (ref 20–50)

## 2024-11-21 PROCEDURE — 0DB28ZX EXCISION OF MIDDLE ESOPHAGUS, VIA NATURAL OR ARTIFICIAL OPENING ENDOSCOPIC, DIAGNOSTIC: ICD-10-PCS | Performed by: INTERNAL MEDICINE

## 2024-11-21 PROCEDURE — 43239 EGD BIOPSY SINGLE/MULTIPLE: CPT | Performed by: INTERNAL MEDICINE

## 2024-11-21 PROCEDURE — 43249 ESOPH EGD DILATION <30 MM: CPT | Performed by: INTERNAL MEDICINE

## 2024-11-21 PROCEDURE — 250N000013 HC RX MED GY IP 250 OP 250 PS 637: Performed by: PHYSICIAN ASSISTANT

## 2024-11-21 PROCEDURE — 250N000009 HC RX 250: Performed by: INTERNAL MEDICINE

## 2024-11-21 PROCEDURE — 258N000003 HC RX IP 258 OP 636: Performed by: INTERNAL MEDICINE

## 2024-11-21 PROCEDURE — 250N000013 HC RX MED GY IP 250 OP 250 PS 637: Performed by: HOSPITALIST

## 2024-11-21 PROCEDURE — 250N000013 HC RX MED GY IP 250 OP 250 PS 637: Performed by: INTERNAL MEDICINE

## 2024-11-21 PROCEDURE — 999N000157 HC STATISTIC RCP TIME EA 10 MIN

## 2024-11-21 PROCEDURE — 88305 TISSUE EXAM BY PATHOLOGIST: CPT | Mod: TC | Performed by: INTERNAL MEDICINE

## 2024-11-21 PROCEDURE — 99232 SBSQ HOSP IP/OBS MODERATE 35: CPT | Performed by: HOSPITALIST

## 2024-11-21 PROCEDURE — 88312 SPECIAL STAINS GROUP 1: CPT | Mod: TC | Performed by: INTERNAL MEDICINE

## 2024-11-21 PROCEDURE — 250N000011 HC RX IP 250 OP 636: Performed by: INTERNAL MEDICINE

## 2024-11-21 PROCEDURE — 250N000009 HC RX 250: Performed by: HOSPITALIST

## 2024-11-21 PROCEDURE — 94640 AIRWAY INHALATION TREATMENT: CPT

## 2024-11-21 PROCEDURE — 0DB78ZX EXCISION OF STOMACH, PYLORUS, VIA NATURAL OR ARTIFICIAL OPENING ENDOSCOPIC, DIAGNOSTIC: ICD-10-PCS | Performed by: INTERNAL MEDICINE

## 2024-11-21 PROCEDURE — 999N000099 HC STATISTIC MODERATE SEDATION < 10 MIN: Performed by: INTERNAL MEDICINE

## 2024-11-21 PROCEDURE — 94640 AIRWAY INHALATION TREATMENT: CPT | Mod: 76

## 2024-11-21 PROCEDURE — 0D758ZZ DILATION OF ESOPHAGUS, VIA NATURAL OR ARTIFICIAL OPENING ENDOSCOPIC: ICD-10-PCS | Performed by: INTERNAL MEDICINE

## 2024-11-21 PROCEDURE — 120N000001 HC R&B MED SURG/OB

## 2024-11-21 RX ORDER — FLUCONAZOLE 100 MG/1
100 TABLET ORAL DAILY
Status: DISCONTINUED | OUTPATIENT
Start: 2024-11-21 | End: 2024-11-22 | Stop reason: HOSPADM

## 2024-11-21 RX ORDER — LIDOCAINE 40 MG/G
CREAM TOPICAL 4 TIMES DAILY PRN
Status: DISCONTINUED | OUTPATIENT
Start: 2024-11-21 | End: 2024-11-22 | Stop reason: HOSPADM

## 2024-11-21 RX ORDER — ALBUTEROL SULFATE 90 UG/1
2 INHALANT RESPIRATORY (INHALATION) EVERY 4 HOURS PRN
Status: DISCONTINUED | OUTPATIENT
Start: 2024-11-21 | End: 2024-11-22 | Stop reason: HOSPADM

## 2024-11-21 RX ORDER — SODIUM CHLORIDE 9 MG/ML
INJECTION, SOLUTION INTRAVENOUS CONTINUOUS PRN
Status: DISCONTINUED | OUTPATIENT
Start: 2024-11-21 | End: 2024-11-21

## 2024-11-21 RX ORDER — LIDOCAINE 40 MG/G
CREAM TOPICAL 4 TIMES DAILY PRN
Status: COMPLETED | OUTPATIENT
Start: 2024-11-21 | End: 2024-11-21

## 2024-11-21 RX ORDER — FENTANYL CITRATE 50 UG/ML
INJECTION, SOLUTION INTRAMUSCULAR; INTRAVENOUS PRN
Status: DISCONTINUED | OUTPATIENT
Start: 2024-11-21 | End: 2024-11-21 | Stop reason: HOSPADM

## 2024-11-21 RX ADMIN — ATORVASTATIN CALCIUM 40 MG: 40 TABLET, FILM COATED ORAL at 08:16

## 2024-11-21 RX ADMIN — FLUTICASONE FUROATE AND VILANTEROL TRIFENATATE 1 PUFF: 200; 25 POWDER RESPIRATORY (INHALATION) at 08:16

## 2024-11-21 RX ADMIN — MIRTAZAPINE 15 MG: 15 TABLET, FILM COATED ORAL at 21:51

## 2024-11-21 RX ADMIN — OXYCODONE HYDROCHLORIDE 2.5 MG: 5 TABLET ORAL at 21:51

## 2024-11-21 RX ADMIN — GABAPENTIN 100 MG: 100 CAPSULE ORAL at 08:17

## 2024-11-21 RX ADMIN — FLUCONAZOLE 100 MG: 100 TABLET ORAL at 14:27

## 2024-11-21 RX ADMIN — ACETYLCYSTEINE 2 ML: 200 SOLUTION ORAL; RESPIRATORY (INHALATION) at 21:00

## 2024-11-21 RX ADMIN — ACETYLCYSTEINE 2 ML: 200 SOLUTION ORAL; RESPIRATORY (INHALATION) at 14:21

## 2024-11-21 RX ADMIN — UMECLIDINIUM 1 PUFF: 62.5 AEROSOL, POWDER ORAL at 08:16

## 2024-11-21 RX ADMIN — ALBUTEROL SULFATE 2 PUFF: 108 INHALANT RESPIRATORY (INHALATION) at 19:05

## 2024-11-21 RX ADMIN — OXYCODONE HYDROCHLORIDE 5 MG: 5 TABLET ORAL at 15:58

## 2024-11-21 RX ADMIN — GUAIFENESIN 600 MG: 600 TABLET, EXTENDED RELEASE ORAL at 19:03

## 2024-11-21 RX ADMIN — IPRATROPIUM BROMIDE AND ALBUTEROL SULFATE 3 ML: .5; 3 SOLUTION RESPIRATORY (INHALATION) at 21:00

## 2024-11-21 RX ADMIN — GABAPENTIN 100 MG: 100 CAPSULE ORAL at 14:27

## 2024-11-21 RX ADMIN — IPRATROPIUM BROMIDE AND ALBUTEROL SULFATE 3 ML: .5; 3 SOLUTION RESPIRATORY (INHALATION) at 12:23

## 2024-11-21 RX ADMIN — IPRATROPIUM BROMIDE AND ALBUTEROL SULFATE 3 ML: .5; 3 SOLUTION RESPIRATORY (INHALATION) at 14:19

## 2024-11-21 RX ADMIN — ACETYLCYSTEINE 2 ML: 200 SOLUTION ORAL; RESPIRATORY (INHALATION) at 07:05

## 2024-11-21 RX ADMIN — GUAIFENESIN 600 MG: 600 TABLET, EXTENDED RELEASE ORAL at 08:17

## 2024-11-21 RX ADMIN — OXYCODONE HYDROCHLORIDE 5 MG: 5 TABLET ORAL at 08:16

## 2024-11-21 RX ADMIN — LOSARTAN POTASSIUM 100 MG: 100 TABLET, FILM COATED ORAL at 08:16

## 2024-11-21 RX ADMIN — LIDOCAINE: 40 CREAM TOPICAL at 14:27

## 2024-11-21 RX ADMIN — GABAPENTIN 100 MG: 100 CAPSULE ORAL at 19:04

## 2024-11-21 RX ADMIN — AMLODIPINE BESYLATE 10 MG: 10 TABLET ORAL at 08:17

## 2024-11-21 RX ADMIN — TAMSULOSIN HYDROCHLORIDE 0.4 MG: 0.4 CAPSULE ORAL at 08:16

## 2024-11-21 RX ADMIN — SENNOSIDES AND DOCUSATE SODIUM 3 TABLET: 50; 8.6 TABLET ORAL at 14:26

## 2024-11-21 RX ADMIN — OXYCODONE HYDROCHLORIDE 5 MG: 5 TABLET ORAL at 19:04

## 2024-11-21 RX ADMIN — OXYCODONE HYDROCHLORIDE 5 MG: 5 TABLET ORAL at 12:32

## 2024-11-21 RX ADMIN — IPRATROPIUM BROMIDE AND ALBUTEROL SULFATE 3 ML: .5; 3 SOLUTION RESPIRATORY (INHALATION) at 07:06

## 2024-11-21 NOTE — PROGRESS NOTES
November 21, 2024  Shift:[id number]  Ángel Pham  69 year old  YOB: 1955    Reason for admission:Right inguinal hernia [K40.90]  COPD exacerbation (H) [J44.1]    Cognition/Mentation:AxO x4. Labile mood.  Neuros/CMS:Intact.   VS:VSS on RA. Soft BP.  Cardiac: no tele ordered  GI:Continent. No BM.  : Continent. Urine retention at baseline  Pulmonary: LS diminished with crackles. Large amount of secretions. Productive cough. Dyspneic on exertion   Pain:R leg pain extending from inguinal bulge. Scheduled pain meds given.  Drains/Lines:PIV SL.  Skin:Scattered bruising and scabs.    Activity:Ind in room.  Diet:Regular. Thin liquids  Discharge:Pending     Shift summary:EGD completed today. Found esophagitis and possible thrush. Esophagus also dilated during procedure. Inguinal bulge removal cancelled. Additional PRN oxy added for break thru pain. Lidocaine cream also added for inguinal bulge/leg pain. Possible swallow study tomorrow.

## 2024-11-21 NOTE — PLAN OF CARE
Goal Outcome Evaluation:      Plan of Care Reviewed With: patient, child    PRIMARY Concern: Cough with COPD exacerbation, R sided groin pain.  SAFETY RISK Concerns (fall risk, behaviors, etc.): Fall Risk      Aggression Tool Color: Green  Isolation/Type: None  Tests/Procedures for NEXT shift: PVRs  Consults? (Pending/following, signed-off?) Surgery, SLP  Where is patient from? (Home, TCU, etc.): Home, lives alone.  Other Important info for NEXT shift: Scheduled Nebs, Frequent productive cough, LS diminished.  Anticipated DC date & active delays: TBD  _____________________________________________________________________________  SUMMARY NOTE:              Orientation/Cognitive: A&Ox4, irritable at times.  Observation Goals (Met/ Not Met): Inpatient  Mobility Level/Assist Equipment: SBA  Antibiotics & Plan (IV/po, length of tx left): N/A  Pain Management: PO dilaudid given x1, PRN tums given for heartburn  Tele/VS/O2: VSS on RA ex htn  ABNL Lab/BG: Hgb 12.2; CT abd/pelvis shows b/l hydronephrosis.  Diet: Regular  Bowel/Bladder: Continent, some retention. PVR-225, 178, 158  Skin Concerns: Refused skin assessment  Drains/Devices: PIV S.L.  Patient Stated Goal for Today: Rest

## 2024-11-21 NOTE — PLAN OF CARE
PRIMARY Concern: Cough with COPD exacerbation, R sided groin pain.  SAFETY RISK Concerns (fall risk, behaviors, etc.): Fall Risk      Aggression Tool Color: Green  Isolation/Type: None  Tests/Procedures for NEXT shift: PVRs  Consults? (Pending/following, signed-off?) Surgery, SLP  Where is patient from? (Home, TCU, etc.): Home, lives alone.  Other Important info for NEXT shift: Scheduled Nebs, Frequent productive cough, LS diminished. Mass excesion 10 am and egd today 0145  Anticipated DC date & active delays: TBD  _____________________________________________________________________________  SUMMARY NOTE:              Orientation/Cognitive: A&Ox4, irritable at times.  Observation Goals (Met/ Not Met): Inpatient  Mobility Level/Assist Equipment: SBA  Antibiotics & Plan (IV/po, length of tx left): N/A  Pain Management: PO dilaudid available   Tele/VS/O2: VSS on RA ex htn  ABNL Lab/BG: Hgb 12.2; CT abd/pelvis shows b/l hydronephrosis.  Diet: Regular  Bowel/Bladder: Continent, some retention. PVR-170  Skin Concerns: Refused skin assessment  Drains/Devices: PIV S.L.  Patient Stated Goal for Today: Rest

## 2024-11-21 NOTE — PROGRESS NOTES
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     Severe Malnutrition/cachexia - likely multifactorial - dysphagia, history supraglottis CA s/p radation, COPD, presumed lung malignancy.    --normal albumin/total protein   -- NPO  -- Pantoprazole 40 mg PO daily  -- EGD today  -- Video swallow per SLP on 11/22  -- Vitamin levels pending  -- Will follow          Interval History:     doing well, n/v/d, or abd pain.              Review of Systems:     C: NEGATIVE for fever, chills, change in weight  E/M: NEGATIVE for ear, mouth and throat problems  R: NEGATIVE for significant cough or SOB  CV: NEGATIVE for chest pain, palpitations or peripheral edema             Medications:   I have reviewed this patient's current medications  Current Facility-Administered Medications   Medication Dose Route Frequency Provider Last Rate Last Admin    acetylcysteine (MUCOMYST) 20 % nebulizer solution 2 mL  2 mL Nebulization TID Dey, Sanya Quintana MD   2 mL at 11/21/24 0705    amLODIPine (NORVASC) tablet 10 mg  10 mg Oral Daily Dey, Sanya Quintana MD   10 mg at 11/21/24 0817    atorvastatin (LIPITOR) tablet 40 mg  40 mg Oral Daily Dey, Sanya Quintana MD   40 mg at 11/21/24 0816    fluticasone-vilanterol (BREO ELLIPTA) 200-25 MCG/ACT inhaler 1 puff  1 puff Inhalation Daily Dey, Sanya Quintana MD   1 puff at 11/21/24 0816    And    umeclidinium (INCRUSE  ELLIPTA) 62.5 MCG/ACT inhaler 1 puff  1 puff Inhalation Daily Dey, Sanya Quintana MD   1 puff at 11/21/24 0816    gabapentin (NEURONTIN) capsule 100 mg  100 mg Oral TID Micky Charles PA-C   100 mg at 11/21/24 0817    guaiFENesin (MUCINEX) 12 hr tablet 600 mg  600 mg Oral BID Dey, Sanya Quintana MD   600 mg at 11/21/24 0817    ipratropium - albuterol 0.5 mg/2.5 mg/3 mL (DUONEB) neb solution 3 mL  1 vial Nebulization 5x Daily Dey, Sanya Quintana MD   3 mL at 11/21/24 0706    Lidocaine (LIDOCARE) 4 % Patch 3 patch  3 patch Transdermal Q24H Micky Charles PA-C        losartan (COZAAR) tablet 100 mg  100 mg Oral Daily Dey, Sanya Quintana MD   100 mg at 11/21/24 0816    mirtazapine (REMERON) tablet 15 mg  15 mg Oral At Bedtime Dey, Sanya Quintana MD   15 mg at 11/20/24 2033    nicotine (NICODERM CQ) 21 MG/24HR 24 hr patch 1 patch  1 patch Transdermal Daily Micky Charles PA-C        oxyCODONE (ROXICODONE) tablet 5 mg  5 mg Oral 4x Daily Micky Charles PA-C   5 mg at 11/21/24 0816    pantoprazole (PROTONIX) EC tablet 40 mg  40 mg Oral QAM AC Sanchez Mcnamara MD        senna-docusate (SENOKOT-S/PERICOLACE) 8.6-50 MG per tablet 3 tablet  3 tablet Oral Daily Dey, Sanya Quintana MD   3 tablet at 11/20/24 1412    sodium chloride (PF) 0.9% PF flush 3 mL  3 mL Intracatheter Q8H Dey, Sanya Quintana MD   3 mL at 11/20/24 1755    tamsulosin (FLOMAX) capsule 0.4 mg  0.4 mg Oral Daily Dey, Sanya Quintana MD   0.4 mg at 11/21/24 0816                  Physical Exam:   Vitals were reviewed  Vital Signs with Ranges  Temp:  [97.7  F (36.5  C)-97.9  F (36.6  C)] 97.9  F (36.6  C)  Pulse:  [67-83] 72  Resp:  [16-18] 16  BP: (101-148)/(60-79) 101/60  SpO2:  [91 %-100 %] 95 %  I/O last 3 completed shifts:  In: -   Out: 225 [Urine:225]  Constitutional: Alert, cachectic, lying in bed in NAD.   Respiratory:  Lungs CTAB, no labored breathing.  Cardiovascular:  Heart RRR  GI:  Abdomen soft, NT/ND   Skin/Integumen:  Warm, dry,  non-diaphoretic.             Data:   I reviewed the patient's new clinical lab test results.   Recent Labs   Lab Test 11/19/24 1945 08/29/24 0924 11/19/23  1034 11/17/23  0619 11/16/23  0942   WBC 5.4 3.9* 8.9   < > 10.4   HGB 12.2* 13.6 12.4*   < > 14.7   MCV 91 96 98   < > 99    228 248   < > 210   INR  --   --   --   --  0.99    < > = values in this interval not displayed.     Recent Labs   Lab Test 11/19/24 1945 08/29/24  0924 01/15/24  1400   POTASSIUM 4.2 4.9 4.1   CHLORIDE 95* 99 98   CO2 27 27 30*   BUN 10.8 10.9 13.9   ANIONGAP 12 9 8     Recent Labs   Lab Test 11/19/24 1945 11/16/23  0942 04/20/23  1030   ALBUMIN 3.8 4.0 4.1   BILITOTAL 0.2 0.4 0.4   ALT 18 22 25   AST 26 27 28       I reviewed the patient's new imaging results.    All laboratory data reviewed  All imaging studies reviewed by me.    DEXTER Euceda,  11/21/2024  [facility]  Office : [phone]  Cell: [phone] (Dr. Groves)

## 2024-11-21 NOTE — PROGRESS NOTES
[facility]    Medicine Progress Note - Hospitalist Service    Date of Admission:  11/19/2024    Assessment & Plan   Ángel Pham is a 69 year old male admitted on 11/19/2024. PMH of history of left upper lung cancer, current right spiculated lung nodule concerning for neoplasm, oral cancer s/p radiation, chemo, dysphagia, continued tobacco addiction/smoking, severe COPD, chronic pain on chronic opiates, BPH, bilateral hydronephrosis, malnutrition who was admitted on 11/19/2024, complaining of painful bump near groin.       Tender Right Inguinal lymphadenopathy x 6mo,   Possible early Right inguinal hernia without obstruction and without gangrene  *reporting pain in right inguinal area x6 months, positional in nature but worse with cough  *suspected palpable LN in this area possibly irritating femoral nerve  *Also with signs of a soft bulge in the right lower abdomen consistent with an early inguinal hernia.   *admission CT ABD/pelvis with no mention of hernia, pulm and urologic findings as below  *US right thigh 11/20 showed normal appearing lymph node anterior to vascular structures  *Gen Surg consulted and do not feel hernia is worth risk of surgery, had initial plan for LN excision    - discussed with Gen Surg 11/21; they are concerned that the palpable node may be vascular nature and also not the source of his pain - discussed risks of surgery with patient and all are in agreement to avoid any surgery and manage pain medically  - added prn oxycodone 2.5 mg q4h prn to PTA pain regimen  - does not like lidocaine patches, willing to try lidocaine cream to area  - continue gabapentin 100 mg tid initiated this stay     Thick sputum, difficult to expectorate  Subacute cough  Severe COPD w Severe diffusion restriction  R/o Acute exacerbation of severe chronic obstructive pulmonary disease (COPD) (H)  *Follows closely with pulmonology for severe COPD,  PFTs w/ severe obstruction and  severe diffusion limitation, recent tx for COPD exacerbation, s/p Pred 40mg w 15d taper, finished azithromycin 2 days prior 11/18.    *complains of difficulty with sputum expectoration, reports oral Mucinex worsens symptoms  *CXR with chronic findings; SARS/flu/RSV negative    - trial Mucomyst 3 times a day with DuoNebs- may stop if ineffective   - sputum culture in process  - continue PTA DuoNebs 5 times simone, Trelegy Ellipta 200/62.5/25 ( substitution by PharmD), prn albuterol  -Tessalon trialed on arrival if he can swallow safely  - F/u Fv Pulm w Dr Cortes for Pulmonary Emphysema   - outpt palliative consult given hx/dx     Severe dysphagia likely due to esophageal stenosis s/p dilation 11/21  Suspected esophageal candidiasis  *progressive issues with food and pills sticking in throat; with hx supraglottic SCC s/p neck radiation  *GI consulted, EGD 11/21 showing plaques of early and mid-esophagus (biopsied) and benign-appearing esophageal stenosis near GE junction s/p dilation  - Speech pathology consult, anticipate video swallow study   - PPI daily  - follow up with [facility] outpatient  - diflucan 100 mg daily x7 days  - [facility] soft diet     Hx throat cancer 2019, s/p radiation  History of T2 N0 stage II squamous cell carcinoma of the supraglottis: Treated with radiation therapy in the fall 2019 and felt to be in remission. Follows with [place] [facility] with recent follow-up, Dr. Mehta of ENT.   - F/u outpt w outpatient providers     Severe malnutrition, cachexia, BMI 14, cachexia (H)  Previous and current Dx severe malnutrition, cachectic, weight 91 pounds, BMI 14, in the setting of above cancers, dysphagia, poor intake, severe COPD, hx malignancy.  Presentation consistent with continuing ongoing severe malnutrition.   -PTA mirtazapine 15 at bedtime, consider 30mg at times of d/c  - Defer on dietary at present. He is willing to meet w nutrition outpt  - Continue home supplements of Ensure between meals     R  spiculated lung nodule concerning for malignancy  Hx Malignant neoplasm of upper lobe, left bronchus or lung (H), s/p radiation 2023  Right lower lobe nodular opacity concerning for possible neoplasm noted 10/21/2024 on CT chest, new from April imaging.  Follows with Dr. Ann of thoracic surgery.  History of left upper lobe lung nodule for which he completed stereotactic radiation therapy in July 2023 for what was presumed to be primary pulmonary malignancy.  Was not biopsied at that time (not amenable to surgery or bx)  *CT shows spiculated nodule appears slightly smaller (2.7 x 2 cm) than prior imaging as visualized on abdomen pelvis CT.  Improved size suggests against neoplasm, but should still have follow-up imaging.     - followup CT chest outpt    -Continue with plan for 3-month follow-up with Dr Breen and Dr Ann      Bilateral hydronephrosis, Suspected chronic urinary retention  Suspected BPH,  CT with bilateral hydronephrosis with distended bladder, thought secondary to bladder outlet obstruction. Pt however states due to sever GRAHAM when he walks to bathroom, he holds his urine as long as possible, Suspect chronic urinary retention as major cause  *PSA wnl  - advised to stop holding urine so long, rather, schedule urination freq as pulm can gabriella  -Started Flomax trial at admit, continue  -Monitor for retention bladder scans every 4 hours  - he declines condom cath, bed urinal or bedside commode to lessen GRAHAM  -Follow-up with PCP     Insomnia, Anxiety hx (per pt),    Per outpt notes, Wife passed away in 2019.  Flat affect during appointment. Wants to smoke. States on Remeron x 1 yr, no recent dose change, denies much for depressive sx, no SI  -PTA Remeron 15HHS continued     Mild hyponatremia-History of SIADH (likely resp trigger)   on arrival, which is okay, noting last baseline was 135.  History of lung cancer as below  -Monitor     History of chronic throat pain. headaches   Hx chronic pain,  "uncomplicated opioid dependence (H)  Long-term chronic opioid dependence for chronic CA-related pain.  Follows w [facility]. PTA oxycodone 5 mg tabs, last fill 120 tabs with 15-day supply occurred 11/15. He is inconsistent only takes 4 tabs/d. States never tried gabapentin before (started on admit) . Note opiates have a risk of appetite suppression.Also note that he is chronically on Fioricet getting #40 tabs, filling approximately every 6-8 weeks days.  Certainly at risk of medication related headache/tolerance.   -PTA oxycodone 5 mg-  previous max, 4 tabs /day. Will allow more here w incr pain  -PTA senna 3 tabs in the afternoon-for constipation continued  -PTA as needed Fioricet noted, PCP/pain clinic reeval  -Defer add on UDS given he has had meds since arrival     Cigarette nicotine severe dependence with nicotine-induced disorder  Long-term addiction, 43pk/yr. continues to smoke 1 pack a day despite the multiple severe diagnoses cancers she has had.  Not interested in stopping per multiple outpt notes. Was advised that his ongoing abuse is worsening his c/c of sputum and accelerate his death. States Fransico patches and gum \"didn't work\", but will to have a rx at time of d/c Past chantix trial caused nosebleed, declines.  11/20- he actually is contemplative re quitting and tried to decrease cigs, hears our concerns, willing to take rx on d/c , not here. Cont gentle encouragement highlighting quitting will help his c/c   -Smoking cessation strongly encouraged, directly contributing/hastening his death  -Nicotine replacement rx encouraged, offered and he declines while he is here. Ok w at discharge  -Pt declines Chantix d/t past nosebleed side effect and Wellbutrin too risky with weight loss             Diet: Snacks/Supplements Adult: Ensure Enlive; Between Meals  NPO for Medical/Clinical Reasons Except for: Meds, Ice Chips    DVT Prophylaxis: Pneumatic Compression Devices  Gannon Catheter: Not " present  Lines: None     Cardiac Monitoring: None  Code Status: No CPR- Do NOT Intubate      Clinically Significant Risk Factors Present on Admission         # Hyponatremia: Lowest Na = 134 mmol/L in last 2 days, will monitor as appropriate  # Hypochloremia: Lowest Cl = 95 mmol/L in last 2 days, will monitor as appropriate          # Hypertension: Noted on problem list               # Financial/Environmental Concerns:           Social Drivers of Health    Tobacco Use: High Risk (11/12/2024)    Patient History     Smoking Tobacco Use: Every Day     Smokeless Tobacco Use: Never   Alcohol Use: Unknown (7/25/2019)    AUDIT-C     Frequency of Alcohol Consumption: 4 or more times a week     Average Number of Drinks: 1 or 2   Physical Activity: Inactive (3/6/2024)    Exercise Vital Sign     Days of Exercise per Week: 0 days     Minutes of Exercise per Session: 0 min   Social Connections: Unknown (3/6/2024)    Social Connection and Isolation Panel [NHANES]     Frequency of Social Gatherings with Friends and Family: Three times a week          Disposition Plan     Medically Ready for Discharge: Anticipated in 2-4 Days             Antonio Thorpe MD  Hospitalist Service  [facility]  Securely message with [employer] (more info)  Text page via [employer] Paging/Directory   ______________________________________________________________________    Interval History   Long discussion regarding risks of surgery including sedation, possibility nothing could be done or LN excision not correct his pain issue.  Discussed with daughter via phone as well and decision made to avoid surgery, however, do wish to proceed with EGD.   He reports no change to his cough.     Physical Exam   Vital Signs: Temp: 97.9  F (36.6  C) Temp src: Oral BP: 101/60 Pulse: 72   Resp: 16 SpO2: 95 % O2 Device: None (Room air)    Weight: 91 lbs 0 oz    General Appearance: Thin male in NAD  Respiratory: lungs CTAB, no wheezes or  crackles  Cardiovascular: RRR, normal s1/s2 without murmur  GI: normal bowel sounds   Skin:   Other: Awake and alert, CN grossly intact      Medical Decision Making       40 MINUTES SPENT BY ME on the date of service doing chart review, history, exam, documentation & further activities per the note.      Data         Imaging results reviewed over the past 24 hrs:   No results found for this or any previous visit (from the past 24 hours).

## 2024-11-22 ENCOUNTER — APPOINTMENT (OUTPATIENT)
Dept: GENERAL RADIOLOGY | Facility: CLINIC | Age: 69
DRG: 073 | End: 2024-11-22
Attending: PHYSICIAN ASSISTANT
Payer: COMMERCIAL

## 2024-11-22 ENCOUNTER — APPOINTMENT (OUTPATIENT)
Dept: SPEECH THERAPY | Facility: CLINIC | Age: 69
DRG: 073 | End: 2024-11-22
Payer: COMMERCIAL

## 2024-11-22 VITALS
HEART RATE: 79 BPM | DIASTOLIC BLOOD PRESSURE: 75 MMHG | TEMPERATURE: 97.7 F | HEIGHT: 69 IN | OXYGEN SATURATION: 92 % | WEIGHT: 91 LBS | SYSTOLIC BLOOD PRESSURE: 132 MMHG | BODY MASS INDEX: 13.48 KG/M2 | RESPIRATION RATE: 14 BRPM

## 2024-11-22 LAB
ANION GAP SERPL CALCULATED.3IONS-SCNC: 7 MMOL/L (ref 7–15)
BACTERIA SPT CULT: ABNORMAL
BACTERIA SPT CULT: ABNORMAL
BUN SERPL-MCNC: 12.9 MG/DL (ref 8–23)
CALCIUM SERPL-MCNC: 8.4 MG/DL (ref 8.8–10.4)
CHLORIDE SERPL-SCNC: 100 MMOL/L (ref 98–107)
CREAT SERPL-MCNC: 0.5 MG/DL (ref 0.67–1.17)
EGFRCR SERPLBLD CKD-EPI 2021: >90 ML/MIN/1.73M2
GLUCOSE SERPL-MCNC: 107 MG/DL (ref 70–99)
GRAM STAIN RESULT: ABNORMAL
HCO3 SERPL-SCNC: 25 MMOL/L (ref 22–29)
MAGNESIUM SERPL-MCNC: 2 MG/DL (ref 1.7–2.3)
PHOSPHATE SERPL-MCNC: 2.7 MG/DL (ref 2.5–4.5)
POTASSIUM SERPL-SCNC: 4.1 MMOL/L (ref 3.4–5.3)
SODIUM SERPL-SCNC: 132 MMOL/L (ref 135–145)

## 2024-11-22 PROCEDURE — 74230 X-RAY XM SWLNG FUNCJ C+: CPT

## 2024-11-22 PROCEDURE — 250N000013 HC RX MED GY IP 250 OP 250 PS 637: Performed by: INTERNAL MEDICINE

## 2024-11-22 PROCEDURE — 99239 HOSP IP/OBS DSCHRG MGMT >30: CPT | Performed by: HOSPITALIST

## 2024-11-22 PROCEDURE — 999N000157 HC STATISTIC RCP TIME EA 10 MIN

## 2024-11-22 PROCEDURE — 250N000013 HC RX MED GY IP 250 OP 250 PS 637: Performed by: PHYSICIAN ASSISTANT

## 2024-11-22 PROCEDURE — 250N000013 HC RX MED GY IP 250 OP 250 PS 637: Performed by: HOSPITALIST

## 2024-11-22 PROCEDURE — 92526 ORAL FUNCTION THERAPY: CPT | Mod: GN | Performed by: SPEECH-LANGUAGE PATHOLOGIST

## 2024-11-22 PROCEDURE — 84100 ASSAY OF PHOSPHORUS: CPT | Performed by: HOSPITALIST

## 2024-11-22 PROCEDURE — 250N000009 HC RX 250: Performed by: INTERNAL MEDICINE

## 2024-11-22 PROCEDURE — 83735 ASSAY OF MAGNESIUM: CPT | Performed by: HOSPITALIST

## 2024-11-22 PROCEDURE — 80048 BASIC METABOLIC PNL TOTAL CA: CPT | Performed by: HOSPITALIST

## 2024-11-22 PROCEDURE — 92611 MOTION FLUOROSCOPY/SWALLOW: CPT | Mod: GN | Performed by: SPEECH-LANGUAGE PATHOLOGIST

## 2024-11-22 PROCEDURE — 94640 AIRWAY INHALATION TREATMENT: CPT

## 2024-11-22 PROCEDURE — 36415 COLL VENOUS BLD VENIPUNCTURE: CPT | Performed by: HOSPITALIST

## 2024-11-22 RX ORDER — TAMSULOSIN HYDROCHLORIDE 0.4 MG/1
0.4 CAPSULE ORAL DAILY
Qty: 30 CAPSULE | Refills: 0 | Status: SHIPPED | OUTPATIENT
Start: 2024-11-23

## 2024-11-22 RX ORDER — BARIUM SULFATE 400 MG/ML
SUSPENSION ORAL ONCE
Status: COMPLETED | OUTPATIENT
Start: 2024-11-22 | End: 2024-11-22

## 2024-11-22 RX ORDER — GABAPENTIN 100 MG/1
100 CAPSULE ORAL 3 TIMES DAILY
Qty: 90 CAPSULE | Refills: 0 | Status: SHIPPED | OUTPATIENT
Start: 2024-11-22

## 2024-11-22 RX ORDER — LIDOCAINE 40 MG/G
CREAM TOPICAL 4 TIMES DAILY PRN
Qty: 120 G | Refills: 0 | Status: SHIPPED | OUTPATIENT
Start: 2024-11-22

## 2024-11-22 RX ORDER — PANTOPRAZOLE SODIUM 40 MG/1
40 TABLET, DELAYED RELEASE ORAL
Qty: 30 TABLET | Refills: 0 | Status: SHIPPED | OUTPATIENT
Start: 2024-11-23

## 2024-11-22 RX ORDER — SWAB
1 SWAB, NON-MEDICATED MISCELLANEOUS DAILY
Qty: 30 CAPSULE | Refills: 0 | Status: SHIPPED | OUTPATIENT
Start: 2024-11-22

## 2024-11-22 RX ORDER — OXYCODONE HYDROCHLORIDE 5 MG/1
2.5 TABLET ORAL EVERY 6 HOURS PRN
Qty: 10 TABLET | Refills: 0 | Status: SHIPPED | OUTPATIENT
Start: 2024-11-22

## 2024-11-22 RX ORDER — FLUCONAZOLE 100 MG/1
100 TABLET ORAL DAILY
Qty: 5 TABLET | Refills: 0 | Status: SHIPPED | OUTPATIENT
Start: 2024-11-23 | End: 2024-11-28

## 2024-11-22 RX ADMIN — OXYCODONE HYDROCHLORIDE 2.5 MG: 5 TABLET ORAL at 02:11

## 2024-11-22 RX ADMIN — PANTOPRAZOLE SODIUM 40 MG: 40 TABLET, DELAYED RELEASE ORAL at 05:48

## 2024-11-22 RX ADMIN — ALBUTEROL SULFATE 2 PUFF: 108 INHALANT RESPIRATORY (INHALATION) at 08:07

## 2024-11-22 RX ADMIN — IPRATROPIUM BROMIDE AND ALBUTEROL SULFATE 3 ML: .5; 3 SOLUTION RESPIRATORY (INHALATION) at 11:01

## 2024-11-22 RX ADMIN — LOSARTAN POTASSIUM 100 MG: 100 TABLET, FILM COATED ORAL at 07:57

## 2024-11-22 RX ADMIN — BARIUM SULFATE 40 ML: 400 SUSPENSION ORAL at 10:24

## 2024-11-22 RX ADMIN — SENNOSIDES AND DOCUSATE SODIUM 3 TABLET: 50; 8.6 TABLET ORAL at 14:21

## 2024-11-22 RX ADMIN — UMECLIDINIUM 1 PUFF: 62.5 AEROSOL, POWDER ORAL at 08:05

## 2024-11-22 RX ADMIN — GABAPENTIN 100 MG: 100 CAPSULE ORAL at 07:57

## 2024-11-22 RX ADMIN — POLYETHYLENE GLYCOL 3350 17 G: 17 POWDER, FOR SOLUTION ORAL at 11:37

## 2024-11-22 RX ADMIN — FLUCONAZOLE 100 MG: 100 TABLET ORAL at 08:04

## 2024-11-22 RX ADMIN — ATORVASTATIN CALCIUM 40 MG: 40 TABLET, FILM COATED ORAL at 07:58

## 2024-11-22 RX ADMIN — GABAPENTIN 100 MG: 100 CAPSULE ORAL at 14:21

## 2024-11-22 RX ADMIN — OXYCODONE HYDROCHLORIDE 5 MG: 5 TABLET ORAL at 07:56

## 2024-11-22 RX ADMIN — OXYCODONE HYDROCHLORIDE 5 MG: 5 TABLET ORAL at 11:27

## 2024-11-22 RX ADMIN — FLUTICASONE FUROATE AND VILANTEROL TRIFENATATE 1 PUFF: 200; 25 POWDER RESPIRATORY (INHALATION) at 08:05

## 2024-11-22 RX ADMIN — TAMSULOSIN HYDROCHLORIDE 0.4 MG: 0.4 CAPSULE ORAL at 07:57

## 2024-11-22 RX ADMIN — GUAIFENESIN 600 MG: 600 TABLET, EXTENDED RELEASE ORAL at 07:58

## 2024-11-22 RX ADMIN — AMLODIPINE BESYLATE 10 MG: 10 TABLET ORAL at 07:58

## 2024-11-22 ASSESSMENT — ACTIVITIES OF DAILY LIVING (ADL)
ADLS_ACUITY_SCORE: 0
WEAR_GLASSES_OR_BLIND: YES

## 2024-11-22 NOTE — PLAN OF CARE
Goal Outcome Evaluation:         Pt discharge and went home with all the belongings.Discharge paperwork explained and given to pt.Periferal line removed.

## 2024-11-22 NOTE — PLAN OF CARE
Goal Outcome Evaluation:  PRIMARY Concern: R sided groin pain/mass, COPD exacerbation   SAFETY RISK Concerns (fall risk, behaviors, etc.): Fall Risk      Aggression Tool Color: Green/yellow.   Isolation/Type: None  Tests/Procedures for NEXT shift: Swallow study in AM  Consults? (Pending/following, signed-off?) SLP, GI, CC following. Gen surg signed off.   Where is patient from? (Home, TCU, etc.): Home, lives alone.  Other Important info for NEXT shift: Scheduled nebs. EGD completed this yesterday with esophagus dilation.  Anticipated DC date & active delays: TBD  _____________________________________________________________________________  SUMMARY NOTE:              Orientation/Cognitive: A&Ox4, irritable.  Observation Goals (Met/ Not Met): Inpatient  Mobility Level/Assist Equipment: Ind in room  Antibiotics & Plan (IV/po, length of tx left): N/A  Pain Management: Scheduled oxycodone and gabapentin, Prn oxy given x1.   Tele/VS/O2: VSS on RA,   ABNL Lab/BG: Vit D=16  Diet: Mech soft pending video swallow today  Bowel/Bladder: Continent, some retention (baseline).   Skin Concerns: scattered bruising/dry patches.  Drains/Devices: PIV SL  Patient Stated Goal for Today: To go home

## 2024-11-22 NOTE — DISCHARGE SUMMARY
"[facility]  Hospitalist Discharge Summary      Date of Admission:  11/19/2024  Date of Discharge:  11/22/2024  2:28 PM  Discharging Provider: Antonio Thorpe MD  Discharge Service: Hospitalist Service    Discharge Diagnoses   Right inguinal radicular pain  Severe dysphagia due to esophageal stenosis s/p dilation  Suspected esophageal candidiasis  Hx throat cancer s/p radiation  Severe malnutrition  Suspected right lung cancer  Hx left lung cancer  Bilateral hydronephrosis  Suspected chronic urinary retention  Insomnia  Anxiety  Hyponatremia  Chronic pain on chronic opioids  Nicotine use disorder      Clinically Significant Risk Factors     # Cachexia: Estimated body mass index is 13.44 kg/m  as calculated from the following:    Height as of this encounter: 1.753 m (5' 9\").    Weight as of this encounter: 41.3 kg (91 lb).       Follow-ups Needed After Discharge   Follow-up Appointments       Follow-up and recommended labs and tests       Follow up with primary care provider, Maxim Goodman, within 7 days for hospital follow- up.  No follow up labs or test are needed.  Follow up with Dr. Groves in GI clinic in 1-2 weeks.  Clinic should call you to schedule appointment.                Unresulted Labs Ordered in the Past 30 Days of this Admission       Date and Time Order Name Status Description    11/20/2024  2:23 AM Respiratory Aerobic Bacterial Culture with Gram Stain Preliminary         These results will be followed up by: hospitalist group; patient has no signs or symptoms of active pneumonia - suspect GNR on culture at time of discharge represents colonization in setting of his advanced lung disease    Discharge Disposition   Discharged to home  Condition at discharge: Stable    Hospital Course   Ángel Pham is a 69 year old male admitted on 11/19/2024. PMH of history of left upper lung cancer, current right spiculated lung nodule concerning for neoplasm, oral cancer s/p " radiation, chemo, dysphagia, continued tobacco addiction/smoking, severe COPD, chronic pain on chronic opiates, BPH, bilateral hydronephrosis, malnutrition who was admitted on 11/19/2024, complaining of painful bump near groin.       Right inguinal radicular pain  Reporting pain in right inguinal area x6 months, positional in nature but worse with cough.  Question if palpable LN in this area possibly irritating femoral nerve, also with signs of a soft bulge in the right lower abdomen consistent with an early inguinal hernia.  Admission CT ABD/pelvis with no mention of hernia.  US right thigh 11/20 showed normal appearing lymph node anterior to vascular structures.  Gen Surg consulted and do not feel hernia is worth risk of surgery, also not convinced lymph node is cause for pain and also concerned this may actually be a vascular structure.  In discussion with patient, ultimately decided would work on trial on med management for pain and avoid any surgery.  Initiated on gabapentin 100 mg tid, lidocaine cream (does not like patches) and an additional 2.5 mg oxycodone prn with adequate pain control.  He will follow up with PCP for further titration of gabapentin and hopefully wean from oxycodone.     Severe dysphagia likely due to esophageal stenosis s/p dilation 11/21  Suspected esophageal candidiasis  Progressive issues with food and pills sticking in throat; with hx supraglottic SCC s/p neck radiation.  GI consulted, EGD 11/21 showing plaques of early and mid-esophagus (biopsied) and benign-appearing esophageal stenosis near GE junction s/p dilation with improvement in symptoms.  Underwent video swallow study with SLP recommending soft diet with mildly thick liquids, but he firmly declines thickened liquids and will continue with safe swallow strategies. Continue PPI daily, diflucan 100 mg daily x7 days and follow up with [facility] in about 2 weeks.     Bilateral hydronephrosis, Suspected chronic urinary  retention  Suspected BPH  CT with bilateral hydronephrosis with distended bladder, thought secondary to bladder outlet obstruction, however, was not found to be retaining significant urine on bladder scan. Does report occasionally holding urine due to his severe dyspnea walking to bathroom.  Cr is stable at baseline.  PSA wnl.  Initiated on Flomax and recommended to follow up with PCP.     Severe COPD w Severe diffusion restriction  Follows closely with pulmonology for severe COPD, recently treated for exacerbation. Complains of difficulty clearing sputum, with no improvement with oral mucinex or trial of mucomyst nebs this stay.  Admission CXR with chronic findings. SARS/flu/RSV negative.  Sputum culture positive for GNR at discharge, but he denies any increase in cough or dyspnea, is afebrile without leukocytosis - suspect colonization. Follow up with outpatient pulmonologist.         Chronic, stable medical conditions:     Severe malnutrition, cachexia, BMI 14  Cachectic with BMI 14, in the setting of above cancers, dysphagia, poor intake, severe COPD, hx malignancy.  Declined nutrition consult.       Hx throat cancer 2019, s/p radiation  Follows with [facility] with recent follow-up, Dr. Mehta of ENT.      R spiculated lung nodule concerning for malignancy  Hx Malignant neoplasm of upper lobe, left bronchus or lung (H), s/p radiation 2023  CT shows spiculated nodule appears slightly smaller (2.7 x 2 cm) than prior imaging as visualized on abdomen pelvis CT.   Continue with plan for 3-month follow-up with Dr Breen and Dr Ann      Insomnia, Anxiety   Continue PTA Remeron      Mild hyponatremia  History of SIADH   Na stable in low 130's this stay     History of chronic throat pain. headaches   Hx chronic pain, uncomplicated opioid dependence (H)  Continue PTA oxycodone and follow up with [facility].     Nicotine use disorder  Declined nicotine replacement therapy.       Consultations This  Hospital Stay   SPEECH LANGUAGE PATH ADULT IP CONSULT  CARE MANAGEMENT / SOCIAL WORK IP CONSULT  SURGERY GENERAL IP CONSULT  GASTROENTEROLOGY IP CONSULT    Code Status   No CPR- Do NOT Intubate    Time Spent on this Encounter   I, Antonio Thorpe MD, personally saw the patient today and spent greater than 30 minutes discharging this patient.       Antonio Thorpe MD  [facility] EXTENDED RECOVERY AND SHORT STAY  [address] [address]  Phone: [phone]  ______________________________________________________________________    Physical Exam   Vital Signs: Temp: 97.7  F (36.5  C) Temp src: Oral BP: 132/75 Pulse: 79   Resp: 14 SpO2: 92 % O2 Device: None (Room air)    Weight: 91 lbs 0 oz  General Appearance:  Thin male in NAD  Respiratory: lungs CTAB, no wheezes or crackles  Cardiovascular: RRR, normal s1/s2 without murmur  GI: normal bowel sounds   Other:  Awake and alert, CN grossly intact         Primary Care Physician   Maxim Goodman    Discharge Orders      Primary Care - Care Coordination Referral      Reason for your hospital stay    You were admitted for apparent neuropathic pain for which your medications were adjusted, and of the leg as well as difficulty swallowing for which you underwent endoscopy with dilation of your esophagus.     Follow-up and recommended labs and tests     Follow up with primary care provider, Maxim Goodman, within 7 days for hospital follow- up.  No follow up labs or test are needed.  Follow up with Dr. Groves in GI clinic in 1-2 weeks.  Clinic should call you to schedule appointment.     Activity    Your activity upon discharge: activity as tolerated     Diet    Follow this diet upon discharge:       Snacks/Supplements Adult: Ensure Enlive; Between Meals      Combination Diet Regular Diet; Thin Liquids (level 0) (Swallow-COUGH-swallow again, small bites/sips, extra swallows.  Eat only when up to chair.  Remain upright 1-2 hours after  intake.)       Significant Results and Procedures   Most Recent 3 CBC's:  Recent Labs   Lab Test 11/19/24 1945 08/29/24  0924 11/19/23  1034   WBC 5.4 3.9* 8.9   HGB 12.2* 13.6 12.4*   MCV 91 96 98    228 248     Most Recent 3 BMP's:  Recent Labs   Lab Test 11/22/24  0714 11/21/24  0233 11/19/24 1945 08/29/24  0924   *  --  134* 135   POTASSIUM 4.1  --  4.2 4.9   CHLORIDE 100  --  95* 99   CO2 25  --  27 27   BUN 12.9  --  10.8 10.9   CR 0.50*  --  0.65* 0.49*   ANIONGAP 7  --  12 9   MOSHE 8.4*  --  9.1 9.3   * 104* 97 92     7-Day Micro Results       Collected Updated Procedure Result Status      11/20/2024 0811 11/22/2024 1304 Respiratory Aerobic Bacterial Culture with Gram Stain [[id number]]   (Abnormal)   Sputum from Expectorate    Preliminary result Component Value   Culture 2+ Pseudomonas aeruginosa  [P]     4+ Normal thien  [P]    Gram Stain Result <10 Squamous epithelial cells/low power field  [P]     >25 PMNs/low power field  [P]     4+ Mixed thien  [P]                11/19/2024 2348 11/20/2024 0033 Influenza A/B, RSV and SARS-CoV2 PCR (COVID-19) Nasopharyngeal [[id number]]    Swab from Nasopharyngeal    Final result Component Value   Influenza A PCR Negative   Influenza B PCR Negative   RSV PCR Negative   SARS CoV2 PCR Negative   NEGATIVE: SARS-CoV-2 (COVID-19) RNA not detected, presumed negative.                ,   Results for orders placed or performed during the hospital encounter of 11/19/24   Chest XR,  PA & LAT    Narrative    EXAM: XR CHEST 2 VIEWS  LOCATION: [facility]  DATE: 11/19/2024    INDICATION: dyspnea, cough wheezing  COMPARISON: The chest 10/21/2024.      Impression    IMPRESSION: Hyperexpansion of the lungs consistent with known emphysema. Chronic scarring in the upper lobes. Irregular nodular opacity in the right lower lobe concerning for possible neoplasm on prior chest CT is again demonstrated. Chest CT follow-up   is recommended. No  new lung consolidation, pleural effusion or pneumothorax. Normal heart size.   CT Abdomen Pelvis w Contrast    Narrative    EXAM: CT ABDOMEN PELVIS W CONTRAST  LOCATION: [facility]  DATE: 11/20/2024    INDICATION: R inguinal lymph node, concern for inguinal hernia (but more superior lateral than canal), hx malignancy here w  R groin pain  COMPARISON: Noncontrast CT of the chest 10/21/2024. CT of the chest, abdomen, and pelvis 3/31/2022.  TECHNIQUE: CT scan of the abdomen and pelvis was performed following injection of IV contrast. Multiplanar reformats were obtained. Dose reduction techniques were used.  CONTRAST: 48 mL Isovue 370    FINDINGS:   LOWER CHEST: An irregular nodular opacity at the base of the right lower lobe is slightly smaller compared to 10/21/2024, now measuring 2.7 x 2.0 cm, previously 2.9 x 2.8 cm (images 1-10 of axial series 4). Advanced emphysema incompletely imaged.    HEPATOBILIARY: Normal.    PANCREAS: Normal.    SPLEEN: Normal.    ADRENAL GLANDS: Normal.    KIDNEYS/BLADDER: Mild bilateral hydronephrosis may be due to distention of the urinary bladder. No urolithiasis or other cause for ureteral obstruction visualized. Kidneys and bladder otherwise normal.    BOWEL: No obstruction or inflammation. No evidence for appendicitis.    LYMPH NODES: Normal.    VASCULATURE: Advanced aortoiliac atherosclerosis. No aneurysm.    PELVIC ORGANS: Normal.    MUSCULOSKELETAL: No aminata hernia visualized. Degenerative changes of the lumbar spine.      Impression    IMPRESSION:   1.  Mild bilateral hydronephrosis may be due to distention of the urinary bladder. No urolithiasis or other cause for ureteral obstruction visualized.  2.  Compared to 10/21/2024, mild decrease in size of the 2.7 cm irregular nodular opacity at the base of the right lower lobe which warrants chest CT follow-up in 3 months.  3.  Advanced emphysema incompletely imaged.  4.  Atherosclerosis.   US Lower Extremity  Non Vascular Right    Narrative    RIGHT THIGH  11/20/2024 2:49 PM     COMPARISON: None.    HISTORY: Calcified nodule/possible lymph node - concerned about if it  is a fixed structure and if it is adjacent to vascular or nerve  structures.    TECHNIQUE: Clinical area of interest was scanned in the standard  fashion with specialized ultrasound transducer(s) using both gray  scale and limited color/spectral Doppler techniques.    FINDINGS:  Normal-appearing lymph node in the area of the patient's  palpable mass is adjacent to the common femoral artery and vein but  separate from these structures. No peripheral nerves are identified.  MRI can be performed for further evaluation if indicated.      Impression    IMPRESSION:  Normal-appearing lymph node. This is anterior to the  common femoral artery and vein but separate from the vascular  structures. No peripheral nerves are definitively identified.    EMLIIA NARAYAN MD         SYSTEM ID:  [id number]   XR Video Swallow with SLP or OT    Narrative    VIDEO SWALLOWING EVALUATION   11/22/2024 10:38 AM     HISTORY: Throat cancer with radiation and difficulty swallowing.    COMPARISON: None.    FLUOROSCOPY TIME: 2.1 minutes.  SPOT IMAGES OR CINE RUNS: 8      Impression    IMPRESSION:    Thin: Aspiration with cough.    Slightly thick: Aspiration with cough.    Puree: No penetration. There is residue in the vallecula.    Semi-Solid: No penetration. Residue in the vallecula and upper  esophagus.    This study only includes the cervical esophagus.    STEPHANIE DOWNEY MD         SYSTEM ID:  [id number]       Discharge Medications   Current Discharge Medication List        START taking these medications    Details   fluconazole (DIFLUCAN) 100 MG tablet Take 1 tablet (100 mg) by mouth daily for 5 days.  Qty: 5 tablet, Refills: 0    Associated Diagnoses: Esophageal candidiasis (H)      gabapentin (NEURONTIN) 100 MG capsule Take 1 capsule (100 mg) by mouth 3 times daily.  Qty: 90 capsule,  Refills: 0    Comments: Future refills by PCP Dr. Maxim Goodman with phone number [phone].  Associated Diagnoses: Inguinal pain, right      lidocaine (LMX4) 4 % external cream Apply topically 4 times daily as needed for mild pain.  Qty: 120 g, Refills: 0    Comments: Future refills by PCP Dr. Maxim Goodman with phone number [phone].  Associated Diagnoses: Inguinal pain, right      !! oxyCODONE (ROXICODONE) 5 MG tablet Take 0.5 tablets (2.5 mg) by mouth every 6 hours as needed for moderate pain.  Qty: 10 tablet, Refills: 0    Associated Diagnoses: Inguinal pain, right      pantoprazole (PROTONIX) 40 MG EC tablet Take 1 tablet (40 mg) by mouth every morning (before breakfast).  Qty: 30 tablet, Refills: 0    Comments: Future refills by PCP Dr. Maxim Goodman with phone number [phone].  Associated Diagnoses: Esophageal stricture      tamsulosin (FLOMAX) 0.4 MG capsule Take 1 capsule (0.4 mg) by mouth daily.  Qty: 30 capsule, Refills: 0    Comments: Future refills by PCP Dr. Maxim Goodman with phone number [phone].  Associated Diagnoses: Urinary retention with incomplete bladder emptying      vitamin D3 (CHOLECALCIFEROL) 10 MCG (400 UNIT) capsule Take 1 capsule (400 Units) by mouth daily.  Qty: 30 capsule, Refills: 0    Comments: Future refills by PCP Dr. Maxim Goodman with phone number [phone].  Associated Diagnoses: Vitamin D deficiency       !! - Potential duplicate medications found. Please discuss with provider.        CONTINUE these medications which have NOT CHANGED    Details   albuterol (PROAIR HFA/PROVENTIL HFA/VENTOLIN HFA) 108 (90 Base) MCG/ACT inhaler Inhale 2 puffs into the lungs every 4 hours as needed for wheezing, shortness of breath or cough.  Qty: 18 g, Refills: 11    Comments: Pharmacy may dispense brand covered by insurance (Proair, or proventil or ventolin or generic albuterol inhaler)  Associated Diagnoses: Pulmonary emphysema, unspecified emphysema type (H)       amLODIPine (NORVASC) 10 MG tablet TAKE 1 TABLET BY MOUTH DAILY  Qty: 100 tablet, Refills: 3    Comments: Please send a replace/new response with 100-Day Supply if appropriate to maximize member benefit. Requesting 1 year supply.  Associated Diagnoses: HTN, goal below 140/90      atorvastatin (LIPITOR) 40 MG tablet TAKE 1 TABLET BY MOUTH DAILY  Qty: 100 tablet, Refills: 3    Comments: Please send a replace/new response with 100-Day Supply if appropriate to maximize member benefit. Requesting 1 year supply.  Associated Diagnoses: Peripheral vascular disease (H)      butalbital-acetaminophen-caffeine (FIORICET/ESGIC) -40 MG per tablet TAKE 1 TABLET BY MOUTH EVERY 4-6 HOURS AS NEEDED  Refills: 0      Fluticasone-Umeclidin-Vilanterol (TRELEGY ELLIPTA) 200-62.5-25 MCG/ACT oral inhaler Inhale 1 puff into the lungs daily.  Qty: 1 each, Refills: 5    Associated Diagnoses: Pulmonary emphysema, unspecified emphysema type (H)      ipratropium - albuterol 0.5 mg/2.5 mg/3 mL (DUONEB) 0.5-2.5 (3) MG/3ML neb solution Take 1 vial (3 mLs) by nebulization 5 times daily.  Qty: 450 mL, Refills: 2    Comments: Increased frequency needed  Associated Diagnoses: Pulmonary emphysema, unspecified emphysema type (H)      losartan (COZAAR) 100 MG tablet TAKE 1 TABLET BY MOUTH DAILY  Qty: 100 tablet, Refills: 3    Comments: Please send a replace/new response with 100-Day Supply if appropriate to maximize member benefit. Requesting 1 year supply.  Associated Diagnoses: HTN, goal below 140/90      mirtazapine (REMERON) 15 MG tablet Take 1 tablet (15 mg) by mouth at bedtime.  Qty: 60 tablet, Refills: 0    Associated Diagnoses: Anxiety      !! oxyCODONE (ROXICODONE) 5 MG tablet TAKE 1 TABLET BY MOUTH EVERY 3 TO 4 HOURS AS NEEDED FOR PAIN    Associated Diagnoses: COPD with acute exacerbation (H); Community acquired pneumonia of left lung, unspecified part of lung      senna-docusate (SENOKOT-S/PERICOLACE) 8.6-50 MG tablet Take 3 tablets by  mouth daily. Early afternoon.       !! - Potential duplicate medications found. Please discuss with provider.        Allergies   Allergies   Allergen Reactions    Chantix [Varenicline] Other (See Comments)     Patient reports has tried this in past and had lightheadedness and nose bleeds.    Morphine Hcl     Wellbutrin [Bupropion Hydrobromide]      syncope

## 2024-11-22 NOTE — PROGRESS NOTES
Blood pressures controlled.    Continue metoprolol.  Monitor BP   PRIMARY Concern: R sided groin pain/mass, COPD exacerbation   SAFETY RISK Concerns (fall risk, behaviors, etc.): Fall Risk      Aggression Tool Color: Green/yellow.   Isolation/Type: None  Tests/Procedures for NEXT shift: Swallow study in AM  Consults? (Pending/following, signed-off?) SLP, GI, CC following. Gen surg signed off.   Where is patient from? (Home, TCU, etc.): Home, lives alone.  Other Important info for NEXT shift: Scheduled nebs. EGD completed this AM- esophagus dilation.  Anticipated DC date & active delays: TBD  _____________________________________________________________________________  SUMMARY NOTE:              Orientation/Cognitive: A&Ox4, irritable.  Observation Goals (Met/ Not Met): Inpatient  Mobility Level/Assist Equipment: Ind in room  Antibiotics & Plan (IV/po, length of tx left): N/A  Pain Management: Scheduled oxycodone and gabapentin, Prn oxy given x1.   Tele/VS/O2: VSS on RA, ex htn  ABNL Lab/BG: Vit D=16  Diet: Mech soft   Bowel/Bladder: Continent, some retention (baseline).   Skin Concerns: Refused skin assessment- scattered bruising/dry patches.  Drains/Devices: PIV SL  Patient Stated Goal for Today: To go home

## 2024-11-22 NOTE — PROGRESS NOTES
VIDEO SWALLOW STUDY       11/22/24 1048   Appointment Info   Signing Clinician's Name / Credentials (SLP) Justa Nation M.A., CCC-SLP, [facility]   General Information   Onset of Illness/Injury or Date of Surgery 11/19/24   Pertinent History of Current Problem Ángel Pham is a 69 year old male admitted on 11/19/2024. He presents to the emergency department with complaints of right groin pain when coughing. Found to have a lower abdominal hernia as well as palpable inguinal mass suspicious for calcified lymph node.  Admission was requested for treatment of cough and pain control.     Subacute cough: Follows closely with pulmonology, and was treated for a COPD exacerbation in the past weeks.  He was on a 15-day taper of steroids starting October 25, and completed a course of azithromycin 11/18/2024.  Reports thick green sputum.  No wheezing at this time.  Primary concern is that the sputum feels as though it gets stuck in his throat and he is unable to clear it.  No fevers, no chills.  Ángel Pham is a 69 year old male admitted on 11/19/2024. He presents to the emergency department with complaints of right groin pain when coughing.  Found to have a lower abdominal hernia as well as palpable inguinal mass suspicious for calcified lymph node.  Admission was requested for treatment of cough and pain control.     Subacute cough: Follows closely with pulmonology, and was treated for a COPD exacerbation in the past weeks.  He was on a 15-day taper of steroids starting October 25, and completed a course of azithromycin 11/18/2024.  Reports thick green sputum.  No wheezing at this time.  Primary concern is that the sputum feels as though it gets stuck in his throat and he is unable to clear it.  No fevers, no chills. Dysphagia: Patient reports that over the past year or so he has had more issues with phlegm and pills getting stuck in his throat.  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  -Speech pathology consult, anticipate video swallow study with possible gastroenterology consultation  -Might benefit from splitting pills at home including lisinopril, amlodipine   Type of Evaluation   Type of Evaluation Swallow Evaluation   General Swallowing Observations   Swallowing Evaluation Videofluoroscopic swallow study (VFSS)   Clinical Swallow Evaluation   Feeding Assistance set up only required   VFSS Evaluation   Radiologist Dr. Swann   Views Taken left lateral   VFSS Textures Trialed thin liquids;slightly thick liquids;pureed;soft & bite-sized   VFSS Eval: Thin Liquid Texture Trial   Mode of Presentation, Thin Liquid cup;self-fed   Preparatory Phase WFL   Oral Phase, Thin Liquid premature pharyngeal entry   Bolus Location When Swallow Triggered valleculae   Pharyngeal Phase, Thin Liquid impaired epiglottic movement;residue in vallecula;residue in pyriform sinus;pharyngeal wall coating;impaired pharyngoesophageal segment opening;impaired tongue base retraction;impaired hyolaryngeal excursion   Rosenbek's Penetration Aspiration Scale: Thin Liquid Trial Results 8 - contrast passes glottis, visible subglottic residue remains, absent patient response (aspiration)   Response to Aspiration productive cued cough   Strategies and Compensations supraglottic swallow strategy;double swallow;reduce bolus size   Diagnostic Statement Productive cued cough when near vocal cords, but not productive cued response for tracheal aspirate.  Silent aspiration unless cued. Cricopharyngeal bar without signficant flow restriction.   VFSS Eval: Slightly Thick Liquids   Mode of Presentation cup;self-fed   Preparatory Phase WFL   Oral Phase premature pharyngeal entry   Bolus Location When Swallow Triggered valleculae   Pharyngeal Phase impaired hyolaryngel excursion;impaired epiglottic movement;impaired tongue base retraction;pharyngeal wall coating;residue in vallecula;residue in pyriform sinus;impaired  pharyngoesophageal segment opening   Rosenbek's Penetration Aspiration Scale 8 - contrast passes glottis, visable residue remains, absent patient response   Strategies and Compensations supraglottic swallow strategy;reduce bolus size;double swallow   Diagnostic Statement Productive cued cough when near vocal cords, but not productive cued response for tracheal aspirate. Silent aspiration unless cued.  Cricopharyngeal bar without signficant flow restriction.   VFSS Evaluation: Puree Solid Texture Trial   Mode of Presentation, Puree spoon;self-fed   Preparatory Phase WFL   Oral Phase, Puree premature pharyngeal entry   Bolus Location When Swallow Triggered posterior angle of ramus   Pharyngeal Phase, Puree impaired epiglottic movement;impaired tongue base retraction;residue in vallecula;impaired pharyngoesophageal segment opening;pharyngeal wall coating;residue in pyriform sinus   Diagnostic Statement Severe pharyngeal stasis, reduced with multiple swallows,  Cricopharyngeal bar not obstructive, but diffuse upper esophageal food stasis and air inflation is also visualized.   VFSS Eval: Soft & Bite Sized   Mode of Presentation spoon;self-fed   Preparatory Phase WFL   Oral Phase WFL   Pharyngeal Phase impaired epiglottic movement;impaired tongue base retraction;pharyngeal wall coating;residue in vallecula;residue in pyriform sinus;impaired pharyngoesophageal segment opening   Rosenbek's Penetration Aspiration Scale 1 - no aspiration, contrast does not enter airway   Diagnostic Statement Severe pharyngeal stasis, reduced with multiple swallows, Cricopharyngeal bar not obstructive, but diffuse upper esophageal food stasis and air inflation is also visualized.   Esophageal Phase of Swallow   Patient reports or presents with symptoms of esophageal dysphagia Yes   Esophageal sweep performed during today s vidofluoroscopic exam  No   Esophageal comments Visualized significant esophageal stasis and air-filling throughout all  "textures   Swallowing Recommendations   Diet Consistency Recommendations regular diet;slightly thick liquids (level 1)  (Per patient wishes for regular solids)   Supervision Level for Intake distant supervision needed   Mode of Delivery Recommendations bolus size, small;slow rate of intake;no straws   Swallowing Maneuver Recommendations effortful (hard) swallow;super supraglottic swallow;supraglottic swallow;throat clear-swallow   Monitoring/Assistance Required (Eating/Swallowing) stop eating activities when fatigue is present;monitor for cough or change in vocal quality with intake   Recommended Feeding/Eating Techniques (Swallow Eval) patient is independent, no specific recommendations;maintain upright sitting position for eating;maintain upright posture during/after eating for 30 minutes;other (see comments)  (Cue for use of swallow-cough-swallow strategy)   SLP Goals   Therapy Frequency (SLP Eval) 5 times/week   SLP Predicted Duration/Target Date for Goal Attainment 11/29/24   SLP Goals Swallow   SLP: Safely tolerate diet without signs/symptoms of aspiration Regular diet;Thin liquids;With use of compensatory swallow strategies;With assistance/supervision;Independently   Interventions   Interventions Quick Adds Swallowing Dysfunction   Swallowing Intervention   Treatment of Swallowing Dysfunction &/or Oral Function for Feeding Minutes (78179) 15   Symptoms Noted During/After Treatment None   Treatment Detail/Skilled Intervention Patient motivated to continue eating a regular diet, required cueing for use of complicated, multi-step swallowing strategies.  Trained \"swallow-cough-swallow\" for each bite and sip, which will require reenforcement of training.   SLP Discharge Planning   SLP Plan Supraglottic swallow strategies, train post-XRT exercise program.   SLP Discharge Recommendation home with outpatient therapy services   SLP Rationale for DC Rec Patient's swallow function below baseline.   SLP Brief overview of " "current status  Video swallow study completed in radiology with high aspiration risk across all oral intake: silent aspiration of liquids and severe pharyngeal and esophageal stasis with any puree or solid consistency.  Patient would like to continue eating a regular diet despite his pneumonias, and he is agreeable to using \"cough-swallow-cough\" strategy with intake to minimize aspiration events and work toward pharyngeal and esophageal residue clearance.  Trial slightly thick liquids while learning supraglottic swallow strategy, but can consider advancing to thin per patient wishes if he verbalizes understanding of increased aspiration risk with these.   SLP Time and Intention   Total Session Time (sum of timed and untimed services) 15     Severe pharyngeal and esophageal stasis after swallow:    "

## 2024-11-22 NOTE — PROGRESS NOTES
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     Severe Malnutrition/cachexia - likely multifactorial - dysphagia, history supraglottis CA s/p radation, COPD, presumed lung malignancy.    --normal albumin/total protein   -- Vitamin levels: B12 463, Folate 6.0, Vit D 16  -- EGD 11/21: finding consistent with esophagitis with thrush.  A tight stricture at GE junction was noticed which was dilated up to 16 mm.  Small hiatus hernia.  Minimally congested stomach consistent with gastritis.    --Continue on soft diet.  --Diflucan x 7 days for thrush.  --Continue on PPI.    -- Video swallow per SLP on 11/22  --Repeat EGD if with dilation in 4 to 6 weeks.  --Follow up with [facility] in 1-2 weeks. Our clinical staff will call the patient to schedule. Office phone: [phone]          Interval History:     doing well, n/v/d, or abd pain.  Reports that swallowing is improved, but still having a lot of phlegm.              Review of Systems:     C: NEGATIVE for fever, chills, change in weight  E/M: NEGATIVE for ear, mouth and throat problems  R: NEGATIVE for significant cough or SOB  CV: NEGATIVE for chest pain, palpitations or peripheral edema             Medications:   I have reviewed this patient's current medications  Current Facility-Administered Medications   Medication Dose Route Frequency Provider Last Rate Last Admin     acetylcysteine (MUCOMYST) 20 % nebulizer solution 2 mL  2 mL Nebulization TID Dey, Sanya Quintana MD   2 mL at 11/21/24 2100    amLODIPine (NORVASC) tablet 10 mg  10 mg Oral Daily Dey, Sanya Quintana MD   10 mg at 11/22/24 0758    atorvastatin (LIPITOR) tablet 40 mg  40 mg Oral Daily Dey, Sanya Quintana MD   40 mg at 11/22/24 0758    fluconazole (DIFLUCAN) tablet 100 mg  100 mg Oral Daily Antonio Thorpe MD   100 mg at 11/22/24 0804    fluticasone-vilanterol (BREO ELLIPTA) 200-25 MCG/ACT inhaler 1 puff  1 puff Inhalation Daily Dey, Sanya Quintana MD   1 puff at 11/22/24 0805    And    umeclidinium (INCRUSE ELLIPTA) 62.5 MCG/ACT inhaler 1 puff  1 puff Inhalation Daily Dey, Sanya Quintana MD   1 puff at 11/22/24 0805    gabapentin (NEURONTIN) capsule 100 mg  100 mg Oral TID Micky Charles PA-C   100 mg at 11/22/24 0757    guaiFENesin (MUCINEX) 12 hr tablet 600 mg  600 mg Oral BID Dey, Sanya Quintana MD   600 mg at 11/22/24 0758    ipratropium - albuterol 0.5 mg/2.5 mg/3 mL (DUONEB) neb solution 3 mL  1 vial Nebulization 5x Daily Dey, Sanya Quintana MD   3 mL at 11/21/24 2100    losartan (COZAAR) tablet 100 mg  100 mg Oral Daily Dey, Sanya Quintana MD   100 mg at 11/22/24 0757    mirtazapine (REMERON) tablet 15 mg  15 mg Oral At Bedtime Dey, Sanya Quintana MD   15 mg at 11/21/24 2151    nicotine (NICODERM CQ) 21 MG/24HR 24 hr patch 1 patch  1 patch Transdermal Daily Micky Charles PA-C        oxyCODONE (ROXICODONE) tablet 5 mg  5 mg Oral 4x Daily Micky Charles PA-C   5 mg at 11/22/24 0756    pantoprazole (PROTONIX) EC tablet 40 mg  40 mg Oral QAM AC Sanchez Mcnamara MD   40 mg at 11/22/24 0548    senna-docusate (SENOKOT-S/PERICOLACE) 8.6-50 MG per tablet 3 tablet  3 tablet Oral Daily Dey, Sanya Quintana MD   3 tablet at 11/21/24 1426    sodium chloride (PF) 0.9% PF flush 3 mL  3 mL Intracatheter Q8H Dey, Sanya Quintana MD   3 mL at 11/22/24 0120    tamsulosin (FLOMAX) capsule 0.4 mg  0.4 mg Oral Daily Sanya Dey  MD Mariano   0.4 mg at 11/22/24 0757                  Physical Exam:   Vitals were reviewed  Vital Signs with Ranges  Temp:  [97.7  F (36.5  C)-98.4  F (36.9  C)] 97.7  F (36.5  C)  Pulse:  [67-79] 79  Resp:  [9-21] 14  BP: ()/(41-79) 132/75  SpO2:  [91 %-98 %] 92 %  I/O last 3 completed shifts:  In: 240 [P.O.:240]  Out: -   Constitutional: Alert, cachectic, lying in bed in NAD.   Respiratory:  Lungs CTAB, no labored breathing.  Cardiovascular:  Heart RRR  GI:  Abdomen soft, NT/ND   Skin/Integumen:  Warm, dry, non-diaphoretic.             Data:   I reviewed the patient's new clinical lab test results.   Recent Labs   Lab Test 11/19/24 1945 08/29/24  0924 11/19/23  1034 11/17/23  0619 11/16/23  0942   WBC 5.4 3.9* 8.9   < > 10.4   HGB 12.2* 13.6 12.4*   < > 14.7   MCV 91 96 98   < > 99    228 248   < > 210   INR  --   --   --   --  0.99    < > = values in this interval not displayed.     Recent Labs   Lab Test 11/22/24  0714 11/19/24 1945 08/29/24  0924   POTASSIUM 4.1 4.2 4.9   CHLORIDE 100 95* 99   CO2 25 27 27   BUN 12.9 10.8 10.9   ANIONGAP 7 12 9     Recent Labs   Lab Test 11/19/24 1945 11/16/23  0942 04/20/23  1030   ALBUMIN 3.8 4.0 4.1   BILITOTAL 0.2 0.4 0.4   ALT 18 22 25   AST 26 27 28       I reviewed the patient's new imaging results.    All laboratory data reviewed  All imaging studies reviewed by me.    DEXTER Euceda,  11/21/2024  [facility]  Office : [phone]  Cell: [phone] (Dr. Groves)

## 2024-11-22 NOTE — PROVIDER NOTIFICATION
MD Notification    Notified Person: MD    Notified Person Name: Maurilio    Notification Date/Time: 4:15pm    Notification Interaction: Vocera    Purpose of Notification: Patient requesting Ventolin inhaler    Orders Received: Ordered    Comments:

## 2024-11-23 NOTE — PROGRESS NOTES
"Speech Language Therapy Discharge Summary    Reason for therapy discharge:    Discharged to home with home therapy.    Progress towards therapy goal(s). See goals on Care Plan in [facility] electronic health record for goal details.  Goals partially met.  Barriers to achieving goals:   discharge from facility.    Therapy recommendation(s):    Continued therapy is recommended.  Rationale/Recommendations:  ongoing dysphagia treatment is recommended to maximize swallow strength/function and reduce risk of aspiration.        Note from last session on 11/22/24:      11/22/24 1048   SLP Discharge Planning   SLP Plan Supraglottic swallow strategies, train post-XRT exercise program.   SLP Discharge Recommendation home with outpatient therapy services   SLP Rationale for DC Rec Patient's swallow function below baseline.   SLP Brief overview of current status  Video swallow study completed in radiology with high aspiration risk across all oral intake: silent aspiration of liquids and severe pharyngeal and esophageal stasis with any puree or solid consistency.  Patient would like to continue eating a regular diet despite his pneumonias, and he is agreeable to using \"cough-swallow-cough\" strategy with intake to minimize aspiration events and work toward pharyngeal and esophageal residue clearance.  Trial slightly thick liquids while learning supraglottic swallow strategy, but can consider advancing to thin per patient wishes if he verbalizes understanding of increased aspiration risk with these.       "

## 2024-11-25 LAB
PATH REPORT.ADDENDUM SPEC: NORMAL
PATH REPORT.COMMENTS IMP SPEC: NORMAL
PATH REPORT.COMMENTS IMP SPEC: NORMAL
PATH REPORT.FINAL DX SPEC: NORMAL
PATH REPORT.GROSS SPEC: NORMAL
PATH REPORT.MICROSCOPIC SPEC OTHER STN: NORMAL
PATH REPORT.RELEVANT HX SPEC: NORMAL
PHOTO IMAGE: NORMAL

## 2024-11-25 PROCEDURE — 88305 TISSUE EXAM BY PATHOLOGIST: CPT | Mod: 26 | Performed by: PATHOLOGY

## 2024-11-25 PROCEDURE — 88312 SPECIAL STAINS GROUP 1: CPT | Mod: 26 | Performed by: PATHOLOGY

## 2024-11-26 ENCOUNTER — OFFICE VISIT (OUTPATIENT)
Dept: FAMILY MEDICINE | Facility: CLINIC | Age: 69
End: 2024-11-26

## 2024-11-26 VITALS
SYSTOLIC BLOOD PRESSURE: 108 MMHG | OXYGEN SATURATION: 92 % | DIASTOLIC BLOOD PRESSURE: 61 MMHG | HEART RATE: 83 BPM | BODY MASS INDEX: 14.29 KG/M2 | WEIGHT: 96.8 LBS

## 2024-11-26 DIAGNOSIS — R64 CACHEXIA (H): Primary | ICD-10-CM

## 2024-11-26 DIAGNOSIS — R13.10 DYSPHAGIA, UNSPECIFIED TYPE: ICD-10-CM

## 2024-11-26 DIAGNOSIS — R10.31 INGUINAL PAIN, RIGHT: ICD-10-CM

## 2024-11-26 PROCEDURE — 99496 TRANSJ CARE MGMT HIGH F2F 7D: CPT | Performed by: FAMILY MEDICINE

## 2024-11-26 ASSESSMENT — PATIENT HEALTH QUESTIONNAIRE - PHQ9
SUM OF ALL RESPONSES TO PHQ QUESTIONS 1-9: 1
5. POOR APPETITE OR OVEREATING: NOT AT ALL

## 2024-11-26 ASSESSMENT — ANXIETY QUESTIONNAIRES
5. BEING SO RESTLESS THAT IT IS HARD TO SIT STILL: NOT AT ALL
GAD7 TOTAL SCORE: 0
3. WORRYING TOO MUCH ABOUT DIFFERENT THINGS: NOT AT ALL
GAD7 TOTAL SCORE: 0
6. BECOMING EASILY ANNOYED OR IRRITABLE: NOT AT ALL
2. NOT BEING ABLE TO STOP OR CONTROL WORRYING: NOT AT ALL
1. FEELING NERVOUS, ANXIOUS, OR ON EDGE: NOT AT ALL
7. FEELING AFRAID AS IF SOMETHING AWFUL MIGHT HAPPEN: NOT AT ALL

## 2024-11-26 NOTE — PROGRESS NOTES
Hospital Follow-up Visit:    Hospital/Nursing Home/IP Rehab Facility: [facility]  Date of Admission: 11/19/24  Date of Discharge: 11/22/24  Reason(s) for Admission: Painful bump near groin  Was the patient in the ICU or did the patient experience delirium during hospitalization?  No  Do you have any other stressors you would like to discuss with your provider? No    Problems taking medications regularly:  None  Medication changes since discharge:        START taking these medications     Details   fluconazole (DIFLUCAN) 100 MG tablet Take 1 tablet (100 mg) by mouth daily for 5 days.  Qty: 5 tablet, Refills: 0     Associated Diagnoses: Esophageal candidiasis (H)       gabapentin (NEURONTIN) 100 MG capsule Take 1 capsule (100 mg) by mouth 3 times daily.  Qty: 90 capsule, Refills: 0     Comments: Future refills by PCP Dr. Maxim Goodman with phone number [phone].  Associated Diagnoses: Inguinal pain, right       lidocaine (LMX4) 4 % external cream Apply topically 4 times daily as needed for mild pain.  Qty: 120 g, Refills: 0     Comments: Future refills by PCP Dr. Maxim Goodman with phone number [phone].  Associated Diagnoses: Inguinal pain, right       !! oxyCODONE (ROXICODONE) 5 MG tablet Take 0.5 tablets (2.5 mg) by mouth every 6 hours as needed for moderate pain.  Qty: 10 tablet, Refills: 0     Associated Diagnoses: Inguinal pain, right       pantoprazole (PROTONIX) 40 MG EC tablet Take 1 tablet (40 mg) by mouth every morning (before breakfast).  Qty: 30 tablet, Refills: 0     Comments: Future refills by PCP Dr. Maxim Goodman with phone number [phone].  Associated Diagnoses: Esophageal stricture       tamsulosin (FLOMAX) 0.4 MG capsule Take 1 capsule (0.4 mg) by mouth daily.  Qty: 30 capsule, Refills: 0     Comments: Future refills by PCP Dr. Maxim Goodman with phone number [phone].  Associated Diagnoses: Urinary retention with incomplete bladder emptying        vitamin D3 (CHOLECALCIFEROL) 10 MCG (400 UNIT) capsule Take 1 capsule (400 Units) by mouth daily.  Qty: 30 capsule, Refills: 0     Comments: Future refills by PCP Dr. Maxim Goodman with phone number [phone].  Associated Diagnoses: Vitamin D deficiency     Problems adhering to non-medication therapy:  None    Summary of hospitalization:  [facility] discharge summary reviewed  Diagnostic Tests/Treatments reviewed.  Follow up needed: GI  Other Healthcare Providers Involved in Patient s Care:          GI  Update since discharge: stable.         Plan of care communicated with patient and family             Subjective     Ángel is a 69 year old patient who presents to clinic for hospital follow up.    Right inguinal pain: fairly persistent and chronic.  Workup considered inguinal hernia vs lymph node vs vascular structure as cause of pain.  Did not feel was good surgical candidate and cause of pain remained unclear.  Is treating with gabapentin, currently 100 mg TID, possible slight improvement.  Also increaed dose of oxycodone that is hopefully to be weaned once pain improved.    Severe dysphagia: s/p dilation, has follow up with Dr Groves.  Also prescribed PPI and 7 days of diflucan.  Declined thickened liquid diet.    Bilateral hydro: felt likely secondary to chronic urinary retention.  Minimal symptoms.  Started on flomax.    Other chronic issues stable.  Weight is up slightly.  Reports good appetite        Review of Systems   Constitutional, HEENT, cardiovascular, pulmonary, GI, , musculoskeletal, neuro, skin, endocrine and psych systems are negative, except as otherwise noted.      Objective    /61   Pulse 83   Wt 43.9 kg (96 lb 12.8 oz)   SpO2 92%   BMI 14.29 kg/m      General: Frail, cachetic, stable, NAD  Psych: normal mood and affect        No results found for this or any previous visit (from the past 24 hours).    Cachexia (H)  Stable but severe.  Weight up slightly.     Cont adequate nutrition  Cont mirtazapine    Inguinal pain, right  Uncertain etiology  Will increase gabapentin to 200 TID if needed  Oxycodone per oncology with patient hoping to reduce back to previous dosing if gabapentin is effective    Dysphagia, unspecified type  GI follow up is scheduled

## 2024-12-04 ENCOUNTER — MEDICAL CORRESPONDENCE (OUTPATIENT)
Dept: FAMILY MEDICINE | Facility: CLINIC | Age: 69
End: 2024-12-04

## 2024-12-06 ENCOUNTER — HOSPITAL ENCOUNTER (OUTPATIENT)
Dept: GENERAL RADIOLOGY | Facility: CLINIC | Age: 69
Discharge: HOME | End: 2024-12-06
Attending: STUDENT IN AN ORGANIZED HEALTH CARE EDUCATION/TRAINING PROGRAM | Admitting: STUDENT IN AN ORGANIZED HEALTH CARE EDUCATION/TRAINING PROGRAM
Payer: COMMERCIAL

## 2024-12-06 DIAGNOSIS — J43.9 PULMONARY EMPHYSEMA, UNSPECIFIED EMPHYSEMA TYPE (H): ICD-10-CM

## 2024-12-06 PROCEDURE — 71046 X-RAY EXAM CHEST 2 VIEWS: CPT

## 2024-12-09 ENCOUNTER — OFFICE VISIT (OUTPATIENT)
Dept: FAMILY MEDICINE | Facility: CLINIC | Age: 69
End: 2024-12-09

## 2024-12-09 VITALS — HEART RATE: 83 BPM | DIASTOLIC BLOOD PRESSURE: 72 MMHG | OXYGEN SATURATION: 92 % | SYSTOLIC BLOOD PRESSURE: 119 MMHG

## 2024-12-09 DIAGNOSIS — R91.1 PULMONARY NODULE: Primary | ICD-10-CM

## 2024-12-09 DIAGNOSIS — R10.31 INGUINAL PAIN, RIGHT: ICD-10-CM

## 2024-12-09 PROCEDURE — 99214 OFFICE O/P EST MOD 30 MIN: CPT | Performed by: FAMILY MEDICINE

## 2024-12-09 PROCEDURE — G2211 COMPLEX E/M VISIT ADD ON: HCPCS | Performed by: FAMILY MEDICINE

## 2024-12-09 RX ORDER — GABAPENTIN 100 MG/1
200 CAPSULE ORAL 3 TIMES DAILY
Qty: 540 CAPSULE | Refills: 0 | Status: SHIPPED | OUTPATIENT
Start: 2024-12-09

## 2024-12-09 NOTE — PROGRESS NOTES
Latasha Neal is a 69 year old patient who presents to clinic for evaluation.  See prior notes, but in brief, he has had persistent right groin pain that intermittently becomes more severe.  There are no clear exacerbating factors.  He also had right leg swelling and previously underwent duplex ultrasound which did not show a DVT.  Another ultrasound was performed to evaluate an enlarged tender inguinal lymph node.  This was not felt to be pathologic appearing and was discrete from the surrounding vascular structures.  CT scan showed advanced aortoiliac atherosclerosis, among many other findings, but no definite cause of his pain.  Prior hip xray also only showed mild arthritis.  He is taking gabapentin 200 mg TID.          Review of Systems   Constitutional, HEENT, cardiovascular, pulmonary, GI, , musculoskeletal, neuro, skin, endocrine and psych systems are negative, except as otherwise noted.      Objective    /72   Pulse 83   SpO2 92%     General: Well appearing, NAD  Abd: soft, nontender  Inguinal: there is a mildly tender right inguinal node that is mobile  MSK: hip ROM normal.  Ext: 1+ right pedal pulse.  Skin is pink and warm.  Psych: normal mood and affect        No results found for this or any previous visit (from the past 24 hours).    Inguinal pain, right  Uncertain cause.  Has had extensive work up  Message sent to vascular specialist to see if CTA with runoff could be valuable in ruling out arterial cause.  If not, it is unclear what is causing this pain.  May continue gabapentin and use oxycodone prn  - gabapentin (NEURONTIN) 100 MG capsule; Take 2 capsules (200 mg) by mouth 3 times daily.    Pulmonary nodule  Repeat CT ordered per recommendation  - CT Chest w/o Contrast; Future    Patient is agreement with the assessment and plan as outlined above.  All questions answered.  Red flag symptoms that should prompt emergent evaluation discussed and understood.

## 2024-12-14 ENCOUNTER — HOSPITAL ENCOUNTER (EMERGENCY)
Facility: CLINIC | Age: 69
Discharge: HOME | End: 2024-12-15
Attending: EMERGENCY MEDICINE | Admitting: EMERGENCY MEDICINE
Payer: COMMERCIAL

## 2024-12-14 ENCOUNTER — APPOINTMENT (OUTPATIENT)
Dept: CT IMAGING | Facility: CLINIC | Age: 69
End: 2024-12-14
Attending: EMERGENCY MEDICINE
Payer: COMMERCIAL

## 2024-12-14 VITALS
TEMPERATURE: 98 F | SYSTOLIC BLOOD PRESSURE: 162 MMHG | DIASTOLIC BLOOD PRESSURE: 94 MMHG | HEART RATE: 79 BPM | RESPIRATION RATE: 20 BRPM | OXYGEN SATURATION: 94 %

## 2024-12-14 DIAGNOSIS — K40.90 RIGHT INGUINAL HERNIA: ICD-10-CM

## 2024-12-14 DIAGNOSIS — K59.00 CONSTIPATION, UNSPECIFIED CONSTIPATION TYPE: ICD-10-CM

## 2024-12-14 DIAGNOSIS — R59.0 INGUINAL LYMPHADENOPATHY: ICD-10-CM

## 2024-12-14 LAB
ALBUMIN SERPL BCG-MCNC: 3.7 G/DL (ref 3.5–5.2)
ALP SERPL-CCNC: 147 U/L (ref 40–150)
ALT SERPL W P-5'-P-CCNC: 10 U/L (ref 0–70)
ANION GAP SERPL CALCULATED.3IONS-SCNC: 10 MMOL/L (ref 7–15)
AST SERPL W P-5'-P-CCNC: 25 U/L (ref 0–45)
BASOPHILS # BLD AUTO: 0.1 10E3/UL (ref 0–0.2)
BASOPHILS NFR BLD AUTO: 1 %
BILIRUB SERPL-MCNC: 0.3 MG/DL
BUN SERPL-MCNC: 10.6 MG/DL (ref 8–23)
CALCIUM SERPL-MCNC: 9.2 MG/DL (ref 8.8–10.4)
CHLORIDE SERPL-SCNC: 96 MMOL/L (ref 98–107)
CREAT SERPL-MCNC: 0.59 MG/DL (ref 0.67–1.17)
EGFRCR SERPLBLD CKD-EPI 2021: >90 ML/MIN/1.73M2
EOSINOPHIL # BLD AUTO: 0.2 10E3/UL (ref 0–0.7)
EOSINOPHIL NFR BLD AUTO: 2 %
ERYTHROCYTE [DISTWIDTH] IN BLOOD BY AUTOMATED COUNT: 14.6 % (ref 10–15)
GLUCOSE SERPL-MCNC: 87 MG/DL (ref 70–99)
HCO3 SERPL-SCNC: 27 MMOL/L (ref 22–29)
HCT VFR BLD AUTO: 35.9 % (ref 40–53)
HGB BLD-MCNC: 12.4 G/DL (ref 13.3–17.7)
IMM GRANULOCYTES # BLD: 0 10E3/UL
IMM GRANULOCYTES NFR BLD: 0 %
LIPASE SERPL-CCNC: 33 U/L (ref 13–60)
LYMPHOCYTES # BLD AUTO: 1 10E3/UL (ref 0.8–5.3)
LYMPHOCYTES NFR BLD AUTO: 14 %
MCH RBC QN AUTO: 30.5 PG (ref 26.5–33)
MCHC RBC AUTO-ENTMCNC: 34.5 G/DL (ref 31.5–36.5)
MCV RBC AUTO: 88 FL (ref 78–100)
MONOCYTES # BLD AUTO: 0.9 10E3/UL (ref 0–1.3)
MONOCYTES NFR BLD AUTO: 13 %
NEUTROPHILS # BLD AUTO: 4.5 10E3/UL (ref 1.6–8.3)
NEUTROPHILS NFR BLD AUTO: 69 %
NRBC # BLD AUTO: 0 10E3/UL
NRBC BLD AUTO-RTO: 0 /100
PLATELET # BLD AUTO: 269 10E3/UL (ref 150–450)
POTASSIUM SERPL-SCNC: 4.3 MMOL/L (ref 3.4–5.3)
PROT SERPL-MCNC: 6.5 G/DL (ref 6.4–8.3)
RBC # BLD AUTO: 4.07 10E6/UL (ref 4.4–5.9)
SODIUM SERPL-SCNC: 133 MMOL/L (ref 135–145)
WBC # BLD AUTO: 6.6 10E3/UL (ref 4–11)

## 2024-12-14 PROCEDURE — 83690 ASSAY OF LIPASE: CPT | Performed by: EMERGENCY MEDICINE

## 2024-12-14 PROCEDURE — 96375 TX/PRO/DX INJ NEW DRUG ADDON: CPT

## 2024-12-14 PROCEDURE — 250N000009 HC RX 250: Performed by: EMERGENCY MEDICINE

## 2024-12-14 PROCEDURE — 85004 AUTOMATED DIFF WBC COUNT: CPT | Performed by: EMERGENCY MEDICINE

## 2024-12-14 PROCEDURE — 250N000011 HC RX IP 250 OP 636: Performed by: EMERGENCY MEDICINE

## 2024-12-14 PROCEDURE — 80053 COMPREHEN METABOLIC PANEL: CPT | Performed by: EMERGENCY MEDICINE

## 2024-12-14 PROCEDURE — 74177 CT ABD & PELVIS W/CONTRAST: CPT

## 2024-12-14 PROCEDURE — 258N000003 HC RX IP 258 OP 636: Performed by: EMERGENCY MEDICINE

## 2024-12-14 PROCEDURE — 99285 EMERGENCY DEPT VISIT HI MDM: CPT | Mod: 25

## 2024-12-14 PROCEDURE — 96361 HYDRATE IV INFUSION ADD-ON: CPT

## 2024-12-14 PROCEDURE — 36415 COLL VENOUS BLD VENIPUNCTURE: CPT | Performed by: EMERGENCY MEDICINE

## 2024-12-14 PROCEDURE — 250N000013 HC RX MED GY IP 250 OP 250 PS 637: Performed by: EMERGENCY MEDICINE

## 2024-12-14 PROCEDURE — 96374 THER/PROPH/DIAG INJ IV PUSH: CPT | Mod: 59

## 2024-12-14 RX ORDER — OXYCODONE HYDROCHLORIDE 5 MG/1
5 TABLET ORAL ONCE
Status: COMPLETED | OUTPATIENT
Start: 2024-12-14 | End: 2024-12-14

## 2024-12-14 RX ORDER — HYDROMORPHONE HYDROCHLORIDE 1 MG/ML
0.5 INJECTION, SOLUTION INTRAMUSCULAR; INTRAVENOUS; SUBCUTANEOUS ONCE
Status: COMPLETED | OUTPATIENT
Start: 2024-12-14 | End: 2024-12-15

## 2024-12-14 RX ORDER — POLYETHYLENE GLYCOL 3350 17 G/17G
1 POWDER, FOR SOLUTION ORAL DAILY
Qty: 357 G | Refills: 0 | Status: SHIPPED | OUTPATIENT
Start: 2024-12-14 | End: 2025-01-04

## 2024-12-14 RX ORDER — IOPAMIDOL 755 MG/ML
51 INJECTION, SOLUTION INTRAVASCULAR ONCE
Status: COMPLETED | OUTPATIENT
Start: 2024-12-14 | End: 2024-12-14

## 2024-12-14 RX ORDER — DOCUSATE SODIUM 100 MG/1
100 CAPSULE, LIQUID FILLED ORAL 2 TIMES DAILY
Qty: 28 CAPSULE | Refills: 0 | Status: SHIPPED | OUTPATIENT
Start: 2024-12-14 | End: 2024-12-28

## 2024-12-14 RX ORDER — HYDROMORPHONE HYDROCHLORIDE 1 MG/ML
0.5 INJECTION, SOLUTION INTRAMUSCULAR; INTRAVENOUS; SUBCUTANEOUS ONCE
Status: COMPLETED | OUTPATIENT
Start: 2024-12-14 | End: 2024-12-14

## 2024-12-14 RX ORDER — ONDANSETRON 2 MG/ML
4 INJECTION INTRAMUSCULAR; INTRAVENOUS ONCE
Status: COMPLETED | OUTPATIENT
Start: 2024-12-14 | End: 2024-12-14

## 2024-12-14 RX ORDER — POLYETHYLENE GLYCOL 3350 17 G/17G
17 POWDER, FOR SOLUTION ORAL ONCE
Status: COMPLETED | OUTPATIENT
Start: 2024-12-14 | End: 2024-12-15

## 2024-12-14 RX ADMIN — SODIUM CHLORIDE 1000 ML: 9 INJECTION, SOLUTION INTRAVENOUS at 21:07

## 2024-12-14 RX ADMIN — ONDANSETRON 4 MG: 2 INJECTION, SOLUTION INTRAMUSCULAR; INTRAVENOUS at 21:17

## 2024-12-14 RX ADMIN — IOPAMIDOL 51 ML: 755 INJECTION, SOLUTION INTRAVENOUS at 22:15

## 2024-12-14 RX ADMIN — SODIUM CHLORIDE 64 ML: 9 INJECTION, SOLUTION INTRAVENOUS at 22:15

## 2024-12-14 RX ADMIN — OXYCODONE HYDROCHLORIDE 5 MG: 5 TABLET ORAL at 21:46

## 2024-12-14 RX ADMIN — HYDROMORPHONE HYDROCHLORIDE 0.5 MG: 1 INJECTION, SOLUTION INTRAMUSCULAR; INTRAVENOUS; SUBCUTANEOUS at 21:17

## 2024-12-14 ASSESSMENT — COLUMBIA-SUICIDE SEVERITY RATING SCALE - C-SSRS
1. IN THE PAST MONTH, HAVE YOU WISHED YOU WERE DEAD OR WISHED YOU COULD GO TO SLEEP AND NOT WAKE UP?: NO
2. HAVE YOU ACTUALLY HAD ANY THOUGHTS OF KILLING YOURSELF IN THE PAST MONTH?: NO
6. HAVE YOU EVER DONE ANYTHING, STARTED TO DO ANYTHING, OR PREPARED TO DO ANYTHING TO END YOUR LIFE?: NO

## 2024-12-14 ASSESSMENT — ACTIVITIES OF DAILY LIVING (ADL)
ADLS_ACUITY_SCORE: 56

## 2024-12-15 PROCEDURE — 250N000013 HC RX MED GY IP 250 OP 250 PS 637: Performed by: EMERGENCY MEDICINE

## 2024-12-15 PROCEDURE — 96376 TX/PRO/DX INJ SAME DRUG ADON: CPT

## 2024-12-15 PROCEDURE — 250N000011 HC RX IP 250 OP 636: Performed by: EMERGENCY MEDICINE

## 2024-12-15 RX ADMIN — POLYETHYLENE GLYCOL 3350 17 G: 17 POWDER, FOR SOLUTION ORAL at 00:04

## 2024-12-15 RX ADMIN — HYDROMORPHONE HYDROCHLORIDE 0.5 MG: 1 INJECTION, SOLUTION INTRAMUSCULAR; INTRAVENOUS; SUBCUTANEOUS at 00:04

## 2024-12-15 RX ADMIN — MAGNESIUM HYDROXIDE 30 ML: 400 SUSPENSION ORAL at 00:08

## 2024-12-15 NOTE — ED TRIAGE NOTES
Brought in by ambulance from home for complaint of upper abdominal pain that radiates to right leg. Pain has been ongoing and intermittent for two weeks.

## 2024-12-15 NOTE — DISCHARGE INSTRUCTIONS
You have a inguinal hernia on the right.  You have a slightly irritated lymph node on the right in your groin.  You have a fair amount of constipation seen throughout the colon.    You should be taking your senna.  Also consider taking MiraLAX 17 g or 1 capful daily for the next several weeks.  You can also consider taking some Kelley' milk of magnesia as needed to help with some bowel movements.  Lastly, if you are feeling significantly constipated you can try an enema.    You can make an appointment with general surgery to see if they can make arrangements to fix your right inguinal hernia, it is possible that this can be done with spinal anesthesia and local, rather than general anesthesia.

## 2024-12-15 NOTE — ED NOTES
Bed: ED27  Expected date: 12/14/24  Expected time: 7:45 PM  Means of arrival: Ambulance  Comments:  Dorie griffith; 69M abdominal/leg pain

## 2024-12-15 NOTE — ED PROVIDER NOTES
Emergency Department Note      History of Present Illness     Chief Complaint   Abdominal Pain      HPI   Ángel Pham is a 69 year old male with a history of COPD, inguinal hernia, inguinal lymphadenopathy, pulmonary emphysema, hypertension, and hyperlipidemia, presenting to the emergency department for evaluation of abdominal pain. The patient reports he began experiencing abdominal pain in his right upper quadrant which radiates to his ribs, at 1600 yesterday. He also reports right sided inguinal pain which radiates down his right leg. He states this pain began approximately one month ago. He denies use of oxygen at baseline, but notes he uses Duonebs and Ventolin at home. He notes recovering recently from pneumonia. He states his last bowel movement was last night. He endorses a history of a right inguinal hernia, but he denies that it goes into his scrotum. He denies any recent alcohol use.      Independent Historian   None    Review of External Notes   I reviewed notes from the patient's right lower extremity ultrasounds on 8/29/24 and 11/20/24, noting no DVT.  I also reviewed a note from the patient's discharge summary on 11/22/24;     [facility]  Hospitalist Discharge Summary       Date of Admission:  11/19/2024  Date of Discharge:  11/22/2024  2:28 PM  Discharging Provider: Antonio Thorpe MD  Discharge Service: Hospitalist Service        Discharge Diagnoses  Right inguinal radicular pain  Severe dysphagia due to esophageal stenosis s/p dilation  Suspected esophageal candidiasis  Hx throat cancer s/p radiation  Severe malnutrition  Suspected right lung cancer  Hx left lung cancer  Bilateral hydronephrosis  Suspected chronic urinary retention  Insomnia  Anxiety  Hyponatremia  Chronic pain on chronic opioids  Nicotine use disorder    Right inguinal radicular pain  Reporting pain in right inguinal area x6 months, positional in nature but worse with cough.  Question if palpable  LN in this area possibly irritating femoral nerve, also with signs of a soft bulge in the right lower abdomen consistent with an early inguinal hernia.  Admission CT ABD/pelvis with no mention of hernia.  US right thigh 11/20 showed normal appearing lymph node anterior to vascular structures.  Gen Surg consulted and do not feel hernia is worth risk of surgery, also not convinced lymph node is cause for pain and also concerned this may actually be a vascular structure.  In discussion with patient, ultimately decided would work on trial on med management for pain and avoid any surgery.  Initiated on gabapentin 100 mg tid, lidocaine cream (does not like patches) and an additional 2.5 mg oxycodone prn with adequate pain control.  He will follow up with PCP for further titration of gabapentin and hopefully wean from oxycodone.    Past Medical History     Medical History and Problem List   Alcohol abuse   Carcinoma of supraglottis  Tobacco use  COPD   Community acquired pneumonia or   Hypertension  Hyperlipidemia   Alcohol use disorder   Inguinal hernia   Aortic calcification  Pulmonary emphysema   Peripheral artery disease   Opioid dependence   left lung cancer   Throat cancer     Medications   Norvasc  Lipitor  Fioricet/Esgic   Duoneb   Trelegy ellipta   Neurontin   Duoneb   Cozaar   Remeron   Oxycodone   Protonix   Senna-docusate  Flomax   Levaquin     Surgical History   Bronchoscopy  ENT surgery   EGD combined   Hemorrhoidectomy, external   Hernia repair   Laryngoscopy with biopsy   Thoracic surgery     Physical Exam     Patient Vitals for the past 24 hrs:   BP Temp Temp src Pulse Resp SpO2   12/14/24 2007 (!) 167/96 98  F (36.7  C) Oral 85 20 98 %     Physical Exam   General: Resting uncomfortably on the gurney. The patient appears malnourished and cachectic.   Head:  The scalp, face, and head appear normal  Eyes:  The pupils are equal, round, and reactive to light    There is no nystagmus    Extraocular muscles are  intact    Conjunctivae and sclerae are normal  ENT:    The nose is normal    Pinnae are normal    The oropharynx is normal  Neck:  Normal range of motion    There is no rigidity noted  CV:  Regular rate  Normal underlying rhythm     Normal S1/S2    No pathological murmur detected  Resp:  Lungs are clear    There is no tachypnea    Non-labored    No rales    No wheezing     Lungs reveal diminished airflow throughout the lungs, consistent with COPD/emphysema.  GI:  Abdomen is soft, there is no rigidity    No distension/tympani    No rebound tenderness     Non-surgical without peritoneal features at this time    The patient has subjective epigastric discomfort.    The patient has a tender lymph node in the right inguinal area, and a small, intermittent bulge in the right inguinal region above the lymph node. This is most noticeable with coughing and may be a subtle hernia. Nothing is incarcerated or strangulated. The scrotum and penis are normal.   MS:  Normal muscular tone    Symmetric motor strength    No major joint effusions    1+ edema noted to the right ankle. Calves and thighs are without edema.     No calf tenderness  Skin:  No rash or acute skin lesions noted  Neuro:  Speech is normal and fluent, there is no aphasia    No motor deficits    Cranial nerves are intact  Psych: Awake. Alert.      Normal affect  Appropriate interactions      Diagnostics     Lab Results   Labs Ordered and Resulted from Time of ED Arrival to Time of ED Departure   COMPREHENSIVE METABOLIC PANEL - Abnormal       Result Value    Sodium 133 (*)     Potassium 4.3      Carbon Dioxide (CO2) 27      Anion Gap 10      Urea Nitrogen 10.6      Creatinine 0.59 (*)     GFR Estimate >90      Calcium 9.2      Chloride 96 (*)     Glucose 87      Alkaline Phosphatase 147      AST 25      ALT 10      Protein Total 6.5      Albumin 3.7      Bilirubin Total 0.3     CBC WITH PLATELETS AND DIFFERENTIAL - Abnormal    WBC Count 6.6      RBC Count 4.07 (*)      Hemoglobin 12.4 (*)     Hematocrit 35.9 (*)     MCV 88      MCH 30.5      MCHC 34.5      RDW 14.6      Platelet Count 269      % Neutrophils 69      % Lymphocytes 14      % Monocytes 13      % Eosinophils 2      % Basophils 1      % Immature Granulocytes 0      NRBCs per 100 WBC 0      Absolute Neutrophils 4.5      Absolute Lymphocytes 1.0      Absolute Monocytes 0.9      Absolute Eosinophils 0.2      Absolute Basophils 0.1      Absolute Immature Granulocytes 0.0      Absolute NRBCs 0.0     LIPASE - Normal    Lipase 33         Imaging   CT Abdomen Pelvis w Contrast   Preliminary Result   IMPRESSION:    1.  Marked amount of gas and stool throughout the colon and gas-filled dilated small bowel loops up to the level of the colon. This extends into the distal colon where there is a large amount of gas and stool up to the level of the anal-rectal junction    and within the rectal vault. Findings likely related to constipation/slow transit with ileus.      2.  No evidence for perforation or bowel wall thickening.      3.  Protrusion of a mildly dilated gas-filled loop of small bowel into the right inguinal canal. This may account for a palpable abnormality in this area.      4.  No lymphadenopathy.      5.  Advanced atherosclerotic vascular calcification as detailed above.          ED Course      Medications Administered   Medications   HYDROmorphone (PF) (DILAUDID) injection 0.5 mg (has no administration in time range)   ondansetron (ZOFRAN) injection 4 mg (has no administration in time range)   sodium chloride 0.9% BOLUS 1,000 mL (1,000 mLs Intravenous $New Bag 12/14/24 2107)   oxyCODONE (ROXICODONE) tablet 5 mg (has no administration in time range)       Procedures   Procedures     Discussion of Management   None    ED Course   ED Course as of 12/14/24 2117   Sat Dec 14, 2024   2015 I obtained history and examined the patient as noted above.        Additional Documentation  None    Medical Decision Making /  Diagnosis     CMS Diagnoses: None    MIPS       None    MDM   Ángel Pham is a 69 year old male presents to the emergency department with worsening abdominal pain.  The patient complains of some epigastric discomfort and also right inguinal discomfort.  Physical examination reveals a chronic right inguinal lymph node which is mildly tender, he also has a reducible subtle direct right inguinal hernia.  This is not incarcerated or strangulated.  CT scan of the abdomen was repeated given that the patient recently had esophageal dilatation procedure in the hospital, no complication there was seen.  His CT does show evidence of gas and fecal distention consistent with slow transit constipation, he also has a subtle right inguinal hernia and a known slightly enlarged lymph node in the right groin.  All of these are chronic findings.  The patient does have a history of chronic pain as well.  The patient might be a candidate for a right inguinal hernia repair with spinal anesthesia and possibly local anesthesia, but there is certainly nothing emergent here.  He can follow-up with general surgery as an outpatient to discuss this further.  The patient notes that they declined in the hospital to intervene acutely given the patient's pulmonary status.  I let the patient work this out further with the general surgeons and his primary care team.  There is certainly nothing life-threatening at this juncture that needs to be intervened upon.  No other life-threatening etiologies were detected here.    11:45 PM  I reevaluated the patient.  I went over all of the findings.  The patient notes that the ambulance brought him in and he left his keys inside of the house.  He does have a lock box at his house that has additional keys in it for him to get in.  He notes that he does not have any money on him and does not have a ride and he does not drive.  I noted to the patient that there is no medical reason to admit him to the  hospital.  He can work on his bowel regimen at home with MiraLAX and his own senna and some doses of milk of magnesia.  I will give initial doses here.  I will give him a prescription for MiraLAX and Colace as well.  The patient can  MiraLAX tomorrow as this is over-the-counter.    Disposition   The patient was discharged.     Diagnosis     ICD-10-CM    1. Right inguinal hernia  K40.90       2. Inguinal lymphadenopathy  R59.0       3. Constipation, unspecified constipation type  K59.00            Discharge Medications   New Prescriptions    No medications on file         Scribe Disclosure:  I, Jorge Walker, am serving as a scribe at 8:41 PM on 12/14/2024 to document services personally performed by Rico Sheth MD based on my observations and the provider's statements to me.        Rico Sheth MD  12/14/24 [id number]       Rico Sheth MD  12/14/24 0147

## 2024-12-17 DIAGNOSIS — F41.9 ANXIETY: ICD-10-CM

## 2024-12-17 RX ORDER — MIRTAZAPINE 15 MG/1
15 TABLET, FILM COATED ORAL AT BEDTIME
Qty: 60 TABLET | Refills: 0 | Status: SHIPPED | OUTPATIENT
Start: 2024-12-17

## 2024-12-17 NOTE — TELEPHONE ENCOUNTER
Med: Mirtazapine    LOV (related): 9/18/24      Due for F/U around: Not specified    Next Appt: Not Scheduled            11/26/2024     4:25 PM   PHQ   PHQ-9 Total Score 1   Q9: Thoughts of better off dead/self-harm past 2 weeks Not at all           11/26/2024     4:25 PM   PATIENCE-7 SCORE   Total Score 0

## 2024-12-21 ENCOUNTER — HOSPITAL ENCOUNTER (OUTPATIENT)
Facility: CLINIC | Age: 69
Setting detail: OBSERVATION
Discharge: HOME | End: 2024-12-22
Attending: STUDENT IN AN ORGANIZED HEALTH CARE EDUCATION/TRAINING PROGRAM | Admitting: INTERNAL MEDICINE
Payer: COMMERCIAL

## 2024-12-21 ENCOUNTER — APPOINTMENT (OUTPATIENT)
Dept: GENERAL RADIOLOGY | Facility: CLINIC | Age: 69
End: 2024-12-21
Attending: STUDENT IN AN ORGANIZED HEALTH CARE EDUCATION/TRAINING PROGRAM
Payer: COMMERCIAL

## 2024-12-21 DIAGNOSIS — J44.1 COPD EXACERBATION (H): Primary | ICD-10-CM

## 2024-12-21 DIAGNOSIS — R06.03 ACUTE RESPIRATORY DISTRESS: ICD-10-CM

## 2024-12-21 PROBLEM — R53.83 FATIGUE: Status: ACTIVE | Noted: 2024-12-21

## 2024-12-21 PROBLEM — E03.8 SUBCLINICAL HYPOTHYROIDISM: Status: ACTIVE | Noted: 2024-12-21

## 2024-12-21 PROBLEM — G89.3 CHRONIC PAIN DUE TO MALIGNANT NEOPLASTIC DISEASE: Status: ACTIVE | Noted: 2024-12-21

## 2024-12-21 PROBLEM — L98.9 SKIN LESION OF FACE: Status: ACTIVE | Noted: 2024-12-21

## 2024-12-21 PROBLEM — R63.4 UNINTENDED WEIGHT LOSS: Status: ACTIVE | Noted: 2024-12-21

## 2024-12-21 LAB
ANION GAP SERPL CALCULATED.3IONS-SCNC: 12 MMOL/L (ref 7–15)
ATRIAL RATE - MUSE: 97 BPM
BASE EXCESS BLDV CALC-SCNC: 4 MMOL/L (ref -3–3)
BASOPHILS # BLD AUTO: 0 10E3/UL (ref 0–0.2)
BASOPHILS NFR BLD AUTO: 1 %
BUN SERPL-MCNC: 15.3 MG/DL (ref 8–23)
CALCIUM SERPL-MCNC: 9.6 MG/DL (ref 8.8–10.4)
CHLORIDE SERPL-SCNC: 90 MMOL/L (ref 98–107)
CREAT SERPL-MCNC: 0.77 MG/DL (ref 0.67–1.17)
D DIMER PPP FEU-MCNC: 0.44 UG/ML FEU (ref 0–0.5)
DIASTOLIC BLOOD PRESSURE - MUSE: NORMAL MMHG
EGFRCR SERPLBLD CKD-EPI 2021: >90 ML/MIN/1.73M2
EOSINOPHIL # BLD AUTO: 0 10E3/UL (ref 0–0.7)
EOSINOPHIL NFR BLD AUTO: 1 %
ERYTHROCYTE [DISTWIDTH] IN BLOOD BY AUTOMATED COUNT: 14.6 % (ref 10–15)
FLUAV RNA SPEC QL NAA+PROBE: NEGATIVE
FLUBV RNA RESP QL NAA+PROBE: NEGATIVE
GLUCOSE SERPL-MCNC: 101 MG/DL (ref 70–99)
HCO3 BLDV-SCNC: 30 MMOL/L (ref 21–28)
HCO3 SERPL-SCNC: 27 MMOL/L (ref 22–29)
HCT VFR BLD AUTO: 37.9 % (ref 40–53)
HGB BLD-MCNC: 12.7 G/DL (ref 13.3–17.7)
IMM GRANULOCYTES # BLD: 0 10E3/UL
IMM GRANULOCYTES NFR BLD: 1 %
INTERPRETATION ECG - MUSE: NORMAL
LACTATE BLD-SCNC: 1.5 MMOL/L
LYMPHOCYTES # BLD AUTO: 0.5 10E3/UL (ref 0.8–5.3)
LYMPHOCYTES NFR BLD AUTO: 8 %
MAGNESIUM SERPL-MCNC: 2 MG/DL (ref 1.7–2.3)
MCH RBC QN AUTO: 29.8 PG (ref 26.5–33)
MCHC RBC AUTO-ENTMCNC: 33.5 G/DL (ref 31.5–36.5)
MCV RBC AUTO: 89 FL (ref 78–100)
MONOCYTES # BLD AUTO: 0.5 10E3/UL (ref 0–1.3)
MONOCYTES NFR BLD AUTO: 8 %
NEUTROPHILS # BLD AUTO: 4.9 10E3/UL (ref 1.6–8.3)
NEUTROPHILS NFR BLD AUTO: 82 %
NRBC # BLD AUTO: 0 10E3/UL
NRBC BLD AUTO-RTO: 0 /100
P AXIS - MUSE: 92 DEGREES
PCO2 BLDV: 53 MM HG (ref 40–50)
PH BLDV: 7.36 [PH] (ref 7.32–7.43)
PLATELET # BLD AUTO: 238 10E3/UL (ref 150–450)
PO2 BLDV: 17 MM HG (ref 25–47)
POTASSIUM SERPL-SCNC: 5 MMOL/L (ref 3.4–5.3)
PR INTERVAL - MUSE: 140 MS
QRS DURATION - MUSE: 88 MS
QT - MUSE: 344 MS
QTC - MUSE: 436 MS
R AXIS - MUSE: -18 DEGREES
RBC # BLD AUTO: 4.26 10E6/UL (ref 4.4–5.9)
RSV RNA SPEC NAA+PROBE: NEGATIVE
SAO2 % BLDV: 23 % (ref 70–75)
SARS-COV-2 RNA RESP QL NAA+PROBE: NEGATIVE
SODIUM SERPL-SCNC: 129 MMOL/L (ref 135–145)
SYSTOLIC BLOOD PRESSURE - MUSE: NORMAL MMHG
T AXIS - MUSE: 87 DEGREES
TROPONIN T SERPL HS-MCNC: 22 NG/L
TROPONIN T SERPL HS-MCNC: 31 NG/L
VENTRICULAR RATE- MUSE: 97 BPM
WBC # BLD AUTO: 5.9 10E3/UL (ref 4–11)

## 2024-12-21 PROCEDURE — 87040 BLOOD CULTURE FOR BACTERIA: CPT | Performed by: STUDENT IN AN ORGANIZED HEALTH CARE EDUCATION/TRAINING PROGRAM

## 2024-12-21 PROCEDURE — 96365 THER/PROPH/DIAG IV INF INIT: CPT

## 2024-12-21 PROCEDURE — G0378 HOSPITAL OBSERVATION PER HR: HCPCS

## 2024-12-21 PROCEDURE — 96375 TX/PRO/DX INJ NEW DRUG ADDON: CPT

## 2024-12-21 PROCEDURE — 96368 THER/DIAG CONCURRENT INF: CPT

## 2024-12-21 PROCEDURE — 80048 BASIC METABOLIC PNL TOTAL CA: CPT | Performed by: STUDENT IN AN ORGANIZED HEALTH CARE EDUCATION/TRAINING PROGRAM

## 2024-12-21 PROCEDURE — 250N000009 HC RX 250: Performed by: INTERNAL MEDICINE

## 2024-12-21 PROCEDURE — 258N000003 HC RX IP 258 OP 636: Performed by: STUDENT IN AN ORGANIZED HEALTH CARE EDUCATION/TRAINING PROGRAM

## 2024-12-21 PROCEDURE — 250N000009 HC RX 250: Performed by: STUDENT IN AN ORGANIZED HEALTH CARE EDUCATION/TRAINING PROGRAM

## 2024-12-21 PROCEDURE — 250N000013 HC RX MED GY IP 250 OP 250 PS 637: Performed by: INTERNAL MEDICINE

## 2024-12-21 PROCEDURE — 36415 COLL VENOUS BLD VENIPUNCTURE: CPT | Performed by: STUDENT IN AN ORGANIZED HEALTH CARE EDUCATION/TRAINING PROGRAM

## 2024-12-21 PROCEDURE — 99291 CRITICAL CARE FIRST HOUR: CPT | Mod: 25

## 2024-12-21 PROCEDURE — 87637 SARSCOV2&INF A&B&RSV AMP PRB: CPT | Performed by: STUDENT IN AN ORGANIZED HEALTH CARE EDUCATION/TRAINING PROGRAM

## 2024-12-21 PROCEDURE — 99285 EMERGENCY DEPT VISIT HI MDM: CPT | Mod: 25

## 2024-12-21 PROCEDURE — 96367 TX/PROPH/DG ADDL SEQ IV INF: CPT

## 2024-12-21 PROCEDURE — 71045 X-RAY EXAM CHEST 1 VIEW: CPT

## 2024-12-21 PROCEDURE — 83735 ASSAY OF MAGNESIUM: CPT | Performed by: STUDENT IN AN ORGANIZED HEALTH CARE EDUCATION/TRAINING PROGRAM

## 2024-12-21 PROCEDURE — 84484 ASSAY OF TROPONIN QUANT: CPT | Performed by: STUDENT IN AN ORGANIZED HEALTH CARE EDUCATION/TRAINING PROGRAM

## 2024-12-21 PROCEDURE — 250N000011 HC RX IP 250 OP 636: Performed by: INTERNAL MEDICINE

## 2024-12-21 PROCEDURE — 85379 FIBRIN DEGRADATION QUANT: CPT | Performed by: STUDENT IN AN ORGANIZED HEALTH CARE EDUCATION/TRAINING PROGRAM

## 2024-12-21 PROCEDURE — 85025 COMPLETE CBC W/AUTO DIFF WBC: CPT | Performed by: STUDENT IN AN ORGANIZED HEALTH CARE EDUCATION/TRAINING PROGRAM

## 2024-12-21 PROCEDURE — 250N000011 HC RX IP 250 OP 636: Performed by: STUDENT IN AN ORGANIZED HEALTH CARE EDUCATION/TRAINING PROGRAM

## 2024-12-21 PROCEDURE — 82803 BLOOD GASES ANY COMBINATION: CPT

## 2024-12-21 PROCEDURE — 94640 AIRWAY INHALATION TREATMENT: CPT

## 2024-12-21 PROCEDURE — 999N000157 HC STATISTIC RCP TIME EA 10 MIN

## 2024-12-21 PROCEDURE — 93005 ELECTROCARDIOGRAM TRACING: CPT

## 2024-12-21 RX ORDER — METHYLPREDNISOLONE SODIUM SUCCINATE 125 MG/2ML
40 INJECTION INTRAMUSCULAR; INTRAVENOUS EVERY 8 HOURS
Status: DISCONTINUED | OUTPATIENT
Start: 2024-12-21 | End: 2024-12-22 | Stop reason: HOSPADM

## 2024-12-21 RX ORDER — NALOXONE HYDROCHLORIDE 0.4 MG/ML
0.2 INJECTION, SOLUTION INTRAMUSCULAR; INTRAVENOUS; SUBCUTANEOUS
Status: DISCONTINUED | OUTPATIENT
Start: 2024-12-21 | End: 2024-12-22 | Stop reason: HOSPADM

## 2024-12-21 RX ORDER — MAGNESIUM SULFATE HEPTAHYDRATE 40 MG/ML
2 INJECTION, SOLUTION INTRAVENOUS ONCE
Status: COMPLETED | OUTPATIENT
Start: 2024-12-21 | End: 2024-12-21

## 2024-12-21 RX ORDER — NALOXONE HYDROCHLORIDE 0.4 MG/ML
0.4 INJECTION, SOLUTION INTRAMUSCULAR; INTRAVENOUS; SUBCUTANEOUS
Status: DISCONTINUED | OUTPATIENT
Start: 2024-12-21 | End: 2024-12-22 | Stop reason: HOSPADM

## 2024-12-21 RX ORDER — ACETAMINOPHEN 650 MG/1
650 SUPPOSITORY RECTAL EVERY 4 HOURS PRN
Status: DISCONTINUED | OUTPATIENT
Start: 2024-12-21 | End: 2024-12-22 | Stop reason: HOSPADM

## 2024-12-21 RX ORDER — MIRTAZAPINE 15 MG/1
15 TABLET, FILM COATED ORAL AT BEDTIME
Status: DISCONTINUED | OUTPATIENT
Start: 2024-12-21 | End: 2024-12-22 | Stop reason: HOSPADM

## 2024-12-21 RX ORDER — TAMSULOSIN HYDROCHLORIDE 0.4 MG/1
0.4 CAPSULE ORAL DAILY
Status: DISCONTINUED | OUTPATIENT
Start: 2024-12-22 | End: 2024-12-22 | Stop reason: HOSPADM

## 2024-12-21 RX ORDER — LEVOFLOXACIN 750 MG/1
750 TABLET, FILM COATED ORAL DAILY
Status: DISCONTINUED | OUTPATIENT
Start: 2024-12-22 | End: 2024-12-22 | Stop reason: HOSPADM

## 2024-12-21 RX ORDER — PANTOPRAZOLE SODIUM 40 MG/1
40 TABLET, DELAYED RELEASE ORAL
Status: DISCONTINUED | OUTPATIENT
Start: 2024-12-22 | End: 2024-12-22 | Stop reason: HOSPADM

## 2024-12-21 RX ORDER — AMOXICILLIN 250 MG
1 CAPSULE ORAL 2 TIMES DAILY PRN
Status: DISCONTINUED | OUTPATIENT
Start: 2024-12-21 | End: 2024-12-22 | Stop reason: HOSPADM

## 2024-12-21 RX ORDER — IPRATROPIUM BROMIDE AND ALBUTEROL SULFATE 2.5; .5 MG/3ML; MG/3ML
3 SOLUTION RESPIRATORY (INHALATION)
Status: DISCONTINUED | OUTPATIENT
Start: 2024-12-21 | End: 2024-12-22 | Stop reason: HOSPADM

## 2024-12-21 RX ORDER — IPRATROPIUM BROMIDE AND ALBUTEROL SULFATE 2.5; .5 MG/3ML; MG/3ML
3 SOLUTION RESPIRATORY (INHALATION)
Status: COMPLETED | OUTPATIENT
Start: 2024-12-21 | End: 2024-12-21

## 2024-12-21 RX ORDER — ACETAMINOPHEN 325 MG/1
650 TABLET ORAL EVERY 4 HOURS PRN
Status: DISCONTINUED | OUTPATIENT
Start: 2024-12-21 | End: 2024-12-22 | Stop reason: HOSPADM

## 2024-12-21 RX ORDER — ONDANSETRON 4 MG/1
4 TABLET, ORALLY DISINTEGRATING ORAL EVERY 6 HOURS PRN
Status: DISCONTINUED | OUTPATIENT
Start: 2024-12-21 | End: 2024-12-22 | Stop reason: HOSPADM

## 2024-12-21 RX ORDER — PROCHLORPERAZINE MALEATE 5 MG/1
5 TABLET ORAL EVERY 6 HOURS PRN
Status: DISCONTINUED | OUTPATIENT
Start: 2024-12-21 | End: 2024-12-22 | Stop reason: HOSPADM

## 2024-12-21 RX ORDER — CEFTRIAXONE 1 G/1
1 INJECTION, POWDER, FOR SOLUTION INTRAMUSCULAR; INTRAVENOUS ONCE
Status: DISCONTINUED | OUTPATIENT
Start: 2024-12-21 | End: 2024-12-21

## 2024-12-21 RX ORDER — ALBUTEROL SULFATE 90 UG/1
2 INHALANT RESPIRATORY (INHALATION) EVERY 4 HOURS PRN
Status: DISCONTINUED | OUTPATIENT
Start: 2024-12-21 | End: 2024-12-22 | Stop reason: HOSPADM

## 2024-12-21 RX ORDER — FLUTICASONE FUROATE AND VILANTEROL 200; 25 UG/1; UG/1
1 POWDER RESPIRATORY (INHALATION) DAILY
Status: DISCONTINUED | OUTPATIENT
Start: 2024-12-21 | End: 2024-12-22 | Stop reason: HOSPADM

## 2024-12-21 RX ORDER — ONDANSETRON 2 MG/ML
4 INJECTION INTRAMUSCULAR; INTRAVENOUS EVERY 6 HOURS PRN
Status: DISCONTINUED | OUTPATIENT
Start: 2024-12-21 | End: 2024-12-22 | Stop reason: HOSPADM

## 2024-12-21 RX ORDER — AZITHROMYCIN MONOHYDRATE 500 MG/5ML
500 INJECTION, POWDER, LYOPHILIZED, FOR SOLUTION INTRAVENOUS ONCE
Status: COMPLETED | OUTPATIENT
Start: 2024-12-21 | End: 2024-12-21

## 2024-12-21 RX ORDER — ATORVASTATIN CALCIUM 40 MG/1
40 TABLET, FILM COATED ORAL DAILY
Status: DISCONTINUED | OUTPATIENT
Start: 2024-12-22 | End: 2024-12-22 | Stop reason: HOSPADM

## 2024-12-21 RX ORDER — FENTANYL CITRATE 50 UG/ML
50 INJECTION, SOLUTION INTRAMUSCULAR; INTRAVENOUS ONCE
Status: COMPLETED | OUTPATIENT
Start: 2024-12-21 | End: 2024-12-21

## 2024-12-21 RX ORDER — GABAPENTIN 100 MG/1
200 CAPSULE ORAL 3 TIMES DAILY
Status: DISCONTINUED | OUTPATIENT
Start: 2024-12-21 | End: 2024-12-22 | Stop reason: HOSPADM

## 2024-12-21 RX ORDER — CEFEPIME HYDROCHLORIDE 2 G/1
2 INJECTION, POWDER, FOR SOLUTION INTRAVENOUS ONCE
Status: COMPLETED | OUTPATIENT
Start: 2024-12-21 | End: 2024-12-21

## 2024-12-21 RX ORDER — AMOXICILLIN 250 MG
2 CAPSULE ORAL 2 TIMES DAILY PRN
Status: DISCONTINUED | OUTPATIENT
Start: 2024-12-21 | End: 2024-12-22 | Stop reason: HOSPADM

## 2024-12-21 RX ORDER — AZITHROMYCIN 250 MG/1
500 TABLET, FILM COATED ORAL ONCE
Status: DISCONTINUED | OUTPATIENT
Start: 2024-12-21 | End: 2024-12-21

## 2024-12-21 RX ORDER — IPRATROPIUM BROMIDE AND ALBUTEROL SULFATE 2.5; .5 MG/3ML; MG/3ML
1 SOLUTION RESPIRATORY (INHALATION)
Status: DISCONTINUED | OUTPATIENT
Start: 2024-12-21 | End: 2024-12-21

## 2024-12-21 RX ADMIN — IPRATROPIUM BROMIDE AND ALBUTEROL SULFATE 3 ML: .5; 3 SOLUTION RESPIRATORY (INHALATION) at 20:47

## 2024-12-21 RX ADMIN — OXYCODONE HYDROCHLORIDE 5 MG: 5 TABLET ORAL at 22:28

## 2024-12-21 RX ADMIN — MIDAZOLAM 2 MG: 1 INJECTION INTRAMUSCULAR; INTRAVENOUS at 16:07

## 2024-12-21 RX ADMIN — MAGNESIUM SULFATE HEPTAHYDRATE 2 G: 40 INJECTION, SOLUTION INTRAVENOUS at 16:00

## 2024-12-21 RX ADMIN — MIRTAZAPINE 15 MG: 15 TABLET, FILM COATED ORAL at 22:28

## 2024-12-21 RX ADMIN — IPRATROPIUM BROMIDE AND ALBUTEROL SULFATE 3 ML: .5; 3 SOLUTION RESPIRATORY (INHALATION) at 15:58

## 2024-12-21 RX ADMIN — METHYLPREDNISOLONE SODIUM SUCCINATE 37.5 MG: 125 INJECTION INTRAMUSCULAR; INTRAVENOUS at 20:30

## 2024-12-21 RX ADMIN — IPRATROPIUM BROMIDE AND ALBUTEROL SULFATE 3 ML: .5; 3 SOLUTION RESPIRATORY (INHALATION) at 16:00

## 2024-12-21 RX ADMIN — FENTANYL CITRATE 50 MCG: 50 INJECTION, SOLUTION INTRAMUSCULAR; INTRAVENOUS at 16:54

## 2024-12-21 RX ADMIN — ACETAMINOPHEN 650 MG: 325 TABLET, FILM COATED ORAL at 22:28

## 2024-12-21 RX ADMIN — UMECLIDINIUM 1 PUFF: 62.5 AEROSOL, POWDER ORAL at 20:35

## 2024-12-21 RX ADMIN — IPRATROPIUM BROMIDE AND ALBUTEROL SULFATE 3 ML: .5; 3 SOLUTION RESPIRATORY (INHALATION) at 15:59

## 2024-12-21 RX ADMIN — CEFEPIME 2 G: 2 INJECTION, POWDER, FOR SOLUTION INTRAVENOUS at 16:20

## 2024-12-21 RX ADMIN — GABAPENTIN 200 MG: 100 CAPSULE ORAL at 20:30

## 2024-12-21 RX ADMIN — FLUTICASONE FUROATE AND VILANTEROL TRIFENATATE 1 PUFF: 200; 25 POWDER RESPIRATORY (INHALATION) at 20:35

## 2024-12-21 RX ADMIN — SODIUM CHLORIDE 1000 ML: 9 INJECTION, SOLUTION INTRAVENOUS at 16:13

## 2024-12-21 RX ADMIN — AZITHROMYCIN MONOHYDRATE 500 MG: 500 INJECTION, POWDER, LYOPHILIZED, FOR SOLUTION INTRAVENOUS at 16:41

## 2024-12-21 ASSESSMENT — ACTIVITIES OF DAILY LIVING (ADL)
ADLS_ACUITY_SCORE: 50
ADLS_ACUITY_SCORE: 56
ADLS_ACUITY_SCORE: 50
ADLS_ACUITY_SCORE: 56
ADLS_ACUITY_SCORE: 56

## 2024-12-21 ASSESSMENT — COLUMBIA-SUICIDE SEVERITY RATING SCALE - C-SSRS: IS THE PATIENT NOT ABLE TO COMPLETE C-SSRS: UNABLE TO VERBALIZE

## 2024-12-21 NOTE — ED TRIAGE NOTES
EMS report: recently discharged to home (lives alone) from hospital. Diagnosed with pneumonia and shingles. Today had sudden onset dyspnea. EMS arrived and found patient acutely distressed with RR 30s-40s. Placed on bipap, gave duonebs x2, and solumedrol. PIV 18g R AC     Triage Assessment (Adult)       Row Name 12/21/24 1556          Triage Assessment    Airway WDL WDL        Respiratory WDL    Respiratory WDL X;rhythm/pattern     Rhythm/Pattern, Respiratory head bobbing;dyspnea upon exertion;labored;tachypneic        Skin Circulation/Temperature WDL    Skin Circulation/Temperature WDL X  R thoracic rash rash        Cardiac WDL    Cardiac WDL WDL        Peripheral/Neurovascular WDL    Peripheral Neurovascular WDL WDL        Cognitive/Neuro/Behavioral WDL    Cognitive/Neuro/Behavioral WDL WDL

## 2024-12-21 NOTE — ED NOTES
Bed: ST01  Expected date:   Expected time:   Means of arrival:   Comments:  542  69 M sob/bipap/nebs/RR 9  6162

## 2024-12-21 NOTE — PROGRESS NOTES
Pt arrived to stab 1 on bipap via EMS with the following settings.    CPAP/BiPAP Settings  IPAP: 16  EPAP: 8  Rate: 12  FiO2: 21%  Timed Inspiration (sec): 0.85    CPAP/BiPAP/AVAPS/AVAPS AE Patient Assessment  Skin Assessment: Slight redness on bridge of nose  Barriers Applied: Foam mepilex applied, will rotate with under the nose mask   Lung Sounds: Diminished, expiratory wheezing, 3 duonebs given per order    Plan: Continue to monitor.    Tita Rodríguez, RT  December 21, 2024

## 2024-12-21 NOTE — H&P
[facility]    History and Physical - Hospitalist Service       Date of Admission:  12/21/2024    Assessment & Plan   Ángel Pham is a pleasant 69 year old male with history of COPD presenting with respiratory distress. EMS reports that earlier this afternoon, the patient experienced a sudden onset of dyspnea. Patient has received two duonebs and 125 mg solumedrol. Upon EMS arrival, his blood pressure was 119/86 and his heart rate was in the 90s. His O2 saturation is normal, but the patient is tachypneic on BIPAP. They explain that the patient was recently hospitalized for 4-5 days for a COPD exacerbation, and was placed on Valtrex for shingles.     Patient started feeling more short of breath yesterday and was seen at his PCP office and started on levaquin and prednisone.  Today, he had sudden worsening of SOB and EMS was called, patient received IV solumedrol and nebs and brought to [facility] ER in respiratory distress.  His PCOx 53, wbc normal, viral panel negative, normal WBC.  He was stable on 2L oxygen and eventually room air.  He is being admitted under observation status.    ## Acute respiratory failure  ## Acute COPD exacerbation  Admit patient to observation status  Duonebs QID  IV solumedrol  Continue levaquin  Wean oxygen off   Continue Trelegy inhaler or its equivalent    ## Hyponatremia  No symptoms  Repeat in AM  Further labs if Na gets worse.    ## Mild troponin elevation  Trop 22 and 31  Will check TTE    ## Shingles  Continue Valtrex 1000 mg TID    ## Hypertension  ## Hyperlipidemia  Continue Amlodipine  Hold Losartan due to softer blood pressures  Hold statin    ## GERD  Continue Protonix    ## BPH  Continue Flomax    Diet:  Regular  DVT Prophylaxis: Pneumatic Compression Devices  Gannon Catheter: Not present  Lines: None     Cardiac Monitoring: None  Code Status:  Full    Clinically Significant Risk Factors Present on Admission         # Hyponatremia: Lowest Na = 129  mmol/L in last 2 days, will monitor as appropriate  # Hypochloremia: Lowest Cl = 90 mmol/L in last 2 days, will monitor as appropriate          # Hypertension: Noted on problem list               # Financial/Environmental Concerns:           Disposition Plan     Medically Ready for Discharge: Anticipated Tomorrow           Antonio Monterroso MD  Hospitalist Service  [facility]  Securely message with setObject (more info)  Text page via [employer] Paging/Directory     ______________________________________________________________________    Chief Complaint   Shortness of breath    History is obtained from the patient, electronic health record, and emergency department physician    History of Present Illness     Ángel Pham is a pleasant 69 year old male with history of COPD presenting with respiratory distress. EMS reports that earlier this afternoon, the patient experienced a sudden onset of dyspnea. Patient has received two duonebs and 125 mg solumedrol. Upon EMS arrival, his blood pressure was 119/86 and his heart rate was in the 90s. His O2 saturation is normal, but the patient is tachypneic on BIPAP. They explain that the patient was recently hospitalized for 4-5 days for a COPD exacerbation, and was placed on Valtrex for shingles.     Patient started feeling more short of breath yesterday and was seen at his PCP office and started on Levaquin and prednisone.  Today, he had sudden worsening of SOB and EMS was called, patient received IV solumedrol and nebs and brought to [facility] ER in respiratory distress.  His PCOx 53, wbc normal, viral panel negative, normal WBC.  He was stable on 2L oxygen and eventually room air.  He is being admitted under observation status.    Past Medical History    Past Medical History:   Diagnosis Date    Abrasions of multiple sites 08/01/2022    Alcoholic intoxication with complication (H) 07/31/2022    Allergy history unknown     Carcinoma of supraglottis (H)      Cigarette nicotine dependence with nicotine-induced disorder 11/20/2024    Community acquired pneumonia of left lung, unspecified part of lung 11/16/2023    COPD (chronic obstructive pulmonary disease) (H)     HTN, goal below 140/90     Hyperlipidaemia LDL goal < 100     Hypoglycemia 07/31/2022    Migraine     Mild alcohol use disorder 10/06/2021    Pneumothorax on left     Uncomplicated alcohol dependence (H) 02/13/2023       Past Surgical History   Past Surgical History:   Procedure Laterality Date    BRONCHOSCOPY      BRONCHOSCOPY RIGID OR FLEXIBLE W/TRANSENDOSCOPIC ENDOBRONCHIAL ULTRASOUND GUIDED N/A 1/18/2021    Procedure: BRONCHOSCOPY, WITH ENDOBRONCHIAL ULTRASOUND;  Surgeon: Yasir Combs MD;  Location:  OR    ENT SURGERY      sinus    ESOPHAGOSCOPY, GASTROSCOPY, DUODENOSCOPY (EGD), COMBINED N/A 11/21/2024    Procedure: ESOPHAGOGASTRODUODENOSCOPY, WITH BIOPSY;  Surgeon: Bobo Groves MD;  Location:  GI    HC HEMORROIDECTOMY, EXTERNAL, SINGLE COLUMN/GROUP      left    HERNIA REPAIR      umbilical and groin    LARYNGOSCOPY WITH BIOPSY(IES) Left 7/26/2019    Procedure: MICRODIRECT LARYNGOSCOPY WITH BIOPSY OF LEFT LARYNGEAL MASS;  Surgeon: Boo Mehta MD;  Location:  OR    THORACIC SURGERY Right 2005    pneumothorax       Prior to Admission Medications   Prior to Admission Medications   Prescriptions Last Dose Informant Patient Reported? Taking?   Fluticasone-Umeclidin-Vilanterol (TRELEGY ELLIPTA) 200-62.5-25 MCG/ACT oral inhaler  Other, Self No No   Sig: Inhale 1 puff into the lungs daily.   albuterol (PROAIR HFA/PROVENTIL HFA/VENTOLIN HFA) 108 (90 Base) MCG/ACT inhaler  Other, Self No No   Sig: Inhale 2 puffs into the lungs every 4 hours as needed for wheezing, shortness of breath or cough.   amLODIPine (NORVASC) 10 MG tablet  Other, Self No No   Sig: TAKE 1 TABLET BY MOUTH DAILY   atorvastatin (LIPITOR) 40 MG tablet  Other, Self No No   Sig: TAKE 1 TABLET BY MOUTH DAILY    butalbital-acetaminophen-caffeine (FIORICET/ESGIC) -40 MG per tablet  Self, Other Yes No   Sig: TAKE 1 TABLET BY MOUTH EVERY 4-6 HOURS AS NEEDED   docusate sodium (COLACE) 100 MG capsule   No No   Sig: Take 1 capsule (100 mg) by mouth 2 times daily for 14 days.   gabapentin (NEURONTIN) 100 MG capsule   No No   Sig: Take 2 capsules (200 mg) by mouth 3 times daily.   ipratropium - albuterol 0.5 mg/2.5 mg/3 mL (DUONEB) 0.5-2.5 (3) MG/3ML neb solution  Other, Self No No   Sig: Take 1 vial (3 mLs) by nebulization 5 times daily.   levofloxacin (LEVAQUIN) 750 MG tablet   No No   Sig: Take 1 tablet (750 mg) by mouth daily.   lidocaine (LMX4) 4 % external cream   No No   Sig: Apply topically 4 times daily as needed for mild pain.   losartan (COZAAR) 100 MG tablet  Other, Self No No   Sig: TAKE 1 TABLET BY MOUTH DAILY   magnesium hydroxide (MILK OF MAGNESIA) 400 MG/5ML suspension   No No   Sig: Take 30 mLs by mouth daily as needed for constipation.   mirtazapine (REMERON) 15 MG tablet   No No   Sig: TAKE 1 TABLET BY MOUTH AT BEDTIME   oxyCODONE (ROXICODONE) 5 MG tablet   No No   Sig: Take 0.5 tablets (2.5 mg) by mouth every 6 hours as needed for moderate pain.   Patient not taking: Reported on 12/9/2024   pantoprazole (PROTONIX) 40 MG EC tablet   No No   Sig: Take 1 tablet (40 mg) by mouth every morning (before breakfast).   polyethylene glycol (MIRALAX) 17 GM/Dose powder   No No   Sig: Take 17 g (1 Capful) by mouth daily for 21 days.   predniSONE (DELTASONE) 20 MG tablet   No No   Sig: Take 1 tablet (20 mg) by mouth 2 times daily for 5 days.   senna-docusate (SENOKOT-S/PERICOLACE) 8.6-50 MG tablet  Self, Other Yes No   Sig: Take 3 tablets by mouth daily. Early afternoon.   tamsulosin (FLOMAX) 0.4 MG capsule   No No   Sig: Take 1 capsule (0.4 mg) by mouth daily.   valACYclovir (VALTREX) 1000 mg tablet   No No   Sig: Take 1 tablet (1,000 mg) by mouth 3 times daily for 7 days.   vitamin D3 (CHOLECALCIFEROL) 10 MCG (400  UNIT) capsule   No No   Sig: Take 1 capsule (400 Units) by mouth daily.      Facility-Administered Medications: None           Physical Exam   Vital Signs: Temp: 98  F (36.7  C) Temp src: Temporal BP: (!) 85/58 Pulse: 90   Resp: 20 SpO2: 97 % O2 Device: None (Room air)    Weight: 90 lbs 6.22 oz        Medical Decision Making             Data

## 2024-12-21 NOTE — PHARMACY-ADMISSION MEDICATION HISTORY
Pharmacist Admission Medication History    Admission medication history is complete. The information provided in this note is only as accurate as the sources available at the time of the update.    Information Source(s): Patient and CareEverywhere/SureScripts via in-person    Pertinent Information: Patient started prednisone, Valtrex and Levaquin yesterday 12/20    Changes made to PTA medication list:  Added: None  Deleted: None  Changed: None    Allergies reviewed with patient and updates made in EHR: no - Patient on Bipap and very tired.     Medication History Completed By: Caty Markham, PharmD 12/21/2024 5:54 PM    PTA Med List   Medication Sig Last Dose/Taking    albuterol (PROAIR HFA/PROVENTIL HFA/VENTOLIN HFA) 108 (90 Base) MCG/ACT inhaler Inhale 2 puffs into the lungs every 4 hours as needed for wheezing, shortness of breath or cough. Taking As Needed    amLODIPine (NORVASC) 10 MG tablet TAKE 1 TABLET BY MOUTH DAILY 12/21/2024    atorvastatin (LIPITOR) 40 MG tablet TAKE 1 TABLET BY MOUTH DAILY Taking    butalbital-acetaminophen-caffeine (FIORICET/ESGIC) -40 MG per tablet TAKE 1 TABLET BY MOUTH EVERY 4-6 HOURS AS NEEDED Taking    docusate sodium (COLACE) 100 MG capsule Take 1 capsule (100 mg) by mouth 2 times daily for 14 days. Past Week    Fluticasone-Umeclidin-Vilanterol (TRELEGY ELLIPTA) 200-62.5-25 MCG/ACT oral inhaler Inhale 1 puff into the lungs daily. 12/21/2024    gabapentin (NEURONTIN) 100 MG capsule Take 2 capsules (200 mg) by mouth 3 times daily. 12/21/2024    ipratropium - albuterol 0.5 mg/2.5 mg/3 mL (DUONEB) 0.5-2.5 (3) MG/3ML neb solution Take 1 vial (3 mLs) by nebulization 5 times daily. 12/21/2024    levofloxacin (LEVAQUIN) 750 MG tablet Take 1 tablet (750 mg) by mouth daily. 12/21/2024    lidocaine (LMX4) 4 % external cream Apply topically 4 times daily as needed for mild pain. Taking As Needed    losartan (COZAAR) 100 MG tablet TAKE 1 TABLET BY MOUTH DAILY 12/21/2024    magnesium  hydroxide (MILK OF MAGNESIA) 400 MG/5ML suspension Take 30 mLs by mouth daily as needed for constipation. Past Week    mirtazapine (REMERON) 15 MG tablet TAKE 1 TABLET BY MOUTH AT BEDTIME 12/20/2024    oxyCODONE (ROXICODONE) 5 MG tablet Take 0.5 tablets (2.5 mg) by mouth every 6 hours as needed for moderate pain. 12/21/2024    pantoprazole (PROTONIX) 40 MG EC tablet Take 1 tablet (40 mg) by mouth every morning (before breakfast). 12/21/2024    polyethylene glycol (MIRALAX) 17 GM/Dose powder Take 17 g (1 Capful) by mouth daily for 21 days. 12/20/2024    predniSONE (DELTASONE) 20 MG tablet Take 1 tablet (20 mg) by mouth 2 times daily for 5 days. 12/21/2024    senna-docusate (SENOKOT-S/PERICOLACE) 8.6-50 MG tablet Take 3 tablets by mouth daily. Early afternoon. Past Week    tamsulosin (FLOMAX) 0.4 MG capsule Take 1 capsule (0.4 mg) by mouth daily. 12/21/2024    valACYclovir (VALTREX) 1000 mg tablet Take 1 tablet (1,000 mg) by mouth 3 times daily for 7 days. 12/21/2024    vitamin D3 (CHOLECALCIFEROL) 10 MCG (400 UNIT) capsule Take 1 capsule (400 Units) by mouth daily. Past Week

## 2024-12-21 NOTE — ED PROVIDER NOTES
Emergency Department Note      History of Present Illness     Chief Complaint   Respiratory Distress    HPI   Ángel Pham is a pleasant 69 year old male with history of COPD presenting with respiratory distress. EMS reports that earlier this afternoon, the patient experienced a sudden onset of dyspnea. Patient has received two duonebs and 125 mg solumedrol. Upon EMS arrival, his blood pressure was 119/86 and his heart rate was in the 90s. His O2 saturation is normal, but the patient is tachypneic on BIPAP. They explain that the patient was recently hospitalized for 4-5 days for a COPD exacerbation, and was placed on Valtrex for shingles.     Independent Historian   EMS as detailed above.    Review of External Notes   I personally reviewed notes from the patient's progress note dated yesterday. This provided me with information regarding  COPD exacerbation and shingles. Patient was placed on Levaquin, Prednisone, and Valtrex .     I personally reviewed the patient's chart, including available medication list and available past medical history, past surgical history, family history, and social history.    Physical Exam     Patient Vitals for the past 24 hrs:   BP Temp Temp src Pulse Resp SpO2 Weight   12/21/24 2100 128/82 -- -- 81 15 100 % --   12/21/24 2030 119/72 -- -- 78 12 96 % --   12/21/24 2015 122/76 -- -- 78 11 95 % --   12/21/24 2000 111/86 -- -- 81 14 94 % --   12/21/24 1945 130/69 -- -- 80 10 96 % --   12/21/24 1930 114/75 -- -- 81 13 90 % --   12/21/24 1916 116/78 -- -- 80 12 96 % --   12/21/24 1901 109/74 -- -- 82 10 95 % --   12/21/24 1845 101/72 -- -- 81 11 95 % --   12/21/24 1833 99/67 -- -- 82 12 95 % --   12/21/24 1750 (!) 88/59 -- -- 85 12 94 % --   12/21/24 1745 (!) 85/58 -- -- 86 13 92 % --   12/21/24 1735 (!) 84/60 -- -- 87 13 -- --   12/21/24 1725 94/60 -- -- 87 18 91 % --   12/21/24 1720 (!) 86/59 -- -- 89 23 94 % --   12/21/24 1715 (!) 86/63 -- -- 88 17 94 % --   12/21/24 1710 (!)  85/58 -- -- 90 20 97 % --   12/21/24 1705 (!) 84/59 -- -- 92 16 94 % --   12/21/24 1700 (!) 79/63 -- -- 91 17 98 % --   12/21/24 1650 109/73 -- -- 92 18 95 % --   12/21/24 1630 111/81 -- -- 88 15 100 % --   12/21/24 1625 105/72 -- -- 87 15 98 % --   12/21/24 1621 90/64 -- -- 86 13 97 % --   12/21/24 1615 (!) 72/57 -- -- 92 14 -- --   12/21/24 1613 (!) 55/41 -- -- 90 14 -- --   12/21/24 1610 (!) 67/43 -- -- 93 13 100 % --   12/21/24 1600 105/77 -- -- 97 23 99 % --   12/21/24 1554 -- -- -- -- -- 99 % --   12/21/24 1551 106/82 98  F (36.7  C) Temporal 102 -- 99 % --   12/21/24 1500 -- -- -- -- -- -- 41 kg (90 lb 6.2 oz)     Physical Exam  Vitals and nursing note reviewed.   Constitutional:       General: He is in acute distress.      Appearance: He is cachectic. He is ill-appearing. He is not diaphoretic.   HENT:      Head: Atraumatic.      Mouth/Throat:      Mouth: Mucous membranes are moist.   Eyes:      Extraocular Movements: Extraocular movements intact.   Cardiovascular:      Rate and Rhythm: Normal rate and regular rhythm.   Pulmonary:      Effort: Tachypnea, accessory muscle usage and respiratory distress present.      Breath sounds: Decreased air movement present. No wheezing.   Musculoskeletal:      Right lower leg: No edema.      Left lower leg: No edema.   Skin:     General: Skin is warm and dry.      Coloration: Skin is not jaundiced or pale.      Findings: Rash (Shingles rash on right back and trunk) present.   Neurological:      General: No focal deficit present.      Mental Status: He is alert.           Diagnostics     Lab Results   Labs Ordered and Resulted from Time of ED Arrival to Time of ED Departure   BASIC METABOLIC PANEL - Abnormal       Result Value    Sodium 129 (*)     Potassium 5.0      Chloride 90 (*)     Carbon Dioxide (CO2) 27      Anion Gap 12      Urea Nitrogen 15.3      Creatinine 0.77      GFR Estimate >90      Calcium 9.6      Glucose 101 (*)    CBC WITH PLATELETS AND DIFFERENTIAL -  Abnormal    WBC Count 5.9      RBC Count 4.26 (*)     Hemoglobin 12.7 (*)     Hematocrit 37.9 (*)     MCV 89      MCH 29.8      MCHC 33.5      RDW 14.6      Platelet Count 238      % Neutrophils 82      % Lymphocytes 8      % Monocytes 8      % Eosinophils 1      % Basophils 1      % Immature Granulocytes 1      NRBCs per 100 WBC 0      Absolute Neutrophils 4.9      Absolute Lymphocytes 0.5 (*)     Absolute Monocytes 0.5      Absolute Eosinophils 0.0      Absolute Basophils 0.0      Absolute Immature Granulocytes 0.0      Absolute NRBCs 0.0     ISTAT GASES LACTATE VENOUS POCT - Abnormal    Lactic Acid POCT 1.5      Bicarbonate Venous POCT 30 (*)     O2 Sat, Venous POCT 23 (*)     pCO2 Venous POCT 53 (*)     pH Venous POCT 7.36      pO2 Venous POCT 17 (*)     Base Excess/Deficit (+/-) POCT 4.0 (*)    TROPONIN T, HIGH SENSITIVITY - Abnormal    Troponin T, High Sensitivity 31 (*)    INFLUENZA A/B, RSV AND SARS-COV2 PCR - Normal    Influenza A PCR Negative      Influenza B PCR Negative      RSV PCR Negative      SARS CoV2 PCR Negative     MAGNESIUM - Normal    Magnesium 2.0     TROPONIN T, HIGH SENSITIVITY - Normal    Troponin T, High Sensitivity 22     D DIMER QUANTITATIVE - Normal    D-Dimer Quantitative 0.44     BLOOD CULTURE   BLOOD CULTURE     Imaging   XR Chest Port 1 View   Final Result   IMPRESSION:       Advanced emphysema and related hyperinflation with flattening of diaphragmatic curvature. There are bilateral calcified lung nodules, sequela of prior granulomatous inflammation. Pleural-parenchymal scarring is present in both apices, left greater than    right. There are surgical resection staple lines in the right apex. There are linear opacities in the paradiaphragmatic lungs consistent with atelectasis. No new airspace opacities or focal lung volume loss.      Cardiac silhouette is not enlarged. Fairly diffuse great vessel, arch, and patchy descending aorta atheromatous calcifications.      No pleural  effusion or pneumothorax.      Report per radiology.     EKG   ECG taken at 1554, ECG read at 1410  Normal sinus rhythm  Possible left atrial enlargement  ST & T wave abnormality, consider lateral ischemia    Rate 97 bpm. IL interval 140 ms. QRS duration 88 ms. QT/QTc 344/436 ms. P-R-T axes 92 -18 87.     Independent Interpretation - See ED Course Below    ED Course      Medications Administered   Medications   albuterol (PROVENTIL HFA/VENTOLIN HFA) inhaler (has no administration in time range)   atorvastatin (LIPITOR) tablet 40 mg (has no administration in time range)   fluticasone-vilanterol (BREO ELLIPTA) 200-25 MCG/ACT inhaler 1 puff (1 puff Inhalation $Given 12/21/24 2035)     And   umeclidinium (INCRUSE ELLIPTA) 62.5 MCG/ACT inhaler 1 puff (1 puff Inhalation $Given 12/21/24 2035)   gabapentin (NEURONTIN) capsule 200 mg (200 mg Oral $Given 12/21/24 2030)   levofloxacin (LEVAQUIN) tablet 750 mg (has no administration in time range)   mirtazapine (REMERON) tablet 15 mg (has no administration in time range)   pantoprazole (PROTONIX) EC tablet 40 mg (has no administration in time range)   cholecalciferol (VITAMIN D3) tablet 400 Units (has no administration in time range)   senna-docusate (SENOKOT-S/PERICOLACE) 8.6-50 MG per tablet 1 tablet (has no administration in time range)     Or   senna-docusate (SENOKOT-S/PERICOLACE) 8.6-50 MG per tablet 2 tablet (has no administration in time range)   ondansetron (ZOFRAN ODT) ODT tab 4 mg (has no administration in time range)     Or   ondansetron (ZOFRAN) injection 4 mg (has no administration in time range)   prochlorperazine (COMPAZINE) injection 5 mg (has no administration in time range)     Or   prochlorperazine (COMPAZINE) tablet 5 mg (has no administration in time range)   acetaminophen (TYLENOL) tablet 650 mg (has no administration in time range)     Or   acetaminophen (TYLENOL) Suppository 650 mg (has no administration in time range)   ipratropium - albuterol 0.5  mg/2.5 mg/3 mL (DUONEB) neb solution 3 mL (3 mLs Nebulization $Given 12/21/24 2047)   methylPREDNISolone Na Suc (solu-MEDROL) injection 37.5 mg (37.5 mg Intravenous $Given 12/21/24 2030)   ipratropium - albuterol 0.5 mg/2.5 mg/3 mL (DUONEB) neb solution 3 mL (3 mLs Nebulization $Given 12/21/24 1600)   magnesium sulfate 2 g in 50 mL sterile water intermittent infusion (0 g Intravenous Stopped 12/21/24 1700)   ceFEPIme (MAXIPIME) 2 g vial to attach to  mL bag for ADULTS or NS 50 mL bag for PEDS (0 g Intravenous Stopped 12/21/24 1641)   midazolam (VERSED) injection 2 mg (2 mg Intravenous $Given 12/21/24 1607)   sodium chloride 0.9% BOLUS 1,000 mL (0 mLs Intravenous Stopped 12/21/24 1700)   azithromycin (ZITHROMAX) 500 mg vial to attach to  mL bag (0 mg Intravenous Stopped 12/21/24 1755)   fentaNYL (PF) (SUBLIMAZE) injection 50 mcg (50 mcg Intravenous $Given 12/21/24 1654)     Procedures   Procedures   None performed    Discussion of Management - See ED Course Below    ED Course   Independent Interpretation / Discussion of Management / Repeat Assessments  ED Course as of 12/21/24 2112   Sat Dec 21, 2024   1543 I obtained history and examined the patient as noted above.     1604 I rechecked the patient.   1615 I rechecked the patient after being informed by RN that he was hypotensive after receiving Versed.   1624 XR Chest Port 1 View  I independently interpreted the patient's chest x-ray; no pneumothorax or infiltrate     1742 I spoke with Dr. Monterroso, hospitalist, regarding the patient's history and presentation in the emergency department today. Dr. Monterroso accepted the patient for admission.         Additional Documentation  None    Medical Decision Making / Diagnosis     CMS Diagnoses: The patient has signs of sepsis   Sepsis ED evaluation   The patient has signs of sepsis as evidenced by:  1. Presence of 2 SIRS criteria, suspected infection, AND  2. Organ dysfunction: Acute respiratory or ventilatory  failure with new need for positive pressure ventilation due to sepsis    Time zero:  1500  on 12/21/24 as this was the time when patient required noninvasive positive pressure ventilation.    Lactic Acid Results:  Recent Labs   Lab Test 12/21/24  1556 11/19/24  1943 11/16/23  0944   LACT 1.5 1.8 1.7       3 Hour Bundle 6 Hour Bundle (Reassessment)   Blood Cultures before IV Antibiotics: Yes  Antibiotics given: see below  Prehospital fluid volume (mL):                     Total fluids given (ED +Pre-hospital):  The patient responded to a lesser volume of IV fluids. The initial volume ordered was 1000 mL.    Repeat Lactic Acid Level: Ordered by reflex for 2 hours after initial lactic acid collection.  Vasopressors: MAP>65 after initial IVF bolus, will continue to monitor fluid status and vital signs.  Repeat perfusion exam: I attest to having performed a repeat sepsis exam and assessment of perfusion at  2000 .   BMI Readings from Last 1 Encounters:   12/21/24 13.35 kg/m        Anti-infectives (From admission through now)      Start     Dose/Rate Route Frequency Ordered Stop    12/21/24 1630  azithromycin (ZITHROMAX) 500 mg vial to attach to  mL bag         500 mg  over 1 Hours Intravenous ONCE 12/21/24 1629 12/21/24 1755    12/21/24 1600  ceFEPIme (MAXIPIME) 2 g vial to attach to  mL bag for ADULTS or NS 50 mL bag for PEDS         2 g  over 30 Minutes Intravenous ONCE 12/21/24 1557 12/21/24 1641                  MIPS       None    Critical Care  The critical condition was: Respiratory Failure (acute)/Respiratory Arrest    Critical interventions performed or strongly considered: Non-invasive Ventilation: BiPAP/CPAP    Critical care time was 38 minutes exclusive of time spent on separately billable procedures.      MDM   Patient presenting with acute respiratory distress.  On arrival, vital signs notable for significant tachypnea, but otherwise reassuring.  Patient has significant respiratory distress and  is on BiPAP on arrival.  Considered differential including COPD exacerbation, pneumonia, viral illness, pulmonary embolism, sepsis, among others.  Workup is ultimately quite reassuring.  Presentation seems most consistent with COPD exacerbation.  There is no clear infiltrate on chest x-ray and patient has no leukocytosis or elevation in lactic acid.  Viral studies negative.  Patient is noted to have a mild elevation in troponin, which increased on repeat, possibly demand ischemia, but I do not appreciate any acute appearing ischemic changes on his EKG.  We did treat the patient with medications for COPD exacerbation, along with antibiotics for possible community-acquired pneumonia.  Patient was extremely anxious with BiPAP and ultimately he was able to be weaned from it.  We did give the patient small doses of midazolam and fentanyl and his blood pressure did drop in response to these.  I do not feel his presentation is consistent with septic shock.  Patient was admitted to the hospitalist for further evaluation and management.    Disposition   The patient was admitted to the hospital.     Diagnosis     ICD-10-CM    1. COPD exacerbation (H)  J44.1       2. Acute respiratory distress  R06.03          Scribe Disclosure:  I, Jennifer Tovar, am serving as a scribe at 4:24 PM on 12/21/2024 to document services personally performed by Leonel Rod MD based on my observations and the provider's statements to me.     Leonel Rod MD  12/21/24 [id number]

## 2024-12-22 ENCOUNTER — APPOINTMENT (OUTPATIENT)
Dept: CARDIOLOGY | Facility: CLINIC | Age: 69
End: 2024-12-22
Attending: INTERNAL MEDICINE
Payer: COMMERCIAL

## 2024-12-22 VITALS
DIASTOLIC BLOOD PRESSURE: 67 MMHG | WEIGHT: 90.39 LBS | SYSTOLIC BLOOD PRESSURE: 114 MMHG | RESPIRATION RATE: 18 BRPM | HEART RATE: 82 BPM | OXYGEN SATURATION: 92 % | TEMPERATURE: 97.6 F | BODY MASS INDEX: 13.35 KG/M2

## 2024-12-22 LAB
ANION GAP SERPL CALCULATED.3IONS-SCNC: 11 MMOL/L (ref 7–15)
BUN SERPL-MCNC: 17.7 MG/DL (ref 8–23)
CALCIUM SERPL-MCNC: 9 MG/DL (ref 8.8–10.4)
CHLORIDE SERPL-SCNC: 96 MMOL/L (ref 98–107)
CREAT SERPL-MCNC: 0.65 MG/DL (ref 0.67–1.17)
EGFRCR SERPLBLD CKD-EPI 2021: >90 ML/MIN/1.73M2
ERYTHROCYTE [DISTWIDTH] IN BLOOD BY AUTOMATED COUNT: 14.6 % (ref 10–15)
GLUCOSE SERPL-MCNC: 117 MG/DL (ref 70–99)
HCO3 SERPL-SCNC: 24 MMOL/L (ref 22–29)
HCT VFR BLD AUTO: 35.6 % (ref 40–53)
HGB BLD-MCNC: 11.9 G/DL (ref 13.3–17.7)
LVEF ECHO: NORMAL
MCH RBC QN AUTO: 29.3 PG (ref 26.5–33)
MCHC RBC AUTO-ENTMCNC: 33.4 G/DL (ref 31.5–36.5)
MCV RBC AUTO: 88 FL (ref 78–100)
PLATELET # BLD AUTO: 260 10E3/UL (ref 150–450)
POTASSIUM SERPL-SCNC: 4.9 MMOL/L (ref 3.4–5.3)
RBC # BLD AUTO: 4.06 10E6/UL (ref 4.4–5.9)
SODIUM SERPL-SCNC: 131 MMOL/L (ref 135–145)
WBC # BLD AUTO: 1.7 10E3/UL (ref 4–11)

## 2024-12-22 PROCEDURE — 999N000157 HC STATISTIC RCP TIME EA 10 MIN

## 2024-12-22 PROCEDURE — 93308 TTE F-UP OR LMTD: CPT | Mod: 26 | Performed by: INTERNAL MEDICINE

## 2024-12-22 PROCEDURE — 80048 BASIC METABOLIC PNL TOTAL CA: CPT | Performed by: INTERNAL MEDICINE

## 2024-12-22 PROCEDURE — G0378 HOSPITAL OBSERVATION PER HR: HCPCS

## 2024-12-22 PROCEDURE — 93321 DOPPLER ECHO F-UP/LMTD STD: CPT | Mod: 26 | Performed by: INTERNAL MEDICINE

## 2024-12-22 PROCEDURE — 36415 COLL VENOUS BLD VENIPUNCTURE: CPT | Performed by: INTERNAL MEDICINE

## 2024-12-22 PROCEDURE — 85014 HEMATOCRIT: CPT | Performed by: INTERNAL MEDICINE

## 2024-12-22 PROCEDURE — 250N000013 HC RX MED GY IP 250 OP 250 PS 637: Performed by: INTERNAL MEDICINE

## 2024-12-22 PROCEDURE — 85041 AUTOMATED RBC COUNT: CPT | Performed by: INTERNAL MEDICINE

## 2024-12-22 PROCEDURE — 250N000009 HC RX 250: Performed by: INTERNAL MEDICINE

## 2024-12-22 PROCEDURE — 82374 ASSAY BLOOD CARBON DIOXIDE: CPT | Performed by: INTERNAL MEDICINE

## 2024-12-22 PROCEDURE — 94640 AIRWAY INHALATION TREATMENT: CPT | Mod: 76

## 2024-12-22 PROCEDURE — 93325 DOPPLER ECHO COLOR FLOW MAPG: CPT

## 2024-12-22 PROCEDURE — 93325 DOPPLER ECHO COLOR FLOW MAPG: CPT | Mod: 26 | Performed by: INTERNAL MEDICINE

## 2024-12-22 PROCEDURE — 96376 TX/PRO/DX INJ SAME DRUG ADON: CPT

## 2024-12-22 PROCEDURE — 250N000011 HC RX IP 250 OP 636: Performed by: INTERNAL MEDICINE

## 2024-12-22 RX ORDER — VALACYCLOVIR HYDROCHLORIDE 1 G/1
1000 TABLET, FILM COATED ORAL 3 TIMES DAILY
Status: DISCONTINUED | OUTPATIENT
Start: 2024-12-22 | End: 2024-12-22 | Stop reason: HOSPADM

## 2024-12-22 RX ORDER — BUTALBITAL, ACETAMINOPHEN AND CAFFEINE 50; 325; 40 MG/1; MG/1; MG/1
1 TABLET ORAL EVERY 6 HOURS PRN
Status: DISCONTINUED | OUTPATIENT
Start: 2024-12-22 | End: 2024-12-22 | Stop reason: HOSPADM

## 2024-12-22 RX ADMIN — IPRATROPIUM BROMIDE AND ALBUTEROL SULFATE 3 ML: .5; 3 SOLUTION RESPIRATORY (INHALATION) at 11:46

## 2024-12-22 RX ADMIN — FLUTICASONE FUROATE AND VILANTEROL TRIFENATATE 1 PUFF: 200; 25 POWDER RESPIRATORY (INHALATION) at 09:22

## 2024-12-22 RX ADMIN — TAMSULOSIN HYDROCHLORIDE 0.4 MG: 0.4 CAPSULE ORAL at 09:23

## 2024-12-22 RX ADMIN — CHOLECALCIFEROL (VITAMIN D3) 10 MCG (400 UNIT) TABLET 400 UNITS: at 09:24

## 2024-12-22 RX ADMIN — GABAPENTIN 200 MG: 100 CAPSULE ORAL at 14:25

## 2024-12-22 RX ADMIN — GABAPENTIN 200 MG: 100 CAPSULE ORAL at 09:22

## 2024-12-22 RX ADMIN — METHYLPREDNISOLONE SODIUM SUCCINATE 37.5 MG: 125 INJECTION INTRAMUSCULAR; INTRAVENOUS at 04:23

## 2024-12-22 RX ADMIN — OXYCODONE HYDROCHLORIDE 5 MG: 5 TABLET ORAL at 09:24

## 2024-12-22 RX ADMIN — UMECLIDINIUM 1 PUFF: 62.5 AEROSOL, POWDER ORAL at 09:22

## 2024-12-22 RX ADMIN — IPRATROPIUM BROMIDE AND ALBUTEROL SULFATE 3 ML: .5; 3 SOLUTION RESPIRATORY (INHALATION) at 04:53

## 2024-12-22 RX ADMIN — VALACYCLOVIR HYDROCHLORIDE 1000 MG: 1 TABLET, FILM COATED ORAL at 14:25

## 2024-12-22 RX ADMIN — VALACYCLOVIR HYDROCHLORIDE 1000 MG: 1 TABLET, FILM COATED ORAL at 12:17

## 2024-12-22 RX ADMIN — METHYLPREDNISOLONE SODIUM SUCCINATE 37.5 MG: 125 INJECTION INTRAMUSCULAR; INTRAVENOUS at 12:14

## 2024-12-22 RX ADMIN — PANTOPRAZOLE SODIUM 40 MG: 40 TABLET, DELAYED RELEASE ORAL at 09:23

## 2024-12-22 RX ADMIN — LEVOFLOXACIN 750 MG: 750 TABLET, FILM COATED ORAL at 09:23

## 2024-12-22 RX ADMIN — ATORVASTATIN CALCIUM 40 MG: 40 TABLET, FILM COATED ORAL at 09:23

## 2024-12-22 RX ADMIN — BUTALBITAL, ACETAMINOPHEN AND CAFFEINE 1 TABLET: 50; 325; 40 TABLET ORAL at 14:25

## 2024-12-22 RX ADMIN — IPRATROPIUM BROMIDE AND ALBUTEROL SULFATE 3 ML: .5; 3 SOLUTION RESPIRATORY (INHALATION) at 15:10

## 2024-12-22 ASSESSMENT — ACTIVITIES OF DAILY LIVING (ADL)
ADLS_ACUITY_SCORE: 55
ADLS_ACUITY_SCORE: 53
ADLS_ACUITY_SCORE: 55
ADLS_ACUITY_SCORE: 50
ADLS_ACUITY_SCORE: 50
ADLS_ACUITY_SCORE: 55
ADLS_ACUITY_SCORE: 51
ADLS_ACUITY_SCORE: 55
ADLS_ACUITY_SCORE: 51
ADLS_ACUITY_SCORE: 51
ADLS_ACUITY_SCORE: 55
ADLS_ACUITY_SCORE: 51
ADLS_ACUITY_SCORE: 51
ADLS_ACUITY_SCORE: 53
ADLS_ACUITY_SCORE: 55
ADLS_ACUITY_SCORE: 51

## 2024-12-22 NOTE — PROVIDER NOTIFICATION
MD Notification    Notified Person: MD    Notified Person Name: Dr. Monterroso    Notification Date/Time: 12/21/24 3404    Notification Interaction: in person    Purpose of Notification: ok for pt to eat?    Orders Received: yes, ok for regular diet.

## 2024-12-22 NOTE — PROVIDER NOTIFICATION
MD Notification    Notified Person: MD    Notified Person Name: Dr. Monterroso    Notification Date/Time: 2208 12/21/24    Notification Interaction: [employer] messaging    Purpose of Notification: pt having intense 8/10 pain R hip/thigh from R inguinal hernia. Says Tylenol will not help. Can we do a dose of dilaudid or re-order his PTA oxycodone?    Orders Received: Md ordered oxycodone (see EMAR)

## 2024-12-22 NOTE — PROVIDER NOTIFICATION
MD Notification    Notified Person: MD    Notified Person Name:  Loc     Notification Date/Time: 12/22/2024 10:13 am     Notification Interaction: vocera     Purpose of Notification: Concern for disseminated shingles. ANS said MD needs to evaluate for us to transfer him to a negative airflow room. Are you able to come by and eval?    Orders Received: MD to eval soon    Comments:

## 2024-12-22 NOTE — ED NOTES
[facility]  ED Nurse Handoff Report    ED Chief complaint: Respiratory Distress      ED Diagnosis:   Final diagnoses:   COPD exacerbation (H)   Acute respiratory distress       Code Status: DNR / DNI per recent admission; admitting to confirm    Allergies:   Allergies   Allergen Reactions    Chantix [Varenicline] Other (See Comments)     Patient reports has tried this in past and had lightheadedness and nose bleeds.    Morphine Hcl     Wellbutrin [Bupropion Hydrobromide]      syncope       Patient Story: admitted last month but has been in ED handful of other times. Diagnosed with pneumonia and shingles. Today had sudden onset dyspnea. EMS arrived and found patient acutely distressed with RR 30s-40s but SpO2 stable. Placed on bipap, gave duonebs x2, and solumedrol.   Focused Assessment:  tachypneic, SOB/dyspneic, distressed/anxious, very frail appearing, shingles rash to R side  XR Chest Port 1 View   Final Result   IMPRESSION:       Advanced emphysema and related hyperinflation with flattening of diaphragmatic curvature. There are bilateral calcified lung nodules, sequela of prior granulomatous inflammation. Pleural-parenchymal scarring is present in both apices, left greater than    right. There are surgical resection staple lines in the right apex. There are linear opacities in the paradiaphragmatic lungs consistent with atelectasis. No new airspace opacities or focal lung volume loss.      Cardiac silhouette is not enlarged. Fairly diffuse great vessel, arch, and patchy descending aorta atheromatous calcifications.      No pleural effusion or pneumothorax.        Labs Ordered and Resulted from Time of ED Arrival to Time of ED Departure   BASIC METABOLIC PANEL - Abnormal       Result Value    Sodium 129 (*)     Potassium 5.0      Chloride 90 (*)     Carbon Dioxide (CO2) 27      Anion Gap 12      Urea Nitrogen 15.3      Creatinine 0.77      GFR Estimate >90      Calcium 9.6      Glucose 101 (*)     CBC WITH PLATELETS AND DIFFERENTIAL - Abnormal    WBC Count 5.9      RBC Count 4.26 (*)     Hemoglobin 12.7 (*)     Hematocrit 37.9 (*)     MCV 89      MCH 29.8      MCHC 33.5      RDW 14.6      Platelet Count 238      % Neutrophils 82      % Lymphocytes 8      % Monocytes 8      % Eosinophils 1      % Basophils 1      % Immature Granulocytes 1      NRBCs per 100 WBC 0      Absolute Neutrophils 4.9      Absolute Lymphocytes 0.5 (*)     Absolute Monocytes 0.5      Absolute Eosinophils 0.0      Absolute Basophils 0.0      Absolute Immature Granulocytes 0.0      Absolute NRBCs 0.0     ISTAT GASES LACTATE VENOUS POCT - Abnormal    Lactic Acid POCT 1.5      Bicarbonate Venous POCT 30 (*)     O2 Sat, Venous POCT 23 (*)     pCO2 Venous POCT 53 (*)     pH Venous POCT 7.36      pO2 Venous POCT 17 (*)     Base Excess/Deficit (+/-) POCT 4.0 (*)    INFLUENZA A/B, RSV AND SARS-COV2 PCR - Normal    Influenza A PCR Negative      Influenza B PCR Negative      RSV PCR Negative      SARS CoV2 PCR Negative     MAGNESIUM - Normal    Magnesium 2.0     TROPONIN T, HIGH SENSITIVITY - Normal    Troponin T, High Sensitivity 22     D DIMER QUANTITATIVE - Normal    D-Dimer Quantitative 0.44     BLOOD GAS VENOUS   TROPONIN T, HIGH SENSITIVITY   BLOOD CULTURE   BLOOD CULTURE         Treatments and/or interventions provided: bipap initially but only on 21% FiO2, weaned off to room air as patient kept stating wasn't working anyways  Medications   ipratropium - albuterol 0.5 mg/2.5 mg/3 mL (DUONEB) neb solution 3 mL (3 mLs Nebulization $Given 12/21/24 1600)   magnesium sulfate 2 g in 50 mL sterile water intermittent infusion (0 g Intravenous Stopped 12/21/24 1700)   ceFEPIme (MAXIPIME) 2 g vial to attach to  mL bag for ADULTS or NS 50 mL bag for PEDS (0 g Intravenous Stopped 12/21/24 1641)   midazolam (VERSED) injection 2 mg (2 mg Intravenous $Given 12/21/24 1607)   sodium chloride 0.9% BOLUS 1,000 mL (0 mLs Intravenous Stopped 12/21/24  1700)   azithromycin (ZITHROMAX) 500 mg vial to attach to  mL bag (0 mg Intravenous Stopped 12/21/24 1755)   fentaNYL (PF) (SUBLIMAZE) injection 50 mcg (50 mcg Intravenous $Given 12/21/24 1654)         Patient's response to treatments and/or interventions: very sensitive to medications (dropped BP after versed and fentanyl), patient significantly improved but remains anxious and uncomfortable    To be done/followed up on inpatient unit:  palliative consult?    Does this patient have any cognitive concerns?:  n/a    Activity level - Baseline/Home:  Independent  Activity Level - Current:   Stand with Assist    Patient's Preferred language: English   Needed?: No    Isolation: Contact   Infection: shingles  Patient tested for COVID 19 prior to admission: YES  Bariatric?: No    Vital Signs:   Vitals:    12/21/24 1725 12/21/24 1735 12/21/24 1745 12/21/24 1750   BP: 94/60 (!) 84/60 (!) 85/58 (!) 88/59   Pulse: 87 87 86 85   Resp: 18 13 13 12   Temp:       TempSrc:       SpO2: 91%  92% 94%   Weight:           Cardiac Rhythm: SR    Was the PSS-3 completed:   No -acuity of presentation  What interventions are required if any?               Family Comments: son at bedside in ED  OBS brochure/video discussed/provided to patient/family: No    For the majority of the shift this patient's behavior was green but anxious.   Behavioral interventions performed were rounding and reassurance.    ED NURSE PHONE NUMBER: [phone]

## 2024-12-22 NOTE — ED NOTES
Took of bipap and placed on room air. Patient respiratory status remains improved and unchanged after discontinuing bipap. Patient remains anxious and states isn't feeling air move but was making the same comments while on bipap

## 2024-12-22 NOTE — DISCHARGE SUMMARY
[facility]  Hospitalist Discharge Summary      Date of Admission:  12/21/2024  Date of Discharge:  12/22/2024  Discharging Provider: Antonio Monterroso MD  Discharge Service: Hospitalist Service    Discharge Diagnoses   Ángel Pham is a pleasant 69 year old male with history of COPD presenting with respiratory distress. EMS reports that earlier this afternoon, the patient experienced a sudden onset of dyspnea. Patient has received two duonebs and 125 mg solumedrol. Upon EMS arrival, his blood pressure was 119/86 and his heart rate was in the 90s. His O2 saturation is normal, but the patient is tachypneic on BIPAP. They explain that the patient was recently hospitalized for 4-5 days for a COPD exacerbation, and was placed on Valtrex for shingles.      Patient started feeling more short of breath yesterday and was seen at his PCP office and started on levaquin and prednisone.  Today, he had sudden worsening of SOB and EMS was called, patient received IV solumedrol and nebs and brought to [facility] ER in respiratory distress.  His PCOx 53, wbc normal, viral panel negative, normal WBC.  He was stable on 2L oxygen and eventually room air.  He is being admitted under observation status.     ## Acute respiratory failure  ## Acute COPD exacerbation  Admit patient to observation status  Duonebs QID  Continue levaquin and prednisone as prescribed by PCP  Wean oxygen off   Continue Trelegy inhaler or its equivalent  Follow up with PCP in 1 week     ## Hyponatremia  No symptoms  Repeat Na improved 129 >> 131       ## Mild troponin elevation  Trop 22 and 31  Likely demand, no longer  having any chest pain or sob     ## Shingles  Continue Valtrex 1000 mg TID     ## Hypertension  ## Hyperlipidemia  Continue Amlodipine  Resume Losartan  resume statin     ## GERD  Continue Protonix     ## BPH  Continue Flomax     Diet:  Regular  DVT Prophylaxis: Pneumatic Compression Devices  Gannon Catheter: Not  present  Lines: None     Cardiac Monitoring: None  Code Status:  Full        Clinically Significant Risk Factors Present on Admission         # Hyponatremia: Lowest Na = 129 mmol/L in last 2 days, will monitor as appropriate  # Hypochloremia: Lowest Cl = 90 mmol/L in last 2 days, will monitor as appropriate          # Hypertension: Noted on problem list                # Financial/Environmental Concerns:          Disposition Plan  -- home with daughter    Clinically Significant Risk Factors          Follow-ups Needed After Discharge   Follow-up Appointments       Hospital Follow-up with Existing Primary Care Provider (PCP)      Please see details below         Schedule Primary Care visit within: 7 Days           {Additional follow-up instructions/to-do's for PCP      Unresulted Labs Ordered in the Past 30 Days of this Admission       Date and Time Order Name Status Description    12/21/2024  3:53 PM Blood Culture Peripheral Blood Preliminary     12/21/2024  3:53 PM Blood Culture Peripheral Blood Preliminary         These results will be followed up by PCP    Discharge Disposition   Discharged to home  Condition at discharge: Stable    Hospital Course   Ángel Pham is a pleasant 69 year old male with history of COPD presenting with respiratory distress. EMS reports that earlier this afternoon, the patient experienced a sudden onset of dyspnea. Patient has received two duonebs and 125 mg solumedrol. Upon EMS arrival, his blood pressure was 119/86 and his heart rate was in the 90s. His O2 saturation is normal, but the patient is tachypneic on BIPAP. They explain that the patient was recently hospitalized for 4-5 days for a COPD exacerbation, and was placed on Valtrex for shingles.      Patient started feeling more short of breath yesterday and was seen at his PCP office and started on levaquin and prednisone.  Today, he had sudden worsening of SOB and EMS was called, patient received IV solumedrol and nebs and  brought to [facility] ER in respiratory distress.  His PCOx 53, wbc normal, viral panel negative, normal WBC.  He was stable on 2L oxygen and eventually room air.  He is being admitted under observation status.     ## Acute respiratory failure  ## Acute COPD exacerbation  Admit patient to observation status  Duonebs QID  IV solumedrol  Continue levaquin  Wean oxygen off   Continue Trelegy inhaler or its equivalent     ## Hyponatremia  No symptoms  Repeat in AM  Further labs if Na gets worse.     ## Mild troponin elevation  Trop 22 and 31  Will check TTE     ## Shingles  Continue Valtrex 1000 mg TID     ## Hypertension  ## Hyperlipidemia  Continue Amlodipine  Hold Losartan due to softer blood pressures  Hold statin     ## GERD  Continue Protonix     ## BPH  Continue Flomax     Diet:  Regular  DVT Prophylaxis: Pneumatic Compression Devices  Gannon Catheter: Not present  Lines: None     Cardiac Monitoring: None  Code Status:  Full        Clinically Significant Risk Factors Present on Admission         # Hyponatremia: Lowest Na = 129 mmol/L in last 2 days, will monitor as appropriate  # Hypochloremia: Lowest Cl = 90 mmol/L in last 2 days, will monitor as appropriate          # Hypertension: Noted on problem list                # Financial/Environmental Concerns:                  Disposition Plan    Consultations This Hospital Stay   CARE MANAGEMENT / SOCIAL WORK IP CONSULT    Code Status   No CPR- Do NOT Intubate    Time Spent on this Encounter   I, Antonio Monterroso MD, personally saw the patient today and spent greater than 30 minutes discharging this patient.       Antonio Monterroso MD  [facility] EXTENDED RECOVERY AND SHORT STAY  [address] [place] [id number]  Phone: [phone]  ______________________________________________________________________    Physical Exam   Vital Signs: Temp: 97.6  F (36.4  C) Temp src: Oral BP: 114/67 Pulse: 82   Resp: 18 SpO2: 97 % O2 Device: None (Room air)     Weight: 90 lbs 6.22 oz    GEN: NAD  PULM: CTA  CV: RRR  GI:   MS: Right thoracic shingles rash  EXT no edema       Primary Care Physician   Maxim Goodman    Discharge Orders      Reason for your hospital stay    Admit with acute respiratory distress and COPD exacerbation     Activity    Your activity upon discharge: activity as tolerated     Diet    Follow this diet upon discharge: Current Diet:Orders Placed This Encounter      Regular Diet Adult     Hospital Follow-up with Existing Primary Care Provider (PCP)    Please see details below            Significant Results and Procedures   Most Recent 3 CBC's:  Recent Labs   Lab Test 12/22/24  0658 12/21/24  1558 12/14/24  2104   WBC 1.7* 5.9 6.6   HGB 11.9* 12.7* 12.4*   MCV 88 89 88    238 269     Most Recent 3 BMP's:  Recent Labs   Lab Test 12/22/24  0658 12/21/24  1558 12/14/24  2104   * 129* 133*   POTASSIUM 4.9 5.0 4.3   CHLORIDE 96* 90* 96*   CO2 24 27 27   BUN 17.7 15.3 10.6   CR 0.65* 0.77 0.59*   ANIONGAP 11 12 10   MOSHE 9.0 9.6 9.2   * 101* 87   ,   Results for orders placed or performed during the hospital encounter of 12/21/24   XR Chest Port 1 View    Narrative    EXAM: XR CHEST PORT 1 VIEW  LOCATION: [facility]  DATE: 12/21/2024    INDICATION: SOB  COMPARISON: PA and lateral views of the chest 12/6/2024; CT of the chest without contrast 10/21/2024      Impression    IMPRESSION:     Advanced emphysema and related hyperinflation with flattening of diaphragmatic curvature. There are bilateral calcified lung nodules, sequela of prior granulomatous inflammation. Pleural-parenchymal scarring is present in both apices, left greater than   right. There are surgical resection staple lines in the right apex. There are linear opacities in the paradiaphragmatic lungs consistent with atelectasis. No new airspace opacities or focal lung volume loss.    Cardiac silhouette is not enlarged. Fairly diffuse great vessel,  arch, and patchy descending aorta atheromatous calcifications.    No pleural effusion or pneumothorax.       Discharge Medications   Current Discharge Medication List        CONTINUE these medications which have NOT CHANGED    Details   albuterol (PROAIR HFA/PROVENTIL HFA/VENTOLIN HFA) 108 (90 Base) MCG/ACT inhaler Inhale 2 puffs into the lungs every 4 hours as needed for wheezing, shortness of breath or cough.  Qty: 18 g, Refills: 11    Comments: Pharmacy may dispense brand covered by insurance (Proair, or proventil or ventolin or generic albuterol inhaler)  Associated Diagnoses: Pulmonary emphysema, unspecified emphysema type (H)      amLODIPine (NORVASC) 10 MG tablet TAKE 1 TABLET BY MOUTH DAILY  Qty: 100 tablet, Refills: 3    Comments: Please send a replace/new response with 100-Day Supply if appropriate to maximize member benefit. Requesting 1 year supply.  Associated Diagnoses: HTN, goal below 140/90      atorvastatin (LIPITOR) 40 MG tablet TAKE 1 TABLET BY MOUTH DAILY  Qty: 100 tablet, Refills: 3    Comments: Please send a replace/new response with 100-Day Supply if appropriate to maximize member benefit. Requesting 1 year supply.  Associated Diagnoses: Peripheral vascular disease (H)      butalbital-acetaminophen-caffeine (FIORICET/ESGIC) -40 MG per tablet TAKE 1 TABLET BY MOUTH EVERY 4-6 HOURS AS NEEDED  Refills: 0      docusate sodium (COLACE) 100 MG capsule Take 1 capsule (100 mg) by mouth 2 times daily for 14 days.  Qty: 28 capsule, Refills: 0      Fluticasone-Umeclidin-Vilanterol (TRELEGY ELLIPTA) 200-62.5-25 MCG/ACT oral inhaler Inhale 1 puff into the lungs daily.  Qty: 1 each, Refills: 5    Associated Diagnoses: Pulmonary emphysema, unspecified emphysema type (H)      gabapentin (NEURONTIN) 100 MG capsule Take 2 capsules (200 mg) by mouth 3 times daily.  Qty: 540 capsule, Refills: 0    Associated Diagnoses: Inguinal pain, right      ipratropium - albuterol 0.5 mg/2.5 mg/3 mL (DUONEB) 0.5-2.5 (3)  MG/3ML neb solution Take 1 vial (3 mLs) by nebulization 5 times daily.  Qty: 450 mL, Refills: 2    Comments: Increased frequency needed  Associated Diagnoses: Pulmonary emphysema, unspecified emphysema type (H)      levofloxacin (LEVAQUIN) 750 MG tablet Take 1 tablet (750 mg) by mouth daily.  Qty: 7 tablet, Refills: 0    Associated Diagnoses: COPD with acute exacerbation (H)      lidocaine (LMX4) 4 % external cream Apply topically 4 times daily as needed for mild pain.  Qty: 120 g, Refills: 0    Comments: Future refills by PCP Dr. Maxim Goodman with phone number [phone].  Associated Diagnoses: Inguinal pain, right      losartan (COZAAR) 100 MG tablet TAKE 1 TABLET BY MOUTH DAILY  Qty: 100 tablet, Refills: 3    Comments: Please send a replace/new response with 100-Day Supply if appropriate to maximize member benefit. Requesting 1 year supply.  Associated Diagnoses: HTN, goal below 140/90      mirtazapine (REMERON) 15 MG tablet TAKE 1 TABLET BY MOUTH AT BEDTIME  Qty: 60 tablet, Refills: 0    Associated Diagnoses: Anxiety      oxyCODONE (ROXICODONE) 5 MG tablet Take 0.5 tablets (2.5 mg) by mouth every 6 hours as needed for moderate pain.  Qty: 10 tablet, Refills: 0    Associated Diagnoses: Inguinal pain, right      pantoprazole (PROTONIX) 40 MG EC tablet Take 1 tablet (40 mg) by mouth every morning (before breakfast).  Qty: 30 tablet, Refills: 0    Comments: Future refills by PCP Dr. Maxim Goodman with phone number [phone].  Associated Diagnoses: Esophageal stricture      polyethylene glycol (MIRALAX) 17 GM/Dose powder Take 17 g (1 Capful) by mouth daily for 21 days.  Qty: 357 g, Refills: 0      predniSONE (DELTASONE) 20 MG tablet Take 1 tablet (20 mg) by mouth 2 times daily for 5 days.  Qty: 10 tablet, Refills: 0    Associated Diagnoses: COPD with acute exacerbation (H)      senna-docusate (SENOKOT-S/PERICOLACE) 8.6-50 MG tablet Take 3 tablets by mouth daily. Early afternoon.      tamsulosin (FLOMAX)  0.4 MG capsule Take 1 capsule (0.4 mg) by mouth daily.  Qty: 30 capsule, Refills: 0    Comments: Future refills by PCP Dr. Maxim Goodman with phone number [phone].  Associated Diagnoses: Urinary retention with incomplete bladder emptying      valACYclovir (VALTREX) 1000 mg tablet Take 1 tablet (1,000 mg) by mouth 3 times daily for 7 days.  Qty: 21 tablet, Refills: 0    Associated Diagnoses: Herpes zoster without complication      vitamin D3 (CHOLECALCIFEROL) 10 MCG (400 UNIT) capsule Take 1 capsule (400 Units) by mouth daily.  Qty: 30 capsule, Refills: 0    Comments: Future refills by PCP Dr. Maxim Goodman with phone number [phone].  Associated Diagnoses: Vitamin D deficiency           STOP taking these medications       magnesium hydroxide (MILK OF MAGNESIA) 400 MG/5ML suspension Comments:   Reason for Stopping:             Allergies   Allergies   Allergen Reactions    Chantix [Varenicline] Other (See Comments)     Patient reports has tried this in past and had lightheadedness and nose bleeds.    Morphine Hcl     Wellbutrin [Bupropion Hydrobromide]      syncope

## 2024-12-22 NOTE — PROGRESS NOTES
"Arrived to floor from ED around 2130. No acute events overnight, pt appeared to sleep well between cares. GRAHAM, \"this is the part that really gets me out of breath\" when walking back from the bathroom. O2 low-mid 90s after ambulating to and from bathroom. Loose, productive cough. Pt reports feeling improved from when he was admitted but not back to baseline. Shingles on chest (not open), wraps around onto back. Dime size open area on back, non-weeping     PRIMARY Concern: COPD exacerbation  SAFETY RISK Concerns (fall risk, behaviors, etc.): None      Aggression Tool Color: Green  Isolation/Type: Contact  Tests/Procedures for NEXT shift: Echo, AM labs  Consults? (Pending/following, signed-off?) N/A  Where is patient from? (Home, TCU, etc.): home  Other Important info for NEXT shift: Shingles on chest that wraps around back. Also has R inguinal that was investigated during previous hospitalization  Anticipated DC date & active delays: 1-2 days pending clinical progress  _____________________________________________________________________________  SUMMARY NOTE:  Orientation/Cognitive: A&Ox4  Observation Goals (Met/ Not Met): NOT MET  Mobility Level/Assist Equipment: Up SBA  Antibiotics & Plan (IV/po, length of tx left): PO zithromax  Pain Management: Oxycodone and tylenol for R groin/hip/inguinal hernia pain  Tele/VS/O2: VSS on RA,   ABNL Lab/BG: Trops 22, 31.NA: 129. Hgb: 12.9. AM labs pending  Diet: Reg, good appetite  Bowel/Bladder: Continent. Voiding adequately w/o difficulty. No BM this shift.   Skin Concerns:  Blanchable redness sacrum/coccyx. Dry skin extremities. Shingles on chest (not open), wraps around onto back. Dime size open area on back, non-weeping.   Drains/Devices: PIV x2 SL    "

## 2024-12-22 NOTE — PROGRESS NOTES
Admission/Transfer from: ED  2 RN skin assessment completed. Yes  Name of 2nd RN: Hannah Michelle  Significant findings include: Blanchable redness sacrum/coccyx. Dry skin extremities. Shingles on chest (not open), wraps around onto back. Dime size open area on back, non-weeping.    WOC Nurse Consult Ordered? No

## 2024-12-22 NOTE — PROGRESS NOTES
OBS GOALS    -diagnostic tests and consults completed and resulted: NOT MET    -vital signs normal or at patient baseline: MET    -tolerating oral intake to maintain hydration: MET    -adequate pain control on oral analgesics: MET    -dyspnea improved and O2 sats greater than 88% on room air or prior home oxygen levels: IN PROGRESS; GRAHAM. O2 stable on RA    -returns to baseline functional status: NOT MET    -safe disposition plan has been identified: NOT MET

## 2024-12-23 ENCOUNTER — TELEPHONE (OUTPATIENT)
Dept: FAMILY MEDICINE | Facility: CLINIC | Age: 69
End: 2024-12-23

## 2024-12-23 DIAGNOSIS — Z09 HOSPITAL DISCHARGE FOLLOW-UP: ICD-10-CM

## 2024-12-23 NOTE — TELEPHONE ENCOUNTER
"ED / Discharge Outreach Protocol    Patient Contact    Attempt # 1    Was call answered?  Yes.  \"May I please speak with <patient name>\"  Is patient available?   Yes  Spoke with daughter. Preferred contact. States her father is more back to base line with his COPD. Has not had an hyperventilation or out of the normal SOB today. Scheduled appointment with Dr. Stanley 12/26/24. Advised to call back if symptoms worsen or present to ER if we are not open due to the holiday.  "

## 2024-12-24 ENCOUNTER — OFFICE VISIT (OUTPATIENT)
Dept: FAMILY MEDICINE | Facility: CLINIC | Age: 69
End: 2024-12-24

## 2024-12-24 VITALS — SYSTOLIC BLOOD PRESSURE: 112 MMHG | HEART RATE: 87 BPM | DIASTOLIC BLOOD PRESSURE: 78 MMHG | OXYGEN SATURATION: 99 %

## 2024-12-24 DIAGNOSIS — B02.9 HERPES ZOSTER WITHOUT COMPLICATION: ICD-10-CM

## 2024-12-24 DIAGNOSIS — F41.9 ANXIETY: ICD-10-CM

## 2024-12-24 DIAGNOSIS — J44.9 COPD, SEVERE (H): Primary | ICD-10-CM

## 2024-12-24 RX ORDER — CLONAZEPAM 0.5 MG/1
0.5 TABLET ORAL 2 TIMES DAILY
Qty: 60 TABLET | Refills: 0 | Status: SHIPPED | OUTPATIENT
Start: 2024-12-24

## 2024-12-24 RX ORDER — MIRTAZAPINE 30 MG/1
30 TABLET, FILM COATED ORAL AT BEDTIME
Qty: 90 TABLET | Refills: 0 | Status: SHIPPED | OUTPATIENT
Start: 2024-12-24

## 2024-12-24 NOTE — PROGRESS NOTES
SUBJECTIVE:                                                    Ángel Pham is a 69 year old male who presents to clinic today for the following health issues:        Hospital Follow-up Visit:    Hospital/Nursing Home/IP Rehab Facility: [facility]  Date of Admission: 12/21  Date of Discharge: 12/22  Reason(s) for Admission: COPD, SOB            Problems taking medications regularly:  None       Medication changes since discharge: None       Problems adhering to non-medication therapy:  None    Summary of hospitalization:  [facility] discharge summary reviewed  Diagnostic Tests/Treatments reviewed.  Follow up needed: pulmonology  Other Healthcare Providers Involved in Patient s Care:          Pulm  Update since discharge: stable.     Post Discharge Medication Reconciliation: discharge medications reconciled and changed, per note/orders.  Plan of care communicated with patient and family     Coding guidelines for this visit:  Type of Medical   Decision Making Face-to-Face Visit       within 7 Days of discharge Face-to-Face Visit        within 14 days of discharge   Moderate Complexity [id number]   High Complexity [id number]            Subjective     Ángel is a 69 year old patient who presents to clinic for hospital follow up and reassessment.  Admitted with COPD exacerbation.  No changes were made and he was discharged.  He does not feel better.  He is struggling with mucus and ongoing breathing concerns.  He is doing duonebs 5 times per day and continued on trelegy.  He is on Levaquin.  He is having trouble swallowing valtrex for shingles.  Patient and daughter feel some of the breathing struggles are related to anxiety as he appears improved to some degree in the hospital when he knows there is support there.  He is on mirtazapine 15 mg QHS    Hyponatremia: stable        Review of Systems   Constitutional, HEENT, cardiovascular, pulmonary, GI, , musculoskeletal, neuro,  skin, endocrine and psych systems are negative, except as otherwise noted.      Objective    /78   Pulse 87   SpO2 99%     General: Frail, NAD  HEENT: clear  Heart: RRR, no murmur  Chest: distant breath sounds  Skin: Clear  Psych: normal mood and affect        COPD, severe (H)  Clinically appears stable but recurrent SOB and has severe underlying disease.  ?anxiety component  Complete levaquin  See Dr Garcia to discuss if Acetylcysteine could help at all with mucus  Trial of low dose clonazepam BID.  Thoroughly discussed potential risks and harms.  Close follow up and precautions discussed    Herpes zoster without complication  Switch to liquid as unable to swallow pills  - valACYclovir (VALTREX) 50 mg/mL SUSP; Take 20 mLs (1,000 mg) by mouth 3 times daily for 7 days.    Anxiety  See above  Also increase mirtazapine  - clonazePAM (KLONOPIN) 0.5 MG tablet; Take 1 tablet (0.5 mg) by mouth 2 times daily.  - mirtazapine (REMERON) 30 MG tablet; Take 1 tablet (30 mg) by mouth at bedtime.    Follow up in 1-2 weeks, sooner if worse    Patient is agreement with the assessment and plan as outlined above.  All questions answered.  Red flag symptoms that should prompt emergent evaluation discussed and understood.

## 2024-12-26 ENCOUNTER — TELEPHONE (OUTPATIENT)
Dept: FAMILY MEDICINE | Facility: CLINIC | Age: 69
End: 2024-12-26

## 2024-12-26 ENCOUNTER — APPOINTMENT (OUTPATIENT)
Dept: GENERAL RADIOLOGY | Facility: CLINIC | Age: 69
End: 2024-12-26
Attending: EMERGENCY MEDICINE
Payer: COMMERCIAL

## 2024-12-26 ENCOUNTER — HOSPITAL ENCOUNTER (EMERGENCY)
Facility: CLINIC | Age: 69
End: 2024-12-26
Attending: EMERGENCY MEDICINE
Payer: COMMERCIAL

## 2024-12-26 VITALS
TEMPERATURE: 97.6 F | BODY MASS INDEX: 13.29 KG/M2 | RESPIRATION RATE: 13 BRPM | SYSTOLIC BLOOD PRESSURE: 133 MMHG | HEART RATE: 80 BPM | WEIGHT: 90 LBS | DIASTOLIC BLOOD PRESSURE: 83 MMHG | OXYGEN SATURATION: 96 %

## 2024-12-26 DIAGNOSIS — B02.9 HERPES ZOSTER WITHOUT COMPLICATION: ICD-10-CM

## 2024-12-26 DIAGNOSIS — I73.9 PAD (PERIPHERAL ARTERY DISEASE): ICD-10-CM

## 2024-12-26 DIAGNOSIS — J44.9 COPD, SEVERE (H): ICD-10-CM

## 2024-12-26 DIAGNOSIS — J43.9 PULMONARY EMPHYSEMA, UNSPECIFIED EMPHYSEMA TYPE (H): ICD-10-CM

## 2024-12-26 DIAGNOSIS — E43 SEVERE PROTEIN-CALORIE MALNUTRITION: ICD-10-CM

## 2024-12-26 DIAGNOSIS — E87.1 HYPONATREMIA: ICD-10-CM

## 2024-12-26 DIAGNOSIS — M79.2 NEUROPATHIC PAIN: ICD-10-CM

## 2024-12-26 DIAGNOSIS — Z00.00 ENCOUNTER FOR MEDICARE ANNUAL WELLNESS EXAM: ICD-10-CM

## 2024-12-26 DIAGNOSIS — J44.1 COPD EXACERBATION (H): Primary | ICD-10-CM

## 2024-12-26 LAB
ANION GAP SERPL CALCULATED.3IONS-SCNC: 9 MMOL/L (ref 7–15)
ATRIAL RATE - MUSE: 90 BPM
BACTERIA BLD CULT: NO GROWTH
BACTERIA BLD CULT: NO GROWTH
BASE EXCESS BLDV CALC-SCNC: 4 MMOL/L (ref -3–3)
BASOPHILS # BLD AUTO: 0 10E3/UL (ref 0–0.2)
BASOPHILS NFR BLD AUTO: 0 %
BUN SERPL-MCNC: 19 MG/DL (ref 8–23)
CALCIUM SERPL-MCNC: 9.2 MG/DL (ref 8.8–10.4)
CHLORIDE SERPL-SCNC: 101 MMOL/L (ref 98–107)
CREAT SERPL-MCNC: 0.58 MG/DL (ref 0.67–1.17)
DIASTOLIC BLOOD PRESSURE - MUSE: NORMAL MMHG
EGFRCR SERPLBLD CKD-EPI 2021: >90 ML/MIN/1.73M2
EOSINOPHIL # BLD AUTO: 0 10E3/UL (ref 0–0.7)
EOSINOPHIL NFR BLD AUTO: 0 %
ERYTHROCYTE [DISTWIDTH] IN BLOOD BY AUTOMATED COUNT: 15.1 % (ref 10–15)
GLUCOSE SERPL-MCNC: 124 MG/DL (ref 70–99)
HCO3 BLDV-SCNC: 29 MMOL/L (ref 21–28)
HCO3 SERPL-SCNC: 30 MMOL/L (ref 22–29)
HCT VFR BLD AUTO: 35.6 % (ref 40–53)
HGB BLD-MCNC: 12.2 G/DL (ref 13.3–17.7)
HOLD SPECIMEN: NORMAL
HOLD SPECIMEN: NORMAL
IMM GRANULOCYTES # BLD: 0 10E3/UL
IMM GRANULOCYTES NFR BLD: 0 %
INTERPRETATION ECG - MUSE: NORMAL
LACTATE BLD-SCNC: 0.8 MMOL/L
LYMPHOCYTES # BLD AUTO: 0.8 10E3/UL (ref 0.8–5.3)
LYMPHOCYTES NFR BLD AUTO: 15 %
MCH RBC QN AUTO: 31 PG (ref 26.5–33)
MCHC RBC AUTO-ENTMCNC: 34.3 G/DL (ref 31.5–36.5)
MCV RBC AUTO: 90 FL (ref 78–100)
MONOCYTES # BLD AUTO: 0.5 10E3/UL (ref 0–1.3)
MONOCYTES NFR BLD AUTO: 10 %
NEUTROPHILS # BLD AUTO: 4 10E3/UL (ref 1.6–8.3)
NEUTROPHILS NFR BLD AUTO: 75 %
NRBC # BLD AUTO: 0 10E3/UL
NRBC BLD AUTO-RTO: 0 /100
P AXIS - MUSE: 88 DEGREES
PCO2 BLDV: 44 MM HG (ref 40–50)
PH BLDV: 7.43 [PH] (ref 7.32–7.43)
PLATELET # BLD AUTO: 248 10E3/UL (ref 150–450)
PO2 BLDV: 56 MM HG (ref 25–47)
POTASSIUM SERPL-SCNC: 3.9 MMOL/L (ref 3.4–5.3)
PR INTERVAL - MUSE: 146 MS
QRS DURATION - MUSE: 96 MS
QT - MUSE: 372 MS
QTC - MUSE: 455 MS
R AXIS - MUSE: 69 DEGREES
RBC # BLD AUTO: 3.94 10E6/UL (ref 4.4–5.9)
SAO2 % BLDV: 89 % (ref 70–75)
SODIUM SERPL-SCNC: 140 MMOL/L (ref 135–145)
SYSTOLIC BLOOD PRESSURE - MUSE: NORMAL MMHG
T AXIS - MUSE: 85 DEGREES
TROPONIN T SERPL HS-MCNC: 12 NG/L
TROPONIN T SERPL HS-MCNC: 12 NG/L
VENTRICULAR RATE- MUSE: 90 BPM
WBC # BLD AUTO: 5.4 10E3/UL (ref 4–11)

## 2024-12-26 PROCEDURE — 36415 COLL VENOUS BLD VENIPUNCTURE: CPT | Performed by: EMERGENCY MEDICINE

## 2024-12-26 PROCEDURE — 85025 COMPLETE CBC W/AUTO DIFF WBC: CPT | Performed by: EMERGENCY MEDICINE

## 2024-12-26 PROCEDURE — 80048 BASIC METABOLIC PNL TOTAL CA: CPT | Performed by: EMERGENCY MEDICINE

## 2024-12-26 PROCEDURE — 82803 BLOOD GASES ANY COMBINATION: CPT

## 2024-12-26 PROCEDURE — 83605 ASSAY OF LACTIC ACID: CPT

## 2024-12-26 PROCEDURE — 71046 X-RAY EXAM CHEST 2 VIEWS: CPT

## 2024-12-26 PROCEDURE — 84484 ASSAY OF TROPONIN QUANT: CPT | Performed by: EMERGENCY MEDICINE

## 2024-12-26 PROCEDURE — 99285 EMERGENCY DEPT VISIT HI MDM: CPT | Mod: 25

## 2024-12-26 PROCEDURE — 87637 SARSCOV2&INF A&B&RSV AMP PRB: CPT | Performed by: EMERGENCY MEDICINE

## 2024-12-26 PROCEDURE — 93005 ELECTROCARDIOGRAM TRACING: CPT

## 2024-12-26 ASSESSMENT — ACTIVITIES OF DAILY LIVING (ADL)
ADLS_ACUITY_SCORE: 59

## 2024-12-26 NOTE — TELEPHONE ENCOUNTER
Fax received from [facility] stating that they are unable to fill liquid valacyclovir due to supply issues. Called and spoke with patient who reports that he has been able to swallow #2 pills however it is very difficult and he would like to have liquid if possible. Prescription sent to [facility] to compound medication. Per patient request his daughter Aura was contact regarding this pharmacy change, voicemail message left with this information. Verónica Andrews

## 2024-12-26 NOTE — TELEPHONE ENCOUNTER
MTM referral from: Transitions of Care (recent hospital discharge, TCU discharge, or ED visit)    MTM referral outreach attempt #1 on December 26, 2024 at 11:14 AM      Outcome: Patient is not interested at this time because just met with pcp    Use [facility] med adv  for the carrier/Plan on the flowsheet          Emily YUNG    [phone]

## 2024-12-26 NOTE — ED TRIAGE NOTES
Pt seen several times for continuing pneumonia (currently on levofloxacin) and is weak and unable to care for self at home. Pt also has shingles rash on chest and back. Reports left sided chest pain that is worse with walking.      Triage Assessment (Adult)       Row Name 12/26/24 1749          Triage Assessment    Airway WDL WDL        Respiratory WDL    Respiratory WDL X        Skin Circulation/Temperature WDL    Skin Circulation/Temperature WDL WDL        Cardiac WDL    Cardiac WDL X;chest pain        Chest Pain Assessment    Chest Pain Intervention cardiac biomarkers drawn;12-lead ECG obtained        Peripheral/Neurovascular WDL    Peripheral Neurovascular WDL WDL        Cognitive/Neuro/Behavioral WDL    Cognitive/Neuro/Behavioral WDL WDL

## 2024-12-27 VITALS
DIASTOLIC BLOOD PRESSURE: 77 MMHG | SYSTOLIC BLOOD PRESSURE: 140 MMHG | OXYGEN SATURATION: 97 % | HEART RATE: 70 BPM | TEMPERATURE: 97.9 F | WEIGHT: 90 LBS | BODY MASS INDEX: 13.29 KG/M2 | RESPIRATION RATE: 14 BRPM

## 2024-12-27 LAB
ALBUMIN UR-MCNC: 30 MG/DL
APPEARANCE UR: CLEAR
BILIRUB UR QL STRIP: NEGATIVE
COLOR UR AUTO: YELLOW
FLUAV RNA SPEC QL NAA+PROBE: NEGATIVE
FLUBV RNA RESP QL NAA+PROBE: NEGATIVE
GLUCOSE UR STRIP-MCNC: NEGATIVE MG/DL
HGB UR QL STRIP: NEGATIVE
KETONES UR STRIP-MCNC: NEGATIVE MG/DL
LEUKOCYTE ESTERASE UR QL STRIP: NEGATIVE
MUCOUS THREADS #/AREA URNS LPF: PRESENT /LPF
NITRATE UR QL: NEGATIVE
PH UR STRIP: 6.5 [PH] (ref 5–7)
RBC URINE: 1 /HPF
RSV RNA SPEC NAA+PROBE: NEGATIVE
SARS-COV-2 RNA RESP QL NAA+PROBE: NEGATIVE
SP GR UR STRIP: 1.03 (ref 1–1.03)
SQUAMOUS EPITHELIAL: <1 /HPF
UROBILINOGEN UR STRIP-MCNC: NORMAL MG/DL
WBC URINE: 0 /HPF

## 2024-12-27 PROCEDURE — 250N000013 HC RX MED GY IP 250 OP 250 PS 637: Performed by: EMERGENCY MEDICINE

## 2024-12-27 PROCEDURE — 81001 URINALYSIS AUTO W/SCOPE: CPT | Performed by: EMERGENCY MEDICINE

## 2024-12-27 RX ORDER — GABAPENTIN 100 MG/1
200 CAPSULE ORAL 3 TIMES DAILY
Status: DISCONTINUED | OUTPATIENT
Start: 2024-12-27 | End: 2024-12-27 | Stop reason: HOSPADM

## 2024-12-27 RX ADMIN — OXYCODONE HYDROCHLORIDE 2.5 MG: 5 TABLET ORAL at 06:54

## 2024-12-27 RX ADMIN — GABAPENTIN 200 MG: 100 CAPSULE ORAL at 06:55

## 2024-12-27 ASSESSMENT — ACTIVITIES OF DAILY LIVING (ADL)
ADLS_ACUITY_SCORE: 59
DEPENDENT_IADLS:: INDEPENDENT
ADLS_ACUITY_SCORE: 59

## 2024-12-27 NOTE — CONSULTS
Care Management Discharge Note    Discharge Date:         Discharge Disposition: Home    Discharge Services: None    Discharge DME: None    Discharge Transportation: family or friend will provide    Private pay costs discussed: Not applicable    Does the patient's insurance plan have a 3 day qualifying hospital stay waiver?  No    PAS Confirmation Code:    Patient/family educated on Medicare website which has current facility and service quality ratings: no    Education Provided on the Discharge Plan: Yes  Persons Notified of Discharge Plans: Patient, patient's son (Tomy)  Patient/Family in Agreement with the Plan: yes    Handoff Referral Completed: Yes, non-[facility] PCP: External handoff communication completed    Additional Information:   reviewed patient chart and completed consult with patient and patient's son, Tomy, at bedside. Tomy stated that patient has had difficulty with daily cares at home due to pneumonia, and has had multiple visits to ED in last few weeks. Patient has needed more support at home, and patient's family either works full time or is out of state. Patient stated that he has anxiety which has been exacerbated by medical condition. Patient stated that he has recently had medication increased for anxiety and that he is still adjusting. Patient reported that he is feeling much better today and is willing to go home with outpatient resources. Writer spoke with physician and bedside nurse who confirmed patient is medically ready for discharge. Writer provided copy of Senior Housing Guide to look in to Assisted Living Facilities for more support as needed moving forward. Writer provided phone numbers for [facility] and [facility] in event that patient would like to seek outpatient mental health assistance. Patient's son Tomy confirmed that he can provide transportation home. Patient, patient's son, physician, bedside nurse, and  in agreement  with discharge plan.    CAROLYNE Moy  [facility]  Inpatient Care Management

## 2024-12-27 NOTE — ED NOTES
Patient given hot breakfast tray at this time. Patient sitting up, alert, and conversing. Son, Tomy at bedside. Waiting for social work.

## 2024-12-30 ENCOUNTER — TELEPHONE (OUTPATIENT)
Dept: FAMILY MEDICINE | Facility: CLINIC | Age: 69
End: 2024-12-30

## 2024-12-30 NOTE — TELEPHONE ENCOUNTER
"ED / Discharge Outreach Protocol    Patient Contact    Attempt # 1    Was call answered?  Yes.  \"May I please speak with <patient name>\"  Is patient available?   Yes  Spoke with daughter. States they went to ER because he was feeling weak. She advised against this as his new anti anxiety med may be causing it. Patient stopped taking meds and felt better. His sister is coming to the house today and he is going to try the anti anxiety meds again while under supervision. Patient scheduled 1/2/25 with Ivonne.  "

## 2025-01-02 ENCOUNTER — OFFICE VISIT (OUTPATIENT)
Dept: FAMILY MEDICINE | Facility: CLINIC | Age: 70
End: 2025-01-02

## 2025-01-02 VITALS — SYSTOLIC BLOOD PRESSURE: 108 MMHG | HEART RATE: 79 BPM | OXYGEN SATURATION: 98 % | DIASTOLIC BLOOD PRESSURE: 70 MMHG

## 2025-01-02 DIAGNOSIS — K59.00 CONSTIPATION, UNSPECIFIED CONSTIPATION TYPE: ICD-10-CM

## 2025-01-02 DIAGNOSIS — J43.9 PULMONARY EMPHYSEMA, UNSPECIFIED EMPHYSEMA TYPE (H): Primary | ICD-10-CM

## 2025-01-02 DIAGNOSIS — C34.12 MALIGNANT NEOPLASM OF UPPER LOBE, LEFT BRONCHUS OR LUNG (H): ICD-10-CM

## 2025-01-02 DIAGNOSIS — Z02.89 ENCOUNTER FOR COMPLETION OF FORM WITH PATIENT: ICD-10-CM

## 2025-01-02 DIAGNOSIS — F41.9 ANXIETY: ICD-10-CM

## 2025-01-02 DIAGNOSIS — F11.20 UNCOMPLICATED OPIOID DEPENDENCE (H): ICD-10-CM

## 2025-01-02 NOTE — LETTER
Formerly [facility]  01/02/25  Patient: Ángel Pham  YOB: 1955  Medical Record Number: [id number]                                                                                  Non-Opioid Controlled Substance Agreement    This is an agreement between you and your provider regarding safe and appropriate use of controlled substances prescribed by your care team. Controlled substances are?medicines that can cause physical and mental dependence (abuse).     There are strict laws about having and using these medicines. We here at [facility] are  committed to working with you in your efforts to get better. To support you in this work, we'll help you schedule regular office appointments for medicine refills. If we must cancel or change your appointment for any reason, we'll make sure you have enough medicine to last until your next appointment.     As a Provider, I will:   Listen carefully to your concerns while treating you with respect.   Recommend a treatment plan that I believe is in your best interest and may involve therapies other than medicine.    Talk with you often about the possible benefits and the risk of harm of any medicine that we prescribe for you.  Assess the safety of this medicine and check how well it works.    Provide a plan on how to taper (discontinue or go off) using this medicine if the decision is made to stop its use.      ::  As a Patient, I understand controlled substances:     Are prescribed by my care provider to help me function or work and manage my condition(s).?  Are strong medicines and can cause serious side effects.     Need to be taken exactly as prescribed.?Combining controlled substances with certain medicines or chemicals (such as illegal drugs, alcohol, sedatives, sleeping pills, and benzodiazepines) can be dangerous or even fatal.? If I stop taking my medicines suddenly, I may have severe withdrawal symptoms.     The risks, benefits, and side  effects of these medicine(s) were explained to me. I agree that:    I will take part in other treatments as advised by my care team. This may be psychiatry or counseling, physical therapy, behavioral therapy, group treatment or a referral to specialist.    I will keep all my appointments and understand this is part of the monitoring of controlled substances.?My care team may require an office visit for EVERY controlled substance refill. If I miss appointments or don t follow instructions, my care team may stop my medicine    I will take my medicines as prescribed. I will not change the dose or schedule unless my care team tells me to. There will be no refills if I run out early.      I may be asked to come to the clinic and complete a urine drug test or complete a pill count. If I don t give a urine sample or participate in a pill count, the care team may stop my medicine.    I will only receive controlled substance prescriptions from this clinic. If I am treated by another provider, I will tell them that I am taking controlled substances and that I have a treatment agreement with this provider. I will inform my [facility] care team within one business day if I am given a prescription for any controlled substance by another healthcare provider. My [facility] care team can contact other providers and pharmacists about my use of any medicines.    It is up to me to make sure that I don't run out of my medicines on weekends or holidays.?If my care team is willing to refill my prescription without a visit, I must request refills only during office hours. Refills may take up to 3 business days to process. I will use one pharmacy to fill all my controlled substance prescriptions. I will notify the clinic about any changes to my insurance or medicine availability.    I am responsible for my prescriptions. If the medicine/prescription is lost, stolen or destroyed, it will not be replaced.?I also agree not to  share controlled substance medicines with anyone.     I am aware I should not use any illegal or recreational drugs. I agree not to drink alcohol unless my care team says I can.     If I enroll in the [place] Medical Cannabis program, I will tell my care team before my next refill.    I will tell my care team right away if I become pregnant, have a new medical problem treated outside of my regular clinic, or have a change in my medicines.     I understand that this medicine can affect my thinking, judgment and reaction time.? Alcohol and drugs affect the brain and body, which can affect the safety of my driving. Being under the influence of alcohol or drugs can affect my decision-making, behaviors, personal safety and the safety of others. Driving while impaired (DWI) can occur if a person is driving, operating or in physical control of a car, motorcycle, boat, snowmobile, ATV, motorbike, off-road vehicle or any other motor vehicle ([place] Statute 169A.20). I understand the risk if I choose to drive or operate any vehicle or machinery.    I understand that if I do not follow any of the conditions above, my prescriptions or treatment may be stopped or changed.   I agree that my provider, clinic care team and pharmacy may work with any city, state or federal law enforcement agency that investigates the misuse, sale or other diversion of my controlled medicine. I will allow my provider to discuss my care with, or share a copy of, this agreement with any other treating provider, pharmacy or emergency room where I receive care.     I have read this agreement and have asked questions about anything I did not understand.    ________________________________________________________  Patient Signature - Ángel Pham     ___________________                   Date     ________________________________________________________  Provider Signature - ALY Rice CNP       ___________________                   Date      ________________________________________________________  Witness Signature (required if provider not present while patient signing)          ___________________                   Date

## 2025-01-02 NOTE — PROGRESS NOTES
Assessment & Plan     Pulmonary emphysema, unspecified emphysema type (H)  Recent ER visit for shortness of breath and generalized weakness. Patient believes likely a combination of taking Clonazepam with COPD. Will trial clonazepam as needed verse scheduled. Side effects reviewed. Discussed follow up with pulmonology, Dr Garcia- awaiting to hear back after recently missing appointment while in the ER. Will place social work referral to assist with question regarding living arrangements per continuation of ER discussions. Red flags that warrant emergent evaluation discussed. Follow up reviewed. Patient agreeable to plan. All questions answered.   - Primary Care - Care Coordination Referral    Malignant neoplasm of upper lobe, left bronchus or lung (H)  Continue surveillance and follow up with Dr Ann     Uncomplicated opioid dependence (H)  Taking Oxycodone as needed for pain per oncology. Stable/controlled. Continue current medication regimen and treatment plan.    Anxiety  Will trial clonazepam as needed verse scheduled. Side effects reviewed. CSA updated. Red flags that warrant emergent evaluation discussed. Follow up as needed for new or worsening symptoms or if symptoms fail to improve. Patient agreeable to plan. All questions answered.     Constipation, unspecified constipation type  Reviewed symptoms in detail. Discussed OTC symptomatic cares including adequate fluids, high-fiber foods, and prune juice. Reviewed if unable to tolerate fiber supplement, may consider trial of Miralax again. Red flags that warrant emergent evaluation discussed. Follow up reviewed. Patient and  agreeable to plan. All questions answered.      Encounter for completion of form with patient  Disability parking form completed with patient. All questions answered.           MED REC REQUIRED  Post Medication Reconciliation Status: discharge medications reconciled, continue medications without change    See Patient  Instructions    Return in 4 weeks (on 1/30/2025), or if symptoms worsen or fail to improve, for Follow up.    The longitudinal plan of care for the diagnosis(es)/condition(s) as documented were addressed during this visit. Due to the added complexity in care, I will continue to support Ángel in the subsequent management and with ongoing continuity of care.      Latasha Neal is a 69 year old, presenting for the following health issues:  ER F/U (COPD 12/26/EKG, Labs, Xray WNL), Recheck Medication (CSA due for Clonazepam ), and Form Request (Handicap parking sticker)    History of Present Illness       Reason for visit:  Hospital follow up    He eats 2-3 servings of fruits and vegetables daily.He consumes 0 sweetened beverage(s) daily.He exercises with enough effort to increase his heart rate 9 or less minutes per day.  He exercises with enough effort to increase his heart rate 3 or less days per week.   He is taking medications regularly.       ER follow up for COPD/Emphysema plus anxiety medication were too much for him. Recently started on low dose clonazepam BID. Did too much with new anxiety medication. Stopped taking it and could walk more on his own. Only took it for 4 total doses. Stopped. Wondering if they can just take it as needed. Mucus and phlegm that holds him back from breathing. Much more yellow right now. Breathing worse over the past 6 weeks. A week out from antibiotics it starts out again. Finished Levaquin 1 week ago. While in the ER had to miss Dr. Garcia with pulmonology's visit. Waiting to hear back. Will also discuss pulmonary rehab with pulmonology.     Constipation: Taking Senna with no improvement. Also miralax and milk of mag. Taking Oxycodone 5 mg daily and gabapentin TID.  Residual pain from radiation into groin. Haven't found the cause so treating the pain and taking more oxycodone for that pain as well.     : discussed options: nurse come in once a week. Looking for  evaluation to not be at home alone. Sister came up to stay with him to help out.     Oncology: Mass in left lung. Radiation completed. Smaller still there. Not growing. Just monitoring for now. Follows with thoracic oncologist every 6 months. Throat doctor every year as well.     BP Readings from Last 6 Encounters:   01/02/25 108/70   12/27/24 (!) 140/77   12/24/24 112/78   12/22/24 114/67   12/14/24 (!) 162/94   12/09/24 119/72        Review of Systems  Constitutional, HEENT, cardiovascular, pulmonary, GI, , musculoskeletal, neuro, skin, endocrine and psych systems are negative, except as otherwise noted.      Objective    /70   Pulse 79   SpO2 98%   There is no height or weight on file to calculate BMI.  Physical Exam   GENERAL: no distress, frail, and cachectic  RESP: decreased breath sounds throughout  CV: regular rate and rhythm, normal S1 S2, no S3 or S4, no murmur, click or rub, no peripheral edema  PSYCH: mentation appears normal, affect normal/bright          Signed Electronically by: ALY Rice CNP

## 2025-01-14 ENCOUNTER — MYC MEDICAL ADVICE (OUTPATIENT)
Dept: PULMONOLOGY | Facility: CLINIC | Age: 70
End: 2025-01-14
Payer: COMMERCIAL

## 2025-01-14 DIAGNOSIS — J43.9 PULMONARY EMPHYSEMA, UNSPECIFIED EMPHYSEMA TYPE (H): Primary | ICD-10-CM

## 2025-01-14 RX ORDER — PREDNISONE 20 MG/1
TABLET ORAL
Qty: 19 TABLET | Refills: 0 | Status: SHIPPED | OUTPATIENT
Start: 2025-01-14 | End: 2025-01-28

## 2025-01-14 NOTE — TELEPHONE ENCOUNTER
Patient last seen by Dr Cortes on 1/3/25. Started on chronic azithromycin and was recovering from COPD exacerbation. Continues to be symptomatic and has worsening sx. No appointment available to offer to patient. Pended Rx for prednisone taper and routed to Dr Cortes to review.    Elieser Mccallum RN

## 2025-01-23 ENCOUNTER — PATIENT OUTREACH (OUTPATIENT)
Dept: CARE COORDINATION | Facility: CLINIC | Age: 70
End: 2025-01-23
Payer: COMMERCIAL

## 2025-01-23 NOTE — PROGRESS NOTES
Clinic Care Coordination Contact  Follow Up Progress Note      Assessment: Call placed to pt's daughter (Aura), she shared that pt is doing about the same. Writer offered to make [place] [employer] referral now to request he be assessed for Alternative Care Waiver, Aura gave writer permission to make referral and she will notify pt. Aura stated that pt defers to her and prefers not to take calls. Explained wait times in [facility] are ~6 months.    Care Gaps:    Health Maintenance Due   Topic Date Due    URINE DRUG SCREEN  Never done    CONTROLLED SUBSTANCE AGREEMENT FOR CHRONIC PAIN MANAGEMENT  Never done    HEPATITIS A IMMUNIZATION (1 of 2 - Risk 2-dose series) Never done    ZOSTER IMMUNIZATION (1 of 2) Never done    COLORECTAL CANCER SCREENING  01/06/2016    RSV VACCINE (1 - Risk 60-74 years 1-dose series) Never done    LIPID  10/06/2022    DTAP/TDAP/TD IMMUNIZATION (2 - Td or Tdap) 06/26/2023    TSH W/FREE T4 REFLEX  10/24/2024    COVID-19 Vaccine (8 - 2024-25 season) 11/13/2024    PHQ-2 (once per calendar year)  01/01/2025       Care Plans  Care Plan: General       Problem: HP GENERAL PROBLEM       Goal: Increase care and support       Start Date: 1/3/2025 Expected End Date: 5/2/2025    This Visit's Progress: 10%    Priority: High    Note:     Barriers: Complex medical situation, income  Strengths: Supportive family  Patient expressed understanding of goal: yes  Action steps to achieve this goal:  1. I will participate in pulmonary rehab  2. I will apply for MA for long term care  3. I will increase services in the home and/or look into assisted living options                               Intervention/Education provided during outreach: [place] [employer] Assessment referral completed.     Outreach Frequency: monthly, more frequently as needed      Plan:   Care Coordinator will follow up in 1 month.    Emily Shah [facility]  Social Work Care Coordinator  Phone: [phone]

## 2025-02-04 ENCOUNTER — HOSPITAL ENCOUNTER (OUTPATIENT)
Dept: CARDIAC REHAB | Facility: CLINIC | Age: 70
Discharge: HOME | End: 2025-02-04
Attending: STUDENT IN AN ORGANIZED HEALTH CARE EDUCATION/TRAINING PROGRAM
Payer: COMMERCIAL

## 2025-02-04 PROCEDURE — 94625 PHY/QHP OP PULM RHB W/O MNTR: CPT

## 2025-02-11 ENCOUNTER — TELEPHONE (OUTPATIENT)
Dept: PULMONOLOGY | Facility: CLINIC | Age: 70
End: 2025-02-11
Payer: COMMERCIAL

## 2025-02-11 NOTE — TELEPHONE ENCOUNTER
PA Initiation    Medication: DUPIXENT 300 MG/2ML SC SOAJ  Insurance Company: [employer] Part D - Phone [phone] Fax [phone]  Pharmacy Filling the Rx: [facility] - [place], [place] - [address]  Filling Pharmacy Phone:    Filling Pharmacy Fax:    Start Date: 2/11/2025     [id number]

## 2025-02-11 NOTE — LETTER
"2025    To:   [facility]  [address]  [place] [address]    RE: Ángel Pham  [address] [facility] [address]  : 1955  MRN: [id number]  Policy #: [id number]  Case/Reference: [id number]    To Whom It May Concern,    I am writing on behalf of my patient, Ángel Pham to document the medical necessity of Dupixent for the treatment of Severe COPD. This letter provides information about the patient's medical history and diagnosis and a statement summarizing my treatment rationale.     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 Last seen 10/2024. He is prescribed [id number] (Trelegy), prn duonebs, and prn albuterol. He was admitted to [facility] - for COPD exacerbation, given levofloxacin and prednisone. Has been feeling \"terrible\" since hospitalization. Frequent cough w/ yellow-green sputum. Does have nocturnal cough. No orthopnea or PND. GRAHAM w/ minimal activity. Rare SOB at rest. No chest pain or tightness. Does hear wheezing. Using Trelegy. Using albuterol 4-5x/day, somewhat helpful. Using duonbes 5x/day, it is helpful. Did feel better on prednisone. Smoking less, 6-7cigs/day.       Treatment Rationale  Patient is on maximal COPD treatment with Trelegy, chronic azithromycin and as needed albuterol and Duonebs. Despite treatment plan patient continues to be symptomatic and has required multiple extended prednisone tapers. Previous prednisone prescriptions noted on 10/25/24, 24, 25 and 25.       Duration  Duration for Dupixent is expected to be on-going pending improvement in patient symptoms.       Summary  In summary, Dupixent is medically necessary for this patient s medical condition. Please call my office at [phone] if I can provide you with any additional information to approve my request. I look forward to receiving your timely " response and approval of this request.     Sincerely,    Rico Cortes MD

## 2025-02-18 NOTE — TELEPHONE ENCOUNTER
PRIOR AUTHORIZATION DENIED    Medication: DUPIXENT 300 MG/2ML SC SOAJ  Insurance Company: [employer] Part D - Phone [phone] Fax [phone]  Denial Date: 2/14/2025  Denial Reason(s):     Appeal Information:

## 2025-02-19 NOTE — TELEPHONE ENCOUNTER
PA denied. Requirements noted below      Patient has been using Trelegy, 2 recent ED visits in December 2024 for COPD exacerbation and multiple prednisone tapers. Routing message to Dr Cortes to advise on LMN.    Elieser Mccallum RN

## 2025-02-24 ENCOUNTER — OFFICE VISIT (OUTPATIENT)
Dept: FAMILY MEDICINE | Facility: CLINIC | Age: 70
End: 2025-02-24

## 2025-02-24 VITALS
WEIGHT: 100.8 LBS | BODY MASS INDEX: 14.89 KG/M2 | DIASTOLIC BLOOD PRESSURE: 69 MMHG | SYSTOLIC BLOOD PRESSURE: 114 MMHG | OXYGEN SATURATION: 84 % | HEART RATE: 91 BPM

## 2025-02-24 DIAGNOSIS — G89.29 OTHER CHRONIC PAIN: ICD-10-CM

## 2025-02-24 DIAGNOSIS — J43.9 PULMONARY EMPHYSEMA, UNSPECIFIED EMPHYSEMA TYPE (H): Primary | ICD-10-CM

## 2025-02-24 DIAGNOSIS — R10.31 INGUINAL PAIN, RIGHT: ICD-10-CM

## 2025-02-24 RX ORDER — OXYCODONE HCL 10 MG/1
10 TABLET, FILM COATED, EXTENDED RELEASE ORAL EVERY 12 HOURS
Qty: 60 TABLET | Refills: 0 | Status: SHIPPED | OUTPATIENT
Start: 2025-02-24

## 2025-02-24 RX ORDER — OXYCODONE HYDROCHLORIDE 5 MG/1
5 TABLET ORAL EVERY 6 HOURS PRN
Qty: 30 TABLET | Refills: 0 | Status: SHIPPED | OUTPATIENT
Start: 2025-02-24

## 2025-02-24 NOTE — PROGRESS NOTES
Answers submitted by the patient for this visit:  COPD (Chronic Obstructive Pulmonary Disease) Visit Questionnaire (Submitted on 2/18/2025)  Chief Complaint: Chronic problems general questions HPI Form  Current status of COPD symptom:: much worse  Status of fatigue and dyspnea with ambulation:: more than usual  Status of dyspnea:: more than usual  Increase or change in cough or sputum:: all the time  Have you noticed a change in your sputum/phlegm?: No  Have you experienced a recent fever?: No  Baseline ambulation without stopping to rest:: the length of 1-2 rooms  Number of flights of stairs without resting:: None  Have you had any Emergency Room, Urgent Care visits or a Hospital admission because of your COPD since your last office visit?: No  General Questionnaire (Submitted on 2/18/2025)  Chief Complaint: Chronic problems general questions HPI Form  What is the reason for your visit today? : Increased hernia pain and swollen feet and ankles  How many servings of fruits and vegetables do you eat daily?: 0-1  On average, how many sweetened beverages do you drink each day (Examples: soda, juice, sweet tea, etc.  Do NOT count diet or artificially sweetened beverages)?: 1  How many minutes a day do you exercise enough to make your heart beat faster?: 9 or less  How many days a week do you exercise enough to make your heart beat faster?: 3 or less  How many days per week do you miss taking your medication?: 0  Questionnaire about: Chronic problems general questions HPI Form (Submitted on 2/18/2025)  Chief Complaint: Chronic problems general questions HPI Form      Latasha Neal is a 69 year old patient who presents to clinic for follow up with his daughter.  He has severe COPD, worsening right groin pain not controlled with 30 mg of oxycodone daily, decreasing oxygen saturations off oxygen despite maximal COPD treatment followed by pulmonology Dr Cortes, severe protein calorie malnutrition and AISHWARYA mass that is  presumed lung cancer but not surgical candidate.  He is declining.  He and his daughter spoke at length and he feels ready for hospice.          Review of Systems   Constitutional, HEENT, cardiovascular, pulmonary, GI, , musculoskeletal, neuro, skin, endocrine and psych systems are negative, except as otherwise noted.      Objective    /69   Pulse 91   Wt 45.7 kg (100 lb 12.8 oz)   SpO2 (!) 84%   BMI 14.89 kg/m      General: Frail, mild dyspneic at rest, tearful  HEENT: clear  Heart: RRR, no murmur  Chest: distant breath sounds  Skin: Clear  MSK: 2+ pedal edema  Psych: normal mood and affect      Pulmonary emphysema, unspecified emphysema type (H)  Advanced severe COPD on maximal therapy, worsening function and dyspnea  Agree with hospice referral after thorough discussion of what this means and goals of care  - oxyCODONE (OXYCONTIN) 10 MG 12 hr tablet; Take 1 tablet (10 mg) by mouth every 12 hours.  - Hospice Referral; Future    Other chronic pain  Discussed with pharmacist and will start Oxycontin 10 BID and continue oxycodone 5 mg q6h prn with close follow up if not improved while awaiting hospice  - oxyCODONE (OXYCONTIN) 10 MG 12 hr tablet; Take 1 tablet (10 mg) by mouth every 12 hours.    Inguinal pain, right  See above  - oxyCODONE (ROXICODONE) 5 MG tablet; Take 1 tablet (5 mg) by mouth every 6 hours as needed for moderate to severe pain.

## 2025-02-24 NOTE — Clinical Note
Rico, please let me know if you have any concerns about my referral to hospice for Garcia.  Thanks for all of your help.  Maxim

## 2025-02-26 ENCOUNTER — TRANSFERRED RECORDS (OUTPATIENT)
Dept: FAMILY MEDICINE | Facility: CLINIC | Age: 70
End: 2025-02-26

## 2025-03-04 ENCOUNTER — MEDICAL CORRESPONDENCE (OUTPATIENT)
Dept: FAMILY MEDICINE | Facility: CLINIC | Age: 70
End: 2025-03-04

## 2025-03-26 NOTE — PROVIDER NOTIFICATION
MD Notification    Notified Person: MD    Notified Person Name: Antonio Monterroso MD    Notification Date/Time: 0755 7/31/22    Notification Interaction: Page    Purpose of Notification:  C/O hard to swallow/coughing sputum out. patient mentioned of taking Ventolin HFA every morning which helps per his words.     Orders Received: Not yet    
MD Notification    Notified Person: MD    Notified Person Name: Antonio Monterroso MD    Notification Date/Time: 1510 7/31/22    Notification Interaction: Page     Purpose of Notification: C/o of pain tylenol offer, Pt declined wanting something stronger, states of taking Oxycodone at home per his words     Orders Received: Order place for Oxycodone    
MD Notification    Notified Person: MD    Notified Person Name: Shaheed Alvarez     Notification Date/Time: 1420 7/31/22    Notification Interaction: [employer] messaging     Purpose of Notification: Lower Na+ 128    Orders Received: No    Comments: Look at the lab result Patient been having low Na+  
What Type Of Note Output Would You Prefer (Optional)?: Standard Output
What Is The Reason For Today's Visit?: Full Body Skin Examination
What Is The Reason For Today's Visit? (Being Monitored For X): concerning skin lesions on a periodic basis

## (undated) DEVICE — ENDO VALVE SUCTION BRONCH EVIS MAJ-209

## (undated) DEVICE — SYR 10ML SLIP TIP W/O NDL

## (undated) DEVICE — PEN MARKING SKIN W/LABELS 31145884

## (undated) DEVICE — SUCTION CANISTER MEDIVAC LINER 3000ML W/LID 65651-530

## (undated) DEVICE — SOL WATER IRRIG 1000ML BOTTLE 2F7114

## (undated) DEVICE — NDL 19GA 1.5"

## (undated) DEVICE — GOWN XLG DISP 9545

## (undated) DEVICE — TUBING SUCTION 12"X1/4" N612

## (undated) DEVICE — GLOVE PROTEXIS POWDER FREE SMT 7.5  2D72PT75X

## (undated) DEVICE — DRAPE SHEET REV FOLD 3/4 9349

## (undated) DEVICE — LUBRICANT INST KIT ENDO-LUBE 220-90

## (undated) DEVICE — ENDO NDL BX ASPIRATION EBUS 21GA VIZISHOT NA-201SX-4021

## (undated) DEVICE — LINEN TOWEL PACK X5 5464

## (undated) DEVICE — SPECIMEN CONTAINER URINE 90ML STERILE 75.1435.002

## (undated) DEVICE — TUBING SUCTION MEDI-VAC SOFT 3/16"X20' N520A

## (undated) DEVICE — SYR 30ML LL W/O NDL 302832

## (undated) DEVICE — SOL NACL 0.9% IRRIG 1000ML BOTTLE 2F7124

## (undated) DEVICE — SPONGE COTTONOID 1/2X3" 80-1407

## (undated) DEVICE — PACK SET-UP STD 9102

## (undated) DEVICE — CONNECTOR STOPCOCK 3 WAY MALE LL 2C6204

## (undated) DEVICE — BASIN SET MINOR DISP

## (undated) DEVICE — ENDO VALVE SYR NDL KIT ULTRASOUND BRONCH NA-201SX-4022-A

## (undated) DEVICE — SYR 10ML LL W/O NDL 302995

## (undated) DEVICE — SWAB PROCTO 16" NON-STERILE

## (undated) DEVICE — DRSG TELFA 3X8" 1238

## (undated) DEVICE — ENDO ULTRASOUND BRONCH BALLOON PRB COVER MAJ-1351

## (undated) DEVICE — SPONGE COTTONOID 1X3" 80-1408

## (undated) DEVICE — ANTIFOG SOLUTION W/FOAM PAD 31142527

## (undated) RX ORDER — LIDOCAINE HYDROCHLORIDE 10 MG/ML
INJECTION, SOLUTION EPIDURAL; INFILTRATION; INTRACAUDAL; PERINEURAL
Status: DISPENSED
Start: 2019-07-26

## (undated) RX ORDER — PROPOFOL 10 MG/ML
INJECTION, EMULSION INTRAVENOUS
Status: DISPENSED
Start: 2021-01-18

## (undated) RX ORDER — REMIFENTANIL HYDROCHLORIDE 1 MG/ML
INJECTION, POWDER, LYOPHILIZED, FOR SOLUTION INTRAVENOUS
Status: DISPENSED
Start: 2019-07-26

## (undated) RX ORDER — DEXAMETHASONE SODIUM PHOSPHATE 4 MG/ML
INJECTION, SOLUTION INTRA-ARTICULAR; INTRALESIONAL; INTRAMUSCULAR; INTRAVENOUS; SOFT TISSUE
Status: DISPENSED
Start: 2019-07-26

## (undated) RX ORDER — ONDANSETRON 2 MG/ML
INJECTION INTRAMUSCULAR; INTRAVENOUS
Status: DISPENSED
Start: 2019-07-26

## (undated) RX ORDER — PROPOFOL 10 MG/ML
INJECTION, EMULSION INTRAVENOUS
Status: DISPENSED
Start: 2019-07-26

## (undated) RX ORDER — GLYCOPYRROLATE 0.2 MG/ML
INJECTION, SOLUTION INTRAMUSCULAR; INTRAVENOUS
Status: DISPENSED
Start: 2019-07-26

## (undated) RX ORDER — TRIAMCINOLONE ACETONIDE 40 MG/ML
INJECTION, SUSPENSION INTRA-ARTICULAR; INTRAMUSCULAR
Status: DISPENSED
Start: 2019-07-26

## (undated) RX ORDER — FENTANYL CITRATE 50 UG/ML
INJECTION, SOLUTION INTRAMUSCULAR; INTRAVENOUS
Status: DISPENSED
Start: 2019-07-26

## (undated) RX ORDER — LIDOCAINE HYDROCHLORIDE 20 MG/ML
INJECTION, SOLUTION EPIDURAL; INFILTRATION; INTRACAUDAL; PERINEURAL
Status: DISPENSED
Start: 2019-07-26

## (undated) RX ORDER — LIDOCAINE HYDROCHLORIDE 20 MG/ML
INJECTION, SOLUTION EPIDURAL; INFILTRATION; INTRACAUDAL; PERINEURAL
Status: DISPENSED
Start: 2021-01-18

## (undated) RX ORDER — OXYMETAZOLINE HYDROCHLORIDE 0.05 G/100ML
SPRAY NASAL
Status: DISPENSED
Start: 2019-07-26

## (undated) RX ORDER — HYDROMORPHONE HYDROCHLORIDE 1 MG/ML
INJECTION, SOLUTION INTRAMUSCULAR; INTRAVENOUS; SUBCUTANEOUS
Status: DISPENSED
Start: 2019-07-26

## (undated) RX ORDER — FENTANYL CITRATE 50 UG/ML
INJECTION, SOLUTION INTRAMUSCULAR; INTRAVENOUS
Status: DISPENSED
Start: 2021-01-18

## (undated) RX ORDER — FENTANYL CITRATE 50 UG/ML
INJECTION, SOLUTION INTRAMUSCULAR; INTRAVENOUS
Status: DISPENSED
Start: 2024-11-21

## (undated) RX ORDER — HYDROCODONE BITARTRATE AND ACETAMINOPHEN 5; 325 MG/1; MG/1
TABLET ORAL
Status: DISPENSED
Start: 2019-07-26

## (undated) RX ORDER — ONDANSETRON 2 MG/ML
INJECTION INTRAMUSCULAR; INTRAVENOUS
Status: DISPENSED
Start: 2021-01-18

## (undated) RX ORDER — NEOSTIGMINE METHYLSULFATE 1 MG/ML
VIAL (ML) INJECTION
Status: DISPENSED
Start: 2019-07-26

## (undated) RX ORDER — DEXAMETHASONE SODIUM PHOSPHATE 4 MG/ML
INJECTION, SOLUTION INTRA-ARTICULAR; INTRALESIONAL; INTRAMUSCULAR; INTRAVENOUS; SOFT TISSUE
Status: DISPENSED
Start: 2021-01-18